# Patient Record
Sex: MALE | Race: OTHER | HISPANIC OR LATINO | URBAN - METROPOLITAN AREA
[De-identification: names, ages, dates, MRNs, and addresses within clinical notes are randomized per-mention and may not be internally consistent; named-entity substitution may affect disease eponyms.]

---

## 2023-07-31 ENCOUNTER — INPATIENT (INPATIENT)
Facility: HOSPITAL | Age: 63
LOS: 15 days | Discharge: HOME | DRG: 54 | End: 2023-08-16
Attending: HOSPITALIST | Admitting: HOSPITALIST
Payer: MEDICAID

## 2023-07-31 VITALS
SYSTOLIC BLOOD PRESSURE: 179 MMHG | OXYGEN SATURATION: 99 % | WEIGHT: 122.14 LBS | HEIGHT: 64.96 IN | RESPIRATION RATE: 18 BRPM | DIASTOLIC BLOOD PRESSURE: 90 MMHG | HEART RATE: 72 BPM | TEMPERATURE: 98 F

## 2023-07-31 DIAGNOSIS — I63.9 CEREBRAL INFARCTION, UNSPECIFIED: ICD-10-CM

## 2023-07-31 LAB
ALBUMIN SERPL ELPH-MCNC: 4 G/DL — SIGNIFICANT CHANGE UP (ref 3.3–5.2)
ALP SERPL-CCNC: 69 U/L — SIGNIFICANT CHANGE UP (ref 40–120)
ALT FLD-CCNC: 1179 U/L — HIGH
AMPHET UR-MCNC: NEGATIVE — SIGNIFICANT CHANGE UP
ANION GAP SERPL CALC-SCNC: 13 MMOL/L — SIGNIFICANT CHANGE UP (ref 5–17)
APTT BLD: 27.9 SEC — SIGNIFICANT CHANGE UP (ref 24.5–35.6)
AST SERPL-CCNC: 856 U/L — HIGH
BARBITURATES UR SCN-MCNC: NEGATIVE — SIGNIFICANT CHANGE UP
BASOPHILS # BLD AUTO: 0.01 K/UL — SIGNIFICANT CHANGE UP (ref 0–0.2)
BASOPHILS NFR BLD AUTO: 0.1 % — SIGNIFICANT CHANGE UP (ref 0–2)
BENZODIAZ UR-MCNC: NEGATIVE — SIGNIFICANT CHANGE UP
BILIRUB SERPL-MCNC: 0.4 MG/DL — SIGNIFICANT CHANGE UP (ref 0.4–2)
BUN SERPL-MCNC: 23.3 MG/DL — HIGH (ref 8–20)
CALCIUM SERPL-MCNC: 8.9 MG/DL — SIGNIFICANT CHANGE UP (ref 8.4–10.5)
CHLORIDE SERPL-SCNC: 98 MMOL/L — SIGNIFICANT CHANGE UP (ref 96–108)
CO2 SERPL-SCNC: 26 MMOL/L — SIGNIFICANT CHANGE UP (ref 22–29)
COCAINE METAB.OTHER UR-MCNC: POSITIVE
CREAT SERPL-MCNC: 1.34 MG/DL — HIGH (ref 0.5–1.3)
EGFR: 60 ML/MIN/1.73M2 — SIGNIFICANT CHANGE UP
EOSINOPHIL # BLD AUTO: 0 K/UL — SIGNIFICANT CHANGE UP (ref 0–0.5)
EOSINOPHIL NFR BLD AUTO: 0 % — SIGNIFICANT CHANGE UP (ref 0–6)
GLUCOSE BLDC GLUCOMTR-MCNC: 103 MG/DL — HIGH (ref 70–99)
GLUCOSE SERPL-MCNC: 110 MG/DL — HIGH (ref 70–99)
HCT VFR BLD CALC: 40.3 % — SIGNIFICANT CHANGE UP (ref 39–50)
HGB BLD-MCNC: 14 G/DL — SIGNIFICANT CHANGE UP (ref 13–17)
IMM GRANULOCYTES NFR BLD AUTO: 0.4 % — SIGNIFICANT CHANGE UP (ref 0–0.9)
INR BLD: 1.02 RATIO — SIGNIFICANT CHANGE UP (ref 0.85–1.18)
LYMPHOCYTES # BLD AUTO: 0.94 K/UL — LOW (ref 1–3.3)
LYMPHOCYTES # BLD AUTO: 7.6 % — LOW (ref 13–44)
MCHC RBC-ENTMCNC: 29.7 PG — SIGNIFICANT CHANGE UP (ref 27–34)
MCHC RBC-ENTMCNC: 34.7 GM/DL — SIGNIFICANT CHANGE UP (ref 32–36)
MCV RBC AUTO: 85.4 FL — SIGNIFICANT CHANGE UP (ref 80–100)
METHADONE UR-MCNC: NEGATIVE — SIGNIFICANT CHANGE UP
MONOCYTES # BLD AUTO: 0.99 K/UL — HIGH (ref 0–0.9)
MONOCYTES NFR BLD AUTO: 8 % — SIGNIFICANT CHANGE UP (ref 2–14)
NEUTROPHILS # BLD AUTO: 10.44 K/UL — HIGH (ref 1.8–7.4)
NEUTROPHILS NFR BLD AUTO: 83.9 % — HIGH (ref 43–77)
OPIATES UR-MCNC: NEGATIVE — SIGNIFICANT CHANGE UP
PCP SPEC-MCNC: SIGNIFICANT CHANGE UP
PCP UR-MCNC: NEGATIVE — SIGNIFICANT CHANGE UP
PLATELET # BLD AUTO: 162 K/UL — SIGNIFICANT CHANGE UP (ref 150–400)
POTASSIUM SERPL-MCNC: 3.8 MMOL/L — SIGNIFICANT CHANGE UP (ref 3.5–5.3)
POTASSIUM SERPL-SCNC: 3.8 MMOL/L — SIGNIFICANT CHANGE UP (ref 3.5–5.3)
PROT SERPL-MCNC: 6.6 G/DL — SIGNIFICANT CHANGE UP (ref 6.6–8.7)
PROTHROM AB SERPL-ACNC: 11.3 SEC — SIGNIFICANT CHANGE UP (ref 9.5–13)
RBC # BLD: 4.72 M/UL — SIGNIFICANT CHANGE UP (ref 4.2–5.8)
RBC # FLD: 12.9 % — SIGNIFICANT CHANGE UP (ref 10.3–14.5)
SODIUM SERPL-SCNC: 137 MMOL/L — SIGNIFICANT CHANGE UP (ref 135–145)
THC UR QL: POSITIVE
TROPONIN T SERPL-MCNC: 0.03 NG/ML — SIGNIFICANT CHANGE UP (ref 0–0.06)
WBC # BLD: 12.43 K/UL — HIGH (ref 3.8–10.5)
WBC # FLD AUTO: 12.43 K/UL — HIGH (ref 3.8–10.5)

## 2023-07-31 PROCEDURE — 99223 1ST HOSP IP/OBS HIGH 75: CPT

## 2023-07-31 PROCEDURE — 93010 ELECTROCARDIOGRAM REPORT: CPT

## 2023-07-31 PROCEDURE — 99285 EMERGENCY DEPT VISIT HI MDM: CPT

## 2023-07-31 PROCEDURE — 70450 CT HEAD/BRAIN W/O DYE: CPT | Mod: 26,MA

## 2023-07-31 RX ORDER — PANTOPRAZOLE SODIUM 20 MG/1
40 TABLET, DELAYED RELEASE ORAL
Refills: 0 | Status: DISCONTINUED | OUTPATIENT
Start: 2023-07-31 | End: 2023-08-16

## 2023-07-31 RX ORDER — LANOLIN ALCOHOL/MO/W.PET/CERES
3 CREAM (GRAM) TOPICAL AT BEDTIME
Refills: 0 | Status: DISCONTINUED | OUTPATIENT
Start: 2023-07-31 | End: 2023-08-04

## 2023-07-31 RX ORDER — ONDANSETRON 8 MG/1
4 TABLET, FILM COATED ORAL EVERY 8 HOURS
Refills: 0 | Status: DISCONTINUED | OUTPATIENT
Start: 2023-07-31 | End: 2023-08-16

## 2023-07-31 RX ORDER — THIAMINE MONONITRATE (VIT B1) 100 MG
100 TABLET ORAL DAILY
Refills: 0 | Status: COMPLETED | OUTPATIENT
Start: 2023-07-31 | End: 2023-08-03

## 2023-07-31 RX ORDER — SENNA PLUS 8.6 MG/1
2 TABLET ORAL AT BEDTIME
Refills: 0 | Status: DISCONTINUED | OUTPATIENT
Start: 2023-07-31 | End: 2023-08-10

## 2023-07-31 RX ORDER — HYDRALAZINE HCL 50 MG
5 TABLET ORAL EVERY 4 HOURS
Refills: 0 | Status: DISCONTINUED | OUTPATIENT
Start: 2023-07-31 | End: 2023-08-16

## 2023-07-31 RX ORDER — ATORVASTATIN CALCIUM 80 MG/1
80 TABLET, FILM COATED ORAL AT BEDTIME
Refills: 0 | Status: DISCONTINUED | OUTPATIENT
Start: 2023-07-31 | End: 2023-08-16

## 2023-07-31 RX ORDER — ASPIRIN/CALCIUM CARB/MAGNESIUM 324 MG
300 TABLET ORAL DAILY
Refills: 0 | Status: DISCONTINUED | OUTPATIENT
Start: 2023-07-31 | End: 2023-07-31

## 2023-07-31 RX ORDER — ASPIRIN/CALCIUM CARB/MAGNESIUM 324 MG
300 TABLET ORAL DAILY
Refills: 0 | Status: DISCONTINUED | OUTPATIENT
Start: 2023-07-31 | End: 2023-08-01

## 2023-07-31 RX ORDER — FOLIC ACID 0.8 MG
1 TABLET ORAL DAILY
Refills: 0 | Status: DISCONTINUED | OUTPATIENT
Start: 2023-07-31 | End: 2023-08-16

## 2023-07-31 RX ORDER — LABETALOL HCL 100 MG
10 TABLET ORAL
Refills: 0 | Status: DISCONTINUED | OUTPATIENT
Start: 2023-07-31 | End: 2023-08-16

## 2023-07-31 RX ORDER — ACETAMINOPHEN 500 MG
650 TABLET ORAL EVERY 6 HOURS
Refills: 0 | Status: DISCONTINUED | OUTPATIENT
Start: 2023-07-31 | End: 2023-08-16

## 2023-07-31 RX ORDER — HYDRALAZINE HCL 50 MG
10 TABLET ORAL EVERY 4 HOURS
Refills: 0 | Status: DISCONTINUED | OUTPATIENT
Start: 2023-07-31 | End: 2023-08-16

## 2023-07-31 RX ADMIN — Medication 300 MILLIGRAM(S): at 23:55

## 2023-07-31 RX ADMIN — Medication 2 MILLIGRAM(S): at 23:59

## 2023-07-31 RX ADMIN — ATORVASTATIN CALCIUM 80 MILLIGRAM(S): 80 TABLET, FILM COATED ORAL at 23:55

## 2023-07-31 NOTE — ED ADULT TRIAGE NOTE - CHIEF COMPLAINT QUOTE
pt c/o right sided weakness, right arm & right leg, started 8am yesterday, he was in bed all day, just staring  unable to walk, had weakness, couldn't move arms or legs  awake alert resp wnl

## 2023-07-31 NOTE — H&P ADULT - CLICK TO LAUNCH ORM
Signed and placed in TC/MA file.  485, please fax and send for abstracting.  Electronically signed by Yumiko Ralph M.D.    .

## 2023-07-31 NOTE — ED ADULT TRIAGE NOTE - TEMPERATURE IN FAHRENHEIT (DEGREES F)
- Chest pain free on admission.  EKG unrevealing.  Troponin negative x 1.  - Trend serial cardiac enzymes.  - Daily ASA 81 mg.  - Check FLP and add statin if warranted.  - Further recs pending clinical course.  
-Chest pain in patient reproducible to palpitation of chest wall   -Patient believes chest pain also related to anxiety attack. Recalls having a bid disagreement with her daughter prior to episode   -Patient had a fall last week and landed on chest and left arm  -Troponin negative x2  -EKG unrevealing  -Echo in Sept 2019-normal   -Recommend OP Cardiology follow up.   -Will be available PRN     
97.6

## 2023-07-31 NOTE — ED PROVIDER NOTE - OBJECTIVE STATEMENT
ED  Brian 62 y/o male hx cocaine abuse present with weakness to RUE/RLE with poor gait since 8am day prior. Per wife pt did some cocaine on saturday midday, went to check on him 8am yesterday and was making less sense, not moving right arm. has been off balance, given pedialyte and started making more sense when speaking. pt reports mild headache. no hx of prior. no cp/sob. no other complaints.

## 2023-07-31 NOTE — ED ADULT NURSE NOTE - OBJECTIVE STATEMENT
Pt present with weakness to RUE/RLE with poor gait since 8am day prior. Per wife pt did some cocaine on saturday midday, went to check on him 8am yesterday and was making less sense, not moving right arm. has been off balance, given pedialyte and started making more sense when speaking. pt reports mild headache. no hx of prior. no cp/sob. no other complaints.

## 2023-07-31 NOTE — ED ADULT NURSE REASSESSMENT NOTE - NS ED NURSE REASSESS COMMENT FT1
MD Bla[place]ein at bedside with  explaining plan of care and results to patient and family
Patient resting comfortably, rr even and unlabored, reports having right sided weakness, unable to lift right arm, weakness noted to right arm, able to feel equally on both sides. Normal sinus rhythm on cardiac monitor.
Patient resting, rr even and unlabored, vital signs stable, denies pain, aware of plan of care

## 2023-07-31 NOTE — CONSULT NOTE ADULT - SUBJECTIVE AND OBJECTIVE BOX
Patient is a 63 year old Male who presents with a chief complaint of R sided weakness     HISTORY OF PRESENT ILLNESS:   63M w/ no PMHX (has not seen PCP ever)       PAST MEDICAL & SURGICAL HISTORY:  No significant hx    FAMILY HISTORY:  Maternal uterine ca    SOCIAL HISTORY:  Tobacco Use: 1 cigarette a day per 40 years  EtOH use: 3 beers a day  Substance: Cocaine     Allergies  No Known Allergies    REVIEW OF SYSTEMS  Negative except as noted in HPI    HOME MEDICATIONS:    MEDICATIONS:  Antibiotics:  Neuro:  acetaminophen     Tablet .. 650 milliGRAM(s) Oral every 6 hours PRN  aspirin Suppository 300 milliGRAM(s) Rectal daily  melatonin 3 milliGRAM(s) Oral at bedtime PRN  ondansetron Injectable 4 milliGRAM(s) IV Push every 8 hours PRN  Anticoagulation:  OTHER:  aluminum hydroxide/magnesium hydroxide/simethicone Suspension 30 milliLiter(s) Oral every 4 hours PRN  atorvastatin 80 milliGRAM(s) Oral at bedtime  IVF:    Vital Signs Last 24 Hrs  T(C): 37 (31 Jul 2023 19:44), Max: 37.2 (31 Jul 2023 15:24)  T(F): 98.6 (31 Jul 2023 19:44), Max: 98.9 (31 Jul 2023 15:24)  HR: 69 (31 Jul 2023 19:44) (69 - 72)  BP: 167/95 (31 Jul 2023 19:44) (163/95 - 179/90)  BP(mean): --  RR: 18 (31 Jul 2023 19:44) (18 - 18)  SpO2: 97% (31 Jul 2023 19:44) (97% - 99%)  Parameters below as of 31 Jul 2023 19:44  Patient On (Oxygen Delivery Method): room air    PHYSICAL EXAM:  GENERAL: NAD, well-groomed, well-developed  HEAD: Atraumatic, normocephalic  ARIANE COMA SCORE: E-4 V-4 M-6 = 14  MENTAL STATUS: AAO x2 (summer but not year); Awake; Opens eyes spontaneously; conversant without aphasia; following simple commands  CRANIAL NERVES: Visual acuity normal for age, visual fields attempted but unable to follow. PERRL. EOMI without nystagmus. Facial sensation intact V1-3 distribution b/l. Face symmetric w/ normal eye closure and smile, tongue midline. Hearing grossly intact. Speech clear.   MOTOR: strength 5/5 LUE/LLE. RUE 0/5, no tone. RLE 4/5  SENSATION: dec on R side RUE>>RLE  SKIN: Warm, dry    LABS:             14.0   12.43 )-----------( 162      ( 31 Jul 2023 16:30 )             40.3     07-31    137  |  98  |  23.3<H>  ----------------------------<  110<H>  3.8   |  26.0  |  1.34<H>    Ca    8.9      31 Jul 2023 16:30    TPro  6.6  /  Alb  4.0  /  TBili  0.4  /  DBili  x   /  AST  856<H>  /  ALT  1179<H>  /  AlkPhos  69  07-31    PT/INR - ( 31 Jul 2023 16:30 )   PT: 11.3 sec;   INR: 1.02 ratio       PTT - ( 31 Jul 2023 16:30 )  PTT:27.9 sec  Urinalysis Basic - ( 31 Jul 2023 16:30 )    Color: x / Appearance: x / SG: x / pH: x  Gluc: 110 mg/dL / Ketone: x  / Bili: x / Urobili: x   Blood: x / Protein: x / Nitrite: x   Leuk Esterase: x / RBC: x / WBC x   Sq Epi: x / Non Sq Epi: x / Bacteria: x      CULTURES:          RADIOLOGY & ADDITIONAL STUDIES:  CT Head No Cont (07.31.23 @ 19:13)  IMPRESSION:  1. Findings suggestive of a age indeterminate left MCA territory and   right occipital (PCA territory) infarcts..  2. Left posterior parietal intraparenchymal hemorrhage measuring   approximately 2.1 cm in greatest diameter, with surrounding edema.   Smaller eft inferior frontal hemorrhagic lesion with surrounding edema,   small left posterior temporal hemorrhagic lesion with surrounding edema   and small right temporal hemorrhagic lesion with surrounding edema. Small   foci of abnormal signal are also appreciated bilaterally within the   cerebellar regions.Findings are concerning for presence of underlying   hemorrhagic mass lesions, possibly metastatic in etiology. Possibility of   mycotic aneurysm should also be considered in the differential diagnosis.   Consider follow-up pre and postcontrast MR imaging of the brain, to   include DWI imaging and ADC mapping techniques. MRA of the [place] of   [place] may also prove useful.  3. Punctate focus of calcification in a left occipital location without   surrounding edema, may represent prior infectious/inflammatory process   versus nonacute hemorrhagic event.     Patient is a 63 year old Male who presents with a chief complaint of R sided weakness     HISTORY OF PRESENT ILLNESS:   63M w/ no PMHX (has not seen PCP ever) daily ETOH~3 beers, current smoker 1 cigarette >40 years, +cocaine, BIB sister after noticed R sided weakness since sunday, unable to ambulate on his own w/o leaning. Patient used cocaine saturday and woke up sunday w/ the R sided weakness. Came to Ed after was unable to get up. C/o of blurry vision, weakness and dec sensation on RUE/RLE.     PAST MEDICAL & SURGICAL HISTORY:  No significant hx    FAMILY HISTORY:  Maternal uterine ca    SOCIAL HISTORY:  Tobacco Use: 1 cigarette a day per 40 years  EtOH use: 3 beers a day  Substance: Cocaine     Allergies  No Known Allergies    REVIEW OF SYSTEMS  Negative except as noted in HPI    HOME MEDICATIONS:    MEDICATIONS:  Antibiotics:  Neuro:  acetaminophen     Tablet .. 650 milliGRAM(s) Oral every 6 hours PRN  aspirin Suppository 300 milliGRAM(s) Rectal daily  melatonin 3 milliGRAM(s) Oral at bedtime PRN  ondansetron Injectable 4 milliGRAM(s) IV Push every 8 hours PRN  Anticoagulation:  OTHER:  aluminum hydroxide/magnesium hydroxide/simethicone Suspension 30 milliLiter(s) Oral every 4 hours PRN  atorvastatin 80 milliGRAM(s) Oral at bedtime  IVF:    Vital Signs Last 24 Hrs  T(C): 37 (31 Jul 2023 19:44), Max: 37.2 (31 Jul 2023 15:24)  T(F): 98.6 (31 Jul 2023 19:44), Max: 98.9 (31 Jul 2023 15:24)  HR: 69 (31 Jul 2023 19:44) (69 - 72)  BP: 167/95 (31 Jul 2023 19:44) (163/95 - 179/90)  BP(mean): --  RR: 18 (31 Jul 2023 19:44) (18 - 18)  SpO2: 97% (31 Jul 2023 19:44) (97% - 99%)  Parameters below as of 31 Jul 2023 19:44  Patient On (Oxygen Delivery Method): room air    PHYSICAL EXAM:  GENERAL: NAD, well-groomed, well-developed  HEAD: Atraumatic, normocephalic  ARIANE COMA SCORE: E-4 V-4 M-6 = 14  MENTAL STATUS: AAO x2 (summer but not year); Awake; Opens eyes spontaneously; conversant without aphasia; following simple commands  CRANIAL NERVES: Visual acuity normal for age, visual fields attempted but unable to follow. PERRL. EOMI without nystagmus. Facial sensation intact V1-3 distribution b/l. Face symmetric w/ normal eye closure and smile, tongue midline. Hearing grossly intact. Speech clear.   MOTOR: strength 5/5 LUE/LLE. RUE 0/5, no tone. RLE 4/5  SENSATION: dec on R side RUE>>RLE  SKIN: Warm, dry    LABS:             14.0   12.43 )-----------( 162      ( 31 Jul 2023 16:30 )             40.3     07-31    137  |  98  |  23.3<H>  ----------------------------<  110<H>  3.8   |  26.0  |  1.34<H>    Ca    8.9      31 Jul 2023 16:30    TPro  6.6  /  Alb  4.0  /  TBili  0.4  /  DBili  x   /  AST  856<H>  /  ALT  1179<H>  /  AlkPhos  69  07-31    PT/INR - ( 31 Jul 2023 16:30 )   PT: 11.3 sec;   INR: 1.02 ratio       PTT - ( 31 Jul 2023 16:30 )  PTT:27.9 sec  Urinalysis Basic - ( 31 Jul 2023 16:30 )    Color: x / Appearance: x / SG: x / pH: x  Gluc: 110 mg/dL / Ketone: x  / Bili: x / Urobili: x   Blood: x / Protein: x / Nitrite: x   Leuk Esterase: x / RBC: x / WBC x   Sq Epi: x / Non Sq Epi: x / Bacteria: x      CULTURES:          RADIOLOGY & ADDITIONAL STUDIES:  CT Head No Cont (07.31.23 @ 19:13)  IMPRESSION:  1. Findings suggestive of a age indeterminate left MCA territory and   right occipital (PCA territory) infarcts..  2. Left posterior parietal intraparenchymal hemorrhage measuring   approximately 2.1 cm in greatest diameter, with surrounding edema.   Smaller eft inferior frontal hemorrhagic lesion with surrounding edema,   small left posterior temporal hemorrhagic lesion with surrounding edema   and small right temporal hemorrhagic lesion with surrounding edema. Small   foci of abnormal signal are also appreciated bilaterally within the   cerebellar regions.Findings are concerning for presence of underlying   hemorrhagic mass lesions, possibly metastatic in etiology. Possibility of   mycotic aneurysm should also be considered in the differential diagnosis.   Consider follow-up pre and postcontrast MR imaging of the brain, to   include DWI imaging and ADC mapping techniques. MRA of the [place] of   [place] may also prove useful.  3. Punctate focus of calcification in a left occipital location without   surrounding edema, may represent prior infectious/inflammatory process   versus nonacute hemorrhagic event.

## 2023-07-31 NOTE — H&P ADULT - HISTORY OF PRESENT ILLNESS
63 year old  male presents to [facility] ER for weakness of Right arm and leg which began >2 days ago.  Pt also noted speech slurring.  Further questioning of pt. positive for visual disturbances for the past 2 months.  Pt unable to describe visual field defects/loss but noted positive for photophobia.  Pt. also c/o pain, hot in quality" in both right leg/arm, with need for assistance for ambulation by sister.  No h/o falls.  Pt note for h/o ?HTN in past, does not see a PMD on any regular basis.  + cocaine and THC use, as well as Alcohol abuse (daily) stopped yesterday  CT of head done by ER shows age indeterminate L MCA territory, Right occipital PCA territory infarct as well as scattered hemorrhagic lesions with surrounding edema in frontal/posterior/temporal areas.   Concern for underlying hemorrhagic mass lesions probably metastatic in origin cannot be excluded.  Pt admitted for further workup    Hospital  [facility] utilized for history and physical examination

## 2023-07-31 NOTE — ED PROVIDER NOTE - PHYSICAL EXAMINATION
Gen: No acute distress, non toxic  HEENT: Mucous membranes moist, pink conjunctivae, EOMI  CV: RRR, nl s1/s2.  Resp: CTAB, normal rate and effort  GI: Abdomen soft, NT, ND. No rebound, no guarding  : No CVAT  Neuro: A&O x 3, right arm without movement. right leg able to lift off bed but weakner. decreasd sensation right side. cn 2-12 wnl. nl speech.   MSK: No spine or joint tenderness to palpation. equal pulses b/l extremities.   Skin: No rashes. intact and perfused.

## 2023-07-31 NOTE — ED ADULT NURSE NOTE - NSFALLHARMRISKINTERV_ED_ALL_ED

## 2023-07-31 NOTE — H&P ADULT - TIME BILLING
ED course, reviewing labs/imaging studies, discussing case with ED attending, examining patient, furnishing H&P, orders, doing medication reconciliation, discussing plan of care with Patient/PA and answering all their questions.

## 2023-07-31 NOTE — ED PROVIDER NOTE - CLINICAL SUMMARY MEDICAL DECISION MAKING FREE TEXT BOX
64 y/o male hx drug abuse present with >30 hours of RUE/RLE weakness with poor gait and decreased sensation in setting of recent cocaine. no cp/sob. no a/c. nsr. labs, ct, suspect likely completed stroke, ct, neuro consult and admit.

## 2023-07-31 NOTE — ED PROVIDER NOTE - PROGRESS NOTE DETAILS
Called by Radiology regarding CT results.  Neurosurgery consult called and case d/w Hospitalist and will admit to Step down for q2h neuro check and of brain MR brain with/out contrast for metastatic ds work-up.  Pt and his sister informed of results and need for admission for further w/u

## 2023-07-31 NOTE — CONSULT NOTE ADULT - ASSESSMENT
63M daily ETOH, +cocaine, not seen PCP presents w/ multiple brain lesions, CVA      Plan  - Imaging reviewed  - Medicine admission for mets workup  - MRI Brain w/w/o contrast stereotactic brain lab protocol  - Repeat CTH in 6 hours to assess stability of hemorrhagic lesion  - Normotension  - Keppra  - CIWA  - b/l SCDs; recommend holding blood thinners however in setting of acute CVA, risk/benefit ratio to be determined by primary team given risk of possible worsening hemorrhage.   - Medical management/supportive care per primary team  - D/w Dr. Valdivia

## 2023-07-31 NOTE — H&P ADULT - NSHPPHYSICALEXAM_GEN_ALL_CORE
Pt is alert, oriented to person, place, time and situation in NAD  Pt supine on stretcher, monitor in place speaking in full sentences but some dysarthria noted  Head- NCAT  Eyes- PERRLA, EOMI anicteric;   Nares- clear bilaterally without discharge   Pharynx- clear without exudate, lesion or erythema; Uvula midline and symmetrical elevation  Neck- nontender, without lymphadenopathy   Thyroid not appreciated  Thorax nontender without bony deformity  Lungs- clear bilaterally to auscultation  Heart- s1s2 heard, RRR no murmur appreciated  Abdomen- soft, nontender +bowel sounds all quadrants; no appreciated masses or organomegaly  Genitalia- pt declined  Extremities- Loss of active motion RUE, 0/5; RLE 4/5; left side 5/5 upper and lower extremity                          peripheral pulses 2+bilaterally without cyanosis, clubbing or edema; capillary refill <2 seconds all limbs  Neuro- CN II-XII grossly intact except for dysarthria; unable to assess gait                NIH score 6  Psychiatric A+O with appropriate thought processes  Vital Signs Last 24 Hrs  T(C): 37 (31 Jul 2023 19:44), Max: 37.2 (31 Jul 2023 15:24)  T(F): 98.6 (31 Jul 2023 19:44), Max: 98.9 (31 Jul 2023 15:24)  HR: 69 (31 Jul 2023 19:44) (69 - 72)  BP: 167/95 (31 Jul 2023 19:44) (163/95 - 179/90)  BP(mean): --  RR: 18 (31 Jul 2023 19:44) (18 - 18)  SpO2: 97% (31 Jul 2023 19:44) (97% - 99%)    Parameters below as of 31 Jul 2023 19:44  Patient On (Oxygen Delivery Method): room air

## 2023-07-31 NOTE — H&P ADULT - ASSESSMENT
# CVA/ Brain masses  -head CT done as noted above  -MRI brain w/wo contrast pending  -admit to step down unit  -neuro-checks Q2h  -SBP control- hydralazine 5mg i.v. push Q6h for SBP >140; hydralazine 10mg i.v. push Q6H for SBP >160 for desired SBP goal 140mmHg or less  a.m. labs- coags/cbc/bmp/lipid profile and HgA1c  -neurology consultation   -Telemetry   -OOB w/assistance  - dysphagia screen completed and passed- S&S evaluation   -PT/OT evaluation  -PM&R evaluation     #ETOH abuse/substance abuse  -Utox positive for cocaine/THC  -CIWA protocol  -SW consult    DVT prophylaxis   -SCD- hold off on chemical agents due to presence of hemorrhagic components seen on head CT 64 y/o male with hx of Cocaine/THC use, now with confusion and CTH showing possible hemorrhagic metastatic lesions vs CVA with hemorrhagic conversion, elevated transaminases    # CVA w/ hemorrhagic conversion vs Mets with hemorrhage vs primary CNS neoplasm  -Head CT done as noted above  -Repeat CT in 6 hours to assess for interval change   -MRI brain w/wo contrast pending  -admit to step down unit  -neuro-checks Q2h Vitals Q 2  -SBP control- hydralazine 5mg i.v. push Q6h for SBP >140; hydralazine 10mg i.v. push Q6H for SBP >160 for desired SBP goal 140mmHg or less  a.m. labs- coags/cbc/bmp/lipid profile and HgA1c  -neurology consultation   -ICH order set  -Hold aspirin with ICH on CT  -If MRI/MRA showing concern for malignancy over CVA will order CT chest /abd/pelvis to assess for occult malignancy   -Keppra per neuro Sx.  -Telemetry   -OOB w/assistance  -Fall/seizure precautions   -passed dysphagia screen  -PT/OT evaluation  -PM&R evaluation   -VC boots no AC with ICH    #ETOH abuse/substance abuse  -Utox positive for cocaine/THC  -CIWA protocol with Ativan taper and PRN for CIWA >8  -FA/MVI/Thiamine for possible thiamine deficiency  -Seizure/fall risk protocol   -SW consult for eventual SATP referral   -Utox as above     #Elevated transaminases:  -Etoh ?  -Cocaine induced from vasospasm?  -Check viral hepatitis panel  -RUQ sono  -Repeat CMP in AM    DVT prophylaxis   -SCD- hold off on chemical agents due to presence of hemorrhagic components seen on head CT    Discussed with ED staff, Patient

## 2023-08-01 ENCOUNTER — TRANSCRIPTION ENCOUNTER (OUTPATIENT)
Age: 63
End: 2023-08-01

## 2023-08-01 LAB
A1C WITH ESTIMATED AVERAGE GLUCOSE RESULT: 5.2 % — SIGNIFICANT CHANGE UP (ref 4–5.6)
ALBUMIN SERPL ELPH-MCNC: 4.3 G/DL — SIGNIFICANT CHANGE UP (ref 3.3–5.2)
ALP SERPL-CCNC: 72 U/L — SIGNIFICANT CHANGE UP (ref 40–120)
ALT FLD-CCNC: 934 U/L — HIGH
ANION GAP SERPL CALC-SCNC: 14 MMOL/L — SIGNIFICANT CHANGE UP (ref 5–17)
APTT BLD: 26.4 SEC — SIGNIFICANT CHANGE UP (ref 24.5–35.6)
AST SERPL-CCNC: 504 U/L — HIGH
BASOPHILS # BLD AUTO: 0.01 K/UL — SIGNIFICANT CHANGE UP (ref 0–0.2)
BASOPHILS NFR BLD AUTO: 0.1 % — SIGNIFICANT CHANGE UP (ref 0–2)
BILIRUB DIRECT SERPL-MCNC: 0.2 MG/DL — SIGNIFICANT CHANGE UP (ref 0–0.3)
BILIRUB INDIRECT FLD-MCNC: 0.5 MG/DL — SIGNIFICANT CHANGE UP (ref 0.2–1)
BILIRUB SERPL-MCNC: 0.7 MG/DL — SIGNIFICANT CHANGE UP (ref 0.4–2)
BUN SERPL-MCNC: 18.2 MG/DL — SIGNIFICANT CHANGE UP (ref 8–20)
CALCIUM SERPL-MCNC: 9.2 MG/DL — SIGNIFICANT CHANGE UP (ref 8.4–10.5)
CHLORIDE SERPL-SCNC: 100 MMOL/L — SIGNIFICANT CHANGE UP (ref 96–108)
CHOLEST SERPL-MCNC: 156 MG/DL — SIGNIFICANT CHANGE UP
CO2 SERPL-SCNC: 26 MMOL/L — SIGNIFICANT CHANGE UP (ref 22–29)
CREAT SERPL-MCNC: 1.05 MG/DL — SIGNIFICANT CHANGE UP (ref 0.5–1.3)
EGFR: 80 ML/MIN/1.73M2 — SIGNIFICANT CHANGE UP
EOSINOPHIL # BLD AUTO: 0.01 K/UL — SIGNIFICANT CHANGE UP (ref 0–0.5)
EOSINOPHIL NFR BLD AUTO: 0.1 % — SIGNIFICANT CHANGE UP (ref 0–6)
ESTIMATED AVERAGE GLUCOSE: 103 MG/DL — SIGNIFICANT CHANGE UP (ref 68–114)
GLUCOSE SERPL-MCNC: 95 MG/DL — SIGNIFICANT CHANGE UP (ref 70–99)
HBV CORE IGM SER-ACNC: SIGNIFICANT CHANGE UP
HBV SURFACE AG SER-ACNC: SIGNIFICANT CHANGE UP
HCT VFR BLD CALC: 41.5 % — SIGNIFICANT CHANGE UP (ref 39–50)
HCV AB S/CO SERPL IA: 0.3 S/CO — SIGNIFICANT CHANGE UP (ref 0–0.99)
HCV AB S/CO SERPL IA: 0.3 S/CO — SIGNIFICANT CHANGE UP (ref 0–0.99)
HCV AB SERPL-IMP: SIGNIFICANT CHANGE UP
HCV AB SERPL-IMP: SIGNIFICANT CHANGE UP
HDLC SERPL-MCNC: 53 MG/DL — SIGNIFICANT CHANGE UP
HGB BLD-MCNC: 14.5 G/DL — SIGNIFICANT CHANGE UP (ref 13–17)
IMM GRANULOCYTES NFR BLD AUTO: 0.4 % — SIGNIFICANT CHANGE UP (ref 0–0.9)
INR BLD: 1.02 RATIO — SIGNIFICANT CHANGE UP (ref 0.85–1.18)
LIPID PNL WITH DIRECT LDL SERPL: 67 MG/DL — SIGNIFICANT CHANGE UP
LYMPHOCYTES # BLD AUTO: 1.18 K/UL — SIGNIFICANT CHANGE UP (ref 1–3.3)
LYMPHOCYTES # BLD AUTO: 10.5 % — LOW (ref 13–44)
MAGNESIUM SERPL-MCNC: 2.4 MG/DL — SIGNIFICANT CHANGE UP (ref 1.6–2.6)
MCHC RBC-ENTMCNC: 29.7 PG — SIGNIFICANT CHANGE UP (ref 27–34)
MCHC RBC-ENTMCNC: 34.9 GM/DL — SIGNIFICANT CHANGE UP (ref 32–36)
MCV RBC AUTO: 84.9 FL — SIGNIFICANT CHANGE UP (ref 80–100)
MONOCYTES # BLD AUTO: 0.82 K/UL — SIGNIFICANT CHANGE UP (ref 0–0.9)
MONOCYTES NFR BLD AUTO: 7.3 % — SIGNIFICANT CHANGE UP (ref 2–14)
NEUTROPHILS # BLD AUTO: 9.21 K/UL — HIGH (ref 1.8–7.4)
NEUTROPHILS NFR BLD AUTO: 81.6 % — HIGH (ref 43–77)
NON HDL CHOLESTEROL: 103 MG/DL — SIGNIFICANT CHANGE UP
PHOSPHATE SERPL-MCNC: 2.1 MG/DL — LOW (ref 2.4–4.7)
PLATELET # BLD AUTO: 180 K/UL — SIGNIFICANT CHANGE UP (ref 150–400)
POTASSIUM SERPL-MCNC: 3.6 MMOL/L — SIGNIFICANT CHANGE UP (ref 3.5–5.3)
POTASSIUM SERPL-SCNC: 3.6 MMOL/L — SIGNIFICANT CHANGE UP (ref 3.5–5.3)
PROT SERPL-MCNC: 6.5 G/DL — LOW (ref 6.6–8.7)
PROTHROM AB SERPL-ACNC: 11.3 SEC — SIGNIFICANT CHANGE UP (ref 9.5–13)
RBC # BLD: 4.89 M/UL — SIGNIFICANT CHANGE UP (ref 4.2–5.8)
RBC # FLD: 13.1 % — SIGNIFICANT CHANGE UP (ref 10.3–14.5)
SODIUM SERPL-SCNC: 139 MMOL/L — SIGNIFICANT CHANGE UP (ref 135–145)
TRIGL SERPL-MCNC: 180 MG/DL — HIGH
WBC # BLD: 11.27 K/UL — HIGH (ref 3.8–10.5)
WBC # FLD AUTO: 11.27 K/UL — HIGH (ref 3.8–10.5)

## 2023-08-01 PROCEDURE — 70496 CT ANGIOGRAPHY HEAD: CPT | Mod: 26

## 2023-08-01 PROCEDURE — 99254 IP/OBS CNSLTJ NEW/EST MOD 60: CPT

## 2023-08-01 PROCEDURE — 99233 SBSQ HOSP IP/OBS HIGH 50: CPT

## 2023-08-01 PROCEDURE — 99223 1ST HOSP IP/OBS HIGH 75: CPT

## 2023-08-01 PROCEDURE — 71260 CT THORAX DX C+: CPT | Mod: 26

## 2023-08-01 PROCEDURE — 74177 CT ABD & PELVIS W/CONTRAST: CPT | Mod: 26

## 2023-08-01 PROCEDURE — 76705 ECHO EXAM OF ABDOMEN: CPT | Mod: 26

## 2023-08-01 PROCEDURE — 70498 CT ANGIOGRAPHY NECK: CPT | Mod: 26

## 2023-08-01 PROCEDURE — 70450 CT HEAD/BRAIN W/O DYE: CPT | Mod: 26,59

## 2023-08-01 RX ORDER — LEVETIRACETAM 250 MG/1
500 TABLET, FILM COATED ORAL EVERY 12 HOURS
Refills: 0 | Status: COMPLETED | OUTPATIENT
Start: 2023-08-01 | End: 2023-08-07

## 2023-08-01 RX ADMIN — ATORVASTATIN CALCIUM 80 MILLIGRAM(S): 80 TABLET, FILM COATED ORAL at 21:26

## 2023-08-01 RX ADMIN — Medication 1 MILLIGRAM(S): at 17:12

## 2023-08-01 RX ADMIN — PANTOPRAZOLE SODIUM 40 MILLIGRAM(S): 20 TABLET, DELAYED RELEASE ORAL at 07:02

## 2023-08-01 RX ADMIN — LEVETIRACETAM 400 MILLIGRAM(S): 250 TABLET, FILM COATED ORAL at 17:13

## 2023-08-01 RX ADMIN — Medication 100 MILLIGRAM(S): at 17:12

## 2023-08-01 RX ADMIN — LEVETIRACETAM 400 MILLIGRAM(S): 250 TABLET, FILM COATED ORAL at 05:08

## 2023-08-01 RX ADMIN — Medication 1 TABLET(S): at 17:11

## 2023-08-01 RX ADMIN — Medication 10 MILLIGRAM(S): at 10:53

## 2023-08-01 RX ADMIN — Medication 650 MILLIGRAM(S): at 22:39

## 2023-08-01 RX ADMIN — Medication 650 MILLIGRAM(S): at 21:26

## 2023-08-01 NOTE — PROGRESS NOTE ADULT - SUBJECTIVE AND OBJECTIVE BOX
Hospitalist Daily Progress Note    Chief Complaint:  Patient is a 63y old  Male who presents with a chief complaint of CVA with R sided weakness (01 Aug 2023 12:10)      SUBJECTIVE / OVERNIGHT EVENTS:  Patient was seen and examined at bedside. German translated by Niecy at bedside. Sister at bedside. States that he does not drink alcohol daily.   Patient denies chest pain, SOB, abd pain, N/V, fever, chills, dysuria or any other complaints. All remainder ROS negative.     MEDICATIONS  (STANDING):  atorvastatin 80 milliGRAM(s) Oral at bedtime  folic acid 1 milliGRAM(s) Oral daily  levETIRAcetam  IVPB 500 milliGRAM(s) IV Intermittent every 12 hours  multivitamin 1 Tablet(s) Oral daily  pantoprazole   Suspension 40 milliGRAM(s) Oral before breakfast  thiamine 100 milliGRAM(s) Oral daily    MEDICATIONS  (PRN):  acetaminophen     Tablet .. 650 milliGRAM(s) Oral every 6 hours PRN Temp greater or equal to 38C (100.4F), Mild Pain (1 - 3)  aluminum hydroxide/magnesium hydroxide/simethicone Suspension 30 milliLiter(s) Oral every 4 hours PRN Dyspepsia  hydrALAZINE Injectable 10 milliGRAM(s) IV Push every 4 hours PRN SBP >160  hydrALAZINE Injectable 5 milliGRAM(s) IV Push every 4 hours PRN SBP >140  labetalol Injectable 10 milliGRAM(s) IV Push every 10 minutes PRN SBP> 160 and DBP> 90  LORazepam   Injectable 1 milliGRAM(s) IV Push every 1 hour PRN CIWA-Ar score 8 or greater  melatonin 3 milliGRAM(s) Oral at bedtime PRN Insomnia  ondansetron Injectable 4 milliGRAM(s) IV Push every 8 hours PRN Nausea and/or Vomiting  senna 2 Tablet(s) Oral at bedtime PRN Constipation        I&O's Summary    01 Aug 2023 07:01  -  01 Aug 2023 12:51  --------------------------------------------------------  IN: 0 mL / OUT: 350 mL / NET: -350 mL        PHYSICAL EXAM:  Vital Signs Last 24 Hrs  T(C): 36.5 (01 Aug 2023 09:30), Max: 37.2 (31 Jul 2023 15:24)  T(F): 97.7 (01 Aug 2023 09:30), Max: 98.9 (31 Jul 2023 15:24)  HR: 66 (01 Aug 2023 11:24) (62 - 74)  BP: 156/92 (01 Aug 2023 11:24) (124/82 - 179/90)  BP(mean): --  RR: 18 (01 Aug 2023 05:30) (18 - 20)  SpO2: 98% (01 Aug 2023 09:30) (96% - 99%)    Parameters below as of 01 Aug 2023 09:30  Patient On (Oxygen Delivery Method): room air          Constitutional: NAD, Resting  ENT: Supple, No JVD  Lungs: CTA B/L, Non-labored breathing  Cardio: RRR, S1/S2, No murmur  Abdomen: Soft, Nontender, Nondistended; Bowel sounds present  Extremities: No calf tenderness, No pitting edema  Musculoskeletal:   No joint swelling  Psych: Calm, cooperative affect appropriate  Neuro: Awake and alert, oriented, right ue is 05/ RLE is 3/5 LLE/LUE is 5/5   Skin: No rashes; no palpable lesions    LABS:                        14.5   11.27 )-----------( 180      ( 01 Aug 2023 03:20 )             41.5     08-01    139  |  100  |  18.2  ----------------------------<  95  3.6   |  26.0  |  1.05    Ca    9.2      01 Aug 2023 03:20  Phos  2.1     08-01  Mg     2.4     08-01    TPro  6.5<L>  /  Alb  4.3  /  TBili  0.7  /  DBili  0.2  /  AST  504<H>  /  ALT  934<H>  /  AlkPhos  72  08-01    PT/INR - ( 01 Aug 2023 03:20 )   PT: 11.3 sec;   INR: 1.02 ratio         PTT - ( 01 Aug 2023 03:20 )  PTT:26.4 sec  CARDIAC MARKERS ( 31 Jul 2023 16:30 )  x     / 0.03 ng/mL / x     / x     / x          Urinalysis Basic - ( 01 Aug 2023 03:20 )    Color: x / Appearance: x / SG: x / pH: x  Gluc: 95 mg/dL / Ketone: x  / Bili: x / Urobili: x   Blood: x / Protein: x / Nitrite: x   Leuk Esterase: x / RBC: x / WBC x   Sq Epi: x / Non Sq Epi: x / Bacteria: x        CAPILLARY BLOOD GLUCOSE      POCT Blood Glucose.: 103 mg/dL (31 Jul 2023 21:22)        RADIOLOGY REVIEWED

## 2023-08-01 NOTE — CONSULT NOTE ADULT - NSCONSULTADDITIONALINFOA_GEN_ALL_CORE
History obtained from sister. Both patient and sister speak Malay. Joby, the in person ED  translated.

## 2023-08-01 NOTE — PROGRESS NOTE ADULT - ASSESSMENT
62 y/o male with hx of Cocaine/THC use, now with confusion and CTH showing possible hemorrhagic metastatic lesions vs CVA with hemorrhagic conversion, elevated transaminases    #CVA w/ hemorrhagic conversion vs Mets with hemorrhage vs primary CNS neoplasm  -Head CT  noted  -Repeat CT in 6 hours to assess for interval change   -MRI brain w/wo contrast pending  -CTA head and neck ordered  -SDU  -neuro-checks Q2h Vitals Q 2  -SBP control  -Hydralazine PRN  -neurology consultation   -Hold aspirin with ICH on CT  -Keppra per neuro Sx.  -Telemetry   -OOB w/assistance  -Fall/seizure precautions   -passed dysphagia screen  -PT/OT evaluation  -PM&R evaluation   -VC boots no AC with ICH  -Will obtain CT C/A/P with contrast     #ETOH abuse/substance abuse  -Utox positive for cocaine/THC  -CIWA protocol with Ativan PRN for CIWA >8  -FA/MVI/Thiamine for possible thiamine deficiency  -Seizure/fall risk protocol   - consult for eventual SATP referral   -Utox as above     #Elevated transaminases:  -Possibly related to ETOH   -Cocaine induced from vasospasm?  -Check viral hepatitis panel  -JOSHUA herrera      DVT prophylaxis   -SCD     Discussed with patients sister at bedside

## 2023-08-01 NOTE — CHART NOTE - NSCHARTNOTEFT_GEN_A_CORE
Nurse aid interpreters for Latvian speaking pt.  Pt states he was going to the store, and fell, landing on his right side.  Fall witnessed by fellow pt, stated he landed on his right side.  Pt admitted for CVA/brain lesions, [facility] protocol for etoh withdrawl    On examination:  Vital Signs Last 24 Hrs  T(C): 36.7 (01 Aug 2023 02:00), Max: 37.2 (31 Jul 2023 15:24)  T(F): 98.1 (01 Aug 2023 02:00), Max: 98.9 (31 Jul 2023 15:24)  HR: 68 (01 Aug 2023 02:00) (68 - 74)  BP: 124/82 (01 Aug 2023 02:00) (124/82 - 179/90)  BP(mean): --  RR: 18 (01 Aug 2023 02:00) (18 - 20)  SpO2: 98% (01 Aug 2023 02:00) (97% - 99%)    Parameters below as of 01 Aug 2023 02:00  Patient On (Oxygen Delivery Method): room air    Pt is lethargic but answers questions put to him, speech still dysarthric  Head- NCAT. no abrasions/hematomas  Eyes- PERRLA  Neck- nontender to palpation  Thorax/Back without tenderness/abrasions  Extremities- full passive ROM without tenderness; leg length equal bilaterally without internal rotation  Neuro- CN grossly intact (unchanged from admission examination)    Impression: 1- fall, atraumatic  Plan: 1- continue to monitor, re-evaluate prn

## 2023-08-01 NOTE — CONSULT NOTE ADULT - ASSESSMENT
INPROGRESS 63 year old  male with Cocaine, thc, daily alcohol use, admitted with slurring, Rt arm weakness,   CT Head with age indeterminate L MCA infarct, Right occipital PCA territory infarct as well as scattered hemorrhagic lesions with surrounding edema in frontal/posterior/temporal areas.       CT Chest/ Abd w/ IV Cont (08.01.23 @ 13:12) >  - Spiculated 2.3 x 2.2 cm lesion in the suprahilar left upper lobe with retraction of the adjacent major fissure.  - bilobed 4 mm nodule in the left upper lobe at the apex anteriorly  - opacities in the bilateral lungs measuring up to 11 x 10 mm in the right upper lobe Mild patchy groundglass opacity in the   superior segments of the lower lobes.  - shotty mediastinal and left hilar lymph nodes measuring up to 13 x 10 mm at the precarinal station. Small  calcified subcarinal lymph node    CT HEAD: Rt Occcipital area, and scattered hemorrhagic lesions with areas of edema in frontal posterior Temporal area    # Left Upper lobe spiculated lesionm and Mediastinal and hilar ln   # Brain leisons with hemorrhagic component    INPROGRESS 63 year old  male with Cocaine, thc, daily alcohol use, admitted with slurring, Rt arm weakness,   CT Head with age indeterminate L MCA infarct, Right occipital PCA territory infarct as well as scattered hemorrhagic lesions with surrounding edema in frontal/posterior/temporal areas.       CT Chest/ Abd w/ IV Cont (08.01.23 @ 13:12) >  - Spiculated 2.3 x 2.2 cm lesion in the suprahilar left upper lobe with retraction of the adjacent major fissure.  - bilobed 4 mm nodule in the left upper lobe at the apex anteriorly  - opacities in the bilateral lungs measuring up to 11 x 10 mm in the right upper lobe Mild patchy groundglass opacity in the   superior segments of the lower lobes.  - shotty mediastinal and left hilar lymph nodes measuring up to 13 x 10 mm at the precarinal station. Small  calcified subcarinal lymph node    CT HEAD: Rt Occcipital area, and scattered hemorrhagic lesions with areas of edema in frontal posterior Temporal area    # Left Upper lobe spiculated lesionm and Mediastinal and hilar ln   # Brain lesons with hemorrhagic component    - MRI Brain to assess brain lesion   - Will require biopsy of lung ( ir vs Thoracic) to establish diagnosis and primary   - send CEA/ LDH   - will follow with results of biopsy

## 2023-08-01 NOTE — SPEECH LANGUAGE PATHOLOGY EVALUATION - SLP GENERAL OBSERVATIONS
Pt received & seen seated upright in bed, 1:1 present, fair alertness, language line ID: [id number] used for Grenadian, 0/10 pain pre/post

## 2023-08-01 NOTE — OCCUPATIONAL THERAPY INITIAL EVALUATION ADULT - RANGE OF MOTION EXAMINATION, UPPER EXTREMITY
Right UE shoulder shrug, no other active ROM noted/Left UE Active ROM was WNL (within normal limits)/Right UE Passive ROM was WFL  (within functional limits)

## 2023-08-01 NOTE — PROGRESS NOTE ADULT - ASSESSMENT
Assessment:  63M with PMH drug use, etoh use who presented with acute onset R sided weakness Sunday morning after using cocaine night prior. CTH showed multiple areas of hemorrhagic infarct vs hemorrhagic lesions vs mycotic aneurysms.   - Repeat CTH stable  - Exam stable / improving       Plan:  - Discussed with Dr. Valdivia  - Ok for stepdown unit  - MRI brain w/wo IV contrast with brain lab protocol to further characterize lesions   - CTA H/N to rule out mycotic aneurysms  - CT CAP for mets w/u  - TTE pending to r/o endocarditis   - Start keppra 500 BID for seizure prophylaxis   - DVT prophylaxis: SCDs, no chemoprophylaxis given intracranial hemorrhages  - Stat CTH if change in mental status / neuro exam  - Etoh withdrawal per primary team  - PT/OT  - Further plan pending imaging results  - Further care per primary team

## 2023-08-01 NOTE — CONSULT NOTE ADULT - ATTENDING COMMENTS
Seen and examined with resident  Assessment and Plan discussed with Resident and summarized below    Likely brain metastases.  Will follow up MRI brain to help confirm  If no stroke, will ask general neurology to follow    will follow with you    Kostas Carrasco MD PhD   [id number]

## 2023-08-01 NOTE — CONSULT NOTE ADULT - SUBJECTIVE AND OBJECTIVE BOX
63yM was admitted on 07-31 with right sided weakness for 2 days. Also has history of cocaine, THC and EtOH use.     Imaging Reviewed Today:  HEAD CT 7/31 - 1. Findings suggestive of a age indeterminate left MCA territory and right occipital (PCA territory) infarcts..  2. Left posterior parietal intraparenchymal hemorrhage measuring approximately 2.1 cm in greatest diameter, with surrounding edema. Smaller eft inferior frontal hemorrhagic lesion with surrounding edema, small left posterior temporal hemorrhagic lesion with surrounding edema and small right temporal hemorrhagic lesion with surrounding edema. Small foci of abnormal signal are also appreciated bilaterally within the cerebellar regions.Findings are concerning for presence of underlying hemorrhagic mass lesions, possibly metastatic in etiology. Possibility of mycotic aneurysm should also be considered in the differential diagnosis. Consider follow-up pre and postcontrast MR imaging of the brain, to include DWI imaging and ADC mapping techniques. MRA of the [place] may also prove useful. 3. Punctate focus of calcification in a left occipital location without surrounding edema, may represent prior infectious/inflammatory process versus nonacute hemorrhagic event.    HEAD CT 8/1 - Stable peripheral areas of decreased attenuation some of which have associated increased cortical density. Favor subacute infarcts some of which with hemorrhagic conversion. Correlate clinically in regards to embolic disease..    ----------------------------  Patient is fatigued.  Limited comprehension of what he is verbalizing.  Limited memory on the occurrences of his admission.     VITALS  T(C): 36.3 (08-01-23 @ 07:30), Max: 37.2 (07-31-23 @ 15:24)  HR: 69 (08-01-23 @ 07:30) (62 - 74)  BP: 140/96 (08-01-23 @ 07:30) (124/82 - 179/90)  RR: 18 (08-01-23 @ 05:30) (18 - 20)  SpO2: 98% (08-01-23 @ 07:30) (96% - 99%)  Wt(kg): --    PAST MEDICAL & SURGICAL HISTORY  Hypertension    No significant past surgical history         RECENT LABS/IMAGING  REVIEWED    CBC Full  -  ( 01 Aug 2023 03:20 )  WBC Count : 11.27 K/uL  RBC Count : 4.89 M/uL  Hemoglobin : 14.5 g/dL  Hematocrit : 41.5 %  Platelet Count - Automated : 180 K/uL  Mean Cell Volume : 84.9 fl  Mean Cell Hemoglobin : 29.7 pg  Mean Cell Hemoglobin Concentration : 34.9 gm/dL  Auto Neutrophil # : 9.21 K/uL  Auto Lymphocyte # : 1.18 K/uL  Auto Monocyte # : 0.82 K/uL  Auto Eosinophil # : 0.01 K/uL  Auto Basophil # : 0.01 K/uL  Auto Neutrophil % : 81.6 %  Auto Lymphocyte % : 10.5 %  Auto Monocyte % : 7.3 %  Auto Eosinophil % : 0.1 %  Auto Basophil % : 0.1 %    08-01    139  |  100  |  18.2  ----------------------------<  95  3.6   |  26.0  |  1.05    Ca    9.2      01 Aug 2023 03:20  Phos  2.1     08-01  Mg     2.4     08-01    TPro  6.5<L>  /  Alb  4.3  /  TBili  0.7  /  DBili  0.2  /  AST  504<H>  /  ALT  934<H>  /  AlkPhos  72  08-01    Urinalysis Basic - ( 01 Aug 2023 03:20 )    Color: x / Appearance: x / SG: x / pH: x  Gluc: 95 mg/dL / Ketone: x  / Bili: x / Urobili: x   Blood: x / Protein: x / Nitrite: x   Leuk Esterase: x / RBC: x / WBC x   Sq Epi: x / Non Sq Epi: x / Bacteria: x        ALLERGIES  No Known Allergies      MEDICATIONS   acetaminophen     Tablet .. 650 milliGRAM(s) Oral every 6 hours PRN  aluminum hydroxide/magnesium hydroxide/simethicone Suspension 30 milliLiter(s) Oral every 4 hours PRN  atorvastatin 80 milliGRAM(s) Oral at bedtime  folic acid 1 milliGRAM(s) Oral daily  hydrALAZINE Injectable 5 milliGRAM(s) IV Push every 4 hours PRN  hydrALAZINE Injectable 10 milliGRAM(s) IV Push every 4 hours PRN  labetalol Injectable 10 milliGRAM(s) IV Push every 10 minutes PRN  levETIRAcetam  IVPB 500 milliGRAM(s) IV Intermittent every 12 hours  LORazepam     Tablet 1.5 milliGRAM(s) Oral every 4 hours  LORazepam     Tablet 2 milliGRAM(s) Oral every 4 hours  LORazepam     Tablet   Oral   LORazepam   Injectable 1 milliGRAM(s) IV Push every 1 hour PRN  melatonin 3 milliGRAM(s) Oral at bedtime PRN  multivitamin 1 Tablet(s) Oral daily  ondansetron Injectable 4 milliGRAM(s) IV Push every 8 hours PRN  pantoprazole   Suspension 40 milliGRAM(s) Oral before breakfast  senna 2 Tablet(s) Oral at bedtime PRN  thiamine 100 milliGRAM(s) Oral daily      ----------------------------------------------------------------------------------------  FUNCTIONAL HISTORY - as per patient   Lives with mother  Not working    CURRENT FUNCTIONAL STATUS  AS per 1:1, bed mobility is independent, stands with stand by assist    ----------------------------------------------------------------------------------------  PHYSICAL EXAM  Constitutional - NAD, Appears Comfortable  HEENT - NCAT, EOMI  Neck - Supple, No limited ROM  Chest - Breathing comfortably, No wheezing  Cardiovascular - S1S2   Abdomen - Soft   Extremities - No C/C/E, No calf tenderness   Neurologic Exam -                    Cognitive - AAO to self, NOT place, situation     Communication - Delayed processing, Dysarthric      FUNCTIONAL MOTOR EXAM - Unable to assess due to limited participation  Psychiatric - Fatigued, Calm/Intermittently restless  ----------------------------------------------------------------------------------------  ASSESSMENT/PLAN  63yMale with functional deficits after developing acute CVAs/brain lesions  CVAs/Brain Lesions - Keppra  EtOH - Ativan, Thiamine, Folate  CAD - Lipitor  HTN - Labetalol, Hydralazine   Pain - Tylenol  DVT PPX - SCDs  Rehab/Impaired mobility and function - Patient continues to require hospitalization for the above diagnoses and ongoing active management of comorbid complications (on 1:1, pending workup of brain mass - MRI/MRA, CT CAP) that are substantially impairing functional ability and impairing quality of life.       Will continue to follow. Rehab recommendations are dependent on how functional progress changes as well as how patient continues to participate and tolerate therapeutic interventions, which may change. Recommend ongoing mobilization by staff to maintain cardiopulmonary function and prevention of secondary complications related to debility. Discussed the specific management and recommendations above with rehab clinical care team/rehab liaison.      Total Time Spent on Encounter (reviewing clinical notes, labs, radiology, medications, patient history/exam, assessment and plan) - 75 minutes

## 2023-08-01 NOTE — PROGRESS NOTE ADULT - SUBJECTIVE AND OBJECTIVE BOX
Patient is a 63y old  Male who presents with a chief complaint of CVA with R sided weakness (01 Aug 2023 10:22)    HPI:  63 year old  male presents to [facility] ER for weakness of Right arm and leg which began >2 days ago.  Pt also noted speech slurring.  Further questioning of pt. positive for visual disturbances for the past 2 months.  Pt unable to describe visual field defects/loss but noted positive for photophobia.  Pt. also c/o pain, hot in quality" in both right leg/arm, with need for assistance for ambulation by sister.  No h/o falls.  Pt note for h/o ?HTN in past, does not see a PMD on any regular basis.  + cocaine and THC use, as well as Alcohol abuse (daily) stopped yesterday  CT of head done by ER shows age indeterminate L MCA territory, Right occipital PCA territory infarct as well as scattered hemorrhagic lesions with surrounding edema in frontal/posterior/temporal areas.   Concern for underlying hemorrhagic mass lesions probably metastatic in origin cannot be excluded.  Pt admitted for further workup    Hospital  [facility] utilized for history and physical examination  (31 Jul 2023 23:00)      Interval history:  Patient seen and examined by neurosurgery team. Interviewed with ED . Discussed CTH findings with patient need for further workup including MRI, CTA H/N, and CT CAP. Patient's sister reported that patient was complaining about tooth pain starting a few weeks ago. Patient denied cancer history, patient's sister reported their mother had ovarian and liver cancers. No acute complaints at this time.       Vital Signs Last 24 Hrs  T(C): 36.5 (01 Aug 2023 09:30), Max: 37.2 (31 Jul 2023 15:24)  T(F): 97.7 (01 Aug 2023 09:30), Max: 98.9 (31 Jul 2023 15:24)  HR: 66 (01 Aug 2023 11:24) (62 - 74)  BP: 156/92 (01 Aug 2023 11:24) (124/82 - 179/90)  RR: 18 (01 Aug 2023 05:30) (18 - 20)  SpO2: 98% (01 Aug 2023 09:30) (96% - 99%)  Parameters below as of 01 Aug 2023 09:30  Patient On (Oxygen Delivery Method): room air      Physical Exam:  Constitutional: NAD, lying in bed  Neuro  * Mental Status:  GCS 15: Awake, alert, oriented to conversation. No aphasia or difficulty speaking. No dysarthria. Hypophonic.   * Cranial Nerves: Cnii-Cnxii grossly intact. PERRL, EOMI, tongue midline, no gaze deviation  * Motor: RUE 0/5, LUE 5/5, RLE 4/5, LLE 5/5, no drift   * Sensory: Sensation grossly intact to noxious stimuli   * Reflexes: not assessed       LABS:                        14.5   11.27 )-----------( 180      ( 01 Aug 2023 03:20 )             41.5     08-01    139  |  100  |  18.2  ----------------------------<  95  3.6   |  26.0  |  1.05    Ca    9.2      01 Aug 2023 03:20  Phos  2.1     08-01  Mg     2.4     08-01    TPro  6.5<L>  /  Alb  4.3  /  TBili  0.7  /  DBili  0.2  /  AST  504<H>  /  ALT  934<H>  /  AlkPhos  72  08-01    PT/INR - ( 01 Aug 2023 03:20 )   PT: 11.3 sec;   INR: 1.02 ratio    PTT - ( 01 Aug 2023 03:20 )  PTT:26.4 sec    Urinalysis Basic - ( 01 Aug 2023 03:20 )  Color: x / Appearance: x / SG: x / pH: x  Gluc: 95 mg/dL / Ketone: x  / Bili: x / Urobili: x   Blood: x / Protein: x / Nitrite: x   Leuk Esterase: x / RBC: x / WBC x   Sq Epi: x / Non Sq Epi: x / Bacteria: x      Medications:  MEDICATIONS  (STANDING):  atorvastatin 80 milliGRAM(s) Oral at bedtime  folic acid 1 milliGRAM(s) Oral daily  levETIRAcetam  IVPB 500 milliGRAM(s) IV Intermittent every 12 hours  LORazepam     Tablet 2 milliGRAM(s) Oral every 4 hours  LORazepam     Tablet 1.5 milliGRAM(s) Oral every 4 hours  LORazepam     Tablet   Oral   multivitamin 1 Tablet(s) Oral daily  pantoprazole   Suspension 40 milliGRAM(s) Oral before breakfast  thiamine 100 milliGRAM(s) Oral daily    MEDICATIONS  (PRN):  acetaminophen     Tablet .. 650 milliGRAM(s) Oral every 6 hours PRN Temp greater or equal to 38C (100.4F), Mild Pain (1 - 3)  aluminum hydroxide/magnesium hydroxide/simethicone Suspension 30 milliLiter(s) Oral every 4 hours PRN Dyspepsia  hydrALAZINE Injectable 10 milliGRAM(s) IV Push every 4 hours PRN SBP >160  hydrALAZINE Injectable 5 milliGRAM(s) IV Push every 4 hours PRN SBP >140  labetalol Injectable 10 milliGRAM(s) IV Push every 10 minutes PRN SBP> 160 and DBP> 90  LORazepam   Injectable 1 milliGRAM(s) IV Push every 1 hour PRN CIWA-Ar score 8 or greater  melatonin 3 milliGRAM(s) Oral at bedtime PRN Insomnia  ondansetron Injectable 4 milliGRAM(s) IV Push every 8 hours PRN Nausea and/or Vomiting  senna 2 Tablet(s) Oral at bedtime PRN Constipation      RADIOLOGY & ADDITIONAL STUDIES:  < from: CT Head No Cont (08.01.23 @ 02:32) >  IMPRESSION:  Stable peripheral areas of decreased attenuation some of which have   associated increased cortical density. Favor subacute infarcts some of   which with hemorrhagic conversion. Correlate clinically in regards to   embolic disease..    --- End of Report ---    ELIGIO SHRESTHA MD; Attending Radiologist  This document has been electronically signed. Aug  1 2023  8:25AM    < end of copied text >

## 2023-08-01 NOTE — CONSULT NOTE ADULT - SUBJECTIVE AND OBJECTIVE BOX
REASON FOR CONSULTATION:     HPI:  63 year old  male presents to [facility] ER for weakness of Right arm and leg which began >2 days ago.  Pt also noted speech slurring.  Further questioning of pt. positive for visual disturbances for the past 2 months.  Pt unable to describe visual field defects/loss but noted positive for photophobia.  Pt. also c/o pain, hot in quality" in both right leg/arm, with need for assistance for ambulation by sister.  No h/o falls.  Pt note for h/o ?HTN in past, does not see a PMD on any regular basis.  + cocaine and THC use, as well as Alcohol abuse (daily) stopped yesterday  CT of head done by ER shows age indeterminate L MCA territory, Right occipital PCA territory infarct as well as scattered hemorrhagic lesions with surrounding edema in frontal/posterior/temporal areas.   Concern for underlying hemorrhagic mass lesions probably metastatic in origin cannot be excluded.  Pt admitted for further workup     CT Chest/ Abd w/ IV Cont (08.01.23 @ 13:12) >  - Spiculated 2.3 x 2.2 cm lesion in the suprahilar left upper lobe with retraction of the adjacent major fissure.  - bilobed 4 mm nodule in the left upper lobe at the apex anteriorly  - opacities in the bilateral lungs measuring up to 11 x 10 mm in the right upper lobe Mild patchy groundglass opacity in the   superior segments of the lower lobes.  - shotty mediastinal and left hilar lymph nodes measuring up to 13 x 10 mm at the precarinal station. Small  calcified subcarinal lymph node        REVIEW OF SYSTEMS:  Constitutional, Eyes, ENT, Cardiovascular, Respiratory, Gastrointestinal, Genitourinary, Musculoskeletal, Integumentary, Neurological, Psychiatric, Endocrine, Heme/Lymph, and Allergic/Immunologic review of systems are otherwise negative except as noted in the HPI.    PAST MEDICAL & SURGICAL HISTORY:  Hypertension      No significant past surgical history          FAMILY HISTORY:  No pertinent family history in first degree relatives        SOCIAL HISTORY:    Allergies    No Known Allergies    Intolerances        MEDICATIONS  (STANDING):  atorvastatin 80 milliGRAM(s) Oral at bedtime  folic acid 1 milliGRAM(s) Oral daily  levETIRAcetam  IVPB 500 milliGRAM(s) IV Intermittent every 12 hours  multivitamin 1 Tablet(s) Oral daily  pantoprazole   Suspension 40 milliGRAM(s) Oral before breakfast  thiamine 100 milliGRAM(s) Oral daily    MEDICATIONS  (PRN):  acetaminophen     Tablet .. 650 milliGRAM(s) Oral every 6 hours PRN Temp greater or equal to 38C (100.4F), Mild Pain (1 - 3)  aluminum hydroxide/magnesium hydroxide/simethicone Suspension 30 milliLiter(s) Oral every 4 hours PRN Dyspepsia  hydrALAZINE Injectable 5 milliGRAM(s) IV Push every 4 hours PRN SBP >140  hydrALAZINE Injectable 10 milliGRAM(s) IV Push every 4 hours PRN SBP >160  labetalol Injectable 10 milliGRAM(s) IV Push every 10 minutes PRN SBP> 160 and DBP> 90  LORazepam   Injectable 1 milliGRAM(s) IV Push every 1 hour PRN CIWA-Ar score 8 or greater  melatonin 3 milliGRAM(s) Oral at bedtime PRN Insomnia  ondansetron Injectable 4 milliGRAM(s) IV Push every 8 hours PRN Nausea and/or Vomiting  senna 2 Tablet(s) Oral at bedtime PRN Constipation      Vital Signs Last 24 Hrs  T(C): 36.8 (01 Aug 2023 23:01), Max: 36.8 (01 Aug 2023 17:30)  T(F): 98.3 (01 Aug 2023 23:01), Max: 98.3 (01 Aug 2023 23:01)  HR: 64 (01 Aug 2023 23:01) (62 - 85)  BP: 115/71 (01 Aug 2023 23:01) (115/71 - 171/103)  BP(mean): 87 (01 Aug 2023 20:40) (87 - 87)  RR: 16 (01 Aug 2023 23:01) (16 - 20)  SpO2: 97% (01 Aug 2023 23:01) (93% - 98%)    Parameters below as of 01 Aug 2023 23:01  Patient On (Oxygen Delivery Method): room air        PHYSICAL EXAM:    GENERAL: NAD, well-groomed, well-developed  HEAD:  Atraumatic, Normocephalic  EYES: EOMI, PERRLA, conjunctiva and sclera clear  ENMT: No tonsillar erythema, exudates, or enlargement; Moist mucous membranes, Good dentition, No lesions  NECK: Supple, No JVD, Normal thyroid  NERVOUS SYSTEM:  Alert & Oriented X3, Good concentration; Motor Strength 5/5 B/L upper and lower extremities; DTRs 2+ intact and symmetric  CHEST/LUNG: Clear to auscultation bilaterally; No rales, rhonchi, wheezing, or rubs  HEART: Regular rate and rhythm; No murmurs, rubs, or gallops  ABDOMEN: Soft, Nontender, Nondistended; Bowel sounds present  EXTREMITIES:  2+ Peripheral Pulses, No clubbing, cyanosis, or edema  LYMPH: No lymphadenopathy noted  SKIN: No rashes or lesions      LABS:                        14.5   11.27 )-----------( 180      ( 01 Aug 2023 03:20 )             41.5     08-01    139  |  100  |  18.2  ----------------------------<  95  3.6   |  26.0  |  1.05    Ca    9.2      01 Aug 2023 03:20  Phos  2.1     08-01  Mg     2.4     08-01    TPro  6.5<L>  /  Alb  4.3  /  TBili  0.7  /  DBili  0.2  /  AST  504<H>  /  ALT  934<H>  /  AlkPhos  72  08-01    PT/INR - ( 01 Aug 2023 03:20 )   PT: 11.3 sec;   INR: 1.02 ratio         PTT - ( 01 Aug 2023 03:20 )  PTT:26.4 sec  Urinalysis Basic - ( 01 Aug 2023 03:20 )    Color: x / Appearance: x / SG: x / pH: x  Gluc: 95 mg/dL / Ketone: x  / Bili: x / Urobili: x   Blood: x / Protein: x / Nitrite: x   Leuk Esterase: x / RBC: x / WBC x   Sq Epi: x / Non Sq Epi: x / Bacteria: x          RADIOLOGY & ADDITIONAL STUDIES:    PATHOLOGY:     REASON FOR CONSULTATION:     HPI:  63 year old  male presents to [facility] ER for weakness of Right arm and leg which began >2 days ago.  Pt also noted speech slurring.  Further questioning of pt. positive for visual disturbances for the past 2 months.  Pt unable to describe visual field defects/loss but noted positive for photophobia.  Pt. also c/o pain, hot in quality" in both right leg/arm, with need for assistance for ambulation by sister.  No h/o falls.  Pt note for h/o ?HTN in past, does not see a PMD on any regular basis.  + cocaine and THC use, as well as Alcohol abuse (daily) stopped yesterday  CT of head done by ER shows age indeterminate L MCA territory, Right occipital PCA territory infarct as well as scattered hemorrhagic lesions with surrounding edema in frontal/posterior/temporal areas.   Concern for underlying hemorrhagic mass lesions probably metastatic in origin cannot be excluded.  Pt admitted for further workup     CT Chest/ Abd w/ IV Cont (08.01.23 @ 13:12) >  - Spiculated 2.3 x 2.2 cm lesion in the suprahilar left upper lobe with retraction of the adjacent major fissure.  - bilobed 4 mm nodule in the left upper lobe at the apex anteriorly  - opacities in the bilateral lungs measuring up to 11 x 10 mm in the right upper lobe Mild patchy groundglass opacity in the   superior segments of the lower lobes.  - shotty mediastinal and left hilar lymph nodes measuring up to 13 x 10 mm at the precarinal station. Small  calcified subcarinal lymph node        REVIEW OF SYSTEMS:  Constitutional, Eyes, ENT, Cardiovascular, Respiratory, Gastrointestinal, Genitourinary, Musculoskeletal, Integumentary, Neurological, Psychiatric, Endocrine, Heme/Lymph, and Allergic/Immunologic review of systems are otherwise negative except as noted in the HPI.    PAST MEDICAL & SURGICAL HISTORY:  Hypertension      No significant past surgical history          FAMILY HISTORY:  No pertinent family history in first degree relatives        SOCIAL HISTORY:    Allergies    No Known Allergies    Intolerances        MEDICATIONS  (STANDING):  atorvastatin 80 milliGRAM(s) Oral at bedtime  folic acid 1 milliGRAM(s) Oral daily  levETIRAcetam  IVPB 500 milliGRAM(s) IV Intermittent every 12 hours  multivitamin 1 Tablet(s) Oral daily  pantoprazole   Suspension 40 milliGRAM(s) Oral before breakfast  thiamine 100 milliGRAM(s) Oral daily    MEDICATIONS  (PRN):  acetaminophen     Tablet .. 650 milliGRAM(s) Oral every 6 hours PRN Temp greater or equal to 38C (100.4F), Mild Pain (1 - 3)  aluminum hydroxide/magnesium hydroxide/simethicone Suspension 30 milliLiter(s) Oral every 4 hours PRN Dyspepsia  hydrALAZINE Injectable 5 milliGRAM(s) IV Push every 4 hours PRN SBP >140  hydrALAZINE Injectable 10 milliGRAM(s) IV Push every 4 hours PRN SBP >160  labetalol Injectable 10 milliGRAM(s) IV Push every 10 minutes PRN SBP> 160 and DBP> 90  LORazepam   Injectable 1 milliGRAM(s) IV Push every 1 hour PRN CIWA-Ar score 8 or greater  melatonin 3 milliGRAM(s) Oral at bedtime PRN Insomnia  ondansetron Injectable 4 milliGRAM(s) IV Push every 8 hours PRN Nausea and/or Vomiting  senna 2 Tablet(s) Oral at bedtime PRN Constipation      Vital Signs Last 24 Hrs  T(C): 36.8 (01 Aug 2023 23:01), Max: 36.8 (01 Aug 2023 17:30)  T(F): 98.3 (01 Aug 2023 23:01), Max: 98.3 (01 Aug 2023 23:01)  HR: 64 (01 Aug 2023 23:01) (62 - 85)  BP: 115/71 (01 Aug 2023 23:01) (115/71 - 171/103)  BP(mean): 87 (01 Aug 2023 20:40) (87 - 87)  RR: 16 (01 Aug 2023 23:01) (16 - 20)  SpO2: 97% (01 Aug 2023 23:01) (93% - 98%)    Parameters below as of 01 Aug 2023 23:01  Patient On (Oxygen Delivery Method): room air        PHYSICAL EXAM:    GENERAL: NAD, well-groomed, well-developed  HEAD:  Atraumatic, Normocephalic  EYES: EOMI, PERRLA,  CVS: S1S2   RS BS B/l   Abd Soft non tender   Ext : no pedal edema      LABS:                        14.5   11.27 )-----------( 180      ( 01 Aug 2023 03:20 )             41.5     08-01    139  |  100  |  18.2  ----------------------------<  95  3.6   |  26.0  |  1.05    Ca    9.2      01 Aug 2023 03:20  Phos  2.1     08-01  Mg     2.4     08-01    TPro  6.5<L>  /  Alb  4.3  /  TBili  0.7  /  DBili  0.2  /  AST  504<H>  /  ALT  934<H>  /  AlkPhos  72  08-01    PT/INR - ( 01 Aug 2023 03:20 )   PT: 11.3 sec;   INR: 1.02 ratio         PTT - ( 01 Aug 2023 03:20 )  PTT:26.4 sec  Urinalysis Basic - ( 01 Aug 2023 03:20 )    Color: x / Appearance: x / SG: x / pH: x  Gluc: 95 mg/dL / Ketone: x  / Bili: x / Urobili: x   Blood: x / Protein: x / Nitrite: x   Leuk Esterase: x / RBC: x / WBC x   Sq Epi: x / Non Sq Epi: x / Bacteria: x          RADIOLOGY & ADDITIONAL STUDIES:    PATHOLOGY:

## 2023-08-01 NOTE — CONSULT NOTE ADULT - SUBJECTIVE AND OBJECTIVE BOX
Preliminary note, official note pending attending review/signature.    PENDING                             [facility] Stroke Team  CC:  HPI:  63 year old male with pmh htn presents to [facility] ER for new onset weakness of Right arm and leg.  Symptoms began >2 days ago.  Pt also noted speech slurring.  Further questioning of pt. positive for visual disturbances for the past 2 months.  Pt was unable to describe visual field defects/loss but noted positive for photophobia.  Pt. also complained pain, hot in quality" in both right leg/arm, with need for assistance for ambulation by sister.  No h/o falls.  Pt note for h/o ?HTN in past, does not see a PMD on any regular basis.  + cocaine and THC use, as well as Alcohol abuse (daily) stopped yesterday    CT of head showed age indeterminate L MCA territory, Right occipital PCA territory infarct as well as scattered hemorrhagic lesions with surrounding edema in frontal/posterior/temporal areas.   Concern for underlying hemorrhagic mass lesions probably metastatic in origin cannot be excluded.       Stroke risk factors:    PAST MEDICAL & SURGICAL HISTORY:  Hypertension      No significant past surgical history          MEDICATIONS  (STANDING):  atorvastatin 80 milliGRAM(s) Oral at bedtime  folic acid 1 milliGRAM(s) Oral daily  levETIRAcetam  IVPB 500 milliGRAM(s) IV Intermittent every 12 hours  LORazepam     Tablet 2 milliGRAM(s) Oral every 4 hours  LORazepam     Tablet 1.5 milliGRAM(s) Oral every 4 hours  LORazepam     Tablet   Oral   multivitamin 1 Tablet(s) Oral daily  pantoprazole   Suspension 40 milliGRAM(s) Oral before breakfast  thiamine 100 milliGRAM(s) Oral daily    MEDICATIONS  (PRN):  acetaminophen     Tablet .. 650 milliGRAM(s) Oral every 6 hours PRN Temp greater or equal to 38C (100.4F), Mild Pain (1 - 3)  aluminum hydroxide/magnesium hydroxide/simethicone Suspension 30 milliLiter(s) Oral every 4 hours PRN Dyspepsia  hydrALAZINE Injectable 5 milliGRAM(s) IV Push every 4 hours PRN SBP >140  hydrALAZINE Injectable 10 milliGRAM(s) IV Push every 4 hours PRN SBP >160  labetalol Injectable 10 milliGRAM(s) IV Push every 10 minutes PRN SBP> 160 and DBP> 90  LORazepam   Injectable 1 milliGRAM(s) IV Push every 1 hour PRN CIWA-Ar score 8 or greater  melatonin 3 milliGRAM(s) Oral at bedtime PRN Insomnia  ondansetron Injectable 4 milliGRAM(s) IV Push every 8 hours PRN Nausea and/or Vomiting  senna 2 Tablet(s) Oral at bedtime PRN Constipation      Allergies    No Known Allergies    Intolerances        SOCIAL HISTORY:  + cocaine and THC use,   Alcohol use(daily),   no tobacco use    FAMILY HISTORY:  No pertinent family history in first degree relatives          ROS: 14 point ROS negative other than what is present in HPI or below    Vital Signs Last 24 Hrs  T(C): 36.5 (01 Aug 2023 09:30), Max: 37.2 (31 Jul 2023 15:24)  T(F): 97.7 (01 Aug 2023 09:30), Max: 98.9 (31 Jul 2023 15:24)  HR: 67 (01 Aug 2023 09:30) (62 - 74)  BP: 171/103 (01 Aug 2023 09:30) (124/82 - 179/90)  BP(mean): --  RR: 18 (01 Aug 2023 05:30) (18 - 20)  SpO2: 98% (01 Aug 2023 09:30) (96% - 99%)    Parameters below as of 01 Aug 2023 09:30  Patient On (Oxygen Delivery Method): room air        PENDING  General: NAD    Detailed Neurologic Exam:    Mental status: The patient is awake and alert and has normal attention span.  The patient is fully oriented in 3 spheres. The patient is oriented to current events. The patient is able to name objects, follow commands, repeat sentences.    Cranial nerves: Pupils equal and react symmetrically to light. There is no visual field deficit to confrontation. Extraocular motion is full with no nystagmus. There is no ptosis. Facial sensation is intact. Facial musculature is symmetric. Palate elevates symmetrically. Tongue is midline.    Motor: There is normal bulk and tone.  There is no tremor.  Strength is 5/5 in the right arm and leg.   Strength is 5/5 in the left arm and leg.    Sensation: Intact to light touch and pin in 4 extremities    Reflexes: 1-2+ throughout and plantar responses are flexor.    Cerebellar: There is no dysmetria on finger to nose testing.    Gait : deferred    NIH SS:  DATE:  TIME:  1A: Level of consciousness (0-3):   1B: Questions (0-2):   1C: Commands (0-2):   2: Gaze (0-2):   3: Visual fields (0-3):   4: Facial palsy (0-3):   MOTOR:  5A: Left arm motor drift (0-4):   5B: Right arm motor drift (0-4):   6A: Left leg motor drift (0-4):   6B: Right leg motor drift (0-4):   7: Limb ataxia (0-2):   SENSORY:  8: Sensation (0-2):   SPEECH:  9: Language (0-3):   10: Dysarthria (0-2):   EXTINCTION:  11: Extinction/inattention (0-2):     TOTAL SCORE:     prehospital mRS=      LABS:                         14.5   11.27 )-----------( 180      ( 01 Aug 2023 03:20 )             41.5       08-01    139  |  100  |  18.2  ----------------------------<  95  3.6   |  26.0  |  1.05    Ca    9.2      01 Aug 2023 03:20  Phos  2.1     08-01  Mg     2.4     08-01    TPro  6.5<L>  /  Alb  4.3  /  TBili  0.7  /  DBili  0.2  /  AST  504<H>  /  ALT  934<H>  /  AlkPhos  72  08-01      PT/INR - ( 01 Aug 2023 03:20 )   PT: 11.3 sec;   INR: 1.02 ratio         PTT - ( 01 Aug 2023 03:20 )  PTT:26.4 sec    Lipid panel:    HgbA1c:      stroke Neuro REVIEW PENDING    RADIOLOGY & ADDITIONAL STUDIES (independently reviewed unless otherwise noted):  CT head:  CTA head: no aneurysm, AVM, LVO or sig stenosis in COW  CTA neck: no sig carotid or vertebral stenosis  CT Perfusion head - CBF<30% volume 0ml, Tmax>6s volume =0ml   Preliminary note, official note pending attending review/signature.                               [facility] Stroke Team  CC: right arm and leg weakness  HPI:  63 year old male with non-contributory medical history and poor medical follow up presents to [facility] ER for new onset weakness of Right arm and leg.  Symptoms began Sunday morning, when his sister noted the patient could not get out of bed due to new weakness of Right arm and leg and severe fatigue and sleepiness (his sister said he was difficult to rouse).  The patient also had new onset memory loss, slurred speech (he was difficult to understand, per his sister) and severe headache.   No h/o falls.  Pt does not have a pcp and has not seen a doctor in a long time.          PAST MEDICAL & SURGICAL HISTORY:  Hypertension      No significant past surgical history          MEDICATIONS  (STANDING):  atorvastatin 80 milliGRAM(s) Oral at bedtime  folic acid 1 milliGRAM(s) Oral daily  levETIRAcetam  IVPB 500 milliGRAM(s) IV Intermittent every 12 hours  LORazepam     Tablet 2 milliGRAM(s) Oral every 4 hours  LORazepam     Tablet 1.5 milliGRAM(s) Oral every 4 hours  LORazepam     Tablet   Oral   multivitamin 1 Tablet(s) Oral daily  pantoprazole   Suspension 40 milliGRAM(s) Oral before breakfast  thiamine 100 milliGRAM(s) Oral daily    MEDICATIONS  (PRN):  acetaminophen     Tablet .. 650 milliGRAM(s) Oral every 6 hours PRN Temp greater or equal to 38C (100.4F), Mild Pain (1 - 3)  aluminum hydroxide/magnesium hydroxide/simethicone Suspension 30 milliLiter(s) Oral every 4 hours PRN Dyspepsia  hydrALAZINE Injectable 5 milliGRAM(s) IV Push every 4 hours PRN SBP >140  hydrALAZINE Injectable 10 milliGRAM(s) IV Push every 4 hours PRN SBP >160  labetalol Injectable 10 milliGRAM(s) IV Push every 10 minutes PRN SBP> 160 and DBP> 90  LORazepam   Injectable 1 milliGRAM(s) IV Push every 1 hour PRN CIWA-Ar score 8 or greater  melatonin 3 milliGRAM(s) Oral at bedtime PRN Insomnia  ondansetron Injectable 4 milliGRAM(s) IV Push every 8 hours PRN Nausea and/or Vomiting  senna 2 Tablet(s) Oral at bedtime PRN Constipation      Allergies    No Known Allergies    Intolerances        SOCIAL HISTORY:  + cocaine and THC use,   Alcohol use(daily),   no tobacco use    FAMILY HISTORY:  No pertinent family history in first degree relatives          ROS: 14 point ROS negative other than what is present in HPI or below    Vital Signs Last 24 Hrs  T(C): 36.5 (01 Aug 2023 09:30), Max: 37.2 (31 Jul 2023 15:24)  T(F): 97.7 (01 Aug 2023 09:30), Max: 98.9 (31 Jul 2023 15:24)  HR: 67 (01 Aug 2023 09:30) (62 - 74)  BP: 171/103 (01 Aug 2023 09:30) (124/82 - 179/90)  BP(mean): --  RR: 18 (01 Aug 2023 05:30) (18 - 20)  SpO2: 98% (01 Aug 2023 09:30) (96% - 99%)    Parameters below as of 01 Aug 2023 09:30  Patient On (Oxygen Delivery Method): room air        General: patient appear fatigue and somnolent- falls asleep after a few questions, but is arousable    Detailed Neurologic Exam:    Mental status: Aox2; unable to name date. The patient is able to name objects, follow commands, repeat sentences in [place].    Cranial nerves: Pupils equal and react symmetrically to light. There is no visual field deficit to confrontation. Extraocular motion is full with no nystagmus. There is no ptosis. Facial sensation is intact. Mild right side facial droop. Palate elevates symmetrically. Tongue is midline.    Motor:   There is no tremor. Right arm with decreased tone  Strength is 2/5 in the right arm and 4+/5 in right leg.   Strength is 5/5 in the left arm and leg.    Sensation: Intact to light touch in 4 extremities    Cerebellar: There is no dysmetria on finger to nose testing on left, cannot complete exam on right due to weakness    Gait : deferred    [facility] SS:  DATE: 8/1/23  TIME: 3:10 pm   1A: Level of consciousness (0-3): 0  1B: Questions (0-2): 1  1C: Commands (0-2): 0  2: Gaze (0-2): 0  3: Visual fields (0-3): 0  4: Facial palsy (0-3): 1  MOTOR:  5A: Left arm motor drift (0-4): 0  5B: Right arm motor drift (0-4): 3  6A: Left leg motor drift (0-4): 0  6B: Right leg motor drift (0-4): 1  7: Limb ataxia (0-2): 0  SENSORY:  8: Sensation (0-2): 0  SPEECH:  9: Language (0-3): 0  10: Dysarthria (0-2): 1  EXTINCTION:  11: Extinction/inattention (0-2): 0    TOTAL SCORE: 7    prehospital mRS=      LABS:                         14.5   11.27 )-----------( 180      ( 01 Aug 2023 03:20 )             41.5       08-01    139  |  100  |  18.2  ----------------------------<  95  3.6   |  26.0  |  1.05    Ca    9.2      01 Aug 2023 03:20  Phos  2.1     08-01  Mg     2.4     08-01    TPro  6.5<L>  /  Alb  4.3  /  TBili  0.7  /  DBili  0.2  /  AST  504<H>  /  ALT  934<H>  /  AlkPhos  72  08-01      PT/INR - ( 01 Aug 2023 03:20 )   PT: 11.3 sec;   INR: 1.02 ratio         PTT - ( 01 Aug 2023 03:20 )  PTT:26.4 sec    Lipid panel: (08.01.23 @ 03:20)    LDL Cholesterol Calculated: 67 mg/dL Cholesterol: 156 mg/dL  Triglycerides, Serum: 180 mg/dL  HDL Cholesterol: 53 mg/dL  Non HDL Cholesterol: 103    HgbA1c:  A1C with Estimated Average Glucose (08.01.23 @ 03:20)   A1C with Estimated Average Glucose Result: 5.2 %  Estimated Average Glucose: 103 mg/dL    Neuro REVIEW PENDING    RADIOLOGY & ADDITIONAL STUDIES (independently reviewed unless otherwise noted):  < from: CT Angio Neck w/ IV Cont (08.01.23 @ 13:12) >  IMPRESSION:    1. Bilateral vertebral arteries patent, with left-sided dominance.  2. No evidence of stenosis, occlusion or dissection bilateral   extracranial carotid arteries.  3. No evidence of large vessel occlusion, aneurysm or vascular   malformation intracranial circulation.  4. Additional findings described in detail above.    --- End of Report ---            LEENA YIN M.D., ATTENDING RADIOLOGIST    < end of copied text >  < from: CT Chest w/ IV Cont (08.01.23 @ 13:12) >  MPRESSION: Slightly spiculated left upper lobe lesion, suspicious for   primary lung carcinoma, including small cell carcinoma. Scattered   semisolid lung lesions, which may represent sites of semiinvasive   adenocarcinoma of the lung.    --- End of Report ---            LIANA SANTANA MD; Attending Radiologist  This document has been electronically signed. Aug  1 2023  3:04PM    < end of copied text >  < from: CT Angio Head w/ IV Cont (08.01.23 @ 13:11) >  IMPRESSION:    1. Bilateral vertebral arteries patent, with left-sided dominance.  2. No evidence of stenosis, occlusion or dissection bilateral   extracranial carotid arteries.  3. No evidence of large vessel occlusion, aneurysm or vascular   malformation intracranial circulation.  4. Additional findings described in detail above.    --- End of Report ---            LEENA YIN M.D., ATTENDING RADIOLOGIST    < end of copied text >  < from: CT Head No Cont (07.31.23 @ 19:13) >  IMPRESSION:  1. Findings suggestive of a age indeterminate left MCA territory and   right occipital (PCA territory) infarcts..  2. Left posterior parietal intraparenchymal hemorrhage measuring   approximately 2.1 cm in greatest diameter, with surrounding edema.   Smaller eft inferior frontal hemorrhagic lesion with surrounding edema,   small left posterior temporal hemorrhagic lesion with surrounding edema   and small right temporal hemorrhagic lesion with surrounding edema. Small   foci of abnormal signal are also appreciated bilaterally within the   cerebellar regions.Findings are concerning for presence of underlying   hemorrhagic mass lesions, possibly metastatic in etiology. Possibility of   mycotic aneurysm should also be considered in the differential diagnosis.   Consider follow-up pre and postcontrast MR imaging of the brain, to   include DWI imaging and ADC mapping techniques. MRA of the [place] may also prove useful.  3. Punctate focus of calcification in a left occipital location without   surrounding edema, may represent prior infectious/inflammatory process   versus nonacute hemorrhagic event.    Findings were communicated by Dr. Behr Ventura to Dr. Conrado Dorantes in   the emergency department at approximately 7:59 PM 7/31/2023.    --- End of Report ---            DAWN M BEHR-VENTURA MD; Attending Radiologist  This document has been electronically signed. Jul 31 2023  8:11PM    < end of copied text >   [facility] Stroke Team  Consult Note    CC: right arm and leg weakness  HPI:  63 year old male with non-contributory medical history and poor medical follow up presents to [facility] ER for new onset weakness of Right arm and leg.  Symptoms began Sunday morning, when his sister noted the patient could not get out of bed due to new weakness of Right arm and leg and severe fatigue and sleepiness (his sister said he was difficult to rouse).  The patient also had new onset memory loss, slurred speech (he was difficult to understand, per his sister) and severe headache.   No h/o falls.  Pt does not have a pcp and has not seen a doctor in a long time.        PAST MEDICAL & SURGICAL HISTORY:  Hypertension      No significant past surgical history    MEDICATIONS  (STANDING):  atorvastatin 80 milliGRAM(s) Oral at bedtime  folic acid 1 milliGRAM(s) Oral daily  levETIRAcetam  IVPB 500 milliGRAM(s) IV Intermittent every 12 hours  LORazepam     Tablet 2 milliGRAM(s) Oral every 4 hours  LORazepam     Tablet 1.5 milliGRAM(s) Oral every 4 hours  LORazepam     Tablet   Oral   multivitamin 1 Tablet(s) Oral daily  pantoprazole   Suspension 40 milliGRAM(s) Oral before breakfast  thiamine 100 milliGRAM(s) Oral daily    MEDICATIONS  (PRN):  acetaminophen     Tablet .. 650 milliGRAM(s) Oral every 6 hours PRN Temp greater or equal to 38C (100.4F), Mild Pain (1 - 3)  aluminum hydroxide/magnesium hydroxide/simethicone Suspension 30 milliLiter(s) Oral every 4 hours PRN Dyspepsia  hydrALAZINE Injectable 5 milliGRAM(s) IV Push every 4 hours PRN SBP >140  hydrALAZINE Injectable 10 milliGRAM(s) IV Push every 4 hours PRN SBP >160  labetalol Injectable 10 milliGRAM(s) IV Push every 10 minutes PRN SBP> 160 and DBP> 90  LORazepam   Injectable 1 milliGRAM(s) IV Push every 1 hour PRN CIWA-Ar score 8 or greater  melatonin 3 milliGRAM(s) Oral at bedtime PRN Insomnia  ondansetron Injectable 4 milliGRAM(s) IV Push every 8 hours PRN Nausea and/or Vomiting  senna 2 Tablet(s) Oral at bedtime PRN Constipation      Allergies    No Known Allergies    Intolerances        SOCIAL HISTORY:  + cocaine and THC use,   Alcohol use(daily),   no tobacco use    FAMILY HISTORY:  No pertinent family history in first degree relatives      ROS: 14 point ROS negative other than what is present in HPI or below    Vital Signs Last 24 Hrs  T(C): 36.5 (01 Aug 2023 09:30), Max: 37.2 (31 Jul 2023 15:24)  T(F): 97.7 (01 Aug 2023 09:30), Max: 98.9 (31 Jul 2023 15:24)  HR: 67 (01 Aug 2023 09:30) (62 - 74)  BP: 171/103 (01 Aug 2023 09:30) (124/82 - 179/90)  BP(mean): --  RR: 18 (01 Aug 2023 05:30) (18 - 20)  SpO2: 98% (01 Aug 2023 09:30) (96% - 99%)    Parameters below as of 01 Aug 2023 09:30  Patient On (Oxygen Delivery Method): room air      General: patient appear fatigue and somnolent- falls asleep after a few questions, but is arousable    Detailed Neurologic Exam:    Mental status: Aox2; unable to name date. The patient is able to name objects, follow commands, repeat sentences in [place]iqu[place].    Cranial nerves: Pupils equal and react symmetrically to light. There is no visual field deficit to confrontation. Extraocular motion is full with no nystagmus. There is no ptosis. Facial sensation is intact. Mild right side facial droop. Palate elevates symmetrically. Tongue is midline.    Motor:   There is no tremor. Right arm with decreased tone  Strength is 2/5 in the right arm and 4+/5 in right leg.   Strength is 5/5 in the left arm and leg.    Sensation: Intact to light touch in 4 extremities    Cerebellar: There is no dysmetria on finger to nose testing on left, cannot complete exam on right due to weakness    Gait : deferred    [facility] SS:  DATE: 8/1/23  TIME: 3:10 pm   1A: Level of consciousness (0-3): 0  1B: Questions (0-2): 1  1C: Commands (0-2): 0  2: Gaze (0-2): 0  3: Visual fields (0-3): 0  4: Facial palsy (0-3): 1  MOTOR:  5A: Left arm motor drift (0-4): 0  5B: Right arm motor drift (0-4): 3  6A: Left leg motor drift (0-4): 0  6B: Right leg motor drift (0-4): 1  7: Limb ataxia (0-2): 0  SENSORY:  8: Sensation (0-2): 0  SPEECH:  9: Language (0-3): 0  10: Dysarthria (0-2): 1  EXTINCTION:  11: Extinction/inattention (0-2): 0    TOTAL SCORE: 7      LABS:                         14.5   11.27 )-----------( 180      ( 01 Aug 2023 03:20 )             41.5       08-01    139  |  100  |  18.2  ----------------------------<  95  3.6   |  26.0  |  1.05    Ca    9.2      01 Aug 2023 03:20  Phos  2.1     08-01  Mg     2.4     08-01    TPro  6.5<L>  /  Alb  4.3  /  TBili  0.7  /  DBili  0.2  /  AST  504<H>  /  ALT  934<H>  /  AlkPhos  72  08-01      PT/INR - ( 01 Aug 2023 03:20 )   PT: 11.3 sec;   INR: 1.02 ratio         PTT - ( 01 Aug 2023 03:20 )  PTT:26.4 sec    Lipid panel: (08.01.23 @ 03:20)    LDL Cholesterol Calculated: 67 mg/dL Cholesterol: 156 mg/dL  Triglycerides, Serum: 180 mg/dL  HDL Cholesterol: 53 mg/dL  Non HDL Cholesterol: 103    HgbA1c:  A1C with Estimated Average Glucose (08.01.23 @ 03:20)   A1C with Estimated Average Glucose Result: 5.2 %  Estimated Average Glucose: 103 mg/dL    RADIOLOGY & ADDITIONAL STUDIES (independently reviewed unless otherwise noted):  CT Angio Neck w/ IV Cont (08.01.23 @ 13:12)   IMPRESSION:    1. Bilateral vertebral arteries patent, with left-sided dominance.  2. No evidence of stenosis, occlusion or dissection bilateral   extracranial carotid arteries.  3. No evidence of large vessel occlusion, aneurysm or vascular   malformation intracranial circulation.  4. Additional findings described in detail above.    CT Chest w/ IV Cont (08.01.23 @ 13:12)   MPRESSION: Slightly spiculated left upper lobe lesion, suspicious for   primary lung carcinoma, including small cell carcinoma. Scattered   semisolid lung lesions, which may represent sites of semiinvasive   adenocarcinoma of the lung.    CT Angio Head w/ IV Cont (08.01.23 @ 13:11)   IMPRESSION:  1. Bilateral vertebral arteries patent, with left-sided dominance.  2. No evidence of stenosis, occlusion or dissection bilateral   extracranial carotid arteries.  3. No evidence of large vessel occlusion, aneurysm or vascular   malformation intracranial circulation.  4. Additional findings described in detail above.      CT Head No Cont (07.31.23 @ 19:13)   IMPRESSION:  1. Findings suggestive of a age indeterminate left MCA territory and   right occipital (PCA territory) infarcts..  2. Left posterior parietal intraparenchymal hemorrhage measuring   approximately 2.1 cm in greatest diameter, with surrounding edema.   Smaller eft inferior frontal hemorrhagic lesion with surrounding edema,   small left posterior temporal hemorrhagic lesion with surrounding edema   and small right temporal hemorrhagic lesion with surrounding edema. Small   foci of abnormal signal are also appreciated bilaterally within the   cerebellar regions.Findings are concerning for presence of underlying   hemorrhagic mass lesions, possibly metastatic in etiology. Possibility of   mycotic aneurysm should also be considered in the differential diagnosis.   Consider follow-up pre and postcontrast MR imaging of the brain, to   include DWI imaging and ADC mapping techniques. MRA of the [place]   [place] may also prove useful.  3. Punctate focus of calcification in a left occipital location without   surrounding edema, may represent prior infectious/inflammatory process   versus nonacute hemorrhagic event.

## 2023-08-01 NOTE — SPEECH LANGUAGE PATHOLOGY EVALUATION - COMMENTS
At least moderate deficits are informally noted: formal assessment to follow as schedule permits Mild to moderate dysarthria is observed, impacting speech intelligibility at sentence & conversational levels Deficits in cognition impacting overall auditory comprehension skills As per MD note: "62 y/o male with hx of Cocaine/THC use, now with confusion and CTH showing possible hemorrhagic metastatic lesions vs CVA with hemorrhagic conversion, elevated transaminases" Pt unable to visually track (? impacted by cognition/lethargy as eval progressed)

## 2023-08-01 NOTE — DISCHARGE NOTE NURSING/CASE MANAGEMENT/SOCIAL WORK - NSDCPEFALRISK_GEN_ALL_CORE
For information on Fall & Injury Prevention, visit: [email or web address] OR  [email or web address] OR  https://www.cdc.gov/steadi/patient.html

## 2023-08-01 NOTE — CONSULT NOTE ADULT - ASSESSMENT
ASSESSMENT:   68 year old male with pmh htn and poor medical follow up presents with new right sided hemiparesis and encephalopathy, Workup notable for multiple lesions on CT head (largest is 2.1 cm diameter left posterior parietal intraparenchymal lesion), left upper lobe lung mass, and CT head/neck angiogram showing No evidence of large vessel occlusion, aneurysm or vascular malformation intracranial circulation.  Neuro exam and NIHSS score stable since admission.    Patient pending MRI head with and without contrast to better characterize intracranial lesions.    NEURO:   - defer antiplatelet therapy pending MRI head result and concern hemorrhagic intracranial lesions  - Stroke neuro checks q 2h, stat CT head without contrast if changes  -  SBP goal: 120-160 given possible hemorrhagic intracranial lesions  - neurosurgery following  -Dysphagia screen: pass  - continue aspiration precautions  -Physical therapy/OT/Speech eval/treatment.    - agree with CIWA protocol and keppra 500mg bid for seizure prophylaxis  - continue atorvastatin 80mg daily     -CARDIOVASCULAR: follow up results TTE with doppler, cardiac monitoring w/ telemetry for now, further evaluation pending findings of noted workup                              -HEMATOLOGY: hemoglobin stable      No chemical DVT ppx- concern ICH pending MRI head, agree with use scds    PULMONARY: protecting airway, saturating well       - workup of pulmonary mass per primary team    RENAL: CULLEN resolved, monitor urine output and creatinine, maintain adequate hydration       Na Goal:  135-145    ID:  leukocytosis, management per primary team of reactive vs infection as a cause  continue to monitor for sign infection    OTHER:  condition and plan of care d/w patient and sister, questions and concerns addressed.     DISPOSITION: Rehab or home depending on PT eval once stable and workup is complete      CORE MEASURES:        Admission NIHSS: 6     Tenecteplase : [] YES [x] NO      LDL/HDL/A1C:      Depression Screen- if depression hx and/or present      Statin Therapy: continue     Dysphagia Screen: [x] PASS [] FAIL     Smoking [x] YES [] NO      Afib [] YES [x] NO - on cardiac monitor     Stroke Education [x] YES [] NO    Obtain screening lower extremity venous ultrasound in patients who meet 1 or more of the following criteria as patient is high risk for DVT/PE on admission:   [] History of DVT/PE  []Hypercoagulable states (Factor V Leiden, Cancer, OCP, etc. )  []Prolonged immobility (hemiplegia/hemiparesis/post operative or any other extended immobilization)  [] Transferred from outside facility (Rehab or Long term care)  [] Age </= to 50 ASSESSMENT:   68 year old male with pmh htn and poor medical follow up presents with new right sided hemiparesis and encephalopathy, Workup notable for multiple lesions on CT head (largest is 2.1 cm diameter left posterior parietal intraparenchymal lesion), left upper lobe lung mass, and CT head/neck angiogram showing No evidence of large vessel occlusion, aneurysm or vascular malformation intracranial circulation.  Neuro exam and NIHSS score stable since admission.    Patient pending MRI head with and without contrast to better characterize intracranial lesions. However, given likely lung neoplasm on Chest CT this represents metastatic lesions.    NEURO:   - defer antiplatelet therapy pending MRI head result and concern hemorrhagic intracranial lesions  - Stroke neuro checks q 2h, stat CT head without contrast if changes  -  SBP goal: 120-160 given possible hemorrhagic intracranial lesions  - neurosurgery following  -Dysphagia screen: pass  - continue aspiration precautions  -Physical therapy/OT/Speech eval/treatment.    - agree with CIWA protocol and keppra 500mg bid for seizure prophylaxis  - continue atorvastatin 80mg daily     -CARDIOVASCULAR: follow up results TTE with doppler, cardiac monitoring w/ telemetry for now, further evaluation pending findings of noted workup                              -HEMATOLOGY: hemoglobin stable      No chemical DVT ppx- concern ICH pending MRI head, agree with use scds    PULMONARY: protecting airway, saturating well       - workup of pulmonary mass per primary team    RENAL: CULLEN resolved, monitor urine output and creatinine, maintain adequate hydration       Na Goal:  135-145    ID:  leukocytosis, management per primary team of reactive vs infection as a cause  continue to monitor for sign infection    OTHER:  condition and plan of care d/w patient and sister, questions and concerns addressed.     DISPOSITION: Rehab or home depending on PT eval once stable and workup is complete      CORE MEASURES:        Admission NIHSS: 6     Tenecteplase : [] YES [x] NO      LDL/HDL/A1C:      Depression Screen- if depression hx and/or present      Statin Therapy: continue     Dysphagia Screen: [x] PASS [] FAIL     Smoking [x] YES [] NO      Afib [] YES [x] NO - on cardiac monitor     Stroke Education [x] YES [] NO    Obtain screening lower extremity venous ultrasound in patients who meet 1 or more of the following criteria as patient is high risk for DVT/PE on admission:   [] History of DVT/PE  []Hypercoagulable states (Factor V Leiden, Cancer, OCP, etc. )  []Prolonged immobility (hemiplegia/hemiparesis/post operative or any other extended immobilization)  [] Transferred from outside facility (Rehab or Long term care)  [] Age </= to 50

## 2023-08-01 NOTE — DISCHARGE NOTE NURSING/CASE MANAGEMENT/SOCIAL WORK - PATIENT PORTAL LINK FT
You can access the FollowMyHealth Patient Portal offered by [facility] by registering at the following website: [email or web address]. By joining [facility]’s FollowMyHealth portal, you will also be able to view your health information using other applications (apps) compatible with our system.

## 2023-08-02 DIAGNOSIS — R91.8 OTHER NONSPECIFIC ABNORMAL FINDING OF LUNG FIELD: ICD-10-CM

## 2023-08-02 LAB
ANION GAP SERPL CALC-SCNC: 14 MMOL/L — SIGNIFICANT CHANGE UP (ref 5–17)
BASOPHILS # BLD AUTO: 0.02 K/UL — SIGNIFICANT CHANGE UP (ref 0–0.2)
BASOPHILS NFR BLD AUTO: 0.2 % — SIGNIFICANT CHANGE UP (ref 0–2)
BUN SERPL-MCNC: 12.3 MG/DL — SIGNIFICANT CHANGE UP (ref 8–20)
CALCIUM SERPL-MCNC: 9.1 MG/DL — SIGNIFICANT CHANGE UP (ref 8.4–10.5)
CHLORIDE SERPL-SCNC: 103 MMOL/L — SIGNIFICANT CHANGE UP (ref 96–108)
CO2 SERPL-SCNC: 25 MMOL/L — SIGNIFICANT CHANGE UP (ref 22–29)
CREAT SERPL-MCNC: 0.85 MG/DL — SIGNIFICANT CHANGE UP (ref 0.5–1.3)
EGFR: 98 ML/MIN/1.73M2 — SIGNIFICANT CHANGE UP
EOSINOPHIL # BLD AUTO: 0.03 K/UL — SIGNIFICANT CHANGE UP (ref 0–0.5)
EOSINOPHIL NFR BLD AUTO: 0.3 % — SIGNIFICANT CHANGE UP (ref 0–6)
GLUCOSE SERPL-MCNC: 103 MG/DL — HIGH (ref 70–99)
HAV IGM SER-ACNC: SIGNIFICANT CHANGE UP
HCT VFR BLD CALC: 43.8 % — SIGNIFICANT CHANGE UP (ref 39–50)
HGB BLD-MCNC: 15.1 G/DL — SIGNIFICANT CHANGE UP (ref 13–17)
IMM GRANULOCYTES NFR BLD AUTO: 0.3 % — SIGNIFICANT CHANGE UP (ref 0–0.9)
LYMPHOCYTES # BLD AUTO: 1.17 K/UL — SIGNIFICANT CHANGE UP (ref 1–3.3)
LYMPHOCYTES # BLD AUTO: 11.7 % — LOW (ref 13–44)
MCHC RBC-ENTMCNC: 29.5 PG — SIGNIFICANT CHANGE UP (ref 27–34)
MCHC RBC-ENTMCNC: 34.5 GM/DL — SIGNIFICANT CHANGE UP (ref 32–36)
MCV RBC AUTO: 85.5 FL — SIGNIFICANT CHANGE UP (ref 80–100)
MONOCYTES # BLD AUTO: 1.21 K/UL — HIGH (ref 0–0.9)
MONOCYTES NFR BLD AUTO: 12.1 % — SIGNIFICANT CHANGE UP (ref 2–14)
NEUTROPHILS # BLD AUTO: 7.58 K/UL — HIGH (ref 1.8–7.4)
NEUTROPHILS NFR BLD AUTO: 75.4 % — SIGNIFICANT CHANGE UP (ref 43–77)
PLATELET # BLD AUTO: 199 K/UL — SIGNIFICANT CHANGE UP (ref 150–400)
POTASSIUM SERPL-MCNC: 3.8 MMOL/L — SIGNIFICANT CHANGE UP (ref 3.5–5.3)
POTASSIUM SERPL-SCNC: 3.8 MMOL/L — SIGNIFICANT CHANGE UP (ref 3.5–5.3)
RBC # BLD: 5.12 M/UL — SIGNIFICANT CHANGE UP (ref 4.2–5.8)
RBC # FLD: 13.2 % — SIGNIFICANT CHANGE UP (ref 10.3–14.5)
SODIUM SERPL-SCNC: 141 MMOL/L — SIGNIFICANT CHANGE UP (ref 135–145)
WBC # BLD: 10.04 K/UL — SIGNIFICANT CHANGE UP (ref 3.8–10.5)
WBC # FLD AUTO: 10.04 K/UL — SIGNIFICANT CHANGE UP (ref 3.8–10.5)

## 2023-08-02 PROCEDURE — 99233 SBSQ HOSP IP/OBS HIGH 50: CPT

## 2023-08-02 PROCEDURE — 99252 IP/OBS CONSLTJ NEW/EST SF 35: CPT

## 2023-08-02 RX ADMIN — ATORVASTATIN CALCIUM 80 MILLIGRAM(S): 80 TABLET, FILM COATED ORAL at 21:29

## 2023-08-02 RX ADMIN — Medication 650 MILLIGRAM(S): at 05:40

## 2023-08-02 RX ADMIN — PANTOPRAZOLE SODIUM 40 MILLIGRAM(S): 20 TABLET, DELAYED RELEASE ORAL at 05:43

## 2023-08-02 RX ADMIN — Medication 1 MILLIGRAM(S): at 08:26

## 2023-08-02 RX ADMIN — Medication 1 TABLET(S): at 08:26

## 2023-08-02 RX ADMIN — LEVETIRACETAM 400 MILLIGRAM(S): 250 TABLET, FILM COATED ORAL at 05:39

## 2023-08-02 RX ADMIN — Medication 650 MILLIGRAM(S): at 16:16

## 2023-08-02 RX ADMIN — Medication 100 MILLIGRAM(S): at 08:26

## 2023-08-02 RX ADMIN — Medication 5 MILLIGRAM(S): at 08:26

## 2023-08-02 RX ADMIN — LEVETIRACETAM 400 MILLIGRAM(S): 250 TABLET, FILM COATED ORAL at 17:05

## 2023-08-02 NOTE — PROGRESS NOTE ADULT - SUBJECTIVE AND OBJECTIVE BOX
INTERVAL HPI/OVERNIGHT EVENTS:  63y Male presented to [facility] with right hemiparesis and dysarthria, 'visual disturbances' for 2 months, found with multiple areas concerning for infarcts with hemorrhagic conversion versus hemorrhagic lesions. Patient seen earlier today with sister at bedside. Continues to be concerned with RHP but no other complaints    Vital Signs Last 24 Hrs  T(C): 36.8 (02 Aug 2023 09:50), Max: 36.8 (01 Aug 2023 17:30)  T(F): 98.3 (02 Aug 2023 09:50), Max: 98.3 (01 Aug 2023 23:01)  HR: 64 (02 Aug 2023 09:50) (57 - 85)  BP: 126/87 (02 Aug 2023 09:50) (115/71 - 147/94)  BP(mean): 103 (02 Aug 2023 05:15) (87 - 103)  RR: 16 (02 Aug 2023 09:50) (16 - 18)  SpO2: 98% (02 Aug 2023 09:50) (93% - 98%)    Parameters below as of 02 Aug 2023 09:50  Patient On (Oxygen Delivery Method): room air    PHYSICAL EXAM:  GENERAL: NAD, well-groomed  HEAD: Atraumatic, normocephalic  ARIANE COMA SCORE: E- 4 V- 5 M- 6=15  MENTAL STATUS: AAO x3; Appropriately conversant without aphasia; following commands  CRANIAL NERVES: Visual fields grossly intact. PERRL. EOMI without nystagmus. Facial sensation intact V1-3 distribution b/l. Mild flattening of the nasolabial fold on right. Hearing grossly intact. Speech clear  MOTOR: RUE 0/5. RLE 4+/5. LUE/LLE 5/5  SENSATION: grossly intact to light touch all extremities. Extinction intact    LABS:                        15.1   10.04 )-----------( 199      ( 02 Aug 2023 07:25 )             43.8     08-02    141  |  103  |  12.3  ----------------------------<  103<H>  3.8   |  25.0  |  0.85    Ca    9.1      02 Aug 2023 07:25  Phos  2.1     08-01  Mg     2.4     08-01    TPro  6.5<L>  /  Alb  4.3  /  TBili  0.7  /  DBili  0.2  /  AST  504<H>  /  ALT  934<H>  /  AlkPhos  72  08-01    PT/INR - ( 01 Aug 2023 03:20 )   PT: 11.3 sec;   INR: 1.02 ratio       PTT - ( 01 Aug 2023 03:20 )  PTT:26.4 sec  Urinalysis Basic - ( 02 Aug 2023 07:25 )    Color: x / Appearance: x / SG: x / pH: x  Gluc: 103 mg/dL / Ketone: x  / Bili: x / Urobili: x   Blood: x / Protein: x / Nitrite: x   Leuk Esterase: x / RBC: x / WBC x   Sq Epi: x / Non Sq Epi: x / Bacteria: x    08-01 @ 07:01  -  08-02 @ 07:00  --------------------------------------------------------  IN: 0 mL / OUT: 350 mL / NET: -350 mL    RADIOLOGY & ADDITIONAL TESTS:  CT Abdomen and Pelvis w/ IV Cont (08.01.23 @ 13:13)  IMPRESSION: Slightly spiculated left upper lobe lesion, suspicious for   primary lung carcinoma, including small cell carcinoma. Scattered   semisolid lung lesions, which may represent sites of semiinvasive   adenocarcinoma of the lung.    CT Angio Head/Neck w/ IV Cont (08.01.23 @ 13:12)  IMPRESSION:  1. Bilateral vertebral arteries patent, with left-sided dominance.  2. No evidence of stenosis, occlusion or dissection bilateral   extracranial carotid arteries.  3. No evidence of large vessel occlusion, aneurysm or vascular   malformation intracranial circulation.  4. Additional findings described in detail above.    CT Head No Cont (0.01.23 @ 02:32)  IMPRESSION:  Stable peripheral areas of decreased attenuation some of which have   associated increased cortical density. Favor subacute infarcts some of   which with hemorrhagic conversion. Correlate clinically in regards to   embolic disease..    CT Head No Cont (07.31.23 @ 19:13)  IMPRESSION:  1. Findings suggestive of a age indeterminate left MCA territory and   right occipital (PCA territory) infarcts..  2. Left posterior parietal intraparenchymal hemorrhage measuring   approximately 2.1 cm in greatest diameter, with surrounding edema.   Smaller eft inferior frontal hemorrhagic lesion with surrounding edema,   small left posterior temporal hemorrhagic lesion with surrounding edema   and small right temporal hemorrhagic lesion with surrounding edema. Small   foci of abnormal signal are also appreciated bilaterally within the   cerebellar regions.Findings are concerning for presence of underlying   hemorrhagic mass lesions, possibly metastatic in etiology. Possibility of   mycotic aneurysm should also be considered in the differential diagnosis.   Consider follow-up pre and postcontrast MR imaging of the brain, to   include DWI imaging and ADC mapping techniques. MRA of the [place] may also prove useful.  3. Punctate focus of calcification in a left occipital location without   surrounding edema, may represent prior infectious/inflammatory process   versus nonacute hemorrhagic event.

## 2023-08-02 NOTE — CONSULT NOTE ADULT - ASSESSMENT
63 year old  male presents to [facility] ER for weakness of Right arm and leg which began >2 days ago.  Pt also noted speech slurring.  Further questioning of pt. positive for visual disturbances for the past 2 months.  + cocaine and THC use, as well as Alcohol abuse (daily) stopped yesterday. CT of head done by ER shows age indeterminate L MCA territory, Right occipital PCA territory infarct as well as scattered hemorrhagic lesions with surrounding edema in frontal/posterior/temporal areas.   CT chest demonstrated MIGEL spiculated lung lesion suspicious for primary lung CA. Thoracic surgery consulted for possible lung biopsy.

## 2023-08-02 NOTE — PATIENT PROFILE ADULT - FALL HARM RISK - HARM RISK INTERVENTIONS
Assistance with ambulation/Assistance OOB with selected safe patient handling equipment/Communicate Risk of Fall with Harm to all staff/Discuss with provider need for PT consult/Monitor gait and stability/Provide patient with walking aids - walker, cane, crutches/Reinforce activity limits and safety measures with patient and family/Tailored Fall Risk Interventions/Use of alarms - bed, chair and/or voice tab/Visual Cue: Yellow wristband and red socks/Bed in lowest position, wheels locked, appropriate side rails in place/Call bell, personal items and telephone in reach/Instruct patient to call for assistance before getting out of bed or chair/Non-slip footwear when patient is out of bed/[place] to call system/Physically safe environment - no spills, clutter or unnecessary equipment/Purposeful Proactive Rounding/Room/bathroom lighting operational, light cord in reach

## 2023-08-02 NOTE — PROGRESS NOTE ADULT - SUBJECTIVE AND OBJECTIVE BOX
Patient is more alert and interactive.  Reports has improvement in the right sided weakness.   Memory continues to be impaired.     FUNCTIONAL PROGRESS  8/1 OT  Bathing Training:   · Level of [place]	moderate assist (50% patients effort); maximum assist (25% patients effort)  · Physical Assist/Nonphysical Assist	1 person assist    Upper Body Dressing Training:   · Level of [place]	maximum assist (25% patients effort); to don gown  · Physical Assist/Nonphysical Assist	1 person assist    Lower Body Dressing Training:   · Level of [place]	maximum assist (25% patients effort); to don socks  · Physical Assist/Nonphysical Assist	1 person assist    Toilet Hygiene Training:   · Level of [place]	maximum assist (25% patients effort)    Grooming Training:   · Level of [place]	maximum assist (25% patients effort)    Eating/Self-Feeding Training:   · Level of [place]	not observed      VITALS  T(C): 36.4 (08-02-23 @ 07:55), Max: 36.8 (08-01-23 @ 17:30)  HR: 62 (08-02-23 @ 08:55) (57 - 85)  BP: 122/77 (08-02-23 @ 08:55) (115/71 - 171/103)  RR: 16 (08-02-23 @ 08:55) (16 - 18)  SpO2: 98% (08-02-23 @ 08:55) (93% - 98%)  Wt(kg): --    MEDICATIONS   acetaminophen     Tablet .. 650 milliGRAM(s) every 6 hours PRN  aluminum hydroxide/magnesium hydroxide/simethicone Suspension 30 milliLiter(s) every 4 hours PRN  atorvastatin 80 milliGRAM(s) at bedtime  folic acid 1 milliGRAM(s) daily  hydrALAZINE Injectable 5 milliGRAM(s) every 4 hours PRN  hydrALAZINE Injectable 10 milliGRAM(s) every 4 hours PRN  labetalol Injectable 10 milliGRAM(s) every 10 minutes PRN  levETIRAcetam  IVPB 500 milliGRAM(s) every 12 hours  LORazepam   Injectable 1 milliGRAM(s) every 1 hour PRN  melatonin 3 milliGRAM(s) at bedtime PRN  multivitamin 1 Tablet(s) daily  ondansetron Injectable 4 milliGRAM(s) every 8 hours PRN  pantoprazole   Suspension 40 milliGRAM(s) before breakfast  senna 2 Tablet(s) at bedtime PRN  thiamine 100 milliGRAM(s) daily      RECENT LABS/IMAGING  - Reviewed Today                        15.1   10.04 )-----------( 199      ( 02 Aug 2023 07:25 )             43.8     08-02    141  |  103  |  12.3  ----------------------------<  103<H>  3.8   |  25.0  |  0.85    Ca    9.1      02 Aug 2023 07:25  Phos  2.1     08-01  Mg     2.4     08-01    TPro  6.5<L>  /  Alb  4.3  /  TBili  0.7  /  DBili  0.2  /  AST  504<H>  /  ALT  934<H>  /  AlkPhos  72  08-01    PT/INR - ( 01 Aug 2023 03:20 )   PT: 11.3 sec;   INR: 1.02 ratio         PTT - ( 01 Aug 2023 03:20 )  PTT:26.4 sec  Urinalysis Basic - ( 02 Aug 2023 07:25 )    Color: x / Appearance: x / SG: x / pH: x  Gluc: 103 mg/dL / Ketone: x  / Bili: x / Urobili: x   Blood: x / Protein: x / Nitrite: x   Leuk Esterase: x / RBC: x / WBC x   Sq Epi: x / Non Sq Epi: x / Bacteria: x            HEAD CT 7/31 - 1. Findings suggestive of a age indeterminate left MCA territory and right occipital (PCA territory) infarcts..  2. Left posterior parietal intraparenchymal hemorrhage measuring approximately 2.1 cm in greatest diameter, with surrounding edema. Smaller eft inferior frontal hemorrhagic lesion with surrounding edema, small left posterior temporal hemorrhagic lesion with surrounding edema and small right temporal hemorrhagic lesion with surrounding edema. Small foci of abnormal signal are also appreciated bilaterally within the cerebellar regions.Findings are concerning for presence of underlying hemorrhagic mass lesions, possibly metastatic in etiology. Possibility of mycotic aneurysm should also be considered in the differential diagnosis. Consider follow-up pre and postcontrast MR imaging of the brain, to include DWI imaging and ADC mapping techniques. MRA of the [place] may also prove useful. 3. Punctate focus of calcification in a left occipital location without surrounding edema, may represent prior infectious/inflammatory process versus nonacute hemorrhagic event.    HEAD CT 8/1 - Stable peripheral areas of decreased attenuation some of which have associated increased cortical density. Favor subacute infarcts some of which with hemorrhagic conversion. Correlate clinically in regards to embolic disease..    CT CAP 8/1 - Slightly spiculated left upper lobe lesion, suspicious for primary lung carcinoma, including small cell carcinoma. Scattered semisolid lung lesions, which may represent sites of semiinvasive adenocarcinoma of the lung.    CTA NECK/HEAD 8/1 - 1. Bilateral vertebral arteries patent, with left-sided dominance. 2. No evidence of stenosis, occlusion or dissection bilateral extracranial carotid arteries.3. No evidence of large vessel occlusion, aneurysm or vascular malformation intracranial circulation. 4. Additional findings described in detail above.  ----------------------------------------------------------------------------------------  PHYSICAL EXAM  Constitutional - NAD, Comfortable  Extremities - No C/C/E, No calf tenderness   Neurologic Exam -                    Cognitive - AAO to self, NOT place, situation     Communication - Delayed processing, Dysarthric      FUNCTIONAL MOTOR EXAM -                     LEFT    UE - ShAB 5/5, EF 5/5, EE 5/5, WE 5/5,  5/5                    RIGHT UE - ShAB 1/5, EF 0/5, EE 0/5, WE 0/5,  0/5                    LEFT    LE - HF 5/5, KE 5/5, DF 5/5, PF 5/5                    RIGHT LE - HF 3/5, KE 3/5, DF 5/5, PF 5/5      Psychiatric - Fatigued, Calm   ----------------------------------------------------------------------------------------  ASSESSMENT/PLAN  63yMale with functional deficits after developing acute CVAs/brain lesions  CVAs/Brain Lesions - Keppra  EtOH - Ativan, Thiamine, Folate  CAD - Lipitor  HTN - Labetalol, Hydralazine   Pain - Tylenol  DVT PPX - SCDs  Rehab/Impaired mobility and function - Patient continues to require hospitalization for the above diagnoses and ongoing active management of comorbid complications (on 1:1, pending workup of brain mass - MRI) that are substantially impairing functional ability and impairing quality of life.     However, patient with complex psychosocial factors and limited resources for rehab. Therefore, CM and therapy services will need to work on support in community for disposition of HOME.     Will continue to follow. Rehab recommendations are dependent on how functional progress changes as well as how patient continues to participate and tolerate therapeutic interventions, which may change. Recommend ongoing mobilization by staff to maintain cardiopulmonary function and prevention of secondary complications related to debility. Discussed the specific management and recommendations above with rehab clinical care team/rehab liaison.

## 2023-08-02 NOTE — CONSULT NOTE ADULT - ASSESSMENT
Patient is a 63 year-old male admitted for CVA. Patient found to have a lung nodule on imaging and IR consulted for Lung biopsy. Due to scheduling restraints, unable to perform biopsy this week. If patient still requires inpatient care, can schedule for early next week otherwise biopsy can be performed as an outpatient. please call [phone] to set up outpatient biopsy. Will follow up with Primary team on Monday to reassess for biopsy.     Please call extension [phone] with any questions, concerns or issues regarding above.

## 2023-08-02 NOTE — CONSULT NOTE ADULT - SUBJECTIVE AND OBJECTIVE BOX
63 year old  male presents to [facility] ER for weakness of Right arm and leg which began >2 days ago.  Pt also noted speech slurring.  Further questioning of pt. positive for visual disturbances for the past 2 months.  + cocaine and THC use, as well as Alcohol abuse (daily) stopped yesterday. CT of head done by ER shows age indeterminate L MCA territory, Right occipital PCA territory infarct as well as scattered hemorrhagic lesions with surrounding edema in frontal/posterior/temporal areas.   CT chest demonstrated MIGEL spiculated lung lesion suspicious for primary lung CA. Thoracic surgery consulted for possible lung biopsy.     Subjective - patient seen and evaluated bedside. Sitting comfortably in bed. Complains of right sided weakness.  Denies CP, SOB, HA, dizziness, n/v/d    Review of Systems: negative x 10 systems except as noted above        PAST MEDICAL & SURGICAL HISTORY:  Hypertension      No significant past surgical history      No significant past surgical history            acetaminophen     Tablet .. 650 milliGRAM(s) Oral every 6 hours PRN  aluminum hydroxide/magnesium hydroxide/simethicone Suspension 30 milliLiter(s) Oral every 4 hours PRN  atorvastatin 80 milliGRAM(s) Oral at bedtime  folic acid 1 milliGRAM(s) Oral daily  hydrALAZINE Injectable 5 milliGRAM(s) IV Push every 4 hours PRN  hydrALAZINE Injectable 10 milliGRAM(s) IV Push every 4 hours PRN  labetalol Injectable 10 milliGRAM(s) IV Push every 10 minutes PRN  levETIRAcetam  IVPB 500 milliGRAM(s) IV Intermittent every 12 hours  LORazepam   Injectable 1 milliGRAM(s) IV Push every 1 hour PRN  melatonin 3 milliGRAM(s) Oral at bedtime PRN  multivitamin 1 Tablet(s) Oral daily  ondansetron Injectable 4 milliGRAM(s) IV Push every 8 hours PRN  pantoprazole   Suspension 40 milliGRAM(s) Oral before breakfast  senna 2 Tablet(s) Oral at bedtime PRN  thiamine 100 milliGRAM(s) Oral daily  MEDICATIONS  (PRN):  acetaminophen     Tablet .. 650 milliGRAM(s) Oral every 6 hours PRN Temp greater or equal to 38C (100.4F), Mild Pain (1 - 3)  aluminum hydroxide/magnesium hydroxide/simethicone Suspension 30 milliLiter(s) Oral every 4 hours PRN Dyspepsia  hydrALAZINE Injectable 5 milliGRAM(s) IV Push every 4 hours PRN SBP >140  hydrALAZINE Injectable 10 milliGRAM(s) IV Push every 4 hours PRN SBP >160  labetalol Injectable 10 milliGRAM(s) IV Push every 10 minutes PRN SBP> 160 and DBP> 90  LORazepam   Injectable 1 milliGRAM(s) IV Push every 1 hour PRN CIWA-Ar score 8 or greater  melatonin 3 milliGRAM(s) Oral at bedtime PRN Insomnia  ondansetron Injectable 4 milliGRAM(s) IV Push every 8 hours PRN Nausea and/or Vomiting  senna 2 Tablet(s) Oral at bedtime PRN Constipation      Daily     Daily                               15.1   10.04 )-----------( 199      ( 02 Aug 2023 07:25 )             43.8   08-02    141  |  103  |  12.3  ----------------------------<  103<H>  3.8   |  25.0  |  0.85    Ca    9.1      02 Aug 2023 07:25  Phos  2.1     08-01  Mg     2.4     08-01    TPro  6.5<L>  /  Alb  4.3  /  TBili  0.7  /  DBili  0.2  /  AST  504<H>  /  ALT  934<H>  /  AlkPhos  72  08-01    CARDIAC MARKERS ( 31 Jul 2023 16:30 )  x     / 0.03 ng/mL / x     / x     / x        PT/INR - ( 01 Aug 2023 03:20 )   PT: 11.3 sec;   INR: 1.02 ratio         PTT - ( 01 Aug 2023 03:20 )  PTT:26.4 sec      Objective:  T(C): 36.8 (08-02-23 @ 09:50), Max: 36.8 (08-01-23 @ 17:30)  HR: 64 (08-02-23 @ 09:50) (57 - 85)  BP: 126/87 (08-02-23 @ 09:50) (115/71 - 147/94)  RR: 16 (08-02-23 @ 09:50) (16 - 18)  SpO2: 98% (08-02-23 @ 09:50) (93% - 98%)  Wt(kg): --CAPILLARY BLOOD GLUCOSE      I&O's Summary    01 Aug 2023 07:01  -  02 Aug 2023 07:00  --------------------------------------------------------  IN: 0 mL / OUT: 350 mL / NET: -350 mL        Physical Exam  General: NAD  Neuro: A+O x 3,speech clear and intact. RUE 0/5 LUE  5/5, RLE 4/5, LLE 5/5  Psych: Appropriate affect  Pulm: CTA, equal bilaterally  CV: RRR, +S1S2  Abd: soft, NT, ND, +BS  Ext: +DP Pulses b/l, no edema  Skin: Warm, dry, intact          Imaging:    < from: CT Chest w/ IV Cont (08.01.23 @ 13:12) >  IMPRESSION: Slightly spiculated left upper lobe lesion, suspicious for   primary lung carcinoma, including small cell carcinoma. Scattered   semisolid lung lesions, which may represent sites of semiinvasive   adenocarcinoma of the lung.    < end of copied text >  < from: CT Angio Neck w/ IV Cont (08.01.23 @ 13:12) >  IMPRESSION:    1. Bilateral vertebral arteries patent, with left-sided dominance.  2. No evidence of stenosis, occlusion or dissection bilateral   extracranial carotid arteries.  3. No evidence of large vessel occlusion, aneurysm or vascular   malformation intracranial circulation.  4. Additional findings described in detail above.    < end of copied text >  < from: CT Head No Cont (08.01.23 @ 02:32) >  IMPRESSION:  Stable peripheral areas of decreased attenuation some of which have   associated increased cortical density. Favor subacute infarcts some of   which with hemorrhagic conversion. Correlate clinically in regards to   embolic disease..    < end of copied text >

## 2023-08-02 NOTE — PROGRESS NOTE ADULT - SUBJECTIVE AND OBJECTIVE BOX
Hospitalist Daily Progress Note    Chief Complaint:  Patient is a 63y old  Male who presents with a chief complaint of CVA with R sided weakness (02 Aug 2023 08:06)      SUBJECTIVE / OVERNIGHT EVENTS:  Patient was seen and examined at bedside. Kyrgyz translated by Bedside  Honey.   Patient denies chest pain, SOB, abd pain, N/V, fever, chills, dysuria or any other complaints. All remainder ROS negative.     MEDICATIONS  (STANDING):  atorvastatin 80 milliGRAM(s) Oral at bedtime  folic acid 1 milliGRAM(s) Oral daily  levETIRAcetam  IVPB 500 milliGRAM(s) IV Intermittent every 12 hours  multivitamin 1 Tablet(s) Oral daily  pantoprazole   Suspension 40 milliGRAM(s) Oral before breakfast  thiamine 100 milliGRAM(s) Oral daily    MEDICATIONS  (PRN):  acetaminophen     Tablet .. 650 milliGRAM(s) Oral every 6 hours PRN Temp greater or equal to 38C (100.4F), Mild Pain (1 - 3)  aluminum hydroxide/magnesium hydroxide/simethicone Suspension 30 milliLiter(s) Oral every 4 hours PRN Dyspepsia  hydrALAZINE Injectable 10 milliGRAM(s) IV Push every 4 hours PRN SBP >160  hydrALAZINE Injectable 5 milliGRAM(s) IV Push every 4 hours PRN SBP >140  labetalol Injectable 10 milliGRAM(s) IV Push every 10 minutes PRN SBP> 160 and DBP> 90  LORazepam   Injectable 1 milliGRAM(s) IV Push every 1 hour PRN CIWA-Ar score 8 or greater  melatonin 3 milliGRAM(s) Oral at bedtime PRN Insomnia  ondansetron Injectable 4 milliGRAM(s) IV Push every 8 hours PRN Nausea and/or Vomiting  senna 2 Tablet(s) Oral at bedtime PRN Constipation        I&O's Summary    01 Aug 2023 07:01  -  02 Aug 2023 07:00  --------------------------------------------------------  IN: 0 mL / OUT: 350 mL / NET: -350 mL        PHYSICAL EXAM:  Vital Signs Last 24 Hrs  T(C): 36.8 (02 Aug 2023 09:50), Max: 36.8 (01 Aug 2023 17:30)  T(F): 98.3 (02 Aug 2023 09:50), Max: 98.3 (01 Aug 2023 23:01)  HR: 64 (02 Aug 2023 09:50) (57 - 85)  BP: 126/87 (02 Aug 2023 09:50) (115/71 - 147/94)  BP(mean): 103 (02 Aug 2023 05:15) (87 - 103)  RR: 16 (02 Aug 2023 09:50) (16 - 18)  SpO2: 98% (02 Aug 2023 09:50) (93% - 98%)    Parameters below as of 02 Aug 2023 09:50  Patient On (Oxygen Delivery Method): room air          Constitutional: NAD, Resting  ENT: Supple, No JVD  Lungs: CTA B/L, Non-labored breathing  Cardio: RRR, S1/S2, No murmur  Abdomen: Soft, Nontender, Nondistended; Bowel sounds present  Extremities: No calf tenderness, No pitting edema  Musculoskeletal:   No joint swelling  Psych: Calm, cooperative affect appropriate  Neuro: Awake and alert, oriented to name, location, moves 5/5 in LUE/LLE 4/5 in RLE and 0/5 in RUE  Skin: No rashes; no palpable lesions    LABS:                        15.1   10.04 )-----------( 199      ( 02 Aug 2023 07:25 )             43.8     08-02    141  |  103  |  12.3  ----------------------------<  103<H>  3.8   |  25.0  |  0.85    Ca    9.1      02 Aug 2023 07:25  Phos  2.1     08-01  Mg     2.4     08-01    TPro  6.5<L>  /  Alb  4.3  /  TBili  0.7  /  DBili  0.2  /  AST  504<H>  /  ALT  934<H>  /  AlkPhos  72  08-01    PT/INR - ( 01 Aug 2023 03:20 )   PT: 11.3 sec;   INR: 1.02 ratio         PTT - ( 01 Aug 2023 03:20 )  PTT:26.4 sec  CARDIAC MARKERS ( 31 Jul 2023 16:30 )  x     / 0.03 ng/mL / x     / x     / x          Urinalysis Basic - ( 02 Aug 2023 07:25 )    Color: x / Appearance: x / SG: x / pH: x  Gluc: 103 mg/dL / Ketone: x  / Bili: x / Urobili: x   Blood: x / Protein: x / Nitrite: x   Leuk Esterase: x / RBC: x / WBC x   Sq Epi: x / Non Sq Epi: x / Bacteria: x        CAPILLARY BLOOD GLUCOSE            RADIOLOGY REVIEWED

## 2023-08-02 NOTE — CONSULT NOTE ADULT - SUBJECTIVE AND OBJECTIVE BOX
HPI:  63 year old  male presents to [facility] ER for weakness of Right arm and leg which began >2 days ago.  Pt also noted speech slurring.  Further questioning of pt. positive for visual disturbances for the past 2 months.  Pt unable to describe visual field defects/loss but noted positive for photophobia.  Pt. also c/o pain, hot in quality" in both right leg/arm, with need for assistance for ambulation by sister.  No h/o falls.  Pt note for h/o ?HTN in past, does not see a PMD on any regular basis.  + cocaine and THC use, as well as Alcohol abuse (daily) stopped yesterday  CT of head done by ER shows age indeterminate L MCA territory, Right occipital PCA territory infarct as well as scattered hemorrhagic lesions with surrounding edema in frontal/posterior/temporal areas.   Concern for underlying hemorrhagic mass lesions probably metastatic in origin cannot be excluded.  Pt admitted for further workup    Interval history: CT chest noted for L lung nodule. IR consulted for lung biopsy    ============================================================================  MEDICATIONS  (STANDING):  atorvastatin 80 milliGRAM(s) Oral at bedtime  folic acid 1 milliGRAM(s) Oral daily  levETIRAcetam  IVPB 500 milliGRAM(s) IV Intermittent every 12 hours  multivitamin 1 Tablet(s) Oral daily  pantoprazole   Suspension 40 milliGRAM(s) Oral before breakfast  thiamine 100 milliGRAM(s) Oral daily    MEDICATIONS  (PRN):  acetaminophen     Tablet .. 650 milliGRAM(s) Oral every 6 hours PRN Temp greater or equal to 38C (100.4F), Mild Pain (1 - 3)  aluminum hydroxide/magnesium hydroxide/simethicone Suspension 30 milliLiter(s) Oral every 4 hours PRN Dyspepsia  hydrALAZINE Injectable 10 milliGRAM(s) IV Push every 4 hours PRN SBP >160  hydrALAZINE Injectable 5 milliGRAM(s) IV Push every 4 hours PRN SBP >140  labetalol Injectable 10 milliGRAM(s) IV Push every 10 minutes PRN SBP> 160 and DBP> 90  LORazepam   Injectable 1 milliGRAM(s) IV Push every 1 hour PRN CIWA-Ar score 8 or greater  melatonin 3 milliGRAM(s) Oral at bedtime PRN Insomnia  ondansetron Injectable 4 milliGRAM(s) IV Push every 8 hours PRN Nausea and/or Vomiting  senna 2 Tablet(s) Oral at bedtime PRN Constipation      Allergies:   No Known Allergies    ============================================================================  PAST MEDICAL & SURGICAL HISTORY:  Hypertension      No significant past surgical history          FAMILY HISTORY:  No pertinent family history in first degree relatives        Social History:   unemployed  (31 Jul 2023 23:00)      ============================================================================  Vitals:  Vital Signs Last 24 Hrs  T(C): 36.8 (02 Aug 2023 09:50), Max: 36.8 (01 Aug 2023 17:30)  T(F): 98.3 (02 Aug 2023 09:50), Max: 98.3 (01 Aug 2023 23:01)  HR: 64 (02 Aug 2023 09:50) (57 - 85)  BP: 126/87 (02 Aug 2023 09:50) (115/71 - 147/94)  BP(mean): 103 (02 Aug 2023 05:15) (87 - 103)  RR: 16 (02 Aug 2023 09:50) (16 - 18)  SpO2: 98% (02 Aug 2023 09:50) (93% - 98%)    Parameters below as of 02 Aug 2023 09:50  Patient On (Oxygen Delivery Method): room air    Labs:                        15.1   10.04 )-----------( 199      ( 02 Aug 2023 07:25 )             43.8     08-02    141  |  103  |  12.3  ----------------------------<  103<H>  3.8   |  25.0  |  0.85    Ca    9.1      02 Aug 2023 07:25  Phos  2.1     08-01  Mg     2.4     08-01    TPro  6.5<L>  /  Alb  4.3  /  TBili  0.7  /  DBili  0.2  /  AST  504<H>  /  ALT  934<H>  /  AlkPhos  72  08-01    PT/INR - ( 01 Aug 2023 03:20 )   PT: 11.3 sec;   INR: 1.02 ratio         PTT - ( 01 Aug 2023 03:20 )  PTT:26.4 sec    Imaging:   Pertinent Imaging Reviewed.    ============================================================================

## 2023-08-02 NOTE — PROGRESS NOTE ADULT - ASSESSMENT
63y Male presented to [facility] with right hemiparesis and dysarthria, 'visual disturbances' for 2 months, found with multiple areas concerning for infarcts with hemorrhagic conversion versus hemorrhagic lesions.  - CT CAP with lung lesions    PLAN:  - Will d/w attending  - Continue Q2 neuro checks   - MR brain w/wo contrast pending  - On Keppra 500 Q12  - SBP goal 100-160  - Thoracic surgery consulted for possible biopsy  - Hold ACT/APT for now  - Supportive care/further medical management per primary team   - Further recs if any pending MRI results

## 2023-08-02 NOTE — PROGRESS NOTE ADULT - ASSESSMENT
62 y/o male with hx of Cocaine/THC use, now with confusion and CTH showing possible hemorrhagic metastatic lesions vs CVA with hemorrhagic conversion, elevated transaminases    #Mets with hemorrhage   -Unlikely CVA or primary CNS neoplasm  -Head CT noted  -Repeat CT in 6 hours to assess for interval change   -MRI brain w/wo contrast pending  -CTA head and neck without occlusion noted  -SDU  -neuro-checks Q2h Vitals Q 2  -SBP control  -Hydralazine PRN  -neurology recs appreciated  -Hold aspirin with ICH on CT  -Keppra per neuro Sx.  -Telemetry   -OOB w/assistance  -Fall/seizure precautions   -passed dysphagia screen  -PT/OT recs AR  -PM&R recs home  -VC boots no AC with ICH  -Will obtain CT C/A/P with contrast  shows left lung spiculated mass    #Left spiculated lung mass  -Will obtain CT C/A/P with contrast  shows left lung spiculated mass  -IR consulted for biopsy - Reports can not be done until next week  -CT surgery consulted for biopsy  -Heme onc recs apprecaited    #ETOH abuse/substance abuse  -Utox positive for cocaine/THC  -CIWA protocol with Ativan PRN for CIWA >8  -FA/MVI/Thiamine for possible thiamine deficiency  -Seizure/fall risk protocol   -SW consult for eventual SATP referral   -Utox as above     #Elevated transaminases:  -Possibly related to ETOH   -Cocaine induced from vasospasm?  -Viral hepatitis panel  -JOSHUA herrera wnl      DVT prophylaxis   -SCD     Discussed with patients sister at bedside  Dispo: Remains acute, pending course

## 2023-08-03 LAB
ANION GAP SERPL CALC-SCNC: 12 MMOL/L — SIGNIFICANT CHANGE UP (ref 5–17)
BASOPHILS # BLD AUTO: 0.02 K/UL — SIGNIFICANT CHANGE UP (ref 0–0.2)
BASOPHILS NFR BLD AUTO: 0.2 % — SIGNIFICANT CHANGE UP (ref 0–2)
BUN SERPL-MCNC: 12.9 MG/DL — SIGNIFICANT CHANGE UP (ref 8–20)
CALCIUM SERPL-MCNC: 9.2 MG/DL — SIGNIFICANT CHANGE UP (ref 8.4–10.5)
CHLORIDE SERPL-SCNC: 103 MMOL/L — SIGNIFICANT CHANGE UP (ref 96–108)
CO2 SERPL-SCNC: 25 MMOL/L — SIGNIFICANT CHANGE UP (ref 22–29)
CREAT SERPL-MCNC: 0.98 MG/DL — SIGNIFICANT CHANGE UP (ref 0.5–1.3)
EGFR: 87 ML/MIN/1.73M2 — SIGNIFICANT CHANGE UP
EOSINOPHIL # BLD AUTO: 0.08 K/UL — SIGNIFICANT CHANGE UP (ref 0–0.5)
EOSINOPHIL NFR BLD AUTO: 0.8 % — SIGNIFICANT CHANGE UP (ref 0–6)
GLUCOSE SERPL-MCNC: 92 MG/DL — SIGNIFICANT CHANGE UP (ref 70–99)
HCT VFR BLD CALC: 44.8 % — SIGNIFICANT CHANGE UP (ref 39–50)
HGB BLD-MCNC: 15.4 G/DL — SIGNIFICANT CHANGE UP (ref 13–17)
IMM GRANULOCYTES NFR BLD AUTO: 0.3 % — SIGNIFICANT CHANGE UP (ref 0–0.9)
LYMPHOCYTES # BLD AUTO: 1.7 K/UL — SIGNIFICANT CHANGE UP (ref 1–3.3)
LYMPHOCYTES # BLD AUTO: 17.5 % — SIGNIFICANT CHANGE UP (ref 13–44)
MCHC RBC-ENTMCNC: 29.4 PG — SIGNIFICANT CHANGE UP (ref 27–34)
MCHC RBC-ENTMCNC: 34.4 GM/DL — SIGNIFICANT CHANGE UP (ref 32–36)
MCV RBC AUTO: 85.7 FL — SIGNIFICANT CHANGE UP (ref 80–100)
MONOCYTES # BLD AUTO: 1.31 K/UL — HIGH (ref 0–0.9)
MONOCYTES NFR BLD AUTO: 13.5 % — SIGNIFICANT CHANGE UP (ref 2–14)
NEUTROPHILS # BLD AUTO: 6.58 K/UL — SIGNIFICANT CHANGE UP (ref 1.8–7.4)
NEUTROPHILS NFR BLD AUTO: 67.7 % — SIGNIFICANT CHANGE UP (ref 43–77)
PLATELET # BLD AUTO: 232 K/UL — SIGNIFICANT CHANGE UP (ref 150–400)
POTASSIUM SERPL-MCNC: 3.6 MMOL/L — SIGNIFICANT CHANGE UP (ref 3.5–5.3)
POTASSIUM SERPL-SCNC: 3.6 MMOL/L — SIGNIFICANT CHANGE UP (ref 3.5–5.3)
RBC # BLD: 5.23 M/UL — SIGNIFICANT CHANGE UP (ref 4.2–5.8)
RBC # FLD: 13.1 % — SIGNIFICANT CHANGE UP (ref 10.3–14.5)
SODIUM SERPL-SCNC: 140 MMOL/L — SIGNIFICANT CHANGE UP (ref 135–145)
WBC # BLD: 9.72 K/UL — SIGNIFICANT CHANGE UP (ref 3.8–10.5)
WBC # FLD AUTO: 9.72 K/UL — SIGNIFICANT CHANGE UP (ref 3.8–10.5)

## 2023-08-03 PROCEDURE — 99232 SBSQ HOSP IP/OBS MODERATE 35: CPT

## 2023-08-03 PROCEDURE — 99233 SBSQ HOSP IP/OBS HIGH 50: CPT | Mod: GC

## 2023-08-03 PROCEDURE — 99233 SBSQ HOSP IP/OBS HIGH 50: CPT

## 2023-08-03 RX ADMIN — PANTOPRAZOLE SODIUM 40 MILLIGRAM(S): 20 TABLET, DELAYED RELEASE ORAL at 05:07

## 2023-08-03 RX ADMIN — Medication 1 MILLIGRAM(S): at 11:04

## 2023-08-03 RX ADMIN — Medication 650 MILLIGRAM(S): at 11:04

## 2023-08-03 RX ADMIN — Medication 5 MILLIGRAM(S): at 06:05

## 2023-08-03 RX ADMIN — LEVETIRACETAM 400 MILLIGRAM(S): 250 TABLET, FILM COATED ORAL at 18:12

## 2023-08-03 RX ADMIN — Medication 650 MILLIGRAM(S): at 12:04

## 2023-08-03 RX ADMIN — LEVETIRACETAM 400 MILLIGRAM(S): 250 TABLET, FILM COATED ORAL at 05:06

## 2023-08-03 RX ADMIN — Medication 1 TABLET(S): at 11:04

## 2023-08-03 RX ADMIN — ATORVASTATIN CALCIUM 80 MILLIGRAM(S): 80 TABLET, FILM COATED ORAL at 21:02

## 2023-08-03 RX ADMIN — Medication 100 MILLIGRAM(S): at 11:04

## 2023-08-03 NOTE — PHYSICAL THERAPY INITIAL EVALUATION ADULT - WORK/LEISURE ACTIVITY, REHAB EVAL
"[facility]    CC: constipation    HPI 51 y.o. female with HTN, SC trait, and metastatic pancreatic cancer on chemotherapy who was seen in clinic yesterday, and is now in the ER with chronic, worsening, severe constipation associated with abdominal pain. She has significant cancer related pain and takes morphine and dilaudid for this. Over the past month or two, she has become constipated with frequent missed BMs and abdominal fullness/bloating, and loss of appetite. She denies diarrhea and hard stools, as well as blood in stool. She started Miralax daily 5 days ago which has helped some.  Her last BM and flatus were yesterday morning.    After clinic, she continued to worsen and presented to the ER with nausea and vomiting.  NG tube was placed and ~500cc of output was noted.  KUB completed and appeared consistent with constipation and possible small bowel obstruction. CT scan completed with read pending.    Her sister was at the bedside and provided additional history.  The NG tube has provided relief from N/V.  She has not moved her bowels or passed gas since yesterday.  Denies Fevers, chills.    Past Medical History    has a past medical history of Chemotherapy follow-up examination (12/28/2016); HTN (hypertension); Pancreatic mass; Paronychia (1/23/2017); Sickle cell trait; SOB (shortness of breath) (1/23/2017); and Swelling of lower extremity (1/23/2017).      Review of Systems  General ROS: negative for chills, fever or weight loss  Cardiovascular ROS: no chest pain or dyspnea on exertion  Gastrointestinal ROS: +abdominal pain/fullness, nausea, vomiting, and constipation, no GI bleeding    Physical Examination  Visit Vitals    BP (!) 140/86    Pulse (!) 118    Temp 98.5 °F (36.9 °C) (Oral)    Resp 18    Ht 5' 4" (1.626 m)    Wt 74.8 kg (165 lb)    SpO2 97%    BMI 28.32 kg/m2     General appearance: alert, cooperative, no distress  HENT: Normocephalic, atraumatic, neck symmetrical, no nasal " discharge   Lungs: clear to auscultation in all fields, symmetric chest wall expansion bilaterally, no wheeze, rale, or rhonchi  Heart: normal rate, regular rhythm without rub; palpable peripheral pulsesI   Abdomen: soft, full, mildly tender to palpation diffusely without guarding or rebound, decreased bowel sounds without high pitched tinkling  Extremities: extremities symmetric; no clubbing, cyanosis, or edema  Neurologic: Alert and oriented X 3, normal strength, normal coordination    Labs:    WBC 6  H/H 12/38  Platelets 458    CR 0.8  Bilirubin 1.2    Imaging:    KUB today:    Distended small bowel loops with air-fluid levels consistent with small bowel obstruction.  Findings represent detrimental change compared to recent CT from 1/17/17.    I have personally reviewed these images.    Assessment:   52 yo AAF with SC trait, HTN, and metastatic pancreatic cancer on chemotherapy requiring narcotic pain medication, with worsening abdominal pain with abdominal distension, constipation, and a large amount of stool in the colon seen on recent imaging. Now with ER presentation for obstipation with nausea and vomiting; differential includes severe opioid induced constipation, and bowel obstruction.    Plan:  - Follow up CT read  - Admission for NG tube decompression, IV fluid support, and lab monitoring  - Relistor 12mg Subcutaneous x one dose today (written by me)  - BID enemas, either tap water or soap suds  - Serial abdominal exams  - KUB every 1-3 days, depending on clinical course    Brenda Willis MD  [employer] [facility] PGY V  [phone]     independent

## 2023-08-03 NOTE — PROGRESS NOTE ADULT - SUBJECTIVE AND OBJECTIVE BOX
Hospitalist Daily Progress Note    Chief Complaint:  Patient is a 63y old  Male who presents with a chief complaint of CVA with R sided weakness    SUBJECTIVE / OVERNIGHT EVENTS:  Patient was seen and examined at bedside. Patient denies chest pain, SOB, abd pain, N/V, fever, chills, dysuria or any other complaints. All remainder ROS negative.       MEDICATIONS  (STANDING):  atorvastatin 80 milliGRAM(s) Oral at bedtime  folic acid 1 milliGRAM(s) Oral daily  levETIRAcetam  IVPB 500 milliGRAM(s) IV Intermittent every 12 hours  multivitamin 1 Tablet(s) Oral daily  pantoprazole   Suspension 40 milliGRAM(s) Oral before breakfast    MEDICATIONS  (PRN):  acetaminophen     Tablet .. 650 milliGRAM(s) Oral every 6 hours PRN Temp greater or equal to 38C (100.4F), Mild Pain (1 - 3)  aluminum hydroxide/magnesium hydroxide/simethicone Suspension 30 milliLiter(s) Oral every 4 hours PRN Dyspepsia  hydrALAZINE Injectable 5 milliGRAM(s) IV Push every 4 hours PRN SBP >140  hydrALAZINE Injectable 10 milliGRAM(s) IV Push every 4 hours PRN SBP >160  labetalol Injectable 10 milliGRAM(s) IV Push every 10 minutes PRN SBP> 160 and DBP> 90  LORazepam   Injectable 1 milliGRAM(s) IV Push every 1 hour PRN CIWA-Ar score 8 or greater  melatonin 3 milliGRAM(s) Oral at bedtime PRN Insomnia  ondansetron Injectable 4 milliGRAM(s) IV Push every 8 hours PRN Nausea and/or Vomiting  senna 2 Tablet(s) Oral at bedtime PRN Constipation        I&O's Summary    02 Aug 2023 07:01  -  03 Aug 2023 07:00  --------------------------------------------------------  IN: 280 mL / OUT: 775 mL / NET: -495 mL        PHYSICAL EXAM:  Vital Signs Last 24 Hrs  T(C): 36.4 (03 Aug 2023 08:07), Max: 37.1 (02 Aug 2023 20:00)  T(F): 97.5 (03 Aug 2023 08:07), Max: 98.7 (02 Aug 2023 20:00)  HR: 73 (03 Aug 2023 08:00) (57 - 73)  BP: 131/93 (03 Aug 2023 08:00) (124/91 - 166/88)  BP(mean): 102 (03 Aug 2023 08:00) (102 - 109)  RR: 24 (03 Aug 2023 08:00) (13 - 24)  SpO2: 99% (03 Aug 2023 08:00) (97% - 100%)    Parameters below as of 03 Aug 2023 08:00  Patient On (Oxygen Delivery Method): room air        Constitutional: NAD, Resting  ENT: Supple, No JVD  Lungs: CTA B/L, Non-labored breathing  Cardio: RRR, S1/S2, No murmur  Abdomen: Soft, Nontender, Nondistended; Bowel sounds present  Extremities: No calf tenderness, No pitting edema  Musculoskeletal:   No joint swelling  Psych: Calm, cooperative affect appropriate  Neuro: Awake and alert, oriented to name, location, moves 5/5 in LUE/LLE 4/5 in RLE and 0/5 in RUE  Skin: No rashes; no palpable lesions    LABS:                        15.4   9.72  )-----------( 232      ( 03 Aug 2023 04:49 )             44.8     08-03    140  |  103  |  12.9  ----------------------------<  92  3.6   |  25.0  |  0.98    Ca    9.2      03 Aug 2023 04:49            Urinalysis Basic - ( 03 Aug 2023 04:49 )    Color: x / Appearance: x / SG: x / pH: x  Gluc: 92 mg/dL / Ketone: x  / Bili: x / Urobili: x   Blood: x / Protein: x / Nitrite: x   Leuk Esterase: x / RBC: x / WBC x   Sq Epi: x / Non Sq Epi: x / Bacteria: x        CAPILLARY BLOOD GLUCOSE            RADIOLOGY & ADDITIONAL TESTS:  Results Reviewed: Y  Imaging Personally Reviewed: N  Electrocardiogram Personally Reviewed: N

## 2023-08-03 NOTE — CHART NOTE - NSCHARTNOTEFT_GEN_A_CORE
63 year old  male presents to [facility] ER for weakness of Right arm and leg which began >2 days ago.  Pt also noted speech slurring.  Further questioning of pt. positive for visual disturbances for the past 2 months.  + cocaine and THC use, as well as Alcohol abuse (daily) stopped yesterday. CT of head done by ER shows age indeterminate L MCA territory, Right occipital PCA territory infarct as well as scattered hemorrhagic lesions with surrounding edema in frontal/posterior/temporal areas.   CT chest demonstrated MIGEL spiculated lung lesion suspicious for primary lung CA. Thoracic surgery consulted for possible lung biopsy.     Plan:   MIGEL lung lesion suspicious for primary lung CA.  CT scan as above  P/w neuro symptoms head CT suspicious for brain mets  Thoracic surgery consulted for possible lung biopsy.  Will defer to IR at this time.   Will sign off.  Please reconsult as necessary.  D/w Dr. Yoon.

## 2023-08-03 NOTE — SBIRT NOTE ADULT - NSSBIRTBRIEFINTDET_GEN_A_CORE
Social work able to communicate with patient in preferred language, [place]. Social work educated patient on healthy drinking limits and offered resources - patient receptive and agreeable. Resources provided.

## 2023-08-03 NOTE — PHYSICAL THERAPY INITIAL EVALUATION ADULT - RANGE OF MOTION EXAMINATION, REHAB EVAL
no AROM right elbow and wrist, right shoulder lacks 80% AROM throughout/no ROM deficits were identified

## 2023-08-03 NOTE — PHYSICAL THERAPY INITIAL EVALUATION ADULT - PERTINENT HX OF CURRENT PROBLEM, REHAB EVAL
62 y/o male with hx of Cocaine/THC use, now with confusion and CTH showing possible hemorrhagic metastatic lesions vs CVA with hemorrhagic conversion, elevated transaminases

## 2023-08-03 NOTE — PROGRESS NOTE ADULT - SUBJECTIVE AND OBJECTIVE BOX
Patient is doing well today   Complains of right arm weakness   He has a slight HA, rates 3/10   He didn't sleep well because it was cold   VSS afebrile     FUNCTIONAL PROGRESS  8/1 OT  Bathing Training:   · Level of [place]	moderate assist (50% patients effort); maximum assist (25% patients effort)  · Physical Assist/Nonphysical Assist	1 person assist    Upper Body Dressing Training:   · Level of [place]	maximum assist (25% patients effort); to don gown  · Physical Assist/Nonphysical Assist	1 person assist    Lower Body Dressing Training:   · Level of [place]	maximum assist (25% patients effort); to don socks  · Physical Assist/Nonphysical Assist	1 person assist    Toilet Hygiene Training:   · Level of [place]	maximum assist (25% patients effort)    Grooming Training:   · Level of [place]	maximum assist (25% patients effort)    Eating/Self-Feeding Training:   · Level of [place]	not observed      VITALS  T(C): 36.4 (08-03-23 @ 08:07), Max: 37.1 (08-02-23 @ 20:00)  HR: 73 (08-03-23 @ 08:00) (57 - 73)  BP: 131/93 (08-03-23 @ 08:00) (124/91 - 166/88)  RR: 24 (08-03-23 @ 08:00) (13 - 24)  SpO2: 99% (08-03-23 @ 08:00) (97% - 100%)  Wt(kg): --    MEDICATIONS   acetaminophen     Tablet .. 650 milliGRAM(s) every 6 hours PRN  aluminum hydroxide/magnesium hydroxide/simethicone Suspension 30 milliLiter(s) every 4 hours PRN  atorvastatin 80 milliGRAM(s) at bedtime  folic acid 1 milliGRAM(s) daily  hydrALAZINE Injectable 5 milliGRAM(s) every 4 hours PRN  hydrALAZINE Injectable 10 milliGRAM(s) every 4 hours PRN  labetalol Injectable 10 milliGRAM(s) every 10 minutes PRN  levETIRAcetam  IVPB 500 milliGRAM(s) every 12 hours  LORazepam   Injectable 1 milliGRAM(s) every 1 hour PRN  melatonin 3 milliGRAM(s) at bedtime PRN  multivitamin 1 Tablet(s) daily  ondansetron Injectable 4 milliGRAM(s) every 8 hours PRN  pantoprazole   Suspension 40 milliGRAM(s) before breakfast  senna 2 Tablet(s) at bedtime PRN  thiamine 100 milliGRAM(s) daily      RECENT LABS/IMAGING  - Reviewed Today                        15.4   9.72  )-----------( 232      ( 03 Aug 2023 04:49 )             44.8     08-03    140  |  103  |  12.9  ----------------------------<  92  3.6   |  25.0  |  0.98    Ca    9.2      03 Aug 2023 04:49        Urinalysis Basic - ( 03 Aug 2023 04:49 )    Color: x / Appearance: x / SG: x / pH: x  Gluc: 92 mg/dL / Ketone: x  / Bili: x / Urobili: x   Blood: x / Protein: x / Nitrite: x   Leuk Esterase: x / RBC: x / WBC x   Sq Epi: x / Non Sq Epi: x / Bacteria: x      HEAD CT 7/31 - 1. Findings suggestive of a age indeterminate left MCA territory and right occipital (PCA territory) infarcts..  2. Left posterior parietal intraparenchymal hemorrhage measuring approximately 2.1 cm in greatest diameter, with surrounding edema. Smaller eft inferior frontal hemorrhagic lesion with surrounding edema, small left posterior temporal hemorrhagic lesion with surrounding edema and small right temporal hemorrhagic lesion with surrounding edema. Small foci of abnormal signal are also appreciated bilaterally within the cerebellar regions.Findings are concerning for presence of underlying hemorrhagic mass lesions, possibly metastatic in etiology. Possibility of mycotic aneurysm should also be considered in the differential diagnosis. Consider follow-up pre and postcontrast MR imaging of the brain, to include DWI imaging and ADC mapping techniques. MRA of the [place] may also prove useful. 3. Punctate focus of calcification in a left occipital location without surrounding edema, may represent prior infectious/inflammatory process versus nonacute hemorrhagic event.    HEAD CT 8/1 - Stable peripheral areas of decreased attenuation some of which have associated increased cortical density. Favor subacute infarcts some of which with hemorrhagic conversion. Correlate clinically in regards to embolic disease..    CT CAP 8/1 - Slightly spiculated left upper lobe lesion, suspicious for primary lung carcinoma, including small cell carcinoma. Scattered semisolid lung lesions, which may represent sites of semiinvasive adenocarcinoma of the lung.    CTA NECK/HEAD 8/1 - 1. Bilateral vertebral arteries patent, with left-sided dominance. 2. No evidence of stenosis, occlusion or dissection bilateral extracranial carotid arteries.3. No evidence of large vessel occlusion, aneurysm or vascular malformation intracranial circulation. 4. Additional findings described in detail above.  ----------------------------------------------------------------------------------------  PHYSICAL EXAM  Constitutional - NAD, Comfortable  Extremities - No C/C/E, No calf tenderness   Neurologic Exam -                    Cognitive - AAO to self, NOT place, situation     Communication - Delayed processing, Dysarthric      FUNCTIONAL MOTOR EXAM -                     LEFT    UE - ShAB 5/5, EF 5/5, EE 5/5, WE 5/5,  5/5                    RIGHT UE - ShAB 1/5, EF 0/5, EE 0/5, WE 0/5,  0/5                    LEFT    LE - HF 5/5, KE 5/5, DF 5/5, PF 5/5                    RIGHT LE - HF 3/5, KE 3/5, DF 5/5, PF 5/5      Psychiatric - Fatigued, Calm   ----------------------------------------------------------------------------------------  ASSESSMENT/PLAN  63yMale with functional deficits after developing acute CVAs/brain lesions  CVAs/Brain Lesions - Keppra  EtOH - Ativan, Thiamine, Folate  CAD - Lipitor  HTN - Labetalol, Hydralazine   Pain - Tylenol  DVT PPX - SCDs  Rehab/Impaired mobility and function - Patient continues to require hospitalization for the above diagnoses and ongoing active management of comorbid complications (on 1:1, pending workup of brain mass - MRI) that are substantially impairing functional ability and impairing quality of life.     However, patient with complex psychosocial factors and limited resources for rehab. Therefore, CM and therapy services will need to work on support in community for disposition of HOME.     Will continue to follow. Rehab recommendations are dependent on how functional progress changes as well as how patient continues to participate and tolerate therapeutic interventions, which may change. Recommend ongoing mobilization by staff to maintain cardiopulmonary function and prevention of secondary complications related to debility. Discussed the specific management and recommendations above with rehab clinical care team/rehab liaison.         Patient is doing well today   Complains of right arm weakness   He has a slight HA, rates 3/10   He didn't sleep well because it was cold   VSS afebrile     FUNCTIONAL PROGRESS  8/1 OT  Bathing Training:   · Level of [place]	moderate assist (50% patients effort); maximum assist (25% patients effort)  · Physical Assist/Nonphysical Assist	1 person assist    Upper Body Dressing Training:   · Level of [place]	maximum assist (25% patients effort); to don gown  · Physical Assist/Nonphysical Assist	1 person assist    Lower Body Dressing Training:   · Level of [place]	maximum assist (25% patients effort); to don socks  · Physical Assist/Nonphysical Assist	1 person assist    Toilet Hygiene Training:   · Level of [place]	maximum assist (25% patients effort)    Grooming Training:   · Level of [place]	maximum assist (25% patients effort)    Eating/Self-Feeding Training:   · Level of [place]	not observed    8/1 PT   Very lethargic, will reattempt     VITALS  T(C): 36.4 (08-03-23 @ 08:07), Max: 37.1 (08-02-23 @ 20:00)  HR: 73 (08-03-23 @ 08:00) (57 - 73)  BP: 131/93 (08-03-23 @ 08:00) (124/91 - 166/88)  RR: 24 (08-03-23 @ 08:00) (13 - 24)  SpO2: 99% (08-03-23 @ 08:00) (97% - 100%)  Wt(kg): --    MEDICATIONS   acetaminophen     Tablet .. 650 milliGRAM(s) every 6 hours PRN  aluminum hydroxide/magnesium hydroxide/simethicone Suspension 30 milliLiter(s) every 4 hours PRN  atorvastatin 80 milliGRAM(s) at bedtime  folic acid 1 milliGRAM(s) daily  hydrALAZINE Injectable 5 milliGRAM(s) every 4 hours PRN  hydrALAZINE Injectable 10 milliGRAM(s) every 4 hours PRN  labetalol Injectable 10 milliGRAM(s) every 10 minutes PRN  levETIRAcetam  IVPB 500 milliGRAM(s) every 12 hours  LORazepam   Injectable 1 milliGRAM(s) every 1 hour PRN  melatonin 3 milliGRAM(s) at bedtime PRN  multivitamin 1 Tablet(s) daily  ondansetron Injectable 4 milliGRAM(s) every 8 hours PRN  pantoprazole   Suspension 40 milliGRAM(s) before breakfast  senna 2 Tablet(s) at bedtime PRN  thiamine 100 milliGRAM(s) daily      RECENT LABS/IMAGING  -                         15.4   9.72  )-----------( 232      ( 03 Aug 2023 04:49 )             44.8     08-03    140  |  103  |  12.9  ----------------------------<  92  3.6   |  25.0  |  0.98    Ca    9.2      03 Aug 2023 04:49        Urinalysis Basic - ( 03 Aug 2023 04:49 )    Color: x / Appearance: x / SG: x / pH: x  Gluc: 92 mg/dL / Ketone: x  / Bili: x / Urobili: x   Blood: x / Protein: x / Nitrite: x   Leuk Esterase: x / RBC: x / WBC x   Sq Epi: x / Non Sq Epi: x / Bacteria: x      HEAD CT 7/31 - 1. Findings suggestive of a age indeterminate left MCA territory and right occipital (PCA territory) infarcts..  2. Left posterior parietal intraparenchymal hemorrhage measuring approximately 2.1 cm in greatest diameter, with surrounding edema. Smaller eft inferior frontal hemorrhagic lesion with surrounding edema, small left posterior temporal hemorrhagic lesion with surrounding edema and small right temporal hemorrhagic lesion with surrounding edema. Small foci of abnormal signal are also appreciated bilaterally within the cerebellar regions.Findings are concerning for presence of underlying hemorrhagic mass lesions, possibly metastatic in etiology. Possibility of mycotic aneurysm should also be considered in the differential diagnosis. Consider follow-up pre and postcontrast MR imaging of the brain, to include DWI imaging and ADC mapping techniques. MRA of the [place] may also prove useful. 3. Punctate focus of calcification in a left occipital location without surrounding edema, may represent prior infectious/inflammatory process versus nonacute hemorrhagic event.    HEAD CT 8/1 - Stable peripheral areas of decreased attenuation some of which have associated increased cortical density. Favor subacute infarcts some of which with hemorrhagic conversion. Correlate clinically in regards to embolic disease..    CT CAP 8/1 - Slightly spiculated left upper lobe lesion, suspicious for primary lung carcinoma, including small cell carcinoma. Scattered semisolid lung lesions, which may represent sites of semiinvasive adenocarcinoma of the lung.    CTA NECK/HEAD 8/1 - 1. Bilateral vertebral arteries patent, with left-sided dominance. 2. No evidence of stenosis, occlusion or dissection bilateral extracranial carotid arteries.3. No evidence of large vessel occlusion, aneurysm or vascular malformation intracranial circulation. 4. Additional findings described in detail above.  ----------------------------------------------------------------------------------------  PHYSICAL EXAM  Constitutional - NAD, Comfortable  Extremities - No C/C/E, No calf tenderness   Neurologic Exam -                    Cognitive - AAO to self, place, situation     Communication - Fluent      FUNCTIONAL MOTOR EXAM -                     LEFT    UE - ShAB 5/5, EF 5/5, EE 5/5, WE 5/5,  5/5                    RIGHT UE - ShAB 1/5, EF 0/5, EE 0/5, WE 0/5,  0/5                    LEFT    LE - HF 5/5, KE 5/5, DF 5/5, PF 5/5                    RIGHT LE - HF 3/5, KE 3/5, DF 5/5, PF 5/5      Psychiatric - Calm   ----------------------------------------------------------------------------------------  ASSESSMENT/PLAN  63yMale with functional deficits after developing acute CVAs/brain lesions  CVAs/Brain Lesions - Keppra  Right UE weakness - Recommend extension wrist brace   EtOH - Ativan, Thiamine, Folate  CAD - Lipitor  HTN - Labetalol, Hydralazine   Pain - Tylenol  DVT PPX - SCDs, would recommend starting pharmacological DVT ppx when medically cleared   Rehab/Impaired mobility and function - Continuous hospitalization is crucial for managing the patient's acute medical issues, which have significantly impacted their mobility, quality of life, and function. Rehabilitation recommendations will be based on the patient's functional progress and their ability to participate in and tolerate therapeutic interventions, which may change over time. Maintaining ongoing mobilization by the staff is imperative to prevent secondary medical complications and associated health issues related to debility.    Currently, the patient has complex psychosocial factors and limited resources for rehab. Therefore, CM and therapy will need to work on support in the community for disposition home. On interview, the patient has good family support and can get help from his sister and mother on discharge.     The patient will continue to actively engage in Physical Therapy (PT), Occupational Therapy (OT), and Speech Therapy (SLP) to optimize functional outcomes. The Physical Medicine and Rehabilitation (PM&R) team will closely monitor their progress, and discharge recommendations will depend on their functional improvement and overall medical stability.

## 2023-08-03 NOTE — SBIRT NOTE ADULT - NSSBIRTDRGBRIEFINTDET_GEN_A_CORE
Patient reports cocaine and THC use - patient reports he is motivated to stop use and receptive/agreement to outpatient resources. Resources provided.

## 2023-08-03 NOTE — PROGRESS NOTE ADULT - ASSESSMENT
62 y/o male with hx of Cocaine/THC use, now with confusion and CTH showing possible hemorrhagic metastatic lesions vs CVA with hemorrhagic conversion, elevated transaminases    #Mets with hemorrhage   CT Chest showing MIGEL spiculated mass  -Unlikely CVA or primary CNS neoplasm  -MRI brain w/wo contrast pending  -CTA head and neck without occlusion noted  -SDU  -neuro-checks Q2h Vitals Q 2  -SBP control  -Hydralazine PRN  -neurology recs appreciated  -Hold aspirin with ICH on CT  -Keppra per neuro Sx.  -Telemetry   -OOB w/assistance  -Fall/seizure precautions   -PT/OT recs AR  -PM&R recs home  -VC boots no AC with ICH    #Left spiculated lung mass  -Will obtain CT C/A/P with contrast  shows left lung spiculated mass  -IR consulted for biopsy - Reports can not be done until next week  -CT surgery consulted for biopsy  -Heme onc recs appreciated    #ETOH abuse/substance abuse  -Utox positive for cocaine/THC  -CIWA protocol with Ativan PRN for CIWA >8  -FA/MVI/Thiamine for possible thiamine deficiency  -Seizure/fall risk protocol   -SW consult for eventual [facility] referral     #Elevated transaminases:  -Possibly related to ETOH   -Cocaine induced from vasospasm?  -Viral hepatitis panel  -RUQ sono wnl      DVT prophylaxis   -SCD     Discussed with patient  Dispo - Awaiting Brain MR and biopsy of IR

## 2023-08-03 NOTE — PROGRESS NOTE ADULT - SUBJECTIVE AND OBJECTIVE BOX
Preliminary note, offical recommendations pending attending review/signature   [facility] Stroke Team  Progress Note     HPI:  63 year old  male presents to [facility] ER for weakness of Right arm and leg which began >2 days ago.  Pt also noted speech slurring.  Further questioning of pt. positive for visual disturbances for the past 2 months.  Pt unable to describe visual field defects/loss but noted positive for photophobia.  Pt. also c/o pain, hot in quality" in both right leg/arm, with need for assistance for ambulation by sister.  No h/o falls.  Pt note for h/o ?HTN in past, does not see a PMD on any regular basis.  + cocaine and THC use, as well as Alcohol abuse (daily) stopped yesterday  CT of head done by ER shows age indeterminate L MCA territory, Right occipital PCA territory infarct as well as scattered hemorrhagic lesions with surrounding edema in frontal/posterior/temporal areas.   Concern for underlying hemorrhagic mass lesions probably metastatic in origin cannot be excluded.  Pt admitted for further workup    Admission NIHSS  Pre-MRS  ICH Score (if applicable)    PENDING  SUBJECTIVE: No events overnight.  No new neurologic complaints.  ROS reported negative unless otherwise noted.    acetaminophen     Tablet .. 650 milliGRAM(s) Oral every 6 hours PRN  aluminum hydroxide/magnesium hydroxide/simethicone Suspension 30 milliLiter(s) Oral every 4 hours PRN  atorvastatin 80 milliGRAM(s) Oral at bedtime  folic acid 1 milliGRAM(s) Oral daily  hydrALAZINE Injectable 5 milliGRAM(s) IV Push every 4 hours PRN  hydrALAZINE Injectable 10 milliGRAM(s) IV Push every 4 hours PRN  labetalol Injectable 10 milliGRAM(s) IV Push every 10 minutes PRN  levETIRAcetam  IVPB 500 milliGRAM(s) IV Intermittent every 12 hours  LORazepam   Injectable 1 milliGRAM(s) IV Push every 1 hour PRN  melatonin 3 milliGRAM(s) Oral at bedtime PRN  multivitamin 1 Tablet(s) Oral daily  ondansetron Injectable 4 milliGRAM(s) IV Push every 8 hours PRN  pantoprazole   Suspension 40 milliGRAM(s) Oral before breakfast  senna 2 Tablet(s) Oral at bedtime PRN  thiamine 100 milliGRAM(s) Oral daily      PHYSICAL EXAM:   Vital Signs Last 24 Hrs  T(C): 36.4 (03 Aug 2023 08:07), Max: 37.1 (02 Aug 2023 20:00)  T(F): 97.5 (03 Aug 2023 08:07), Max: 98.7 (02 Aug 2023 20:00)  HR: 68 (03 Aug 2023 06:33) (57 - 72)  BP: 124/91 (03 Aug 2023 06:33) (122/77 - 166/88)  BP(mean): 109 (02 Aug 2023 18:00) (108 - 109)  RR: 19 (03 Aug 2023 06:00) (13 - 20)  SpO2: 98% (03 Aug 2023 06:00) (97% - 100%)    Parameters below as of 03 Aug 2023 06:00  Patient On (Oxygen Delivery Method): room air      NEURO EXAM PENDING  General: No acute distress    NEUROLOGICAL EXAM:  Mental status: Awake, alert, oriented x3, speech fluent, follows commands, no neglect, normal memory   Cranial Nerves: No facial asymmetry, no nystagmus, no dysarthria,  tongue midline  Motor exam: Normal tone, no drift, 5/5 RUE, 5/5 RLE, 5/5 LUE, 5/5 LLE, normal fine finger movements.  Sensation: Intact to light touch   Coordination/ Gait: No dysmetria, gait not tested    LABS:                        15.4   9.72  )-----------( 232      ( 03 Aug 2023 04:49 )             44.8    08-03    140  |  103  |  12.9  ----------------------------<  92  3.6   |  25.0  |  0.98    Ca    9.2      03 Aug 2023 04:49          IMAGING: Reviewed by me.      Preliminary note, offical recommendations pending attending review/signature   [facility] Stroke Team  Progress Note     HPI:  63 year old  male presents to [facility] ER for weakness of Right arm and leg which began >2 days ago.  Pt also noted speech slurring.  Further questioning of pt. positive for visual disturbances for the past 2 months.  Pt unable to describe visual field defects/loss but noted positive for photophobia.  Pt. also c/o pain, hot in quality" in both right leg/arm, with need for assistance for ambulation by sister.  No h/o falls.  Pt note for h/o ?HTN in past, does not see a PMD on any regular basis.  + cocaine and THC use, as well as Alcohol abuse (daily) stopped yesterday  CT of head done by ER shows age indeterminate L MCA territory, Right occipital PCA territory infarct as well as scattered hemorrhagic lesions with surrounding edema in frontal/posterior/temporal areas.   Concern for underlying hemorrhagic mass lesions probably metastatic in origin cannot be excluded.  Pt admitted for further workup    Admission NIHSS 6  Pre-MRS:1    PENDING  SUBJECTIVE: No events overnight.  No new neurologic complaints.  ROS reported negative unless otherwise noted.    acetaminophen     Tablet .. 650 milliGRAM(s) Oral every 6 hours PRN  aluminum hydroxide/magnesium hydroxide/simethicone Suspension 30 milliLiter(s) Oral every 4 hours PRN  atorvastatin 80 milliGRAM(s) Oral at bedtime  folic acid 1 milliGRAM(s) Oral daily  hydrALAZINE Injectable 5 milliGRAM(s) IV Push every 4 hours PRN  hydrALAZINE Injectable 10 milliGRAM(s) IV Push every 4 hours PRN  labetalol Injectable 10 milliGRAM(s) IV Push every 10 minutes PRN  levETIRAcetam  IVPB 500 milliGRAM(s) IV Intermittent every 12 hours  LORazepam   Injectable 1 milliGRAM(s) IV Push every 1 hour PRN  melatonin 3 milliGRAM(s) Oral at bedtime PRN  multivitamin 1 Tablet(s) Oral daily  ondansetron Injectable 4 milliGRAM(s) IV Push every 8 hours PRN  pantoprazole   Suspension 40 milliGRAM(s) Oral before breakfast  senna 2 Tablet(s) Oral at bedtime PRN  thiamine 100 milliGRAM(s) Oral daily      PHYSICAL EXAM:   Vital Signs Last 24 Hrs  T(C): 36.4 (03 Aug 2023 08:07), Max: 37.1 (02 Aug 2023 20:00)  T(F): 97.5 (03 Aug 2023 08:07), Max: 98.7 (02 Aug 2023 20:00)  HR: 68 (03 Aug 2023 06:33) (57 - 72)  BP: 124/91 (03 Aug 2023 06:33) (122/77 - 166/88)  BP(mean): 109 (02 Aug 2023 18:00) (108 - 109)  RR: 19 (03 Aug 2023 06:00) (13 - 20)  SpO2: 98% (03 Aug 2023 06:00) (97% - 100%)    Parameters below as of 03 Aug 2023 06:00  Patient On (Oxygen Delivery Method): room air      NEURO EXAM PENDING  General: No acute distress, lying in bed answering questions and appears more alert than previous exam    NEUROLOGICAL EXAM:  Mental status: Awake, alert, oriented x3, , follows commands, no neglect  Cranial Nerves: No facial asymmetry, no nystagmus, dysarthria present,  tongue midline  Motor exam: tone decreased on right side,  2/5 RUE, 4+/5 RLE, 5/5 LUE, 5/5 LLE  Sensation: Intact to light touch   Coordination/ Gait: No dysmetria on left and unable to assess on right, gait not tested    LABS:                        15.4   9.72  )-----------( 232      ( 03 Aug 2023 04:49 )             44.8    08-03    140  |  103  |  12.9  ----------------------------<  92  3.6   |  25.0  |  0.98    Ca    9.2      03 Aug 2023 04:49          IMAGING: Reviewed by me.        [facility] Stroke Team  Progress Note     HPI:  63 year old  male presents to [facility] ER for weakness of Right arm and leg which began >2 days ago.  Pt also noted speech slurring.  Further questioning of pt. positive for visual disturbances for the past 2 months.  Pt unable to describe visual field defects/loss but noted positive for photophobia.  Pt. also c/o pain, hot in quality" in both right leg/arm, with need for assistance for ambulation by sister.  No h/o falls.  Pt note for h/o ?HTN in past, does not see a PMD on any regular basis.  + cocaine and THC use, as well as Alcohol abuse (daily) stopped yesterday  CT of head done by ER shows age indeterminate L MCA territory, Right occipital PCA territory infarct as well as scattered hemorrhagic lesions with surrounding edema in frontal/posterior/temporal areas.   Concern for underlying hemorrhagic mass lesions probably metastatic in origin cannot be excluded.  Pt admitted for further workup    Admission NIHSS 6  Pre-MRS:1    PENDING  SUBJECTIVE: No events overnight.  No new neurologic complaints.  ROS reported negative unless otherwise noted.    acetaminophen     Tablet .. 650 milliGRAM(s) Oral every 6 hours PRN  aluminum hydroxide/magnesium hydroxide/simethicone Suspension 30 milliLiter(s) Oral every 4 hours PRN  atorvastatin 80 milliGRAM(s) Oral at bedtime  folic acid 1 milliGRAM(s) Oral daily  hydrALAZINE Injectable 5 milliGRAM(s) IV Push every 4 hours PRN  hydrALAZINE Injectable 10 milliGRAM(s) IV Push every 4 hours PRN  labetalol Injectable 10 milliGRAM(s) IV Push every 10 minutes PRN  levETIRAcetam  IVPB 500 milliGRAM(s) IV Intermittent every 12 hours  LORazepam   Injectable 1 milliGRAM(s) IV Push every 1 hour PRN  melatonin 3 milliGRAM(s) Oral at bedtime PRN  multivitamin 1 Tablet(s) Oral daily  ondansetron Injectable 4 milliGRAM(s) IV Push every 8 hours PRN  pantoprazole   Suspension 40 milliGRAM(s) Oral before breakfast  senna 2 Tablet(s) Oral at bedtime PRN  thiamine 100 milliGRAM(s) Oral daily      PHYSICAL EXAM:   Vital Signs Last 24 Hrs  T(C): 36.4 (03 Aug 2023 08:07), Max: 37.1 (02 Aug 2023 20:00)  T(F): 97.5 (03 Aug 2023 08:07), Max: 98.7 (02 Aug 2023 20:00)  HR: 68 (03 Aug 2023 06:33) (57 - 72)  BP: 124/91 (03 Aug 2023 06:33) (122/77 - 166/88)  BP(mean): 109 (02 Aug 2023 18:00) (108 - 109)  RR: 19 (03 Aug 2023 06:00) (13 - 20)  SpO2: 98% (03 Aug 2023 06:00) (97% - 100%)    Parameters below as of 03 Aug 2023 06:00  Patient On (Oxygen Delivery Method): room air      NEURO EXAM PENDING  General: No acute distress, lying in bed answering questions and appears more alert than previous exam    NEUROLOGICAL EXAM:  Mental status: Awake, alert, oriented x3, , follows commands, no neglect  Cranial Nerves: No facial asymmetry, no nystagmus, dysarthria present,  tongue midline  Motor exam: tone decreased on right side,  2/5 RUE, 4+/5 RLE, 5/5 LUE, 5/5 LLE  Sensation: Intact to light touch   Coordination/ Gait: No dysmetria on left and unable to assess on right, gait not tested    LABS:                        15.4   9.72  )-----------( 232      ( 03 Aug 2023 04:49 )             44.8    08-03    140  |  103  |  12.9  ----------------------------<  92  3.6   |  25.0  |  0.98    Ca    9.2      03 Aug 2023 04:49          IMAGING: CT Angio Neck w/ IV Cont (08.01.23 @ 13:12)   IMPRESSION:    1. Bilateral vertebral arteries patent, with left-sided dominance.  2. No evidence of stenosis, occlusion or dissection bilateral   extracranial carotid arteries.  3. No evidence of large vessel occlusion, aneurysm or vascular   malformation intracranial circulation.  4. Additional findings described in detail above.    CT Chest w/ IV Cont (08.01.23 @ 13:12)   MPRESSION: Slightly spiculated left upper lobe lesion, suspicious for   primary lung carcinoma, including small cell carcinoma. Scattered   semisolid lung lesions, which may represent sites of semiinvasive   adenocarcinoma of the lung.    CT Angio Head w/ IV Cont (08.01.23 @ 13:11)   IMPRESSION:  1. Bilateral vertebral arteries patent, with left-sided dominance.  2. No evidence of stenosis, occlusion or dissection bilateral   extracranial carotid arteries.  3. No evidence of large vessel occlusion, aneurysm or vascular   malformation intracranial circulation.  4. Additional findings described in detail above.      CT Head No Cont (07.31.23 @ 19:13)   IMPRESSION:  1. Findings suggestive of a age indeterminate left MCA territory and   right occipital (PCA territory) infarcts..  2. Left posterior parietal intraparenchymal hemorrhage measuring   approximately 2.1 cm in greatest diameter, with surrounding edema.   Smaller eft inferior frontal hemorrhagic lesion with surrounding edema,   small left posterior temporal hemorrhagic lesion with surrounding edema   and small right temporal hemorrhagic lesion with surrounding edema. Small   foci of abnormal signal are also appreciated bilaterally within the   cerebellar regions.Findings are concerning for presence of underlying   hemorrhagic mass lesions, possibly metastatic in etiology. Possibility of   mycotic aneurysm should also be considered in the differential diagnosis.   Consider follow-up pre and postcontrast MR imaging of the brain, to   include DWI imaging and ADC mapping techniques. MRA of the [place] may also prove useful.  3. Punctate focus of calcification in a left occipital location without   surrounding edema, may represent prior infectious/inflammatory process   versus nonacute hemorrhagic event.    TTE Echo Complete w/ Contrast w/ Doppler (08.01.23 @ 11:32)    Summary:   1. Left ventricular ejection fraction, by visual estimation, is 50 to   55%.   2. Low normal global left ventricular systolic function.   3. Spectral Doppler shows impaired relaxation pattern of left   ventricular myocardial filling (Grade I diastolic dysfunction).   4. No shunt of the IAS seen by color doppler.   5. There is no evidence of pericardial effusion.   6. Mild mitral valve regurgitation.   7. Mild thickening of the anterior and posterior mitral valve leaflets.   8. Mild tricuspid regurgitation.   9. Endocardial visualization was enhanced with intravenous echo contrast.    Patricia, Electronically signed on 8/1/2023 at 5:03:24 PM             [facility] Stroke Team  Progress Note     HPI:  63 year old  male presents to [facility] ER for weakness of Right arm and leg which began >2 days ago.  Pt also noted speech slurring.  Further questioning of pt. positive for visual disturbances for the past 2 months.  Pt unable to describe visual field defects/loss but noted positive for photophobia.  Pt. also c/o pain, hot in quality" in both right leg/arm, with need for assistance for ambulation by sister.  No h/o falls.  Pt note for h/o ?HTN in past, does not see a PMD on any regular basis.  + cocaine and THC use, as well as Alcohol abuse (daily) stopped yesterday  CT of head done by ER shows age indeterminate L MCA territory, Right occipital PCA territory infarct as well as scattered hemorrhagic lesions with surrounding edema in frontal/posterior/temporal areas.   Concern for underlying hemorrhagic mass lesions probably metastatic in origin cannot be excluded.  Pt admitted for further workup    Admission NIHSS 6  Pre-MRS:1    PENDING  SUBJECTIVE: No events overnight.  No new neurologic complaints.  ROS reported negative unless otherwise noted.    acetaminophen     Tablet .. 650 milliGRAM(s) Oral every 6 hours PRN  aluminum hydroxide/magnesium hydroxide/simethicone Suspension 30 milliLiter(s) Oral every 4 hours PRN  atorvastatin 80 milliGRAM(s) Oral at bedtime  folic acid 1 milliGRAM(s) Oral daily  hydrALAZINE Injectable 5 milliGRAM(s) IV Push every 4 hours PRN  hydrALAZINE Injectable 10 milliGRAM(s) IV Push every 4 hours PRN  labetalol Injectable 10 milliGRAM(s) IV Push every 10 minutes PRN  levETIRAcetam  IVPB 500 milliGRAM(s) IV Intermittent every 12 hours  LORazepam   Injectable 1 milliGRAM(s) IV Push every 1 hour PRN  melatonin 3 milliGRAM(s) Oral at bedtime PRN  multivitamin 1 Tablet(s) Oral daily  ondansetron Injectable 4 milliGRAM(s) IV Push every 8 hours PRN  pantoprazole   Suspension 40 milliGRAM(s) Oral before breakfast  senna 2 Tablet(s) Oral at bedtime PRN  thiamine 100 milliGRAM(s) Oral daily      PHYSICAL EXAM:   Vital Signs Last 24 Hrs  T(C): 36.4 (03 Aug 2023 08:07), Max: 37.1 (02 Aug 2023 20:00)  T(F): 97.5 (03 Aug 2023 08:07), Max: 98.7 (02 Aug 2023 20:00)  HR: 68 (03 Aug 2023 06:33) (57 - 72)  BP: 124/91 (03 Aug 2023 06:33) (122/77 - 166/88)  BP(mean): 109 (02 Aug 2023 18:00) (108 - 109)  RR: 19 (03 Aug 2023 06:00) (13 - 20)  SpO2: 98% (03 Aug 2023 06:00) (97% - 100%)    Parameters below as of 03 Aug 2023 06:00  Patient On (Oxygen Delivery Method): room air      NEURO EXAM PENDING  General: No acute distress, lying in bed answering questions and appears more alert than previous exam    NEUROLOGICAL EXAM:  Mental status: Awake, alert, oriented x3, , follows commands, no neglect  Cranial Nerves: No facial asymmetry, no nystagmus, dysarthria present,  tongue midline  Motor exam: tone decreased on right side,  2/5 RUE, 4+/5 RLE, 5/5 LUE, 5/5 LLE  Sensation: Intact to light touch   Coordination/ Gait: No dysmetria on left and unable to assess on right, gait not tested    LABS:                        15.4   9.72  )-----------( 232      ( 03 Aug 2023 04:49 )             44.8    08-03    140  |  103  |  12.9  ----------------------------<  92  3.6   |  25.0  |  0.98    Ca    9.2      03 Aug 2023 04:49      IMAGING: CT Angio Neck w/ IV Cont (08.01.23 @ 13:12)   IMPRESSION:    1. Bilateral vertebral arteries patent, with left-sided dominance.  2. No evidence of stenosis, occlusion or dissection bilateral   extracranial carotid arteries.  3. No evidence of large vessel occlusion, aneurysm or vascular   malformation intracranial circulation.  4. Additional findings described in detail above.    CT Chest w/ IV Cont (08.01.23 @ 13:12)   MPRESSION: Slightly spiculated left upper lobe lesion, suspicious for   primary lung carcinoma, including small cell carcinoma. Scattered   semisolid lung lesions, which may represent sites of semiinvasive   adenocarcinoma of the lung.    CT Angio Head w/ IV Cont (08.01.23 @ 13:11)   IMPRESSION:  1. Bilateral vertebral arteries patent, with left-sided dominance.  2. No evidence of stenosis, occlusion or dissection bilateral   extracranial carotid arteries.  3. No evidence of large vessel occlusion, aneurysm or vascular   malformation intracranial circulation.  4. Additional findings described in detail above.      CT Head No Cont (07.31.23 @ 19:13)   IMPRESSION:  1. Findings suggestive of a age indeterminate left MCA territory and   right occipital (PCA territory) infarcts..  2. Left posterior parietal intraparenchymal hemorrhage measuring   approximately 2.1 cm in greatest diameter, with surrounding edema.   Smaller eft inferior frontal hemorrhagic lesion with surrounding edema,   small left posterior temporal hemorrhagic lesion with surrounding edema   and small right temporal hemorrhagic lesion with surrounding edema. Small   foci of abnormal signal are also appreciated bilaterally within the   cerebellar regions.Findings are concerning for presence of underlying   hemorrhagic mass lesions, possibly metastatic in etiology. Possibility of   mycotic aneurysm should also be considered in the differential diagnosis.   Consider follow-up pre and postcontrast MR imaging of the brain, to   include DWI imaging and ADC mapping techniques. MRA of the [place] may also prove useful.  3. Punctate focus of calcification in a left occipital location without   surrounding edema, may represent prior infectious/inflammatory process   versus nonacute hemorrhagic event.    TTE Echo Complete w/ Contrast w/ Doppler (08.01.23 @ 11:32)    Summary:   1. Left ventricular ejection fraction, by visual estimation, is 50 to   55%.   2. Low normal global left ventricular systolic function.   3. Spectral Doppler shows impaired relaxation pattern of left   ventricular myocardial filling (Grade I diastolic dysfunction).   4. No shunt of the IAS seen by color doppler.   5. There is no evidence of pericardial effusion.   6. Mild mitral valve regurgitation.   7. Mild thickening of the anterior and posterior mitral valve leaflets.   8. Mild tricuspid regurgitation.   9. Endocardial visualization was enhanced with intravenous echo contrast.    Patricia, Electronically signed on 8/1/2023 at 5:03:24 PM

## 2023-08-03 NOTE — PROGRESS NOTE ADULT - ASSESSMENT
Neuro recommendations pending  ASSESSMENT:   68 year old male with pmh htn, cocaine use and poor medical follow up presents with new right sided hemiparesis and encephalopathy.   Workup notable for multiple lesions on CT head (notably a 2.1 cm diameter left posterior parietal intraparenchymal lesion), left upper lobe lung lesion, and CT head/neck angiogram showing No evidence of large vessel occlusion, aneurysm or vascular malformation intracranial circulation. TTE with doppler not showing shunting, EF low-normal at 50-55%.  Neuro exam and NIHSS score stable since admission.    Patient pending MRI head with and without contrast to better characterize intracranial lesions. However, given likely lung neoplasm on Chest CT this represents metastatic lesions.    NEURO:   - defer antiplatelet therapy pending MRI head result and concern hemorrhagic intracranial lesions  - Stroke neuro checks q 2h, stat CT head without contrast if changes  -  SBP goal: 120-160 given possible hemorrhagic intracranial lesions  - neurosurgery following  - Dysphagia screen: pass  - continue aspiration precautions  - continue Physical therapy/OT/Speech eval/treatment.    - agree with [facility] protocol   - keppra 500mg bid for seizure prophylaxis per neurosurgery  - continue atorvastatin 80mg daily     -CARDIOVASCULAR: TTE with doppler, not showing shunt  cardiac monitoring w/ telemetry for now, further evaluation pending findings of noted workup                              -HEMATOLOGY: hemoglobin stable      No chemical DVT ppx- concern ICH pending MRI head, agree with use scds    PULMONARY: protecting airway, saturating well  CT chest with elena lesion and hilar node enlargement      - workup of pulmonary mass per primary team    RENAL: CULLEN resolved, monitor urine output and creatinine, maintain adequate hydration       Na Goal:  135-145    ID:  leukocytosis resolved, and likely reactive  continue to monitor for sign infection    OTHER:  condition and plan of care d/w patient and sister, questions and concerns addressed.     DISPOSITION: Rehab or home depending on PT eval once stable and workup is complete      CORE MEASURES:        Admission NIHSS: 6     Tenecteplase : [] YES [x] NO      LDL/HDL/A1C:      Depression Screen- if depression hx and/or present      Statin Therapy: continue     Dysphagia Screen: [x] PASS [] FAIL     Smoking [x] YES [] NO      Afib [] YES [x] NO - on cardiac monitor     Stroke Education [x] YES [] NO    Obtain screening lower extremity venous ultrasound in patients who meet 1 or more of the following criteria as patient is high risk for DVT/PE on admission:   [] History of DVT/PE  []Hypercoagulable states (Factor V Leiden, Cancer, OCP, etc. )  []Prolonged immobility (hemiplegia/hemiparesis/post operative or any other extended immobilization)  [] Transferred from outside facility (Rehab or Long term care)  [] Age </= to 50 Neuro recommendations pending  ASSESSMENT:   68 year old male with pmh htn, cocaine use and poor medical follow up presents with new right sided hemiparesis and encephalopathy.   Workup notable for multiple lesions on CT head (notably a 2.1 cm diameter left posterior parietal intraparenchymal lesion), left upper lobe lung lesion, and CT head/neck angiogram showing No evidence of large vessel occlusion, aneurysm or vascular malformation intracranial circulation. TTE with doppler not showing shunting, EF low-normal at 50-55%.  Neuro exam and NIHSS score stable since admission. Mentation improved but pt still, but dysarthria and strength of right side (strength UE<LE) remains     Patient pending MRI head with and without contrast to better characterize intracranial lesions. However, given likely lung neoplasm on Chest CT this represents metastatic lesions.    NEURO:   - defer antiplatelet therapy pending MRI head result and concern hemorrhagic intracranial lesions  - general neurology is aware of the patient and will follow if MRI head shows lesions are neoplastic   - Stroke neuro checks q 2h, stat CT head without contrast if changes  -  SBP goal: 120-160 given possible hemorrhagic intracranial lesions  - neurosurgery following  - Dysphagia screen: pass  - continue aspiration precautions  - continue Physical therapy/OT/Speech eval/treatment.    - agree with CIWA protocol   - keppra 500mg bid for seizure prophylaxis per neurosurgery  - continue atorvastatin 80mg daily     -CARDIOVASCULAR: TTE with doppler, not showing shunt and low-normal ef  cardiac monitoring w/ telemetry for now                             -HEMATOLOGY: hemoglobin stable      No chemical DVT ppx- concern ICH pending MRI head, agree with use scds  heme-onc consulted    PULMONARY: protecting airway, saturating well  CT chest with elena lesion and hilar node enlargement      - workup of pulmonary mass per primary team  thoracic surgery and ir consulted    RENAL: CULLEN resolved, monitor urine output and creatinine, maintain adequate hydration       Na Goal:  135-145    ID:  leukocytosis resolved, likely reactive  continue to monitor for sign infection    OTHER:  condition and plan of care d/w patient and sister, questions and concerns addressed.     DISPOSITION: Rehab or home depending on PT eval once stable and workup is complete      CORE MEASURES:        Admission NIHSS: 6     Tenecteplase : [] YES [x] NO      LDL/HDL/A1C: 67 mg/dL/   53 mg/dl   /    5.2 %     Depression Screen- if depression hx and/or present      Statin Therapy: continue     Dysphagia Screen: [x] PASS [] FAIL     Smoking [x] YES [] NO      Afib [] YES [x] NO - on cardiac monitor     Stroke Education [x] YES [] NO    Obtain screening lower extremity venous ultrasound in patients who meet 1 or more of the following criteria as patient is high risk for DVT/PE on admission:   [] History of DVT/PE  []Hypercoagulable states (Factor V Leiden, Cancer, OCP, etc. )  []Prolonged immobility (hemiplegia/hemiparesis/post operative or any other extended immobilization)  [] Transferred from outside facility (Rehab or Long term care)  [] Age </= to 50 Neuro recommendations pending  ASSESSMENT:   68 year old male with pmh htn, cocaine use and poor medical follow up presents with new right sided hemiparesis and encephalopathy.   Workup notable for multiple lesions on CT head (notably a 2.1 cm diameter left posterior parietal intraparenchymal lesion), left upper lobe lung lesion, and CT head/neck angiogram showing No evidence of large vessel occlusion, aneurysm or vascular malformation intracranial circulation. TTE with doppler not showing shunting, EF low-normal at 50-55%.  Neuro exam and NIHSS score stable since admission. Mentation improved but pt still, but dysarthria and strength of right side (strength UE<LE) remains     Patient pending MRI head with and without contrast to better characterize intracranial lesions. However, given likely lung neoplasm on Chest CT this represents metastatic lesions.    NEURO:   - defer antiplatelet therapy pending MRI head result and concern hemorrhagic intracranial lesions  - general neurology is aware of the patient and will follow if MRI head consistent with neoplastic lesions  - Stroke neuro checks q 2h, stat CT head without contrast if changes  -  SBP goal: 120-160 given possible hemorrhagic intracranial lesions  - neurosurgery following  - Dysphagia screen: pass  - continue aspiration precautions  - continue Physical therapy/OT/Speech eval/treatment.    - agree with CIWA protocol   - keppra 500mg bid for seizure prophylaxis per neurosurgery  - continue atorvastatin 80mg daily     -CARDIOVASCULAR: TTE with doppler, not showing shunt and low-normal ef  cardiac monitoring w/ telemetry for now                             -HEMATOLOGY: hemoglobin stable      No chemical DVT ppx- concern ICH pending MRI head, agree with use scds  heme-onc consulted    PULMONARY: protecting airway, saturating well  CT chest with elena lesion and hilar node enlargement      - workup of pulmonary mass per primary team  thoracic surgery and ir consulted    RENAL: CULLEN resolved, monitor urine output and creatinine, maintain adequate hydration       Na Goal:  135-145    ID:  leukocytosis resolved, likely reactive  continue to monitor for sign infection    OTHER:  condition and plan of care d/w patient and sister, questions and concerns addressed.     DISPOSITION: Rehab or home depending on PT eval once stable and workup is complete      CORE MEASURES:        Admission NIHSS: 6     Tenecteplase : [] YES [x] NO      LDL/HDL/A1C: 67 mg/dL/   53 mg/dl   /    5.2 %     Depression Screen- if depression hx and/or present      Statin Therapy: continue     Dysphagia Screen: [x] PASS [] FAIL     Smoking [x] YES [] NO      Afib [] YES [x] NO - on cardiac monitor     Stroke Education [x] YES [] NO    Obtain screening lower extremity venous ultrasound in patients who meet 1 or more of the following criteria as patient is high risk for DVT/PE on admission:   [] History of DVT/PE  []Hypercoagulable states (Factor V Leiden, Cancer, OCP, etc. )  []Prolonged immobility (hemiplegia/hemiparesis/post operative or any other extended immobilization)  [] Transferred from outside facility (Rehab or Long term care)  [] Age </= to 50

## 2023-08-04 LAB
ANION GAP SERPL CALC-SCNC: 15 MMOL/L — SIGNIFICANT CHANGE UP (ref 5–17)
BUN SERPL-MCNC: 14.4 MG/DL — SIGNIFICANT CHANGE UP (ref 8–20)
CALCIUM SERPL-MCNC: 9.4 MG/DL — SIGNIFICANT CHANGE UP (ref 8.4–10.5)
CHLORIDE SERPL-SCNC: 101 MMOL/L — SIGNIFICANT CHANGE UP (ref 96–108)
CO2 SERPL-SCNC: 22 MMOL/L — SIGNIFICANT CHANGE UP (ref 22–29)
CREAT SERPL-MCNC: 0.9 MG/DL — SIGNIFICANT CHANGE UP (ref 0.5–1.3)
EGFR: 97 ML/MIN/1.73M2 — SIGNIFICANT CHANGE UP
GLUCOSE SERPL-MCNC: 97 MG/DL — SIGNIFICANT CHANGE UP (ref 70–99)
HCT VFR BLD CALC: 47.7 % — SIGNIFICANT CHANGE UP (ref 39–50)
HGB BLD-MCNC: 16.2 G/DL — SIGNIFICANT CHANGE UP (ref 13–17)
MCHC RBC-ENTMCNC: 29.3 PG — SIGNIFICANT CHANGE UP (ref 27–34)
MCHC RBC-ENTMCNC: 34 GM/DL — SIGNIFICANT CHANGE UP (ref 32–36)
MCV RBC AUTO: 86.3 FL — SIGNIFICANT CHANGE UP (ref 80–100)
PLATELET # BLD AUTO: 247 K/UL — SIGNIFICANT CHANGE UP (ref 150–400)
POTASSIUM SERPL-MCNC: 3.5 MMOL/L — SIGNIFICANT CHANGE UP (ref 3.5–5.3)
POTASSIUM SERPL-SCNC: 3.5 MMOL/L — SIGNIFICANT CHANGE UP (ref 3.5–5.3)
RBC # BLD: 5.53 M/UL — SIGNIFICANT CHANGE UP (ref 4.2–5.8)
RBC # FLD: 13.2 % — SIGNIFICANT CHANGE UP (ref 10.3–14.5)
SODIUM SERPL-SCNC: 138 MMOL/L — SIGNIFICANT CHANGE UP (ref 135–145)
WBC # BLD: 9.73 K/UL — SIGNIFICANT CHANGE UP (ref 3.8–10.5)
WBC # FLD AUTO: 9.73 K/UL — SIGNIFICANT CHANGE UP (ref 3.8–10.5)

## 2023-08-04 PROCEDURE — 99233 SBSQ HOSP IP/OBS HIGH 50: CPT

## 2023-08-04 PROCEDURE — 70553 MRI BRAIN STEM W/O & W/DYE: CPT | Mod: 26

## 2023-08-04 PROCEDURE — 99233 SBSQ HOSP IP/OBS HIGH 50: CPT | Mod: GC

## 2023-08-04 RX ORDER — LANOLIN ALCOHOL/MO/W.PET/CERES
3 CREAM (GRAM) TOPICAL
Refills: 0 | Status: DISCONTINUED | OUTPATIENT
Start: 2023-08-04 | End: 2023-08-16

## 2023-08-04 RX ORDER — GABAPENTIN 400 MG/1
300 CAPSULE ORAL
Refills: 0 | Status: DISCONTINUED | OUTPATIENT
Start: 2023-08-04 | End: 2023-08-16

## 2023-08-04 RX ADMIN — LEVETIRACETAM 400 MILLIGRAM(S): 250 TABLET, FILM COATED ORAL at 17:00

## 2023-08-04 RX ADMIN — PANTOPRAZOLE SODIUM 40 MILLIGRAM(S): 20 TABLET, DELAYED RELEASE ORAL at 05:26

## 2023-08-04 RX ADMIN — Medication 650 MILLIGRAM(S): at 11:31

## 2023-08-04 RX ADMIN — Medication 3 MILLIGRAM(S): at 22:51

## 2023-08-04 RX ADMIN — Medication 650 MILLIGRAM(S): at 16:03

## 2023-08-04 RX ADMIN — Medication 1 TABLET(S): at 10:31

## 2023-08-04 RX ADMIN — GABAPENTIN 300 MILLIGRAM(S): 400 CAPSULE ORAL at 22:50

## 2023-08-04 RX ADMIN — LEVETIRACETAM 400 MILLIGRAM(S): 250 TABLET, FILM COATED ORAL at 05:26

## 2023-08-04 RX ADMIN — Medication 1 MILLIGRAM(S): at 10:31

## 2023-08-04 RX ADMIN — ATORVASTATIN CALCIUM 80 MILLIGRAM(S): 80 TABLET, FILM COATED ORAL at 22:50

## 2023-08-04 RX ADMIN — Medication 650 MILLIGRAM(S): at 10:31

## 2023-08-04 NOTE — PROGRESS NOTE ADULT - ASSESSMENT
63y Male presented to [facility] with right hemiparesis and dysarthria, 'visual disturbances' for 2 months, found with multiple areas concerning for infarcts with hemorrhagic conversion versus hemorrhagic lesions.    Recommendations   - q4 neuro checks   - Based on MRI looks like possible hemorrhagic mets, already plan for IR biopsy of lung mass for path, no neurosurgical intervention at this time, would recommend rad / onc   - AC/AP per primary team and oncology   - NSG signing off please reconsult as needed

## 2023-08-04 NOTE — PROGRESS NOTE ADULT - ASSESSMENT
64 y/o male with hx of Cocaine/THC use, now with confusion and CTH showing possible hemorrhagic metastatic lesions vs CVA with hemorrhagic conversion, elevated transaminases    #Mets with hemorrhage   CT Chest showing MIGEL spiculated mass  -Unlikely CVA or primary CNS neoplasm  -MRI brain w/wo contrast reviewed  -CTA head and neck without occlusion noted  -SDU  -neuro-checks Q2h Vitals Q 2, will discuss w/ neuro about downgrading neurochecks  -SBP control  -Hydralazine PRN  -neurology recs appreciated  -Hold aspirin with ICH on CT  -Keppra per neuro Sx.  -Telemetry   -OOB w/assistance  -Fall/seizure precautions   -PT/OT recs AR  -PM&R recs home  -VC boots no AC with ICH    #Left spiculated lung mass  -Will obtain CT C/A/P with contrast  shows left lung spiculated mass  -IR consulted for biopsy - Reports can not be done until next week  -CT surgery consulted for biopsy  -Heme onc recs appreciated    #ETOH abuse/substance abuse  -Utox positive for cocaine/THC  -CIWA protocol with Ativan PRN for CIWA >8  -FA/MVI/Thiamine for possible thiamine deficiency  -Seizure/fall risk protocol   -SW consult for eventual [facility] referral     #Elevated transaminases:  -Possibly related to ETOH   -Cocaine induced from vasospasm?  -Viral hepatitis panel  -JOSHUA herrera wnl      DVT prophylaxis   -SCD     Discussed with patient  Dispo - Awaiting IR Biopsy and recs from neuro and NSGY

## 2023-08-04 NOTE — PROGRESS NOTE ADULT - SUBJECTIVE AND OBJECTIVE BOX
Complains of right arm weakness, but has noticed some movement   Didn't sleep well and has a HA   MRI completed   VSS afebrile     FUNCTIONAL PROGRESS  8/1 OT  Bathing Training:   · Level of [place]	moderate assist (50% patients effort); maximum assist (25% patients effort)  · Physical Assist/Nonphysical Assist	1 person assist    Upper Body Dressing Training:   · Level of [place]	maximum assist (25% patients effort); to don gown  · Physical Assist/Nonphysical Assist	1 person assist    Lower Body Dressing Training:   · Level of [place]	maximum assist (25% patients effort); to don socks  · Physical Assist/Nonphysical Assist	1 person assist    Toilet Hygiene Training:   · Level of [place]	maximum assist (25% patients effort)    Grooming Training:   · Level of [place]	maximum assist (25% patients effort)    Eating/Self-Feeding Training:   · Level of [place]	not observed    VITALS  T(C): 36.9 (08-04-23 @ 07:40), Max: 37.1 (08-04-23 @ 00:27)  HR: 71 (08-04-23 @ 10:00) (62 - 74)  BP: 126/82 (08-04-23 @ 10:00) (122/85 - 150/86)  RR: 15 (08-04-23 @ 10:00) (12 - 21)  SpO2: 100% (08-04-23 @ 10:00) (95% - 100%)  Wt(kg): --    MEDICATIONS   acetaminophen     Tablet .. 650 milliGRAM(s) every 6 hours PRN  aluminum hydroxide/magnesium hydroxide/simethicone Suspension 30 milliLiter(s) every 4 hours PRN  atorvastatin 80 milliGRAM(s) at bedtime  folic acid 1 milliGRAM(s) daily  gabapentin 300 milliGRAM(s) <User Schedule>  hydrALAZINE Injectable 5 milliGRAM(s) every 4 hours PRN  hydrALAZINE Injectable 10 milliGRAM(s) every 4 hours PRN  labetalol Injectable 10 milliGRAM(s) every 10 minutes PRN  levETIRAcetam  IVPB 500 milliGRAM(s) every 12 hours  LORazepam   Injectable 1 milliGRAM(s) every 1 hour PRN  melatonin 3 milliGRAM(s) <User Schedule>  multivitamin 1 Tablet(s) daily  ondansetron Injectable 4 milliGRAM(s) every 8 hours PRN  pantoprazole   Suspension 40 milliGRAM(s) before breakfast  senna 2 Tablet(s) at bedtime PRN      RECENT LABS/IMAGING  - Reviewed Today    HEAD CT 7/31 - 1. Findings suggestive of a age indeterminate left MCA territory and right occipital (PCA territory) infarcts..  2. Left posterior parietal intraparenchymal hemorrhage measuring approximately 2.1 cm in greatest diameter, with surrounding edema. Smaller eft inferior frontal hemorrhagic lesion with surrounding edema, small left posterior temporal hemorrhagic lesion with surrounding edema and small right temporal hemorrhagic lesion with surrounding edema. Small foci of abnormal signal are also appreciated bilaterally within the cerebellar regions.Findings are concerning for presence of underlying hemorrhagic mass lesions, possibly metastatic in etiology. Possibility of mycotic aneurysm should also be considered in the differential diagnosis. Consider follow-up pre and postcontrast MR imaging of the brain, to include DWI imaging and ADC mapping techniques. MRA of the [place] may also prove useful. 3. Punctate focus of calcification in a left occipital location without surrounding edema, may represent prior infectious/inflammatory process versus nonacute hemorrhagic event.    HEAD CT 8/1 - Stable peripheral areas of decreased attenuation some of which have associated increased cortical density. Favor subacute infarcts some of which with hemorrhagic conversion. Correlate clinically in regards to embolic disease..    CT CAP 8/1 - Slightly spiculated left upper lobe lesion, suspicious for primary lung carcinoma, including small cell carcinoma. Scattered semisolid lung lesions, which may represent sites of semiinvasive adenocarcinoma of the lung.    CTA NECK/HEAD 8/1 - 1. Bilateral vertebral arteries patent, with left-sided dominance. 2. No evidence of stenosis, occlusion or dissection bilateral extracranial carotid arteries.3. No evidence of large vessel occlusion, aneurysm or vascular malformation intracranial circulation. 4. Additional findings described in detail above.    MRI Head 8/4:  IMPRESSION: Multiple acute supratentorial and infratentorial hemorrhagic   and nonhemorrhagic embolic infarctions.               16.2   9.73  )-----------( 247      ( 04 Aug 2023 04:39 )             47.7     08-04    138  |  101  |  14.4  ----------------------------<  97  3.5   |  22.0  |  0.90    Ca    9.4      04 Aug 2023 04:39        Urinalysis Basic - ( 04 Aug 2023 04:39 )    Color: x / Appearance: x / SG: x / pH: x  Gluc: 97 mg/dL / Ketone: x  / Bili: x / Urobili: x   Blood: x / Protein: x / Nitrite: x   Leuk Esterase: x / RBC: x / WBC x   Sq Epi: x / Non Sq Epi: x / Bacteria: x    ----------------------------------------------------------------------------------------  PHYSICAL EXAM  Constitutional - NAD, Comfortable  Extremities - No C/C/E, No calf tenderness   Neurologic Exam -                    Cognitive - AAO to self, place, situation     Communication - Fluent      FUNCTIONAL MOTOR EXAM -                     LEFT    UE - ShAB 5/5, EF 5/5, EE 5/5, WE 5/5,  5/5                    RIGHT UE - ShAB 1/5, EF 1/5, EE 0/5, WE 0/5,  0/5                    LEFT    LE - HF 5/5, KE 5/5, DF 5/5, PF 5/5                    RIGHT LE - HF 3/5, KE 3/5, DF 5/5, PF 5/5      Psychiatric - Calm   ----------------------------------------------------------------------------------------  ASSESSMENT/PLAN  63yMale with functional deficits after developing acute CVAs/brain lesions  CVAs/Brain Lesions - MRI completed 8/4, Keppra  Right UE weakness - Recommend Cockup Wrist Splint    EtOH - Ativan, Thiamine, Folate  CAD - Lipitor  HTN - Labetalol, Hydralazine   HA - Recommend gabapentin 300mg 2000  Pain - Tylenol  Sleep - Recommend melatonin 3mg 2000   DVT PPX - SCDs, would recommend starting pharmacological DVT ppx when medically cleared   Rehab/Impaired mobility and function - Continuous hospitalization is crucial for managing the patient's acute medical issues (ischemic infarct and lung mass), which have significantly impacted their mobility, quality of life, and function. Maintaining ongoing mobilization by the staff is imperative to prevent secondary medical complications and associated health issues related to debility.    Currently, the patient has complex psychosocial factors and limited resources for rehab. Therefore, CM and therapy will need to work on support in the community for disposition home. Patient will require a wrist cockup splint on discharge. Due to functional progress, PM&R will sign off at this time.

## 2023-08-04 NOTE — PROGRESS NOTE ADULT - SUBJECTIVE AND OBJECTIVE BOX
[facility] Stroke Team  Progress Note     HPI:  63 year old  male presents to [facility] ER for weakness of Right arm and leg which began >2 days ago.  Pt also noted speech slurring.  Further questioning of pt. positive for visual disturbances for the past 2 months.  Pt unable to describe visual field defects/loss but noted positive for photophobia.  Pt. also c/o pain, hot in quality" in both right leg/arm, with need for assistance for ambulation by sister.  No h/o falls.  Pt note for h/o ?HTN in past, does not see a PMD on any regular basis.  + cocaine and THC use, as well as Alcohol abuse (daily) stopped yesterday  CT of head done by ER shows age indeterminate L MCA territory, Right occipital PCA territory infarct as well as scattered hemorrhagic lesions with surrounding edema in frontal/posterior/temporal areas.   Concern for underlying hemorrhagic mass lesions probably metastatic in origin cannot be excluded.  Pt admitted for further workup    [facility] utilized for history and physical examination  (31 Jul 2023 23:00)      SUBJECTIVE: MRI obtained. Patient still has left sided weakness. No new neurologic complaints.  ROS reported negative unless otherwise noted.    acetaminophen     Tablet .. 650 milliGRAM(s) Oral every 6 hours PRN  aluminum hydroxide/magnesium hydroxide/simethicone Suspension 30 milliLiter(s) Oral every 4 hours PRN  atorvastatin 80 milliGRAM(s) Oral at bedtime  folic acid 1 milliGRAM(s) Oral daily  gabapentin 300 milliGRAM(s) Oral <User Schedule>  hydrALAZINE Injectable 5 milliGRAM(s) IV Push every 4 hours PRN  hydrALAZINE Injectable 10 milliGRAM(s) IV Push every 4 hours PRN  labetalol Injectable 10 milliGRAM(s) IV Push every 10 minutes PRN  levETIRAcetam  IVPB 500 milliGRAM(s) IV Intermittent every 12 hours  LORazepam   Injectable 1 milliGRAM(s) IV Push every 1 hour PRN  melatonin 3 milliGRAM(s) Oral <User Schedule>  multivitamin 1 Tablet(s) Oral daily  ondansetron Injectable 4 milliGRAM(s) IV Push every 8 hours PRN  pantoprazole   Suspension 40 milliGRAM(s) Oral before breakfast  senna 2 Tablet(s) Oral at bedtime PRN      PHYSICAL EXAM:   Vital Signs Last 24 Hrs  T(C): 36.9 (04 Aug 2023 07:40), Max: 37.1 (04 Aug 2023 00:27)  T(F): 98.5 (04 Aug 2023 07:40), Max: 98.8 (04 Aug 2023 00:27)  HR: 65 (04 Aug 2023 12:00) (62 - 74)  BP: 137/85 (04 Aug 2023 12:00) (122/85 - 145/91)  BP(mean): 96 (04 Aug 2023 12:00) (90 - 100)  RR: 16 (04 Aug 2023 12:00) (12 - 21)  SpO2: 100% (04 Aug 2023 12:00) (95% - 100%)    Parameters below as of 04 Aug 2023 12:00  Patient On (Oxygen Delivery Method): room air      Physical Exam:   General: No acute distress, lying in bed answering questions    NEUROLOGICAL EXAM:  Mental status: Awake, alert, oriented x3, , follows commands, no neglect  Cranial Nerves: No facial asymmetry, no nystagmus, dysarthria present,  tongue midline  Motor exam: changed from yesterday (right sided strength improved); tone decreased on right side,  2-3/5 RUE, 5/5 RLE, 5/5 LUE, 5/5 LLE  Sensation: Intact to light touch   Coordination/ Gait: No dysmetria on left and unable to assess on right, gait not tested      LABS:                        16.2   9.73  )-----------( 247      ( 04 Aug 2023 04:39 )             47.7    08-04    138  |  101  |  14.4  ----------------------------<  97  3.5   |  22.0  |  0.90    Ca    9.4      04 Aug 2023 04:39          IMAGING: .     MR Brain Stereotactic w/wo IV Cont (08.04.23 @ 08:43  FINDINGS:  No abnormal leptomeningeal or parenchymal enhancement.  There multiple areas of abnormal restricted diffusion in the bilateral   frontal lobes, bilateral occipital lobes, bilateral cerebral hemispheres   compatible with acute embolic infarctions. The left parietal lobe, left   occipital lobe, right temporal lobe lesions demonstrates hemosiderin   deposition. The hemorrhagic lesions demonstrates adjacent edema.    Scattered periventricular and subcortical white matter T2 /FLAIR   hyperintensities are seen without mass effect, nonspecific, likely   representing mild chronic microvascular changes. Normal T2 flow-voids are   seen within  the intracranial vasculature. The lateral ventricles and   cortical sulci are age-appropriate in size and configuration. There is no   mass, mass effect, or extra-axial fluid collection.There is no   susceptibility artifact to suggest hemorrhage. Midline structures are   normal.  The visualized paranasal sinuses, mastoid air cells and orbits   are unremarkable.      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      IMAGING: CT Angio Neck w/ IV Cont (08.01.23 @ 13:12)   IMPRESSION:    1. Bilateral vertebral arteries patent, with left-sided dominance.  2. No evidence of stenosis, occlusion or dissection bilateral   extracranial carotid arteries.  3. No evidence of large vessel occlusion, aneurysm or vascular   malformation intracranial circulation.  4. Additional findings described in detail above.    CT Chest w/ IV Cont (08.01.23 @ 13:12)   MPRESSION: Slightly spiculated left upper lobe lesion, suspicious for   primary lung carcinoma, including small cell carcinoma. Scattered   semisolid lung lesions, which may represent sites of semiinvasive   adenocarcinoma of the lung.    CT Angio Head w/ IV Cont (08.01.23 @ 13:11)   IMPRESSION:  1. Bilateral vertebral arteries patent, with left-sided dominance.  2. No evidence of stenosis, occlusion or dissection bilateral   extracranial carotid arteries.  3. No evidence of large vessel occlusion, aneurysm or vascular   malformation intracranial circulation.  4. Additional findings described in detail above.      CT Head No Cont (07.31.23 @ 19:13)   IMPRESSION:  1. Findings suggestive of a age indeterminate left MCA territory and   right occipital (PCA territory) infarcts..  2. Left posterior parietal intraparenchymal hemorrhage measuring   approximately 2.1 cm in greatest diameter, with surrounding edema.   Smaller eft inferior frontal hemorrhagic lesion with surrounding edema,   small left posterior temporal hemorrhagic lesion with surrounding edema   and small right temporal hemorrhagic lesion with surrounding edema. Small   foci of abnormal signal are also appreciated bilaterally within the   cerebellar regions.Findings are concerning for presence of underlying   hemorrhagic mass lesions, possibly metastatic in etiology. Possibility of   mycotic aneurysm should also be considered in the differential diagnosis.   Consider follow-up pre and postcontrast MR imaging of the brain, to   include DWI imaging and ADC mapping techniques. MRA of the [place]   [place] may also prove useful.  3. Punctate focus of calcification in a left occipital location without   surrounding edema, may represent prior infectious/inflammatory process   versus nonacute hemorrhagic event.    TTE Echo Complete w/ Contrast w/ Doppler (08.01.23 @ 11:32)    Summary:   1. Left ventricular ejection fraction, by visual estimation, is 50 to   55%.   2. Low normal global left ventricular systolic function.   3. Spectral Doppler shows impaired relaxation pattern of left   ventricular myocardial filling (Grade I diastolic dysfunction).   4. No shunt of the IAS seen by color doppler.   5. There is no evidence of pericardial effusion.   6. Mild mitral valve regurgitation.   7. Mild thickening of the anterior and posterior mitral valve leaflets.   8. Mild tricuspid regurgitation.   9. Endocardial visualization was enhanced with intravenous echo contrast.    Patricia, Electronically signed on 8/1/2023 at 5:03:24 PM [facility] Stroke Team  Progress Note     HPI:  63 year old  male presents to [facility] ER for weakness of Right arm and leg which began >2 days ago.  Pt also noted speech slurring.  Further questioning of pt. positive for visual disturbances for the past 2 months.  Pt unable to describe visual field defects/loss but noted positive for photophobia.  Pt. also c/o pain, hot in quality" in both right leg/arm, with need for assistance for ambulation by sister.  No h/o falls.  Pt note for h/o ?HTN in past, does not see a PMD on any regular basis.  + cocaine and THC use, as well as Alcohol abuse (daily) stopped yesterday  CT of head done by ER shows age indeterminate L MCA territory, Right occipital PCA territory infarct as well as scattered hemorrhagic lesions with surrounding edema in frontal/posterior/temporal areas.   Concern for underlying hemorrhagic mass lesions probably metastatic in origin cannot be excluded.  Pt admitted for further workup    [facility] utilized for history and physical examination  (31 Jul 2023 23:00)      SUBJECTIVE: MRI obtained. Patient still has left sided weakness. No new neurologic complaints.  ROS reported negative unless otherwise noted.    acetaminophen     Tablet .. 650 milliGRAM(s) Oral every 6 hours PRN  aluminum hydroxide/magnesium hydroxide/simethicone Suspension 30 milliLiter(s) Oral every 4 hours PRN  atorvastatin 80 milliGRAM(s) Oral at bedtime  folic acid 1 milliGRAM(s) Oral daily  gabapentin 300 milliGRAM(s) Oral <User Schedule>  hydrALAZINE Injectable 5 milliGRAM(s) IV Push every 4 hours PRN  hydrALAZINE Injectable 10 milliGRAM(s) IV Push every 4 hours PRN  labetalol Injectable 10 milliGRAM(s) IV Push every 10 minutes PRN  levETIRAcetam  IVPB 500 milliGRAM(s) IV Intermittent every 12 hours  LORazepam   Injectable 1 milliGRAM(s) IV Push every 1 hour PRN  melatonin 3 milliGRAM(s) Oral <User Schedule>  multivitamin 1 Tablet(s) Oral daily  ondansetron Injectable 4 milliGRAM(s) IV Push every 8 hours PRN  pantoprazole   Suspension 40 milliGRAM(s) Oral before breakfast  senna 2 Tablet(s) Oral at bedtime PRN      PHYSICAL EXAM:   Vital Signs Last 24 Hrs  T(C): 36.9 (04 Aug 2023 07:40), Max: 37.1 (04 Aug 2023 00:27)  T(F): 98.5 (04 Aug 2023 07:40), Max: 98.8 (04 Aug 2023 00:27)  HR: 65 (04 Aug 2023 12:00) (62 - 74)  BP: 137/85 (04 Aug 2023 12:00) (122/85 - 145/91)  BP(mean): 96 (04 Aug 2023 12:00) (90 - 100)  RR: 16 (04 Aug 2023 12:00) (12 - 21)  SpO2: 100% (04 Aug 2023 12:00) (95% - 100%)    Parameters below as of 04 Aug 2023 12:00  Patient On (Oxygen Delivery Method): room air      Physical Exam:   General: No acute distress, lying in bed answering questions    NEUROLOGICAL EXAM:  Mental status: Awake, alert, oriented x3, , follows commands, no neglect  Cranial Nerves: No facial asymmetry, no nystagmus, dysarthria present,  tongue midline  Motor exam: changed from yesterday (right sided strength improved); tone decreased on right side,  2-3/5 RUE, 5/5 RLE, 5/5 LUE, 5/5 LLE  Sensation: Intact to light touch   Coordination/ Gait: No dysmetria on left and unable to assess on right, gait not tested      LABS:                        16.2   9.73  )-----------( 247      ( 04 Aug 2023 04:39 )             47.7    08-04    138  |  101  |  14.4  ----------------------------<  97  3.5   |  22.0  |  0.90    Ca    9.4      04 Aug 2023 04:39          IMAGING: .     MR Brain Stereotactic w/wo IV Cont (08.04.23 @ 08:43  FINDINGS:  No abnormal leptomeningeal or parenchymal enhancement.  There multiple areas of abnormal restricted diffusion in the bilateral   frontal lobes, bilateral occipital lobes, bilateral cerebral hemispheres   compatible with acute embolic infarctions. The left parietal lobe, left   occipital lobe, right temporal lobe lesions demonstrates hemosiderin   deposition. The hemorrhagic lesions demonstrates adjacent edema.    Scattered periventricular and subcortical white matter T2 /FLAIR   hyperintensities are seen without mass effect, nonspecific, likely   representing mild chronic microvascular changes. Normal T2 flow-voids are   seen within  the intracranial vasculature. The lateral ventricles and   cortical sulci are age-appropriate in size and configuration. There is no   mass, mass effect, or extra-axial fluid collection.There is no   susceptibility artifact to suggest hemorrhage. Midline structures are   normal.  The visualized paranasal sinuses, mastoid air cells and orbits   are unremarkable.      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      IMAGING: CT Angio Neck w/ IV Cont (08.01.23 @ 13:12)   IMPRESSION:    1. Bilateral vertebral arteries patent, with left-sided dominance.  2. No evidence of stenosis, occlusion or dissection bilateral   extracranial carotid arteries.  3. No evidence of large vessel occlusion, aneurysm or vascular   malformation intracranial circulation.  4. Additional findings described in detail above.    CT Chest w/ IV Cont (08.01.23 @ 13:12)   MPRESSION: Slightly spiculated left upper lobe lesion, suspicious for   primary lung carcinoma, including small cell carcinoma. Scattered   semisolid lung lesions, which may represent sites of semiinvasive   adenocarcinoma of the lung.    CT Angio Head w/ IV Cont (08.01.23 @ 13:11)   IMPRESSION:  1. Bilateral vertebral arteries patent, with left-sided dominance.  2. No evidence of stenosis, occlusion or dissection bilateral   extracranial carotid arteries.  3. No evidence of large vessel occlusion, aneurysm or vascular   malformation intracranial circulation.  4. Additional findings described in detail above.      CT Head No Cont (07.31.23 @ 19:13)   IMPRESSION:  1. Findings suggestive of a age indeterminate left MCA territory and   right occipital (PCA territory) infarcts..  2. Left posterior parietal intraparenchymal hemorrhage measuring   approximately 2.1 cm in greatest diameter, with surrounding edema.   Smaller eft inferior frontal hemorrhagic lesion with surrounding edema,   small left posterior temporal hemorrhagic lesion with surrounding edema   and small right temporal hemorrhagic lesion with surrounding edema. Small   foci of abnormal signal are also appreciated bilaterally within the   cerebellar regions.Findings are concerning for presence of underlying   hemorrhagic mass lesions, possibly metastatic in etiology. Possibility of   mycotic aneurysm should also be considered in the differential diagnosis.   Consider follow-up pre and postcontrast MR imaging of the brain, to   include DWI imaging and ADC mapping techniques. MRA of the [place]   [place] may also prove useful.  3. Punctate focus of calcification in a left occipital location without   surrounding edema, may represent prior infectious/inflammatory process   versus nonacute hemorrhagic event.    TTE Echo Complete w/ Contrast w/ Doppler (08.01.23 @ 11:32)    Summary:   1. Left ventricular ejection fraction, by visual estimation, is 50 to   55%.   2. Low normal global left ventricular systolic function.   3. Spectral Doppler shows impaired relaxation pattern of left   ventricular myocardial filling (Grade I diastolic dysfunction).   4. No shunt of the IAS seen by color doppler.   5. There is no evidence of pericardial effusion.   6. Mild mitral valve regurgitation.   7. Mild thickening of the anterior and posterior mitral valve leaflets.   8. Mild tricuspid regurgitation.   9. Endocardial visualization was enhanced with intravenous echo contrast.    Patricia, Electronically signed on 8/1/2023 at 5:03:24 PM

## 2023-08-04 NOTE — PROGRESS NOTE ADULT - SUBJECTIVE AND OBJECTIVE BOX
Hospitalist Daily Progress Note    Chief Complaint:  Patient is a 63y old  Male who presents with a chief complaint of CVA with R sided weakness    SUBJECTIVE / OVERNIGHT EVENTS:  Patient was seen and examined at bedside. Patient denies chest pain, SOB, abd pain, N/V, fever, chills, dysuria or any other complaints. All remainder ROS negative.     MEDICATIONS  (STANDING):  atorvastatin 80 milliGRAM(s) Oral at bedtime  folic acid 1 milliGRAM(s) Oral daily  levETIRAcetam  IVPB 500 milliGRAM(s) IV Intermittent every 12 hours  multivitamin 1 Tablet(s) Oral daily  pantoprazole   Suspension 40 milliGRAM(s) Oral before breakfast    MEDICATIONS  (PRN):  acetaminophen     Tablet .. 650 milliGRAM(s) Oral every 6 hours PRN Temp greater or equal to 38C (100.4F), Mild Pain (1 - 3)  aluminum hydroxide/magnesium hydroxide/simethicone Suspension 30 milliLiter(s) Oral every 4 hours PRN Dyspepsia  hydrALAZINE Injectable 5 milliGRAM(s) IV Push every 4 hours PRN SBP >140  hydrALAZINE Injectable 10 milliGRAM(s) IV Push every 4 hours PRN SBP >160  labetalol Injectable 10 milliGRAM(s) IV Push every 10 minutes PRN SBP> 160 and DBP> 90  LORazepam   Injectable 1 milliGRAM(s) IV Push every 1 hour PRN CIWA-Ar score 8 or greater  melatonin 3 milliGRAM(s) Oral at bedtime PRN Insomnia  ondansetron Injectable 4 milliGRAM(s) IV Push every 8 hours PRN Nausea and/or Vomiting  senna 2 Tablet(s) Oral at bedtime PRN Constipation        I&O's Summary    03 Aug 2023 07:01  -  04 Aug 2023 07:00  --------------------------------------------------------  IN: 100 mL / OUT: 0 mL / NET: 100 mL        PHYSICAL EXAM:  Vital Signs Last 24 Hrs  T(C): 36.9 (04 Aug 2023 07:40), Max: 37.1 (04 Aug 2023 00:27)  T(F): 98.5 (04 Aug 2023 07:40), Max: 98.8 (04 Aug 2023 00:27)  HR: 62 (04 Aug 2023 08:00) (62 - 74)  BP: 135/90 (04 Aug 2023 07:45) (122/85 - 150/86)  BP(mean): 100 (04 Aug 2023 07:45) (93 - 103)  RR: 18 (04 Aug 2023 08:00) (12 - 21)  SpO2: 99% (04 Aug 2023 08:00) (95% - 100%)    Parameters below as of 04 Aug 2023 08:00  Patient On (Oxygen Delivery Method): room air        Constitutional: NAD, Resting  ENT: Supple, No JVD  Lungs: CTA B/L, Non-labored breathing  Cardio: RRR, S1/S2, No murmur  Abdomen: Soft, Nontender, Nondistended; Bowel sounds present  Extremities: No calf tenderness, No pitting edema  Musculoskeletal:   No joint swelling  Psych: Calm, cooperative affect appropriate  Neuro: Awake and alert, oriented to name, location, moves 5/5 in LUE/LLE 4/5 in RLE and 0/5 in RUE  Skin: No rashes; no palpable lesions    LABS:                        16.2   9.73  )-----------( 247      ( 04 Aug 2023 04:39 )             47.7     08-04    138  |  101  |  14.4  ----------------------------<  97  3.5   |  22.0  |  0.90    Ca    9.4      04 Aug 2023 04:39            Urinalysis Basic - ( 04 Aug 2023 04:39 )    Color: x / Appearance: x / SG: x / pH: x  Gluc: 97 mg/dL / Ketone: x  / Bili: x / Urobili: x   Blood: x / Protein: x / Nitrite: x   Leuk Esterase: x / RBC: x / WBC x   Sq Epi: x / Non Sq Epi: x / Bacteria: x        CAPILLARY BLOOD GLUCOSE            RADIOLOGY & ADDITIONAL TESTS:  Results Reviewed: Y  Imaging Personally Reviewed: N  Electrocardiogram Personally Reviewed: N

## 2023-08-04 NOTE — PROGRESS NOTE ADULT - ASSESSMENT
ASSESSMENT:   68 year old male with pmh htn, cocaine use and poor medical follow up admitted with new right sided hemiparesis and encephalopathy.   MRI head notably showed the intracranial lesions multiple areas of abnormal restricted diffusion, compatible with acute embolic infarction and some infarctions hemorrhagic in nature.   frontal lobes, bilateral occipital lobes, bilateral cerebral hemispheres   Workup notable for multiple lesions on CT head (notably a 2.1 cm diameter left posterior parietal intraparenchymal lesion), left upper lobe lung lesion, and CT head/neck angiogram showing No evidence of large vessel occlusion, aneurysm or vascular malformation intracranial circulation. TTE with doppler not showing shunting, EF low-normal at 50-55%.  Neuro exam and NIHSS score slightly improved since initial presentation; but pt still has dysarthria and 2-3/5 strength of right upper extremity     NEURO:   - hold antiplatelet therapy given hemorrhagic intracranial lesions  - patient to have cardioembolic workup; likely needs loop recorder on discharge  - 2 week follow up MRI head with and without contrast to reassess lesions  - stroke neuro will continue to follow  - Stroke neuro checks q 2h, stat CT head without contrast if neuro changes  -  SBP goal: 120-160 given hemorrhagic intracranial lesions  - Dysphagia screen: pass  - continue aspiration precautions  - continue Physical therapy/OT/Speech eval/treatment.    - agree with [facility] protocol   - keppra 500mg bid for seizure prophylaxis per neurosurgery  - continue atorvastatin 80mg daily     -CARDIOVASCULAR: TTE with doppler, not showing shunt and low-normal ef  cardiac monitoring w/ telemetry for now                             -HEMATOLOGY: hemoglobin stable      No chemical DVT ppx- hemorrhagic lesions intracranial, agree with use scds  heme-onc consulted    PULMONARY: protecting airway, saturating well  CT chest with elena lesion and hilar node enlargement      - workup of pulmonary mass per primary team  thoracic surgery and ir consulted    RENAL: CULLEN resolved, monitor urine output and creatinine, maintain adequate hydration       Na Goal:  135-145    ID:  leukocytosis resolved, likely reactive  continue to monitor for sign infection    OTHER:  condition and plan of care d/w patient and sister, questions and concerns addressed.     DISPOSITION: Rehab or home depending on PT eval once stable and workup is complete      CORE MEASURES:        Admission NIHSS: 6     Tenecteplase : [] YES [x] NO      LDL/HDL/A1C: 67 mg/dL/   53 mg/dl   /    5.2 %     Depression Screen- if depression hx and/or present      Statin Therapy: continue     Dysphagia Screen: [x] PASS [] FAIL     Smoking [x] YES [] NO      Afib [] YES [x] NO - on cardiac monitor     Stroke Education [x] YES [] NO    Obtain screening lower extremity venous ultrasound in patients who meet 1 or more of the following criteria as patient is high risk for DVT/PE on admission:   [] History of DVT/PE  []Hypercoagulable states (Factor V Leiden, Cancer, OCP, etc. )  []Prolonged immobility (hemiplegia/hemiparesis/post operative or any other extended immobilization)  [] Transferred from outside facility (Rehab or Long term care)  [] Age </= to 50   ASSESSMENT:   68 year old male with pmh htn, cocaine use and poor medical follow up admitted with new right sided hemiparesis and encephalopathy.   MRI head notably showed the intracranial lesions multiple areas of abnormal restricted diffusion, compatible with acute embolic infarction and some infarctions hemorrhagic in nature.   frontal lobes, bilateral occipital lobes, bilateral cerebral hemispheres   Workup notable for multiple lesions on CT head (notably a 2.1 cm diameter left posterior parietal intraparenchymal lesion), left upper lobe lung lesion, and CT head/neck angiogram showing No evidence of large vessel occlusion, aneurysm or vascular malformation intracranial circulation. TTE with doppler not showing shunting, EF low-normal at 50-55%.  Neuro exam and NIHSS score slightly improved since initial presentation; but pt still has dysarthria and 2-3/5 strength of right upper extremity     NEURO:   - hold antiplatelet therapy given hemorrhagic intracranial lesions, repeat CT head 7 days from stroke and reassess safety of ASA  - patient to have cardioembolic workup; will need KATHRINE, likely needs loop recorder on discharge  - 2 week follow up MRI head with and without contrast to reassess stroke vs lesion (ie if strokes do not show typical evolution this may represent non enhnacing neoplasms)  - stroke neuro will continue to follow  - Stroke neuro checks q 2h, stat CT head without contrast if neuro changes  -  SBP goal: 120-160 given hemorrhagic intracranial lesions  - Dysphagia screen: pass  - continue aspiration precautions  - continue Physical therapy/OT/Speech eval/treatment.    - agree with [facility] protocol   - keppra 500mg bid for seizure prophylaxis for 7 days  - continue atorvastatin 80mg daily     -CARDIOVASCULAR: TTE with doppler, not showing shunt and low-normal ef  cardiac monitoring w/ telemetry for now    Will need KATHRINE/ILR (or alternate long term cardiac monitor for occult A fib)                         -HEMATOLOGY: hemoglobin stable      No chemical DVT ppx- hemorrhagic lesions intracranial, agree with use scds  heme-onc consulted    PULMONARY: protecting airway, saturating well  CT chest with elena lesion and hilar node enlargement      - workup of pulmonary mass per primary team  thoracic surgery and ir consulted    RENAL: CULLEN resolved, monitor urine output and creatinine, maintain adequate hydration       Na Goal:  135-145    ID:  leukocytosis resolved, likely reactive  continue to monitor for sign infection    OTHER:  condition and plan of care d/w patient and sister, questions and concerns addressed.     DISPOSITION: Rehab or home depending on PT eval once stable and workup is complete      CORE MEASURES:        Admission NIHSS: 6     Tenecteplase : [] YES [x] NO      LDL/HDL/A1C: 67 mg/dL/   53 mg/dl   /    5.2 %     Depression Screen- if depression hx and/or present      Statin Therapy: continue     Dysphagia Screen: [x] PASS [] FAIL     Smoking [x] YES [] NO      Afib [] YES [x] NO - on cardiac monitor     Stroke Education [x] YES [] NO    Obtain screening lower extremity venous ultrasound in patients who meet 1 or more of the following criteria as patient is high risk for DVT/PE on admission:   [] History of DVT/PE  []Hypercoagulable states (Factor V Leiden, Cancer, OCP, etc. )  []Prolonged immobility (hemiplegia/hemiparesis/post operative or any other extended immobilization)  [] Transferred from outside facility (Rehab or Long term care)  [] Age </= to 50

## 2023-08-04 NOTE — PROGRESS NOTE ADULT - SUBJECTIVE AND OBJECTIVE BOX
HPI: 63y Male presented to [facility] with right hemiparesis and dysarthria, 'visual disturbances' for 2 months, found with multiple areas concerning for infarcts with hemorrhagic conversion versus hemorrhagic lesions.     Interval History: patient stable, continues to have RUE plegia. Otherwise no complaints. MRI reviewed with neurosurgeon + for strokes and hemorrhagic lesions. CT chest shows lung lesion likely primary     Vital Signs Last 24 Hrs  T(C): 36.9 (04 Aug 2023 07:40), Max: 37.1 (04 Aug 2023 00:27)  T(F): 98.5 (04 Aug 2023 07:40), Max: 98.8 (04 Aug 2023 00:27)  HR: 71 (04 Aug 2023 10:00) (62 - 74)  BP: 126/82 (04 Aug 2023 10:00) (122/85 - 150/86)  BP(mean): 90 (04 Aug 2023 10:00) (90 - 103)  RR: 15 (04 Aug 2023 10:00) (12 - 21)  SpO2: 100% (04 Aug 2023 10:00) (95% - 100%)    Parameters below as of 04 Aug 2023 10:00  Patient On (Oxygen Delivery Method): room air    PHYSICAL EXAM:  GENERAL: NAD  HEAD: Atraumatic, normocephalic  ARIANE COMA SCORE: E- 4 V- 5 M- 6=15  MENTAL STATUS: AAO x3; Appropriately conversant without aphasia; following commands  CRANIAL NERVES: Visual fields grossly intact. PERRL. EOMI without nystagmus. Facial sensation intact V1-3 distribution b/l. Mild flattening of the nasolabial fold on right. Hearing grossly intact. Speech clear  MOTOR: RUE 0/5. RLE 4+/5. LUE/LLE 5/5  SENSATION: grossly intact to light touch all extremities.    < from: MR Brain Stereotactic w/wo IV Cont (08.04.23 @ 08:43) >  Gadavist contrast. 2.5 cc of contrast was discarded    FINDINGS:  No abnormal leptomeningeal or parenchymal enhancement.  There multiple areas of abnormal restricted diffusion in the bilateral   frontal lobes, bilateral occipital lobes, bilateral cerebral hemispheres   compatible with acute embolic infarctions. The left parietal lobe, left   occipital lobe, right temporal lobe lesions demonstrates hemosiderin   deposition. The hemorrhagic lesions demonstrates adjacent edema.    Scattered periventricular and subcortical white matter T2 /FLAIR   hyperintensities are seen without mass effect, nonspecific, likely   representing mild chronic microvascular changes. Normal T2 flow-voids are   seen within  the intracranial vasculature. The lateral ventricles and   cortical sulci are age-appropriate in size and configuration. There is no   mass, mass effect, or extra-axial fluid collection.There is no   susceptibility artifact to suggest hemorrhage. Midline structures are   normal.  The visualized paranasal sinuses, mastoid air cells and orbits   are unremarkable.    < end of copied text >

## 2023-08-05 DIAGNOSIS — I63.9 CEREBRAL INFARCTION, UNSPECIFIED: ICD-10-CM

## 2023-08-05 DIAGNOSIS — I10 ESSENTIAL (PRIMARY) HYPERTENSION: ICD-10-CM

## 2023-08-05 PROCEDURE — 99222 1ST HOSP IP/OBS MODERATE 55: CPT | Mod: 25

## 2023-08-05 PROCEDURE — 99233 SBSQ HOSP IP/OBS HIGH 50: CPT

## 2023-08-05 RX ORDER — IBUPROFEN 200 MG
400 TABLET ORAL ONCE
Refills: 0 | Status: COMPLETED | OUTPATIENT
Start: 2023-08-05 | End: 2023-08-05

## 2023-08-05 RX ADMIN — ATORVASTATIN CALCIUM 80 MILLIGRAM(S): 80 TABLET, FILM COATED ORAL at 20:12

## 2023-08-05 RX ADMIN — Medication 3 MILLIGRAM(S): at 20:12

## 2023-08-05 RX ADMIN — Medication 400 MILLIGRAM(S): at 17:16

## 2023-08-05 RX ADMIN — PANTOPRAZOLE SODIUM 40 MILLIGRAM(S): 20 TABLET, DELAYED RELEASE ORAL at 05:39

## 2023-08-05 RX ADMIN — GABAPENTIN 300 MILLIGRAM(S): 400 CAPSULE ORAL at 20:12

## 2023-08-05 RX ADMIN — Medication 1 TABLET(S): at 08:53

## 2023-08-05 RX ADMIN — LEVETIRACETAM 400 MILLIGRAM(S): 250 TABLET, FILM COATED ORAL at 17:16

## 2023-08-05 RX ADMIN — Medication 1 MILLIGRAM(S): at 08:53

## 2023-08-05 RX ADMIN — LEVETIRACETAM 400 MILLIGRAM(S): 250 TABLET, FILM COATED ORAL at 05:39

## 2023-08-05 NOTE — PROGRESS NOTE ADULT - SUBJECTIVE AND OBJECTIVE BOX
Destiny Moser M.D.    Patient is a 63y old  Male who presents with a chief complaint of CVA with R sided weakness (05 Aug 2023 11:16)      SUBJECTIVE / OVERNIGHT EVENTS: ZAIDA carlos at bedside, Had mild headache, but much improved from yesterday.     Patient denies chest pain, SOB, abd pain, N/V, fever, chills, dysuria or any other complaints. All remainder ROS negative.     MEDICATIONS  (STANDING):  atorvastatin 80 milliGRAM(s) Oral at bedtime  folic acid 1 milliGRAM(s) Oral daily  gabapentin 300 milliGRAM(s) Oral <User Schedule>  levETIRAcetam  IVPB 500 milliGRAM(s) IV Intermittent every 12 hours  melatonin 3 milliGRAM(s) Oral <User Schedule>  multivitamin 1 Tablet(s) Oral daily  pantoprazole   Suspension 40 milliGRAM(s) Oral before breakfast    MEDICATIONS  (PRN):  acetaminophen     Tablet .. 650 milliGRAM(s) Oral every 6 hours PRN Temp greater or equal to 38C (100.4F), Mild Pain (1 - 3)  aluminum hydroxide/magnesium hydroxide/simethicone Suspension 30 milliLiter(s) Oral every 4 hours PRN Dyspepsia  hydrALAZINE Injectable 5 milliGRAM(s) IV Push every 4 hours PRN SBP >140  hydrALAZINE Injectable 10 milliGRAM(s) IV Push every 4 hours PRN SBP >160  labetalol Injectable 10 milliGRAM(s) IV Push every 10 minutes PRN SBP> 160 and DBP> 90  LORazepam   Injectable 1 milliGRAM(s) IV Push every 1 hour PRN CIWA-Ar score 8 or greater  ondansetron Injectable 4 milliGRAM(s) IV Push every 8 hours PRN Nausea and/or Vomiting  senna 2 Tablet(s) Oral at bedtime PRN Constipation      I&O's Summary      PHYSICAL EXAM:  Vital Signs Last 24 Hrs  T(C): 36.9 (05 Aug 2023 08:28), Max: 36.9 (05 Aug 2023 04:23)  T(F): 98.4 (05 Aug 2023 08:28), Max: 98.4 (05 Aug 2023 04:23)  HR: 65 (05 Aug 2023 12:23) (57 - 65)  BP: 134/88 (05 Aug 2023 12:23) (123/72 - 168/88)  BP(mean): 103 (05 Aug 2023 12:23) (103 - 107)  RR: 18 (05 Aug 2023 12:23) (18 - 19)  SpO2: 97% (05 Aug 2023 12:23) (95% - 100%)    Parameters below as of 05 Aug 2023 12:23  Patient On (Oxygen Delivery Method): room air      CONSTITUTIONAL: NAD, well-groomed  RESPIRATORY: Normal respiratory effort; lungs are clear to auscultation bilaterally  CARDIOVASCULAR: Regular rate and rhythm; No lower extremity edema  ABDOMEN: Nontender to palpation, normoactive bowel sounds  PSYCH: A+O x3; affect appropriate  NEUROLOGY: CN 2-12 are intact and symmetric; no gross sensory deficits;   SKIN: No rashes; no palpable lesions    LABS:                        16.2   9.73  )-----------( 247      ( 04 Aug 2023 04:39 )             47.7     08-04    138  |  101  |  14.4  ----------------------------<  97  3.5   |  22.0  |  0.90    Ca    9.4      04 Aug 2023 04:39            Urinalysis Basic - ( 04 Aug 2023 04:39 )    Color: x / Appearance: x / SG: x / pH: x  Gluc: 97 mg/dL / Ketone: x  / Bili: x / Urobili: x   Blood: x / Protein: x / Nitrite: x   Leuk Esterase: x / RBC: x / WBC x   Sq Epi: x / Non Sq Epi: x / Bacteria: x        CAPILLARY BLOOD GLUCOSE          RADIOLOGY & ADDITIONAL TESTS:  Results Reviewed:   Imaging Personally Reviewed:  Electrocardiogram Personally Reviewed:

## 2023-08-05 NOTE — PROGRESS NOTE ADULT - ASSESSMENT
62 y/o male with hx of Cocaine/THC use, now with confusion and CTH showing possible hemorrhagic metastatic lesions vs CVA with hemorrhagic conversion, elevated transaminases    #Mets with hemorrhagic CVA  -CT Chest showing MIGEL spiculated mass - will contact IR next week for biopsy   -Unlikely CVA or primary CNS neoplasm  -MRI brain w/wo contrast reviewed - concern for embolic CVA  -CTA head and neck without occlusion noted  -neuro-checks Q2h Vitals Q 2  -cards called for KATHRINE and ILR  -neurology recs appreciated  -Hold aspirin with ICH on CT. repeat CTH 8/9, 1 wk from CVA to eval safety for resume AC  -Keppra per neuro Sx x 7 days  -Telemetry   -OOB w/assistance  -Fall/seizure precautions   -PT/OT recs AR  -PM&R recs home  -VC boots no AC with ICH  -Neurosx signed off, outpt f/u    #Left spiculated lung mass  -Will obtain CT C/A/P with contrast  shows left lung spiculated mass  -CT surgery consulted - defer to IR  -IR consulted for biopsy - plan for biopsy sometimes next week  -Heme onc recs appreciated    #ETOH abuse/substance abuse  -Utox positive for cocaine/THC  -CIWA protocol with Ativan PRN for CIWA >8  -FA/MVI/Thiamine for possible thiamine deficiency  -Seizure/fall risk protocol   -SW consult for eventual SATP referral     #Elevated transaminases  -Possibly related to ETOH   -Cocaine induced from vasospasm?  -Viral hepatitis panel  -JOSHUA herrera wnl    DVT prophylaxis -SCD   Dispo - Awaiting KATHRINE with cards, IR Biopsy and recs from neuro

## 2023-08-05 NOTE — PROGRESS NOTE ADULT - SUBJECTIVE AND OBJECTIVE BOX
[facility] Stroke Team  Progress Note     HPI:  63 year old  male presents to [facility] ER for weakness of Right arm and leg which began >2 days ago.  Pt also noted speech slurring.  Further questioning of pt. positive for visual disturbances for the past 2 months.  Pt unable to describe visual field defects/loss but noted positive for photophobia.  Pt. also c/o pain, hot in quality" in both right leg/arm, with need for assistance for ambulation by sister.  No h/o falls.  Pt note for h/o ?HTN in past, does not see a PMD on any regular basis.  + cocaine and THC use, as well as Alcohol abuse (daily) stopped yesterday  CT of head done by ER shows age indeterminate L MCA territory, Right occipital PCA territory infarct as well as scattered hemorrhagic lesions with surrounding edema in frontal/posterior/temporal areas.   Concern for underlying hemorrhagic mass lesions probably metastatic in origin cannot be excluded.  Pt admitted for further workup    Pt's nurse was utilized to translate for history and physical examination      SUBJECTIVE: MRI obtained. Patient still has left sided weakness. No new neurologic complaints.  ROS reported negative unless otherwise noted.    acetaminophen     Tablet .. 650 milliGRAM(s) Oral every 6 hours PRN  aluminum hydroxide/magnesium hydroxide/simethicone Suspension 30 milliLiter(s) Oral every 4 hours PRN  atorvastatin 80 milliGRAM(s) Oral at bedtime  folic acid 1 milliGRAM(s) Oral daily  gabapentin 300 milliGRAM(s) Oral <User Schedule>  hydrALAZINE Injectable 5 milliGRAM(s) IV Push every 4 hours PRN  hydrALAZINE Injectable 10 milliGRAM(s) IV Push every 4 hours PRN  labetalol Injectable 10 milliGRAM(s) IV Push every 10 minutes PRN  levETIRAcetam  IVPB 500 milliGRAM(s) IV Intermittent every 12 hours  LORazepam   Injectable 1 milliGRAM(s) IV Push every 1 hour PRN  melatonin 3 milliGRAM(s) Oral <User Schedule>  multivitamin 1 Tablet(s) Oral daily  ondansetron Injectable 4 milliGRAM(s) IV Push every 8 hours PRN  pantoprazole   Suspension 40 milliGRAM(s) Oral before breakfast  senna 2 Tablet(s) Oral at bedtime PRN      PHYSICAL EXAM:   ICU Vital Signs Last 24 Hrs  T(C): 36.9 (05 Aug 2023 08:28), Max: 36.9 (05 Aug 2023 04:23)  T(F): 98.4 (05 Aug 2023 08:28), Max: 98.4 (05 Aug 2023 04:23)  HR: 63 (05 Aug 2023 08:28) (57 - 65)  BP: 123/72 (05 Aug 2023 08:28) (123/72 - 168/88)  BP(mean): 107 (04 Aug 2023 16:00) (96 - 107)  ABP: --  ABP(mean): --  RR: 19 (05 Aug 2023 08:28) (16 - 19)  SpO2: 98% (05 Aug 2023 08:28) (95% - 100%)    O2 Parameters below as of 05 Aug 2023 04:23  Patient On (Oxygen Delivery Method): room air            Physical Exam:   General: No acute distress, lying in bed answering questions    NEUROLOGICAL EXAM:  Mental status: Awake, alert, oriented x3, Belgian speaking , followed all commands, no neglect  Cranial Nerves: No facial asymmetry, no nystagmus, dysarthria present,  tongue midline  Motor exam: tone decreased on right side,  2+/5 RUE, 5/5 RLE, 5/5 LUE, 5/5 LLE  Sensation: Intact to light touch   Coordination/ Gait: No dysmetria on left and unable to assess on right, gait not tested      LABS:  CBC:            16.2   9.73  )-----------( 247      ( 08-04-23 @ 04:39 )             47.7         Chem:         ( 08-04-23 @ 04:39 )    138  |  101  |  14.4  ----------------------------<  97  3.5   |  22.0  |  0.90        Liver Functions:     Type & Screen:           IMAGING: .     MR Brain Stereotactic w/wo IV Cont (08.04.23 @ 08:43  FINDINGS:  No abnormal leptomeningeal or parenchymal enhancement.  There multiple areas of abnormal restricted diffusion in the bilateral   frontal lobes, bilateral occipital lobes, bilateral cerebral hemispheres   compatible with acute embolic infarctions. The left parietal lobe, left   occipital lobe, right temporal lobe lesions demonstrates hemosiderin   deposition. The hemorrhagic lesions demonstrates adjacent edema.    Scattered periventricular and subcortical white matter T2 /FLAIR   hyperintensities are seen without mass effect, nonspecific, likely   representing mild chronic microvascular changes. Normal T2 flow-voids are   seen within  the intracranial vasculature. The lateral ventricles and   cortical sulci are age-appropriate in size and configuration. There is no   mass, mass effect, or extra-axial fluid collection.There is no   susceptibility artifact to suggest hemorrhage. Midline structures are   normal.  The visualized paranasal sinuses, mastoid air cells and orbits   are unremarkable.      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      IMAGING: CT Angio Neck w/ IV Cont (08.01.23 @ 13:12)   IMPRESSION:    1. Bilateral vertebral arteries patent, with left-sided dominance.  2. No evidence of stenosis, occlusion or dissection bilateral   extracranial carotid arteries.  3. No evidence of large vessel occlusion, aneurysm or vascular   malformation intracranial circulation.  4. Additional findings described in detail above.    CT Chest w/ IV Cont (08.01.23 @ 13:12)   MPRESSION: Slightly spiculated left upper lobe lesion, suspicious for   primary lung carcinoma, including small cell carcinoma. Scattered   semisolid lung lesions, which may represent sites of semiinvasive   adenocarcinoma of the lung.    CT Angio Head w/ IV Cont (08.01.23 @ 13:11)   IMPRESSION:  1. Bilateral vertebral arteries patent, with left-sided dominance.  2. No evidence of stenosis, occlusion or dissection bilateral   extracranial carotid arteries.  3. No evidence of large vessel occlusion, aneurysm or vascular   malformation intracranial circulation.  4. Additional findings described in detail above.      CT Head No Cont (07.31.23 @ 19:13)   IMPRESSION:  1. Findings suggestive of a age indeterminate left MCA territory and   right occipital (PCA territory) infarcts..  2. Left posterior parietal intraparenchymal hemorrhage measuring   approximately 2.1 cm in greatest diameter, with surrounding edema.   Smaller eft inferior frontal hemorrhagic lesion with surrounding edema,   small left posterior temporal hemorrhagic lesion with surrounding edema   and small right temporal hemorrhagic lesion with surrounding edema. Small   foci of abnormal signal are also appreciated bilaterally within the   cerebellar regions.Findings are concerning for presence of underlying   hemorrhagic mass lesions, possibly metastatic in etiology. Possibility of   mycotic aneurysm should also be considered in the differential diagnosis.   Consider follow-up pre and postcontrast MR imaging of the brain, to   include DWI imaging and ADC mapping techniques. MRA of the [place] may also prove useful.  3. Punctate focus of calcification in a left occipital location without   surrounding edema, may represent prior infectious/inflammatory process   versus nonacute hemorrhagic event.    TTE Echo Complete w/ Contrast w/ Doppler (08.01.23 @ 11:32)    Summary:   1. Left ventricular ejection fraction, by visual estimation, is 50 to   55%.   2. Low normal global left ventricular systolic function.   3. Spectral Doppler shows impaired relaxation pattern of left   ventricular myocardial filling (Grade I diastolic dysfunction).   4. No shunt of the IAS seen by color doppler.   5. There is no evidence of pericardial effusion.   6. Mild mitral valve regurgitation.   7. Mild thickening of the anterior and posterior mitral valve leaflets.   8. Mild tricuspid regurgitation.   9. Endocardial visualization was enhanced with intravenous echo contrast.    Patricia, Electronically signed on 8/1/2023 at 5:03:24 PM

## 2023-08-05 NOTE — CONSULT NOTE ADULT - CONSULT REQUESTED DATE/TIME
05-Aug-2023 08:26
01-Aug-2023 10:23
01-Aug-2023 09:00
01-Aug-2023 23:57
31-Jul-2023 22:53
02-Aug-2023 14:12
02-Aug-2023 14:48

## 2023-08-05 NOTE — CONSULT NOTE ADULT - PROVIDER SPECIALTY LIST ADULT
Thoracic Surgery
Neurology
Neurosurgery
Heme/Onc
Brain Injury Medicine
Intervent Radiology
Cardiology

## 2023-08-05 NOTE — CONSULT NOTE ADULT - NS ATTEND AMEND GEN_ALL_CORE FT
NSGY Attg:    see above    patient seen and examined 8/1    agree with exam and plan as above
Patient seen and examined by me.    T(C): 36.9 (08-05-23 @ 15:16), Max: 36.9 (08-05-23 @ 04:23)  HR: 61 (08-05-23 @ 15:16) (59 - 65)  BP: 129/78 (08-05-23 @ 15:16) (123/72 - 168/88)  RR: 18 (08-05-23 @ 15:16) (18 - 19)  SpO2: 99% (08-05-23 @ 15:16) (95% - 100%)  Patient alert and awake.  Chest- Bilateral Clear BS  Cardiac- S1 and S2  Abdomen- Soft    Assessment/Plan:  1. CVA  2. HTN    Patient for KATHRINE on Monday.    Will sign off.    I have discussed my recommendation with the PA which are outlined above.

## 2023-08-05 NOTE — CONSULT NOTE ADULT - ASSESSMENT
A/P: Patient is a 64 y/o M active smoker/EtOH/cocaine use with a PMHx of HTN who presented to the ED with complaints of right sided weakness. Patient states that 2 days prior to his arrival he began having some slurred speech and right arm/leg weakness. Patient had also been experiencing photophobia for 2 months prior, but had never been evaluated. Patient states that he was diagnosed with HTN previously, but hadn't seen a PMD in years or taking medications. Patient was found to have findings concerning for L MCA infarct, right occipital PCA infarct, with scattered hemorrhagic lesions with surrounding edema. Workup initially concerning for metastatic lesions with hemorrhagic conversion, but MRI confirmed findings of acute embolic infarction and some infarctions hemorrhagic in nature. Patient also found to have a MIGEL lung lesion. Patient still with right sided weakness, but denies fevers, chills, CP, SOB, syncope, abdominal pain, N/V/D.   Troponin negative x 1

## 2023-08-05 NOTE — PROGRESS NOTE ADULT - ASSESSMENT
ASSESSMENT:   68 year old male with pmh htn, cocaine use and poor medical follow up admitted with new right sided hemiparesis and encephalopathy.   MRI head notably showed the intracranial lesions multiple areas of abnormal restricted diffusion, compatible with acute embolic infarction and some infarctions hemorrhagic in nature.   frontal lobes, bilateral occipital lobes, bilateral cerebral hemispheres   Workup notable for multiple lesions on CT head (notably a 2.1 cm diameter left posterior parietal intraparenchymal lesion), left upper lobe lung lesion, and CT head/neck angiogram showing No evidence of large vessel occlusion, aneurysm or vascular malformation intracranial circulation. TTE with doppler not showing shunting, EF low-normal at 50-55%.  Neuro exam and NIHSS score slightly improved since initial presentation; but pt still has dysarthria and 2+/5 strength of right upper extremity   Today pt reported regular Cocaine and Marijuana use prior to hospitalization.    NEURO:   - hold antiplatelet therapy given hemorrhagic intracranial lesions, repeat CT head 7 days from stroke and reassess safety of  - KATHRINE pending, followed by loop recorder implantation   - 2 week follow up MRI head with and without contrast to reassess stroke vs lesion (ie if strokes do not show typical evolution this may represent non enhnacing neoplasms)  - stroke neuro will continue to follow  - Stroke neuro checks q 2h, stat CT head without contrast if neuro changes  - SBP goal: 120-160 given hemorrhagic intracranial lesions  - Dysphagia screen: pass  - continue aspiration precautions  - continue Physical therapy/OT/Speech eval/treatment.    - agree with [facility] protocol   - keppra 500mg bid for seizure prophylaxis for 7 days  - continue atorvastatin 80mg daily   - discussed with pt the need to quit doing drugs and ETOH.     -CARDIOVASCULAR: TTE with doppler, not showing shunt and low-normal ef  cardiac monitoring w/ telemetry for now     - KATHRINE/ILR (or alternate long term cardiac monitor for occult A fib)                         -HEMATOLOGY: hemoglobin stable      No chemical DVT ppx- hemorrhagic lesions intracranial, agree with use scds  heme-onc consulted    PULMONARY: protecting airway, saturating well  CT chest with elena lesion and hilar node enlargement      - workup of pulmonary mass per primary team  thoracic surgery and ir consulted    RENAL: CULLEN resolved, monitor urine output and creatinine, maintain adequate hydration       Na Goal:  135-145    ID:  leukocytosis resolved, likely reactive  continue to monitor for sign infection    OTHER:  condition and plan of care d/w patient and sister, questions and concerns addressed.     DISPOSITION: Rehab or home depending on PT eval once stable and workup is complete      CORE MEASURES:        Admission NIHSS: 6     Tenecteplase : [] YES [x] NO      LDL/HDL/A1C: 67 mg/dL/   53 mg/dl   /    5.2 %     Depression Screen- if depression hx and/or present      Statin Therapy: continue     Dysphagia Screen: [x] PASS [] FAIL     Smoking [x] YES [] NO      Afib [] YES [x] NO - on cardiac monitor     Stroke Education [x] YES [] NO    Obtain screening lower extremity venous ultrasound in patients who meet 1 or more of the following criteria as patient is high risk for DVT/PE on admission:   [] History of DVT/PE  []Hypercoagulable states (Factor V Leiden, Cancer, OCP, etc. )  []Prolonged immobility (hemiplegia/hemiparesis/post operative or any other extended immobilization)  [] Transferred from outside facility (Rehab or Long term care)  [] Age </= to 50

## 2023-08-05 NOTE — CONSULT NOTE ADULT - PROBLEM SELECTOR RECOMMENDATION 9
- Multiple acute bilateral embolic infarcts on MRI.   - Patient with risk factors for Afib with cocaine and EtOH use.   - Left atrium normal size on TTE.   - Plan for KATHRINE to further evaluate.   - NPO after midnight tomorrow.   - KATHRINE on 08/07/23.   - If no significant findings on KATHRINE would recommend ILR.   - Restart aspirin when cleared from Neurology.   - Continue statin.
MIGEL lung lesion suspicious for primary lung CA.  CT scan as above  P/w neuro symptoms head CT suspicious for brain mets  Thoracic surgery consulted for possible lung biopsy.  Would not be able to perform until next week as well  Will defer to IR at this time.   Will follow along  D/w Dr. Yoon.

## 2023-08-05 NOTE — CONSULT NOTE ADULT - SUBJECTIVE AND OBJECTIVE BOX
[facility]                                              CARDIOLOGY AT [place] [address]                                             Telephone: [phone]. Fax:[phone]                                                       CARDIOLOGY CONSULTATION NOTE                                                                                             History obtained by: Patient and medical record  Community Cardiologist: None   obtained: Yes [ x ] No [  ] [employer] [id number]  Reason for Consultation: CVA  Available out pt records reviewed: Yes [  ] No [  ]    Chief complaint:    Patient is a 63y old  Male who presents with a chief complaint of CVA with R sided weakness (04 Aug 2023 15:04)      HPI: Patient is a 64 y/o M active smoker/EtOH/cocaine use with a PMHx of HTN who presented to the ED with complaints of right sided weakness. Patient states that 2 days prior to his arrival he began having some slurred speech and right arm/leg weakness. Patient had also been experiencing photophobia for 2 months prior, but had never been evaluated. Patient states that he was diagnosed with HTN previously, but hadn't seen a PMD in years or taking medications. Patient was found to have findings concerning for L MCA infarct, right occipital PCA infarct, with scattered hemorrhagic lesions with surrounding edema. Workup initially concerning for metastatic lesions with hemorrhagic conversion, but MRI confirmed findings of acute embolic infarction and some infarctions hemorrhagic in nature. Patient also found to have a MIGEL lung lesion. Patient still with right sided weakness, but denies fevers, chills, CP, SOB, syncope, abdominal pain, N/V/D.       CARDIAC TESTING   ECHO:  < from: TTE Echo Complete w/ Contrast w/ Doppler (08.01.23 @ 11:32) >  PHYSICIAN INTERPRETATION:  Left Ventricle: Endocardial visualization was enhanced with intravenous   echo contrast. The left ventricular internal cavity size is normal.  Global LV systolic function was low normal. Left ventricular ejection   fraction, by visual estimation, is 50 to 55%. Spectral Doppler shows   impaired relaxation pattern of left ventricular myocardial filling (Grade   I diastolic dysfunction).  Right Ventricle: The right ventricular size isnormal. RV systolic   function is low normal.  Left Atrium: The left atrium is normal in size. No shunt of the IAS seen   by color doppler.  Right Atrium: The right atrium is normal in size.  Pericardium: There is no evidence of pericardial effusion.  Mitral Valve: Mild thickening of the anterior and posterior mitral valve   leaflets. Mild mitral valve regurgitation is seen.  Tricuspid Valve: The tricuspid valve is normal in structure. Mild   tricuspid regurgitation is visualized.  Aortic Valve: The aortic valve is trileaflet. No evidence of aortic valve   regurgitation is seen.  Pulmonic Valve: Structurally normal pulmonic valve, with normal leaflet   excursion.  Aorta: The aortic root and ascending aorta are structurally normal, with   no evidence of dilitation.  Pulmonary Artery: The main pulmonary artery is normal in size.  Venous: The inferior vena cava was normal sized, with respiratory size   variation greater than 50%.      Summary:   1. Left ventricular ejection fraction, by visual estimation, is 50 to   55%.   2. Low normal global left ventricular systolic function.   3. Spectral Doppler shows impaired relaxation pattern of left   ventricular myocardial filling (Grade I diastolic dysfunction).   4. No shunt of the IAS seen by color doppler.   5. There is no evidence of pericardial effusion.   6. Mild mitral valve regurgitation.   7. Mild thickening of the anterior and posterior mitral valve leaflets.   8. Mild tricuspid regurgitation.   9. Endocardial visualization was enhanced with intravenous echo contrast.    Patricia, Electronically signed on 8/1/2023 at 5:03:24 PM    < end of copied text >      PAST MEDICAL HISTORY  Hypertension        PAST SURGICAL HISTORY  No significant past surgical history        SOCIAL HISTORY:    CIGARETTES:   Active smoker, a pack every 3 days.   ALCOHOL: 3-4 beers daily  DRUGS: +Cocaine and THC use    FAMILY HISTORY:  No pertinent family history in first degree relatives      Family History of Cardiovascular Disease:  Yes [  ] No [  ]  Coronary Artery Disease in first degree relative: Yes [  ] No [  ]  Sudden Cardiac Death in First degree relative: Yes [  ] No [  ]    HOME MEDICATIONS:      CURRENT CARDIAC MEDICATIONS:  hydrALAZINE Injectable 5 milliGRAM(s) IV Push every 4 hours PRN SBP >140  hydrALAZINE Injectable 10 milliGRAM(s) IV Push every 4 hours PRN SBP >160  labetalol Injectable 10 milliGRAM(s) IV Push every 10 minutes PRN SBP> 160 and DBP> 90      CURRENT OTHER MEDICATIONS:  acetaminophen     Tablet .. 650 milliGRAM(s) Oral every 6 hours PRN Temp greater or equal to 38C (100.4F), Mild Pain (1 - 3)  gabapentin 300 milliGRAM(s) Oral <User Schedule>  levETIRAcetam  IVPB 500 milliGRAM(s) IV Intermittent every 12 hours  LORazepam   Injectable 1 milliGRAM(s) IV Push every 1 hour PRN CIWA-Ar score 8 or greater  melatonin 3 milliGRAM(s) Oral <User Schedule>  ondansetron Injectable 4 milliGRAM(s) IV Push every 8 hours PRN Nausea and/or Vomiting  aluminum hydroxide/magnesium hydroxide/simethicone Suspension 30 milliLiter(s) Oral every 4 hours PRN Dyspepsia  pantoprazole   Suspension 40 milliGRAM(s) Oral before breakfast  senna 2 Tablet(s) Oral at bedtime PRN Constipation  atorvastatin 80 milliGRAM(s) Oral at bedtime  folic acid 1 milliGRAM(s) Oral daily  multivitamin 1 Tablet(s) Oral daily      ALLERGIES:   No Known Allergies      REVIEW OF SYMPTOMS:   CONSTITUTIONAL: No fever, no chills, no weight loss, no weight gain, no fatigue   ENMT:  No vertigo; No sinus or throat pain  NECK: No pain or stiffness  CARDIOVASCULAR: No chest pain, no dyspnea, no syncope/presyncope, no palpitations, no dizziness, no Orthopnea, no Paroxsymal nocturnal dyspnea  RESPIRATORY: no Shortness of breath, no cough, no wheezing  : No dysuria, no hematuria   GI: No dark color stool, no nausea, no diarrhea, no constipation, no abdominal pain   NEURO: AS PER HPI  MUSCULOSKELETAL: No joint pain or swelling; No muscle, back, or extremity pain  PSYCH: No agitation, no anxiety.    ALL OTHER REVIEW OF SYSTEMS ARE NEGATIVE.    VITAL SIGNS:  T(C): 36.9 (08-05-23 @ 04:23), Max: 36.9 (08-05-23 @ 04:23)  T(F): 98.4 (08-05-23 @ 04:23), Max: 98.4 (08-05-23 @ 04:23)  HR: 64 (08-05-23 @ 04:23) (57 - 71)  BP: 146/85 (08-05-23 @ 04:23) (126/82 - 168/88)  RR: 18 (08-05-23 @ 04:23) (15 - 18)  SpO2: 97% (08-05-23 @ 04:23) (95% - 100%)    INTAKE AND OUTPUT:       PHYSICAL EXAM:  Constitutional: Comfortable . No acute distress.   HEENT: Atraumatic and normocephalic , neck is supple . no JVD. No carotid bruit.  CNS: A&Ox3. Mild dysarthria, 2/3 RUE strength, otherwise 5/5   Respiratory: CTAB, unlabored   Cardiovascular: RRR normal s1 s2. No murmur. No rubs or gallop.  Gastrointestinal: Soft, non-tender. +Bowel sounds.   Extremities: 2+ Peripheral Pulses, No clubbing, cyanosis, or edema  Psychiatric: Calm . no agitation.   Skin: Warm and dry, no ulcers on extremities     LABS:  ( 31 Jul 2023 16:30 )  Troponin T  0.03 ,  CPK  X    , CKMB  X    , BNP X                                  16.2   9.73  )-----------( 247      ( 04 Aug 2023 04:39 )             47.7     08-04    138  |  101  |  14.4  ----------------------------<  97  3.5   |  22.0  |  0.90    Ca    9.4      04 Aug 2023 04:39        Urinalysis Basic - ( 04 Aug 2023 04:39 )    Color: x / Appearance: x / SG: x / pH: x  Gluc: 97 mg/dL / Ketone: x  / Bili: x / Urobili: x   Blood: x / Protein: x / Nitrite: x   Leuk Esterase: x / RBC: x / WBC x   Sq Epi: x / Non Sq Epi: x / Bacteria: x              INTERPRETATION OF TELEMETRY: SR    ECG: SR, early repolarization  Prior ECG: Yes [  ] No [  ]    RADIOLOGY & ADDITIONAL STUDIES:    X-ray:    CT scan:     < from: CT Angio Neck w/ IV Cont (08.01.23 @ 13:12) >  FINDINGS:    CTA NECK    Visualized aortic arch and origins of the great vessels unremarkable.    Bilateral vertebral arteries patent, with left-sided dominance. No   evidence of stenosis, occlusion or dissection bilateral extracranial   carotid arteries.    CT BRAIN    Fetal origin bilateral posterior cerebral arteries. Calcified plaque   cavernous segment left internal carotid artery. No evidence of large   vessel occlusion, aneurysm or vascular malformation intracranial   circulation.    OTHER    No evidence of dural venous thrombus. Left apical lung mass. Refer to   separate report of CT chest performed the same date. Degenerative changes   of the spine. No aggressive osseous lesion. Paranasal sinus mucosal   thickening with rightward deviation nasal septum.    IMPRESSION:    1. Bilateral vertebral arteries patent, with left-sided dominance.  2. No evidence of stenosis, occlusion or dissection bilateral   extracranial carotid arteries.  3. No evidence of large vessel occlusion, aneurysm or vascular   malformation intracranial circulation.  4. Additional findings described in detail above.    --- End of Report ---    < end of copied text >    MRI:   < from: MR Brain Stereotactic w/wo IV Cont (08.04.23 @ 08:43) >  FINDINGS:  No abnormal leptomeningeal or parenchymal enhancement.  There multiple areas of abnormal restricted diffusion in the bilateral   frontal lobes, bilateral occipital lobes, bilateral cerebral hemispheres   compatible with acute embolic infarctions. The left parietal lobe, left   occipital lobe, right temporal lobe lesions demonstrates hemosiderin   deposition. The hemorrhagic lesions demonstrates adjacent edema.    Scattered periventricular and subcortical white matter T2 /FLAIR   hyperintensities are seen without mass effect, nonspecific, likely   representing mild chronic microvascular changes. Normal T2 flow-voids are   seen within  the intracranial vasculature. The lateral ventricles and   cortical sulci are age-appropriate in size and configuration. There is no   mass, mass effect, or extra-axial fluid collection.There is no   susceptibility artifact to suggest hemorrhage. Midline structures are   normal.  The visualized paranasal sinuses, mastoid air cells and orbits   are unremarkable.      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    < end of copied text >    US:

## 2023-08-06 LAB
ANION GAP SERPL CALC-SCNC: 13 MMOL/L — SIGNIFICANT CHANGE UP (ref 5–17)
BUN SERPL-MCNC: 12.1 MG/DL — SIGNIFICANT CHANGE UP (ref 8–20)
CALCIUM SERPL-MCNC: 9 MG/DL — SIGNIFICANT CHANGE UP (ref 8.4–10.5)
CHLORIDE SERPL-SCNC: 102 MMOL/L — SIGNIFICANT CHANGE UP (ref 96–108)
CO2 SERPL-SCNC: 24 MMOL/L — SIGNIFICANT CHANGE UP (ref 22–29)
CREAT SERPL-MCNC: 0.78 MG/DL — SIGNIFICANT CHANGE UP (ref 0.5–1.3)
EGFR: 101 ML/MIN/1.73M2 — SIGNIFICANT CHANGE UP
GLUCOSE SERPL-MCNC: 87 MG/DL — SIGNIFICANT CHANGE UP (ref 70–99)
HCT VFR BLD CALC: 44.3 % — SIGNIFICANT CHANGE UP (ref 39–50)
HGB BLD-MCNC: 14.6 G/DL — SIGNIFICANT CHANGE UP (ref 13–17)
MCHC RBC-ENTMCNC: 28.9 PG — SIGNIFICANT CHANGE UP (ref 27–34)
MCHC RBC-ENTMCNC: 33 GM/DL — SIGNIFICANT CHANGE UP (ref 32–36)
MCV RBC AUTO: 87.7 FL — SIGNIFICANT CHANGE UP (ref 80–100)
PLATELET # BLD AUTO: 269 K/UL — SIGNIFICANT CHANGE UP (ref 150–400)
POTASSIUM SERPL-MCNC: 3.9 MMOL/L — SIGNIFICANT CHANGE UP (ref 3.5–5.3)
POTASSIUM SERPL-SCNC: 3.9 MMOL/L — SIGNIFICANT CHANGE UP (ref 3.5–5.3)
RBC # BLD: 5.05 M/UL — SIGNIFICANT CHANGE UP (ref 4.2–5.8)
RBC # FLD: 13.1 % — SIGNIFICANT CHANGE UP (ref 10.3–14.5)
SODIUM SERPL-SCNC: 139 MMOL/L — SIGNIFICANT CHANGE UP (ref 135–145)
WBC # BLD: 9.69 K/UL — SIGNIFICANT CHANGE UP (ref 3.8–10.5)
WBC # FLD AUTO: 9.69 K/UL — SIGNIFICANT CHANGE UP (ref 3.8–10.5)

## 2023-08-06 PROCEDURE — 99233 SBSQ HOSP IP/OBS HIGH 50: CPT

## 2023-08-06 RX ADMIN — ATORVASTATIN CALCIUM 80 MILLIGRAM(S): 80 TABLET, FILM COATED ORAL at 22:14

## 2023-08-06 RX ADMIN — LEVETIRACETAM 400 MILLIGRAM(S): 250 TABLET, FILM COATED ORAL at 05:01

## 2023-08-06 RX ADMIN — LEVETIRACETAM 400 MILLIGRAM(S): 250 TABLET, FILM COATED ORAL at 17:24

## 2023-08-06 RX ADMIN — Medication 1 MILLIGRAM(S): at 11:58

## 2023-08-06 RX ADMIN — Medication 1 TABLET(S): at 11:59

## 2023-08-06 RX ADMIN — GABAPENTIN 300 MILLIGRAM(S): 400 CAPSULE ORAL at 22:14

## 2023-08-06 RX ADMIN — PANTOPRAZOLE SODIUM 40 MILLIGRAM(S): 20 TABLET, DELAYED RELEASE ORAL at 05:01

## 2023-08-06 RX ADMIN — Medication 650 MILLIGRAM(S): at 14:00

## 2023-08-06 RX ADMIN — Medication 3 MILLIGRAM(S): at 22:14

## 2023-08-06 RX ADMIN — Medication 650 MILLIGRAM(S): at 11:58

## 2023-08-06 NOTE — PROGRESS NOTE ADULT - ASSESSMENT
64 y/o male with hx of Cocaine/THC use, now with confusion and CTH showing possible hemorrhagic metastatic lesions vs CVA with hemorrhagic conversion, elevated transaminases    #Mets with hemorrhagic CVA  -CT Chest showing MIGEL spiculated mass - will contact IR next week for biopsy   -Unlikely CVA or primary CNS neoplasm  -MRI brain w/wo contrast reviewed - concern for embolic CVA  -CTA head and neck without occlusion noted  -neuro-checks Q2h Vitals Q 2  -cards called for KATHRINE, planned for 8/7 and ILR  -neurology recs appreciated  -Hold aspirin with ICH on CT. repeat CTH 8/9, 1 wk from CVA to eval safety for resume AC  -Keppra per neuro Sx x 7 days  -Telemetry   -OOB w/assistance  -Fall/seizure precautions   -PT/OT recs AR  -PM&R recs home  -VC boots no AC with ICH  -Neurosx signed off, outpt f/u    #Left spiculated lung mass  -CT C/A/P with contrast  shows left lung spiculated mass  -CT surgery consulted - defer to IR  -IR consulted for biopsy - plan for biopsy sometimes next week, will need to call IR Monday  -Heme onc recs appreciated    #ETOH abuse/substance abuse  -Utox positive for cocaine/THC  -CIWA protocol with Ativan PRN for CIWA >8  -FA/MVI/Thiamine for possible thiamine deficiency  -Seizure/fall risk protocol   -SW consult for eventual S[facility] referral     #Elevated transaminases  -Possibly related to ETOH   -Cocaine induced from vasospasm?  -Viral hepatitis panel  -JOSHUA herrera wnl    DVT prophylaxis -SCD   Dispo - Awaiting KATHRINE with cards, IR Biopsy and recs from neuro

## 2023-08-06 NOTE — PROGRESS NOTE ADULT - SUBJECTIVE AND OBJECTIVE BOX
Destiny Moser M.D.    Patient is a 63y old  Male who presents with a chief complaint of CVA with R sided weakness (05 Aug 2023 13:05)      SUBJECTIVE / OVERNIGHT EVENTS: No concerns.     Patient denies chest pain, SOB, abd pain, N/V, fever, chills, dysuria or any other complaints. All remainder ROS negative.     MEDICATIONS  (STANDING):  atorvastatin 80 milliGRAM(s) Oral at bedtime  folic acid 1 milliGRAM(s) Oral daily  gabapentin 300 milliGRAM(s) Oral <User Schedule>  levETIRAcetam  IVPB 500 milliGRAM(s) IV Intermittent every 12 hours  melatonin 3 milliGRAM(s) Oral <User Schedule>  multivitamin 1 Tablet(s) Oral daily  pantoprazole   Suspension 40 milliGRAM(s) Oral before breakfast    MEDICATIONS  (PRN):  acetaminophen     Tablet .. 650 milliGRAM(s) Oral every 6 hours PRN Temp greater or equal to 38C (100.4F), Mild Pain (1 - 3)  aluminum hydroxide/magnesium hydroxide/simethicone Suspension 30 milliLiter(s) Oral every 4 hours PRN Dyspepsia  hydrALAZINE Injectable 5 milliGRAM(s) IV Push every 4 hours PRN SBP >140  hydrALAZINE Injectable 10 milliGRAM(s) IV Push every 4 hours PRN SBP >160  labetalol Injectable 10 milliGRAM(s) IV Push every 10 minutes PRN SBP> 160 and DBP> 90  LORazepam   Injectable 1 milliGRAM(s) IV Push every 1 hour PRN CIWA-Ar score 8 or greater  ondansetron Injectable 4 milliGRAM(s) IV Push every 8 hours PRN Nausea and/or Vomiting  senna 2 Tablet(s) Oral at bedtime PRN Constipation      I&O's Summary      PHYSICAL EXAM:  Vital Signs Last 24 Hrs  T(C): 36.4 (06 Aug 2023 07:58), Max: 36.9 (05 Aug 2023 15:16)  T(F): 97.6 (06 Aug 2023 07:58), Max: 98.5 (05 Aug 2023 15:16)  HR: 70 (06 Aug 2023 07:58) (58 - 70)  BP: 144/91 (06 Aug 2023 07:58) (110/68 - 144/91)  BP(mean): 103 (05 Aug 2023 12:23) (103 - 103)  RR: 18 (06 Aug 2023 07:58) (18 - 18)  SpO2: 100% (06 Aug 2023 07:58) (96% - 100%)    Parameters below as of 06 Aug 2023 07:58  Patient On (Oxygen Delivery Method): room air    CONSTITUTIONAL: NAD, well-groomed  RESPIRATORY: Normal respiratory effort; lungs are clear to auscultation bilaterally  CARDIOVASCULAR: Regular rate and rhythm; No lower extremity edema  ABDOMEN: Nontender to palpation, normoactive bowel sounds  PSYCH: A+O x3; affect appropriate  NEUROLOGY: CN 2-12 are intact and symmetric; no gross sensory deficits;   SKIN: No rashes; no palpable lesions    LABS:                        14.6   9.69  )-----------( 269      ( 06 Aug 2023 06:39 )             44.3     08-06    139  |  102  |  12.1  ----------------------------<  87  3.9   |  24.0  |  0.78    Ca    9.0      06 Aug 2023 06:39            Urinalysis Basic - ( 06 Aug 2023 06:39 )    Color: x / Appearance: x / SG: x / pH: x  Gluc: 87 mg/dL / Ketone: x  / Bili: x / Urobili: x   Blood: x / Protein: x / Nitrite: x   Leuk Esterase: x / RBC: x / WBC x   Sq Epi: x / Non Sq Epi: x / Bacteria: x        CAPILLARY BLOOD GLUCOSE          RADIOLOGY & ADDITIONAL TESTS:  Results Reviewed:   Imaging Personally Reviewed:  Electrocardiogram Personally Reviewed:

## 2023-08-07 PROCEDURE — 93325 DOPPLER ECHO COLOR FLOW MAPG: CPT | Mod: 26

## 2023-08-07 PROCEDURE — 93312 ECHO TRANSESOPHAGEAL: CPT | Mod: 26

## 2023-08-07 PROCEDURE — 99232 SBSQ HOSP IP/OBS MODERATE 35: CPT

## 2023-08-07 PROCEDURE — 93320 DOPPLER ECHO COMPLETE: CPT | Mod: 26

## 2023-08-07 PROCEDURE — 76376 3D RENDER W/INTRP POSTPROCES: CPT | Mod: 26

## 2023-08-07 PROCEDURE — 99233 SBSQ HOSP IP/OBS HIGH 50: CPT

## 2023-08-07 RX ADMIN — PANTOPRAZOLE SODIUM 40 MILLIGRAM(S): 20 TABLET, DELAYED RELEASE ORAL at 06:14

## 2023-08-07 RX ADMIN — LEVETIRACETAM 400 MILLIGRAM(S): 250 TABLET, FILM COATED ORAL at 06:15

## 2023-08-07 RX ADMIN — LEVETIRACETAM 400 MILLIGRAM(S): 250 TABLET, FILM COATED ORAL at 18:36

## 2023-08-07 RX ADMIN — Medication 3 MILLIGRAM(S): at 21:07

## 2023-08-07 RX ADMIN — Medication 1 MILLIGRAM(S): at 11:29

## 2023-08-07 RX ADMIN — Medication 1 TABLET(S): at 11:29

## 2023-08-07 RX ADMIN — ATORVASTATIN CALCIUM 80 MILLIGRAM(S): 80 TABLET, FILM COATED ORAL at 21:06

## 2023-08-07 RX ADMIN — GABAPENTIN 300 MILLIGRAM(S): 400 CAPSULE ORAL at 21:07

## 2023-08-07 NOTE — PROGRESS NOTE ADULT - SUBJECTIVE AND OBJECTIVE BOX
Destiny Moser M.D.    Patient is a 63y old  Male who presents with a chief complaint of CVA with R sided weakness (07 Aug 2023 08:19)      SUBJECTIVE / OVERNIGHT EVENTS: No concerns.     Patient denies chest pain, SOB, abd pain, N/V, fever, chills, dysuria or any other complaints. All remainder ROS negative.     MEDICATIONS  (STANDING):  atorvastatin 80 milliGRAM(s) Oral at bedtime  folic acid 1 milliGRAM(s) Oral daily  gabapentin 300 milliGRAM(s) Oral <User Schedule>  levETIRAcetam  IVPB 500 milliGRAM(s) IV Intermittent every 12 hours  melatonin 3 milliGRAM(s) Oral <User Schedule>  multivitamin 1 Tablet(s) Oral daily  pantoprazole   Suspension 40 milliGRAM(s) Oral before breakfast    MEDICATIONS  (PRN):  acetaminophen     Tablet .. 650 milliGRAM(s) Oral every 6 hours PRN Temp greater or equal to 38C (100.4F), Mild Pain (1 - 3)  aluminum hydroxide/magnesium hydroxide/simethicone Suspension 30 milliLiter(s) Oral every 4 hours PRN Dyspepsia  hydrALAZINE Injectable 10 milliGRAM(s) IV Push every 4 hours PRN SBP >160  hydrALAZINE Injectable 5 milliGRAM(s) IV Push every 4 hours PRN SBP >140  labetalol Injectable 10 milliGRAM(s) IV Push every 10 minutes PRN SBP> 160 and DBP> 90  ondansetron Injectable 4 milliGRAM(s) IV Push every 8 hours PRN Nausea and/or Vomiting  senna 2 Tablet(s) Oral at bedtime PRN Constipation      I&O's Summary      PHYSICAL EXAM:  Vital Signs Last 24 Hrs  T(C): 36.6 (07 Aug 2023 11:32), Max: 37 (06 Aug 2023 16:00)  T(F): 97.8 (07 Aug 2023 11:32), Max: 98.6 (06 Aug 2023 16:00)  HR: 64 (07 Aug 2023 11:32) (58 - 78)  BP: 149/86 (07 Aug 2023 11:32) (121/74 - 149/86)  BP(mean): --  RR: 16 (07 Aug 2023 11:32) (15 - 18)  SpO2: 98% (07 Aug 2023 11:32) (97% - 98%)    Parameters below as of 07 Aug 2023 11:32  Patient On (Oxygen Delivery Method): room air    CONSTITUTIONAL: NAD, well-groomed  RESPIRATORY: Normal respiratory effort; lungs are clear to auscultation bilaterally  CARDIOVASCULAR: Regular rate and rhythm; No lower extremity edema  ABDOMEN: Nontender to palpation, normoactive bowel sounds  PSYCH: A+O x3; affect appropriate  NEUROLOGY: CN 2-12 are intact and symmetric; no gross sensory deficits;   SKIN: No rashes; no palpable lesions    LABS:                        14.6   9.69  )-----------( 269      ( 06 Aug 2023 06:39 )             44.3     08-06    139  |  102  |  12.1  ----------------------------<  87  3.9   |  24.0  |  0.78    Ca    9.0      06 Aug 2023 06:39            Urinalysis Basic - ( 06 Aug 2023 06:39 )    Color: x / Appearance: x / SG: x / pH: x  Gluc: 87 mg/dL / Ketone: x  / Bili: x / Urobili: x   Blood: x / Protein: x / Nitrite: x   Leuk Esterase: x / RBC: x / WBC x   Sq Epi: x / Non Sq Epi: x / Bacteria: x        CAPILLARY BLOOD GLUCOSE          RADIOLOGY & ADDITIONAL TESTS:  Results Reviewed:   Imaging Personally Reviewed:  Electrocardiogram Personally Reviewed:

## 2023-08-07 NOTE — CHART NOTE - NSCHARTNOTEFT_GEN_A_CORE
CCL NP  Pre/Post KATHRINE note    63 year old male p/w CVA for KATHRINE to r/u cardioembolic source  VSS  Left sided weakness  lungs CTA  NSR      POST KATHRINE: CCL NP  Pre/Post KATHRINE note    63 year old male p/w CVA for KATHRINE to r/u cardioembolic source  VSS  Left sided weakness  lungs CTA  NSR      POST KATHRINE:  S/P KATHRINE which did not revealed any cardioembolic source.  No PFO, no clots  Full report to follow  VSS  -Transfer back to bed when criteria met

## 2023-08-07 NOTE — PROGRESS NOTE ADULT - ASSESSMENT
ASSESSMENT:   Patient is a 63 year old male who presented to [facility] ER on 7/31/23 for right sided hemiparesis for the past 2 days. Pt also noted speech slurring. Also noted to have visual disturbances and photophobia for the past 2 months. Patient noted for possible hx of HTN in the past, but does not see a primary care doctor. On admission patient was found to have + cocaine and THC use, as well as Alcohol abuse (daily) stopped day prior. CT of head showed age indeterminate left MCA territory and right occipital (PCA) infarcts. Also noted left posterior parietal IPH and hemorraghic lesions in the temporal, frontal,occipital and cerebellar regions. Findings were concerning for presence of underlying hemorrhagic mass lesions, possibly metastatic in etiology. Possibility of mycotic aneurysm should also be considered in the differential diagnosis. CT chest showed slightly spiculated left upper lobe lesion, suspicious for primary lung carcinoma. CT angio head/neck showed no significant stenopsis, occlusion, or aneurysm. 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   Etiology: Possible hemorraghic metastatic lesions vs. acute infarct with hemorraghic conversion Will need to obtain lung bx results for further evaluation and plan of management.     NEURO:   -Neurologically with improvement as patient is antigravity in right upper and lower extremity with improved strength  - Stroke neuro checks q 4h  -Suggest repeat head CT one week from infarction (8/9/23) to monitor for stability  -  SBP goal: 120-160 given hemorrhagic intracranial lesions. Avoid rapid fluctuations and hypotension  ANTITHROMBOTICS: Due to multiple hemorrhagic lesions of unclear etiology at this time will need to hold ASA 81mg due to risks>benefits. Further antithrombotics pending lung bx results.  - repeat MRI w/ and w/out contrast in 3 weeks to determine etiology of lesions. to determine if these are hemorrhagic mets due to possible lung carcinoma or true infarctions.   - Dysphagia screen: pass  - continue aspiration precautions  - continue Physical therapy/OT/Speech eval/treatment.    - agree with CIWA protocol   - keppra 500mg bid for seizure prophylaxis for 7 days as per neurosx    -CARDIOVASCULAR: TTE as noted. cardiac monitoring w/ telemetry for now. KATHRINE scheduled for 8/7/23. Still pending results.                 -HEMATOLOGY: H/H 14.6/44.3. Platelets 269. Heme/onc consult appreciated     No chemical DVT ppx- hemorrhagic lesions intracranial, agree with use scds    PULMONARY: protecting airway, saturating well. CT chest showed slightly spiculated left upper lobe lesion, suspicious for   primary lung carcinoma. thoracic surgery and IR consulted who will obtain biopsy for further management.     RENAL: CULLEN resolved,BUN/CR 12.1/0.78. monitor urine output and creatinine, maintain adequate hydration       Na Goal:  135-145    ID: afebrile, no leukocytosis continue to monitor for signs/symptoms of infection    OTHER:  Strongly advised to stop drug use and alcohol consumption Risks of drug use and alcohol were discussed with patient. condition and plan of care d/w patient, questions and concerns addressed.     DISPOSITION: Rehab or home depending on PT eval once stable and workup is complete      CORE MEASURES:        Admission NIHSS: 6     Tenecteplase : [] YES [x] NO      LDL/HDL/A1C: 67 mg/dL/   53 mg/dl   /    5.2 %     Depression Screen- if depression hx and/or present      Statin Therapy: continue     Dysphagia Screen: [x] PASS [] FAIL     Smoking [x] YES [] NO      Afib [] YES [x] NO - on cardiac monitor     Stroke Education [x] YES [] NO    Obtain screening lower extremity venous ultrasound in patients who meet 1 or more of the following criteria as patient is high risk for DVT/PE on admission:   [] History of DVT/PE  []Hypercoagulable states (Factor V Leiden, Cancer, OCP, etc. )  []Prolonged immobility (hemiplegia/hemiparesis/post operative or any other extended immobilization)  [] Transferred from outside facility (Rehab or Long term care)  [] Age </= to 50 ASSESSMENT:   Patient is a 63 year old male who presented to [facility] ER on 7/31/23 for right sided hemiparesis for the past 2 days. Pt also noted speech slurring. Also noted to have visual disturbances and photophobia for the past 2 months. Patient noted for possible hx of HTN in the past, but does not see a primary care doctor. On admission patient was found to have + cocaine and THC use, as well as Alcohol abuse (daily) stopped day prior. CT of head showed age indeterminate left MCA territory and right occipital (PCA) infarcts. Also noted left posterior parietal IPH and hemorraghic lesions in the temporal, frontal,occipital and cerebellar regions. Findings were concerning for presence of underlying hemorrhagic mass lesions, possibly metastatic in etiology. Possibility of mycotic aneurysm should also be considered in the differential diagnosis. CT chest showed slightly spiculated left upper lobe lesion, suspicious for primary lung carcinoma. CT angio head/neck showed no significant stenopsis, occlusion, or aneurysm. 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   Etiology: Possible hemorraghic metastatic lesions vs. acute infarct with hemorraghic conversion. Will need to obtain lung bx results for further evaluation and plan of management. Possible hypercoagulable state in setting of malignancy vs. hemorrhagic mets? Cannot rule out cardioembolic source as well.    NEURO:   -Neurologically with improvement as patient is antigravity in right upper and lower extremity with improved strength  - Stroke neuro checks q 4h  -Suggest repeat head CT one week from infarction (8/9/23) to monitor for stability  -  SBP goal: 120-160 given hemorrhagic intracranial lesions. Avoid rapid fluctuations and hypotension  ANTITHROMBOTICS: Due to multiple hemorrhagic lesions of unclear etiology at this time will need to hold ASA 81mg due to risks>benefits. Further antithrombotics pending lung bx results.  - repeat MRI w/ and w/out contrast in 3 weeks to determine etiology of lesions. to determine if these are hemorrhagic mets due to possible lung carcinoma or true infarctions.   - Dysphagia screen: pass  - continue aspiration precautions  - continue Physical therapy/OT/Speech eval/treatment.    - agree with CIWA protocol   - keppra 500mg bid for seizure prophylaxis for 7 days as per neurosx    -CARDIOVASCULAR: TTE as noted. cardiac monitoring w/ telemetry for now. KATHRINE scheduled for 8/7/23. Still pending results. ILR prior to discharge for long term monitoring for atrial fibrillation.                 -HEMATOLOGY: H/H 14.6/44.3. Platelets 269. Heme/onc consult appreciated. Patient should have all age and risk appropriate malignancy screenings with PCP or sooner if clinically suspected      No chemical DVT ppx- hemorrhagic lesions intracranial, agree with use scds    PULMONARY: protecting airway, saturating well. CT chest showed slightly spiculated left upper lobe lesion, suspicious for   primary lung carcinoma. thoracic surgery and IR consulted who will obtain biopsy for further management.     RENAL: CULLEN resolved,BUN/CR 12.1/0.78. monitor urine output and creatinine, maintain adequate hydration       Na Goal:  135-145    ID: afebrile, no leukocytosis continue to monitor for signs/symptoms of infection    OTHER:  Strongly advised to stop drug use and alcohol consumption Risks of drug use and alcohol were discussed with patient. condition and plan of care d/w patient, questions and concerns addressed.     DISPOSITION: Rehab or home depending on PT eval once stable and workup is complete      CORE MEASURES:        Admission NIHSS: 6     Tenecteplase : [] YES [x] NO      LDL/HDL/A1C: 67 mg/dL/   53 mg/dl   /    5.2 %     Depression Screen- if depression hx and/or present      Statin Therapy: continue     Dysphagia Screen: [x] PASS [] FAIL     Smoking [x] YES [] NO      Afib [] YES [x] NO - on cardiac monitor     Stroke Education [x] YES [] NO    Obtain screening lower extremity venous ultrasound in patients who meet 1 or more of the following criteria as patient is high risk for DVT/PE on admission:   [] History of DVT/PE  []Hypercoagulable states (Factor V Leiden, Cancer, OCP, etc. )  []Prolonged immobility (hemiplegia/hemiparesis/post operative or any other extended immobilization)  [] Transferred from outside facility (Rehab or Long term care)  [] Age </= to 50

## 2023-08-07 NOTE — CHART NOTE - NSCHARTNOTEFT_GEN_A_CORE
Language Line Romeo ID #[id number]     63 year old male, active smoker, with HTN, active ETOH and cocaine use, who presented to [facility] ED with complaints of right sided weakness. Found to have multiple acute supratentorial and infratentorial hemorrhagic and nonhemorrhagic embolic infarctions on brain MR. Telemetry shows sinus rhythm. Request for ILR implant for longitudinal arrhythmia surveillance.     Discussed with patient indications, risks and benefits of ILR implant. Pt is agreeable. All questions answered.   Will plan for ILR implant prior to discharge if pt shows no evidence of atrial arrhythmia while on telemetry.   Please call EP prior to d/c for implant. Language Line Romeo ID #[id number]     63 year old male, active smoker, with HTN, active ETOH and cocaine use, who presented to [facility] ED with complaints of right sided weakness. Found to have multiple acute supratentorial and infratentorial hemorrhagic and nonhemorrhagic embolic infarctions on brain MR. CT chest revealed spiculated lesion in the left upper lung lobe, awaiting biopsy with IR later this week.     Telemetry shows sinus rhythm. Request for ILR implant for longitudinal arrhythmia surveillance.   Discussed with patient indications, risks and benefits of ILR implant. Pt is agreeable. All questions answered.   Will plan for ILR implant prior to discharge if pt shows no evidence of atrial arrhythmia while on telemetry.   Please call EP prior to d/c for implant.

## 2023-08-07 NOTE — PROGRESS NOTE ADULT - ASSESSMENT
64 y/o male with hx of Cocaine/THC use, now with confusion and CTH showing possible hemorrhagic metastatic lesions vs CVA with hemorrhagic conversion, elevated transaminases    #Mets with hemorrhagic CVA  -CT Chest showing MIGEL spiculated mass - will contact IR next week for biopsy   -Unlikely CVA or primary CNS neoplasm  -MRI brain w/wo contrast reviewed - concern for embolic CVA  -CTA head and neck without occlusion noted  -neuro-checks Q2h Vitals Q 2  -cards called for KATHRINE, planned for 8/7 and ILR near [facility]  -neurology recs appreciated  -Hold aspirin with ICH on CT. repeat CTH 8/9, 1 wk from CVA to eval safety for resume AC  -Keppra per neuro Sx x 7 days  -Telemetry   -OOB w/assistance  -Fall/seizure precautions   -PT/OT recs AR  -PM&R recs home  -VC boots no AC with ICH  -Neurosx signed off, outpt f/u    #Left spiculated lung mass  -CT C/A/P with contrast  shows left lung spiculated mass  -CT surgery consulted - defer to IR  -IR consulted for biopsy - plan for biopsy Wed/Thurs after KATHRINE   -Heme onc recs appreciated    #ETOH abuse/substance abuse  -Utox positive for cocaine/THC  -s/p CIWA  -FA/MVI/Thiamine for possible thiamine deficiency  -Seizure/fall risk protocol   -SW consult for eventual [facility] referral     #Elevated transaminases  -Possibly related to ETOH   -Cocaine induced from vasospasm?  -Viral hepatitis panel  -RUQ unrulyo wnl    DVT prophylaxis -SCD   Dispo - Awaiting KATHRINE with cards, IR Biopsy and recs from neuro 62 y/o male with hx of Cocaine/THC use, now with confusion and CTH showing possible hemorrhagic metastatic lesions vs CVA with hemorrhagic conversion, elevated transaminases    #Mets with hemorrhagic CVA  -CT Chest showing MIGEL spiculated mass - will contact IR next week for biopsy   -Unlikely CVA or primary CNS neoplasm  -MRI brain w/wo contrast reviewed - concern for embolic CVA  -CTA head and neck without occlusion noted  -neuro-checks Q2h Vitals Q 2  -cards called for KATHRINE, planned for 8/7 and ILR near [facility]  -neurology recs appreciated  -Hold aspirin with ICH on CT. repeat CTH 8/9, 1 wk from CVA to eval safety for resume AC  -Keppra per neuro Sx x 7 days  -Telemetry   -OOB w/assistance  -Fall/seizure precautions   -PT/OT - home outpatient PT  -VC boots no AC with ICH  -Neurosx signed off, outpt f/u    #Left spiculated lung mass  -CT C/A/P with contrast  shows left lung spiculated mass  -CT surgery consulted - defer to IR  -IR consulted for biopsy - plan for biopsy Wed/Thurs after KATHRINE   -Heme onc recs appreciated    #ETOH abuse/substance abuse  -Utox positive for cocaine/THC  -s/p CIWA  -FA/MVI/Thiamine for possible thiamine deficiency  -Seizure/fall risk protocol   -SW consult for eventual [facility] referral     #Elevated transaminases  -Possibly related to ETOH   -Cocaine induced from vasospasm?  -Viral hepatitis panel  -RUPATEL herrera wnl    DVT prophylaxis -SCD   Dispo - Awaiting KATHRINE with cards, ILR and IR Biopsy     PT - home with outpatient PT

## 2023-08-07 NOTE — PROGRESS NOTE ADULT - SUBJECTIVE AND OBJECTIVE BOX
Preliminary note, offical recommendations pending attending review/signature   [facility] Stroke Team  Progress Note     HPI:  63 year old  male presents to [facility] ER for weakness of Right arm and leg which began >2 days prior to admission.  Pt also noted speech slurring.  Further questioning of pt. positive for visual disturbances for the past 2 months.  Pt unable to describe visual field defects/loss but noted positive for photophobia.  Pt. also c/o pain, hot in quality" in both right leg/arm, with need for assistance for ambulation by sister.  No h/o falls.  Pt note for h/o ?HTN in past, does not see a PMD on any regular basis.  + cocaine and THC use, as well as Alcohol abuse (daily) stopped day prior  CT of head done by ER shows age indeterminate L MCA territory, Right occipital PCA territory infarct as well as scattered hemorrhagic lesions with surrounding edema in frontal/posterior/temporal areas.     Pt admitted for further workup      SUBJECTIVE: No events overnight.  No new neurologic complaints.  ROS reported negative unless otherwise noted.    acetaminophen     Tablet .. 650 milliGRAM(s) Oral every 6 hours PRN  aluminum hydroxide/magnesium hydroxide/simethicone Suspension 30 milliLiter(s) Oral every 4 hours PRN  atorvastatin 80 milliGRAM(s) Oral at bedtime  folic acid 1 milliGRAM(s) Oral daily  gabapentin 300 milliGRAM(s) Oral <User Schedule>  hydrALAZINE Injectable 10 milliGRAM(s) IV Push every 4 hours PRN  hydrALAZINE Injectable 5 milliGRAM(s) IV Push every 4 hours PRN  labetalol Injectable 10 milliGRAM(s) IV Push every 10 minutes PRN  levETIRAcetam  IVPB 500 milliGRAM(s) IV Intermittent every 12 hours  LORazepam   Injectable 1 milliGRAM(s) IV Push every 1 hour PRN  melatonin 3 milliGRAM(s) Oral <User Schedule>  multivitamin 1 Tablet(s) Oral daily  ondansetron Injectable 4 milliGRAM(s) IV Push every 8 hours PRN  pantoprazole   Suspension 40 milliGRAM(s) Oral before breakfast  senna 2 Tablet(s) Oral at bedtime PRN      PHYSICAL EXAM:   Vital Signs Last 24 Hrs  T(C): 36.6 (07 Aug 2023 08:16), Max: 37 (06 Aug 2023 16:00)  T(F): 97.9 (07 Aug 2023 08:16), Max: 98.6 (06 Aug 2023 16:00)  HR: 58 (07 Aug 2023 08:16) (58 - 78)  BP: 127/74 (07 Aug 2023 08:16) (121/74 - 147/78)  BP(mean): --  RR: 18 (07 Aug 2023 08:16) (15 - 18)  SpO2: 98% (07 Aug 2023 08:16) (97% - 99%)    Parameters below as of 07 Aug 2023 08:16  Patient On (Oxygen Delivery Method): room air    EXAM PENDING     Physical Exam:   General: No acute distress, lying in bed answering questions    NEUROLOGICAL EXAM:  Mental status: Awake, alert, oriented x3, [place] speaking , followed all commands, no neglect  Cranial Nerves: No facial asymmetry, no nystagmus, dysarthria present,  tongue midline  Motor exam: tone decreased on right side,  2+/5 RUE, 5/5 RLE, 5/5 LUE, 5/5 LLE  Sensation: Intact to light touch   Coordination/ Gait: No dysmetria on left and unable to assess on right, gait not tested      LABS:                        14.6   9.69  )-----------( 269      ( 06 Aug 2023 06:39 )             44.3    08-06    139  |  102  |  12.1  ----------------------------<  87  3.9   |  24.0  |  0.78    Ca    9.0      06 Aug 2023 06:39          IMAGING: Reviewed by me.        MR Brain Stereotactic w/wo IV Cont (08.04.23 @ 08:43  FINDINGS:  No abnormal leptomeningeal or parenchymal enhancement.  There multiple areas of abnormal restricted diffusion in the bilateral   frontal lobes, bilateral occipital lobes, bilateral cerebral hemispheres   compatible with acute embolic infarctions. The left parietal lobe, left   occipital lobe, right temporal lobe lesions demonstrates hemosiderin   deposition. The hemorrhagic lesions demonstrates adjacent edema.    Scattered periventricular and subcortical white matter T2 /FLAIR   hyperintensities are seen without mass effect, nonspecific, likely   representing mild chronic microvascular changes. Normal T2 flow-voids are   seen within  the intracranial vasculature. The lateral ventricles and   cortical sulci are age-appropriate in size and configuration. There is no   mass, mass effect, or extra-axial fluid collection.There is no   susceptibility artifact to suggest hemorrhage. Midline structures are   normal.  The visualized paranasal sinuses, mastoid air cells and orbits   are unremarkable.      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      IMAGING: CT Angio Neck w/ IV Cont (08.01.23 @ 13:12)   IMPRESSION:    1. Bilateral vertebral arteries patent, with left-sided dominance.  2. No evidence of stenosis, occlusion or dissection bilateral   extracranial carotid arteries.  3. No evidence of large vessel occlusion, aneurysm or vascular   malformation intracranial circulation.  4. Additional findings described in detail above.    CT Chest w/ IV Cont (08.01.23 @ 13:12)   MPRESSION: Slightly spiculated left upper lobe lesion, suspicious for   primary lung carcinoma, including small cell carcinoma. Scattered   semisolid lung lesions, which may represent sites of semiinvasive   adenocarcinoma of the lung.    CT Angio Head w/ IV Cont (08.01.23 @ 13:11)   IMPRESSION:  1. Bilateral vertebral arteries patent, with left-sided dominance.  2. No evidence of stenosis, occlusion or dissection bilateral   extracranial carotid arteries.  3. No evidence of large vessel occlusion, aneurysm or vascular   malformation intracranial circulation.  4. Additional findings described in detail above.      CT Head No Cont (07.31.23 @ 19:13)   IMPRESSION:  1. Findings suggestive of a age indeterminate left MCA territory and   right occipital (PCA territory) infarcts..  2. Left posterior parietal intraparenchymal hemorrhage measuring   approximately 2.1 cm in greatest diameter, with surrounding edema.   Smaller eft inferior frontal hemorrhagic lesion with surrounding edema,   small left posterior temporal hemorrhagic lesion with surrounding edema   and small right temporal hemorrhagic lesion with surrounding edema. Small   foci of abnormal signal are also appreciated bilaterally within the   cerebellar regions.Findings are concerning for presence of underlying   hemorrhagic mass lesions, possibly metastatic in etiology. Possibility of   mycotic aneurysm should also be considered in the differential diagnosis.   Consider follow-up pre and postcontrast MR imaging of the brain, to   include DWI imaging and ADC mapping techniques. MRA of the [place] may also prove useful.  3. Punctate focus of calcification in a left occipital location without   surrounding edema, may represent prior infectious/inflammatory process   versus nonacute hemorrhagic event.    TTE Echo Complete w/ Contrast w/ Doppler (08.01.23 @ 11:32)    Summary:   1. Left ventricular ejection fraction, by visual estimation, is 50 to   55%.   2. Low normal global left ventricular systolic function.   3. Spectral Doppler shows impaired relaxation pattern of left   ventricular myocardial filling (Grade I diastolic dysfunction).   4. No shunt of the IAS seen by color doppler.   5. There is no evidence of pericardial effusion.   6. Mild mitral valve regurgitation.   7. Mild thickening of the anterior and posterior mitral valve leaflets.   8. Mild tricuspid regurgitation.   9. Endocardial visualization was enhanced with intravenous echo contrast.     Preliminary note, offical recommendations pending attending review/signature   [facility] Stroke Team  Progress Note   [id number]-  ID     HPI:  63 year old  male presents to [facility] ER for weakness of Right arm and leg which began >2 days prior to admission.  Pt also noted speech slurring.  Further questioning of pt. positive for visual disturbances for the past 2 months.  Pt unable to describe visual field defects/loss but noted positive for photophobia.  Pt. also c/o pain, hot in quality" in both right leg/arm, with need for assistance for ambulation by sister.  No h/o falls.  Pt note for h/o ?HTN in past, does not see a PMD on any regular basis.  + cocaine and THC use, as well as Alcohol abuse (daily) stopped day prior. CT of head done by ER shows age indeterminate L MCA territory, Right occipital PCA territory infarct as well as scattered hemorrhagic lesions with surrounding edema in frontal/posterior/temporal areas.     Pt admitted for further workup      SUBJECTIVE: No events overnight.  No new neurologic complaints.  Reports some back pain on right , notes could be from sleeping on telemetry monitor since waking up. RLE numbness intermittention- notes of course its better when asked how it is since coming into hospital. Notes some pain ROS reported negative unless otherwise noted.    acetaminophen     Tablet .. 650 milliGRAM(s) Oral every 6 hours PRN  aluminum hydroxide/magnesium hydroxide/simethicone Suspension 30 milliLiter(s) Oral every 4 hours PRN  atorvastatin 80 milliGRAM(s) Oral at bedtime  folic acid 1 milliGRAM(s) Oral daily  gabapentin 300 milliGRAM(s) Oral <User Schedule>  hydrALAZINE Injectable 10 milliGRAM(s) IV Push every 4 hours PRN  hydrALAZINE Injectable 5 milliGRAM(s) IV Push every 4 hours PRN  labetalol Injectable 10 milliGRAM(s) IV Push every 10 minutes PRN  levETIRAcetam  IVPB 500 milliGRAM(s) IV Intermittent every 12 hours  LORazepam   Injectable 1 milliGRAM(s) IV Push every 1 hour PRN  melatonin 3 milliGRAM(s) Oral <User Schedule>  multivitamin 1 Tablet(s) Oral daily  ondansetron Injectable 4 milliGRAM(s) IV Push every 8 hours PRN  pantoprazole   Suspension 40 milliGRAM(s) Oral before breakfast  senna 2 Tablet(s) Oral at bedtime PRN      PHYSICAL EXAM:   Vital Signs Last 24 Hrs  T(C): 36.6 (07 Aug 2023 08:16), Max: 37 (06 Aug 2023 16:00)  T(F): 97.9 (07 Aug 2023 08:16), Max: 98.6 (06 Aug 2023 16:00)  HR: 58 (07 Aug 2023 08:16) (58 - 78)  BP: 127/74 (07 Aug 2023 08:16) (121/74 - 147/78)  BP(mean): --  RR: 18 (07 Aug 2023 08:16) (15 - 18)  SpO2: 98% (07 Aug 2023 08:16) (97% - 99%)    Parameters below as of 07 Aug 2023 08:16  Patient On (Oxygen Delivery Method): room air       Physical Exam:   General: No acute distress, lying in bed   NEUROLOGICAL EXAM:  Mental status: Awake, alert, oriented to self, month, location, states year 93 then corrects to 2023, IDs objects, simple commands, repetition intact   Cranial Nerves: very subtle right facial, no nystagmus, subtle  dysarthria, PERRL b/l, EOMI, VF overall full   Motor exam: 3/5 RUE slight drift and wrist drop,  4/5- wrist and finger extension trace , 5/5 RLE, 5/5 LUE, 5/5 LLE  Sensation: Intact to light touch , no extinction   Coordination/ Gait: No dysmetria        LABS:                        14.6   9.69  )-----------( 269      ( 06 Aug 2023 06:39 )             44.3    08-06    139  |  102  |  12.1  ----------------------------<  87  3.9   |  24.0  |  0.78    Ca    9.0      06 Aug 2023 06:39          IMAGING: Reviewed by me.        MR Brain Stereotactic w/wo IV Cont (08.04.23 @ 08:43  FINDINGS:  No abnormal leptomeningeal or parenchymal enhancement.  There multiple areas of abnormal restricted diffusion in the bilateral   frontal lobes, bilateral occipital lobes, bilateral cerebral hemispheres   compatible with acute embolic infarctions. The left parietal lobe, left   occipital lobe, right temporal lobe lesions demonstrates hemosiderin   deposition. The hemorrhagic lesions demonstrates adjacent edema.    Scattered periventricular and subcortical white matter T2 /FLAIR   hyperintensities are seen without mass effect, nonspecific, likely   representing mild chronic microvascular changes. Normal T2 flow-voids are   seen within  the intracranial vasculature. The lateral ventricles and   cortical sulci are age-appropriate in size and configuration. There is no   mass, mass effect, or extra-axial fluid collection.There is no   susceptibility artifact to suggest hemorrhage. Midline structures are   normal.  The visualized paranasal sinuses, mastoid air cells and orbits   are unremarkable.      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      IMAGING: CT Angio Neck w/ IV Cont (08.01.23 @ 13:12)   IMPRESSION:    1. Bilateral vertebral arteries patent, with left-sided dominance.  2. No evidence of stenosis, occlusion or dissection bilateral   extracranial carotid arteries.  3. No evidence of large vessel occlusion, aneurysm or vascular   malformation intracranial circulation.  4. Additional findings described in detail above.    CT Chest w/ IV Cont (08.01.23 @ 13:12)   MPRESSION: Slightly spiculated left upper lobe lesion, suspicious for   primary lung carcinoma, including small cell carcinoma. Scattered   semisolid lung lesions, which may represent sites of semiinvasive   adenocarcinoma of the lung.    CT Angio Head w/ IV Cont (08.01.23 @ 13:11)   IMPRESSION:  1. Bilateral vertebral arteries patent, with left-sided dominance.  2. No evidence of stenosis, occlusion or dissection bilateral   extracranial carotid arteries.  3. No evidence of large vessel occlusion, aneurysm or vascular   malformation intracranial circulation.  4. Additional findings described in detail above.      CT Head No Cont (07.31.23 @ 19:13)   IMPRESSION:  1. Findings suggestive of a age indeterminate left MCA territory and   right occipital (PCA territory) infarcts..  2. Left posterior parietal intraparenchymal hemorrhage measuring   approximately 2.1 cm in greatest diameter, with surrounding edema.   Smaller eft inferior frontal hemorrhagic lesion with surrounding edema,   small left posterior temporal hemorrhagic lesion with surrounding edema   and small right temporal hemorrhagic lesion with surrounding edema. Small   foci of abnormal signal are also appreciated bilaterally within the   cerebellar regions.Findings are concerning for presence of underlying   hemorrhagic mass lesions, possibly metastatic in etiology. Possibility of   mycotic aneurysm should also be considered in the differential diagnosis.   Consider follow-up pre and postcontrast MR imaging of the brain, to   include DWI imaging and ADC mapping techniques. MRA of the [place] may also prove useful.  3. Punctate focus of calcification in a left occipital location without   surrounding edema, may represent prior infectious/inflammatory process   versus nonacute hemorrhagic event.    TTE Echo Complete w/ Contrast w/ Doppler (08.01.23 @ 11:32)    Summary:   1. Left ventricular ejection fraction, by visual estimation, is 50 to   55%.   2. Low normal global left ventricular systolic function.   3. Spectral Doppler shows impaired relaxation pattern of left   ventricular myocardial filling (Grade I diastolic dysfunction).   4. No shunt of the IAS seen by color doppler.   5. There is no evidence of pericardial effusion.   6. Mild mitral valve regurgitation.   7. Mild thickening of the anterior and posterior mitral valve leaflets.   8. Mild tricuspid regurgitation.   9. Endocardial visualization was enhanced with intravenous echo contrast.     Preliminary note, offical recommendations pending attending review/signature   [facility] Stroke Team  Progress Note   [id number]-  ID     HPI:  63 year old  male presents to [facility] ER for weakness of Right arm and leg which began >2 days prior to admission.  Pt also noted speech slurring.  Further questioning of pt. positive for visual disturbances for the past 2 months.  Pt unable to describe visual field defects/loss but noted positive for photophobia.  Pt. also c/o pain, hot in quality" in both right leg/arm, with need for assistance for ambulation by sister.  No h/o falls.  Pt note for h/o ?HTN in past, does not see a PMD on any regular basis.  + cocaine and THC use, as well as Alcohol abuse (daily) stopped day prior. CT of head done by ER shows age indeterminate L MCA territory, Right occipital PCA territory infarct as well as scattered hemorrhagic lesions with surrounding edema in frontal/posterior/temporal areas.     Pt admitted for further workup      SUBJECTIVE: No events overnight.  No new neurologic complaints.  Reports some back pain on right , notes could be from sleeping on telemetry monitor since waking up. RLE numbness intermittention- notes of course its better when asked how it is since coming into hospital. Notes some pain ROS reported negative unless otherwise noted.    acetaminophen     Tablet .. 650 milliGRAM(s) Oral every 6 hours PRN  aluminum hydroxide/magnesium hydroxide/simethicone Suspension 30 milliLiter(s) Oral every 4 hours PRN  atorvastatin 80 milliGRAM(s) Oral at bedtime  folic acid 1 milliGRAM(s) Oral daily  gabapentin 300 milliGRAM(s) Oral <User Schedule>  hydrALAZINE Injectable 10 milliGRAM(s) IV Push every 4 hours PRN  hydrALAZINE Injectable 5 milliGRAM(s) IV Push every 4 hours PRN  labetalol Injectable 10 milliGRAM(s) IV Push every 10 minutes PRN  levETIRAcetam  IVPB 500 milliGRAM(s) IV Intermittent every 12 hours  LORazepam   Injectable 1 milliGRAM(s) IV Push every 1 hour PRN  melatonin 3 milliGRAM(s) Oral <User Schedule>  multivitamin 1 Tablet(s) Oral daily  ondansetron Injectable 4 milliGRAM(s) IV Push every 8 hours PRN  pantoprazole   Suspension 40 milliGRAM(s) Oral before breakfast  senna 2 Tablet(s) Oral at bedtime PRN      PHYSICAL EXAM:   Vital Signs Last 24 Hrs  T(C): 36.6 (07 Aug 2023 08:16), Max: 37 (06 Aug 2023 16:00)  T(F): 97.9 (07 Aug 2023 08:16), Max: 98.6 (06 Aug 2023 16:00)  HR: 58 (07 Aug 2023 08:16) (58 - 78)  BP: 127/74 (07 Aug 2023 08:16) (121/74 - 147/78)  BP(mean): --  RR: 18 (07 Aug 2023 08:16) (15 - 18)  SpO2: 98% (07 Aug 2023 08:16) (97% - 99%)    Parameters below as of 07 Aug 2023 08:16  Patient On (Oxygen Delivery Method): room air       Physical Exam:   General: No acute distress, lying in bed   NEUROLOGICAL EXAM:  Mental status: Awake, alert, oriented to self, month, location, states year 93 then corrects to 2023, IDs objects, simple commands, repetition intact   Cranial Nerves: very subtle right facial, no nystagmus, subtle dysarthria, PERRL b/l, EOMI, VF overall full   Motor exam: 3/5 RUE slight drift and wrist drop,  4/5- wrist and finger extension trace , 5/5 RLE, 5/5 LUE, 5/5 LLE  Sensation: Intact to light touch , no extinction   Coordination/ Gait: No dysmetria        LABS:                        14.6   9.69  )-----------( 269      ( 06 Aug 2023 06:39 )             44.3    08-06    139  |  102  |  12.1  ----------------------------<  87  3.9   |  24.0  |  0.78    Ca    9.0      06 Aug 2023 06:39          IMAGING: Reviewed by me.       MR Brain Stereotactic w/wo IV Cont (08.04.23 @ 08:43  FINDINGS:  No abnormal leptomeningeal or parenchymal enhancement.  There multiple areas of abnormal restricted diffusion in the bilateral   frontal lobes, bilateral occipital lobes, bilateral cerebral hemispheres   compatible with acute embolic infarctions. The left parietal lobe, left   occipital lobe, right temporal lobe lesions demonstrates hemosiderin   deposition. The hemorrhagic lesions demonstrates adjacent edema.    Scattered periventricular and subcortical white matter T2 /FLAIR   hyperintensities are seen without mass effect, nonspecific, likely   representing mild chronic microvascular changes. Normal T2 flow-voids are   seen within  the intracranial vasculature. The lateral ventricles and   cortical sulci are age-appropriate in size and configuration. There is no   mass, mass effect, or extra-axial fluid collection.There is no   susceptibility artifact to suggest hemorrhage. Midline structures are   normal.  The visualized paranasal sinuses, mastoid air cells and orbits   are unremarkable.      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      IMAGING: CT Angio Neck w/ IV Cont (08.01.23 @ 13:12)   IMPRESSION:    1. Bilateral vertebral arteries patent, with left-sided dominance.  2. No evidence of stenosis, occlusion or dissection bilateral   extracranial carotid arteries.  3. No evidence of large vessel occlusion, aneurysm or vascular   malformation intracranial circulation.  4. Additional findings described in detail above.    CT Chest w/ IV Cont (08.01.23 @ 13:12)   IMPRESSION: Slightly spiculated left upper lobe lesion, suspicious for   primary lung carcinoma, including small cell carcinoma. Scattered   semisolid lung lesions, which may represent sites of semiinvasive   adenocarcinoma of the lung.    CT Angio Head w/ IV Cont (08.01.23 @ 13:11)   IMPRESSION:  1. Bilateral vertebral arteries patent, with left-sided dominance.  2. No evidence of stenosis, occlusion or dissection bilateral   extracranial carotid arteries.  3. No evidence of large vessel occlusion, aneurysm or vascular   malformation intracranial circulation.  4. Additional findings described in detail above.      CT Head No Cont (07.31.23 @ 19:13)   IMPRESSION:  1. Findings suggestive of a age indeterminate left MCA territory and   right occipital (PCA territory) infarcts..  2. Left posterior parietal intraparenchymal hemorrhage measuring   approximately 2.1 cm in greatest diameter, with surrounding edema.   Smaller eft inferior frontal hemorrhagic lesion with surrounding edema,   small left posterior temporal hemorrhagic lesion with surrounding edema   and small right temporal hemorrhagic lesion with surrounding edema. Small   foci of abnormal signal are also appreciated bilaterally within the   cerebellar regions. Findings are concerning for presence of underlying   hemorrhagic mass lesions, possibly metastatic in etiology. Possibility of   mycotic aneurysm should also be considered in the differential diagnosis.   Consider follow-up pre and postcontrast MR imaging of the brain, to   include DWI imaging and ADC mapping techniques. MRA of the [place]   [place] may also prove useful.  3. Punctate focus of calcification in a left occipital location without   surrounding edema, may represent prior infectious/inflammatory process   versus nonacute hemorrhagic event.    TTE Echo Complete w/ Contrast w/ Doppler (08.01.23 @ 11:32)    Summary:   1. Left ventricular ejection fraction, by visual estimation, is 50 to   55%.   2. Low normal global left ventricular systolic function.   3. Spectral Doppler shows impaired relaxation pattern of left   ventricular myocardial filling (Grade I diastolic dysfunction).   4. No shunt of the IAS seen by color doppler.   5. There is no evidence of pericardial effusion.   6. Mild mitral valve regurgitation.   7. Mild thickening of the anterior and posterior mitral valve leaflets.   8. Mild tricuspid regurgitation.   9. Endocardial visualization was enhanced with intravenous echo contrast.       [facility] Stroke Team  Progress Note   [id number]-  ID     HPI:  63 year old  male presents to [facility] ER for weakness of Right arm and leg which began >2 days prior to admission.  Pt also noted speech slurring.  Further questioning of pt. positive for visual disturbances for the past 2 months.  Pt unable to describe visual field defects/loss but noted positive for photophobia.  Pt. also c/o pain, hot in quality" in both right leg/arm, with need for assistance for ambulation by sister.  No h/o falls.  Pt note for h/o ?HTN in past, does not see a PMD on any regular basis.  + cocaine and THC use, as well as Alcohol abuse (daily) stopped day prior. CT of head done by ER shows age indeterminate L MCA territory, Right occipital PCA territory infarct as well as scattered hemorrhagic lesions with surrounding edema in frontal/posterior/temporal areas.     Pt admitted for further workup      SUBJECTIVE: No events overnight.  No new neurologic complaints.  Reports some back pain on right , notes could be from sleeping on telemetry monitor since waking up. RLE numbness intermittention- notes of course its better when asked how it is since coming into hospital. Notes some pain ROS reported negative unless otherwise noted.    acetaminophen     Tablet .. 650 milliGRAM(s) Oral every 6 hours PRN  aluminum hydroxide/magnesium hydroxide/simethicone Suspension 30 milliLiter(s) Oral every 4 hours PRN  atorvastatin 80 milliGRAM(s) Oral at bedtime  folic acid 1 milliGRAM(s) Oral daily  gabapentin 300 milliGRAM(s) Oral <User Schedule>  hydrALAZINE Injectable 10 milliGRAM(s) IV Push every 4 hours PRN  hydrALAZINE Injectable 5 milliGRAM(s) IV Push every 4 hours PRN  labetalol Injectable 10 milliGRAM(s) IV Push every 10 minutes PRN  levETIRAcetam  IVPB 500 milliGRAM(s) IV Intermittent every 12 hours  LORazepam   Injectable 1 milliGRAM(s) IV Push every 1 hour PRN  melatonin 3 milliGRAM(s) Oral <User Schedule>  multivitamin 1 Tablet(s) Oral daily  ondansetron Injectable 4 milliGRAM(s) IV Push every 8 hours PRN  pantoprazole   Suspension 40 milliGRAM(s) Oral before breakfast  senna 2 Tablet(s) Oral at bedtime PRN      PHYSICAL EXAM:   Vital Signs Last 24 Hrs  T(C): 36.6 (07 Aug 2023 08:16), Max: 37 (06 Aug 2023 16:00)  T(F): 97.9 (07 Aug 2023 08:16), Max: 98.6 (06 Aug 2023 16:00)  HR: 58 (07 Aug 2023 08:16) (58 - 78)  BP: 127/74 (07 Aug 2023 08:16) (121/74 - 147/78)  BP(mean): --  RR: 18 (07 Aug 2023 08:16) (15 - 18)  SpO2: 98% (07 Aug 2023 08:16) (97% - 99%)    Parameters below as of 07 Aug 2023 08:16  Patient On (Oxygen Delivery Method): room air       Physical Exam:   General: No acute distress, lying in bed   NEUROLOGICAL EXAM:  Mental status: Awake, alert, oriented to self, month, location, states year 93 then corrects to 2023, IDs objects, simple commands, repetition intact   Cranial Nerves: very subtle right facial, no nystagmus, subtle dysarthria, PERRL b/l, EOMI, VF overall full   Motor exam: 3/5 RUE slight drift and wrist drop,  4/5- wrist and finger extension trace , 5/5 RLE, 5/5 LUE, 5/5 LLE  Sensation: Intact to light touch , no extinction   Coordination/ Gait: No dysmetria        LABS:                        14.6   9.69  )-----------( 269      ( 06 Aug 2023 06:39 )             44.3    08-06    139  |  102  |  12.1  ----------------------------<  87  3.9   |  24.0  |  0.78    Ca    9.0      06 Aug 2023 06:39          IMAGING: Reviewed by me.       MR Brain Stereotactic w/wo IV Cont (08.04.23 @ 08:43  FINDINGS:  No abnormal leptomeningeal or parenchymal enhancement.  There multiple areas of abnormal restricted diffusion in the bilateral   frontal lobes, bilateral occipital lobes, bilateral cerebral hemispheres   compatible with acute embolic infarctions. The left parietal lobe, left   occipital lobe, right temporal lobe lesions demonstrates hemosiderin   deposition. The hemorrhagic lesions demonstrates adjacent edema.    Scattered periventricular and subcortical white matter T2 /FLAIR   hyperintensities are seen without mass effect, nonspecific, likely   representing mild chronic microvascular changes. Normal T2 flow-voids are   seen within  the intracranial vasculature. The lateral ventricles and   cortical sulci are age-appropriate in size and configuration. There is no   mass, mass effect, or extra-axial fluid collection.There is no   susceptibility artifact to suggest hemorrhage. Midline structures are   normal.  The visualized paranasal sinuses, mastoid air cells and orbits   are unremarkable.      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      IMAGING: CT Angio Neck w/ IV Cont (08.01.23 @ 13:12)   IMPRESSION:    1. Bilateral vertebral arteries patent, with left-sided dominance.  2. No evidence of stenosis, occlusion or dissection bilateral   extracranial carotid arteries.  3. No evidence of large vessel occlusion, aneurysm or vascular   malformation intracranial circulation.  4. Additional findings described in detail above.    CT Chest w/ IV Cont (08.01.23 @ 13:12)   IMPRESSION: Slightly spiculated left upper lobe lesion, suspicious for   primary lung carcinoma, including small cell carcinoma. Scattered   semisolid lung lesions, which may represent sites of semiinvasive   adenocarcinoma of the lung.    CT Angio Head w/ IV Cont (08.01.23 @ 13:11)   IMPRESSION:  1. Bilateral vertebral arteries patent, with left-sided dominance.  2. No evidence of stenosis, occlusion or dissection bilateral   extracranial carotid arteries.  3. No evidence of large vessel occlusion, aneurysm or vascular   malformation intracranial circulation.  4. Additional findings described in detail above.      CT Head No Cont (07.31.23 @ 19:13)   IMPRESSION:  1. Findings suggestive of a age indeterminate left MCA territory and   right occipital (PCA territory) infarcts..  2. Left posterior parietal intraparenchymal hemorrhage measuring   approximately 2.1 cm in greatest diameter, with surrounding edema.   Smaller eft inferior frontal hemorrhagic lesion with surrounding edema,   small left posterior temporal hemorrhagic lesion with surrounding edema   and small right temporal hemorrhagic lesion with surrounding edema. Small   foci of abnormal signal are also appreciated bilaterally within the   cerebellar regions. Findings are concerning for presence of underlying   hemorrhagic mass lesions, possibly metastatic in etiology. Possibility of   mycotic aneurysm should also be considered in the differential diagnosis.   Consider follow-up pre and postcontrast MR imaging of the brain, to   include DWI imaging and ADC mapping techniques. MRA of the [place]   [place] may also prove useful.  3. Punctate focus of calcification in a left occipital location without   surrounding edema, may represent prior infectious/inflammatory process   versus nonacute hemorrhagic event.    TTE Echo Complete w/ Contrast w/ Doppler (08.01.23 @ 11:32)    Summary:   1. Left ventricular ejection fraction, by visual estimation, is 50 to   55%.   2. Low normal global left ventricular systolic function.   3. Spectral Doppler shows impaired relaxation pattern of left   ventricular myocardial filling (Grade I diastolic dysfunction).   4. No shunt of the IAS seen by color doppler.   5. There is no evidence of pericardial effusion.   6. Mild mitral valve regurgitation.   7. Mild thickening of the anterior and posterior mitral valve leaflets.   8. Mild tricuspid regurgitation.   9. Endocardial visualization was enhanced with intravenous echo contrast.

## 2023-08-08 LAB
ANION GAP SERPL CALC-SCNC: 14 MMOL/L — SIGNIFICANT CHANGE UP (ref 5–17)
BASOPHILS # BLD AUTO: 0.02 K/UL — SIGNIFICANT CHANGE UP (ref 0–0.2)
BASOPHILS NFR BLD AUTO: 0.2 % — SIGNIFICANT CHANGE UP (ref 0–2)
BUN SERPL-MCNC: 10 MG/DL — SIGNIFICANT CHANGE UP (ref 8–20)
CALCIUM SERPL-MCNC: 8.9 MG/DL — SIGNIFICANT CHANGE UP (ref 8.4–10.5)
CHLORIDE SERPL-SCNC: 100 MMOL/L — SIGNIFICANT CHANGE UP (ref 96–108)
CO2 SERPL-SCNC: 23 MMOL/L — SIGNIFICANT CHANGE UP (ref 22–29)
CREAT SERPL-MCNC: 0.7 MG/DL — SIGNIFICANT CHANGE UP (ref 0.5–1.3)
EGFR: 104 ML/MIN/1.73M2 — SIGNIFICANT CHANGE UP
EOSINOPHIL # BLD AUTO: 0.05 K/UL — SIGNIFICANT CHANGE UP (ref 0–0.5)
EOSINOPHIL NFR BLD AUTO: 0.5 % — SIGNIFICANT CHANGE UP (ref 0–6)
GLUCOSE SERPL-MCNC: 91 MG/DL — SIGNIFICANT CHANGE UP (ref 70–99)
HCT VFR BLD CALC: 42.4 % — SIGNIFICANT CHANGE UP (ref 39–50)
HGB BLD-MCNC: 14.1 G/DL — SIGNIFICANT CHANGE UP (ref 13–17)
IMM GRANULOCYTES NFR BLD AUTO: 0.4 % — SIGNIFICANT CHANGE UP (ref 0–0.9)
LYMPHOCYTES # BLD AUTO: 1.54 K/UL — SIGNIFICANT CHANGE UP (ref 1–3.3)
LYMPHOCYTES # BLD AUTO: 15.2 % — SIGNIFICANT CHANGE UP (ref 13–44)
MCHC RBC-ENTMCNC: 29.1 PG — SIGNIFICANT CHANGE UP (ref 27–34)
MCHC RBC-ENTMCNC: 33.3 GM/DL — SIGNIFICANT CHANGE UP (ref 32–36)
MCV RBC AUTO: 87.6 FL — SIGNIFICANT CHANGE UP (ref 80–100)
MONOCYTES # BLD AUTO: 1.28 K/UL — HIGH (ref 0–0.9)
MONOCYTES NFR BLD AUTO: 12.6 % — SIGNIFICANT CHANGE UP (ref 2–14)
NEUTROPHILS # BLD AUTO: 7.21 K/UL — SIGNIFICANT CHANGE UP (ref 1.8–7.4)
NEUTROPHILS NFR BLD AUTO: 71.1 % — SIGNIFICANT CHANGE UP (ref 43–77)
PLATELET # BLD AUTO: 292 K/UL — SIGNIFICANT CHANGE UP (ref 150–400)
POTASSIUM SERPL-MCNC: 3.7 MMOL/L — SIGNIFICANT CHANGE UP (ref 3.5–5.3)
POTASSIUM SERPL-SCNC: 3.7 MMOL/L — SIGNIFICANT CHANGE UP (ref 3.5–5.3)
RBC # BLD: 4.84 M/UL — SIGNIFICANT CHANGE UP (ref 4.2–5.8)
RBC # FLD: 12.8 % — SIGNIFICANT CHANGE UP (ref 10.3–14.5)
SODIUM SERPL-SCNC: 137 MMOL/L — SIGNIFICANT CHANGE UP (ref 135–145)
WBC # BLD: 10.14 K/UL — SIGNIFICANT CHANGE UP (ref 3.8–10.5)
WBC # FLD AUTO: 10.14 K/UL — SIGNIFICANT CHANGE UP (ref 3.8–10.5)

## 2023-08-08 PROCEDURE — 99232 SBSQ HOSP IP/OBS MODERATE 35: CPT

## 2023-08-08 PROCEDURE — 99233 SBSQ HOSP IP/OBS HIGH 50: CPT

## 2023-08-08 PROCEDURE — 73600 X-RAY EXAM OF ANKLE: CPT | Mod: 26,RT

## 2023-08-08 PROCEDURE — 93970 EXTREMITY STUDY: CPT | Mod: 26

## 2023-08-08 RX ORDER — LIDOCAINE 4 G/100G
1 CREAM TOPICAL DAILY
Refills: 0 | Status: DISCONTINUED | OUTPATIENT
Start: 2023-08-08 | End: 2023-08-16

## 2023-08-08 RX ADMIN — PANTOPRAZOLE SODIUM 40 MILLIGRAM(S): 20 TABLET, DELAYED RELEASE ORAL at 06:32

## 2023-08-08 RX ADMIN — GABAPENTIN 300 MILLIGRAM(S): 400 CAPSULE ORAL at 20:54

## 2023-08-08 RX ADMIN — ATORVASTATIN CALCIUM 80 MILLIGRAM(S): 80 TABLET, FILM COATED ORAL at 20:55

## 2023-08-08 RX ADMIN — LIDOCAINE 1 PATCH: 4 CREAM TOPICAL at 11:26

## 2023-08-08 RX ADMIN — Medication 650 MILLIGRAM(S): at 14:40

## 2023-08-08 RX ADMIN — Medication 1 MILLIGRAM(S): at 11:26

## 2023-08-08 RX ADMIN — Medication 1 TABLET(S): at 11:27

## 2023-08-08 RX ADMIN — Medication 650 MILLIGRAM(S): at 20:20

## 2023-08-08 RX ADMIN — Medication 650 MILLIGRAM(S): at 13:57

## 2023-08-08 RX ADMIN — Medication 3 MILLIGRAM(S): at 20:54

## 2023-08-08 RX ADMIN — LIDOCAINE 1 PATCH: 4 CREAM TOPICAL at 20:54

## 2023-08-08 NOTE — PROGRESS NOTE ADULT - SUBJECTIVE AND OBJECTIVE BOX
Destiny Moser M.D.    Patient is a 63y old  Male who presents with a chief complaint of CVA with R sided weakness (08 Aug 2023 08:26)      SUBJECTIVE / OVERNIGHT EVENTS: No event overnight. Reports mild right rib pain, attributing to sleep position.     Patient denies SOB, abd pain, N/V, fever, chills, dysuria or any other complaints. All remainder ROS negative.     MEDICATIONS  (STANDING):  atorvastatin 80 milliGRAM(s) Oral at bedtime  folic acid 1 milliGRAM(s) Oral daily  gabapentin 300 milliGRAM(s) Oral <User Schedule>  lidocaine   4% Patch 1 Patch Transdermal daily  melatonin 3 milliGRAM(s) Oral <User Schedule>  multivitamin 1 Tablet(s) Oral daily  pantoprazole   Suspension 40 milliGRAM(s) Oral before breakfast    MEDICATIONS  (PRN):  acetaminophen     Tablet .. 650 milliGRAM(s) Oral every 6 hours PRN Temp greater or equal to 38C (100.4F), Mild Pain (1 - 3)  aluminum hydroxide/magnesium hydroxide/simethicone Suspension 30 milliLiter(s) Oral every 4 hours PRN Dyspepsia  hydrALAZINE Injectable 5 milliGRAM(s) IV Push every 4 hours PRN SBP >140  hydrALAZINE Injectable 10 milliGRAM(s) IV Push every 4 hours PRN SBP >160  labetalol Injectable 10 milliGRAM(s) IV Push every 10 minutes PRN SBP> 160 and DBP> 90  ondansetron Injectable 4 milliGRAM(s) IV Push every 8 hours PRN Nausea and/or Vomiting  senna 2 Tablet(s) Oral at bedtime PRN Constipation      I&O's Summary      PHYSICAL EXAM:  Vital Signs Last 24 Hrs  T(C): 36.9 (08 Aug 2023 07:45), Max: 37.1 (08 Aug 2023 04:54)  T(F): 98.5 (08 Aug 2023 07:45), Max: 98.8 (08 Aug 2023 04:54)  HR: 62 (08 Aug 2023 07:45) (54 - 66)  BP: 113/74 (08 Aug 2023 07:45) (113/73 - 162/95)  BP(mean): --  RR: 18 (08 Aug 2023 07:45) (16 - 20)  SpO2: 97% (08 Aug 2023 07:45) (96% - 100%)    Parameters below as of 08 Aug 2023 07:45  Patient On (Oxygen Delivery Method): room air      CONSTITUTIONAL: NAD, well-groomed  RESPIRATORY: Normal respiratory effort; lungs are clear to auscultation bilaterally  CARDIOVASCULAR: Regular rate and rhythm; No lower extremity edema  ABDOMEN: Nontender to palpation, normoactive bowel sounds  PSYCH: A+O x3; affect appropriate  NEUROLOGY: CN 2-12 are intact and symmetric; no gross sensory deficits;   SKIN: No rashes; no palpable lesions    LABS:                        14.1   10.14 )-----------( 292      ( 08 Aug 2023 05:12 )             42.4     08-08    137  |  100  |  10.0  ----------------------------<  91  3.7   |  23.0  |  0.70    Ca    8.9      08 Aug 2023 05:12            Urinalysis Basic - ( 08 Aug 2023 05:12 )    Color: x / Appearance: x / SG: x / pH: x  Gluc: 91 mg/dL / Ketone: x  / Bili: x / Urobili: x   Blood: x / Protein: x / Nitrite: x   Leuk Esterase: x / RBC: x / WBC x   Sq Epi: x / Non Sq Epi: x / Bacteria: x        CAPILLARY BLOOD GLUCOSE          RADIOLOGY & ADDITIONAL TESTS:  Results Reviewed:   Imaging Personally Reviewed:  Electrocardiogram Personally Reviewed:

## 2023-08-08 NOTE — PROGRESS NOTE ADULT - SUBJECTIVE AND OBJECTIVE BOX
Preliminary note, offical recommendations pending attending review/signature   [facility] Stroke Team  Progress Note      ID Number:   HPI:  63 year old  male presents to [facility] ER for weakness of Right arm and leg which began >2 days prior to admission.  Pt also noted speech slurring.  Further questioning of pt. positive for visual disturbances for the past 2 months.  Pt unable to describe visual field defects/loss but noted positive for photophobia.  Pt. also c/o pain, hot in quality" in both right leg/arm, with need for assistance for ambulation by sister.  No h/o falls.  Pt note for h/o ?HTN in past, does not see a PMD on any regular basis.  + cocaine and THC use, as well as Alcohol abuse (daily) stopped day prior. CT of head done by ER shows age indeterminate L MCA territory, Right occipital PCA territory infarct as well as scattered hemorrhagic lesions with surrounding edema in frontal/posterior/temporal areas. Patient admitted for further workup.    SUBJECTIVE: No events overnight.  No new neurologic complaints.  ROS reported negative unless otherwise noted.    acetaminophen   Tablet .. 650 milliGRAM(s) Oral every 6 hours PRN  aluminum hydroxide/magnesium hydroxide/simethicone Suspension 30 milliLiter(s) Oral every 4 hours PRN  atorvastatin 80 milliGRAM(s) Oral at bedtime  folic acid 1 milliGRAM(s) Oral daily  gabapentin 300 milliGRAM(s) Oral <User Schedule>  hydrALAZINE Injectable 10 milliGRAM(s) IV Push every 4 hours PRN  hydrALAZINE Injectable 5 milliGRAM(s) IV Push every 4 hours PRN  labetalol Injectable 10 milliGRAM(s) IV Push every 10 minutes PRN  melatonin 3 milliGRAM(s) Oral <User Schedule>  multivitamin 1 Tablet(s) Oral daily  ondansetron Injectable 4 milliGRAM(s) IV Push every 8 hours PRN  pantoprazole   Suspension 40 milliGRAM(s) Oral before breakfast  senna 2 Tablet(s) Oral at bedtime PRN      PHYSICAL EXAM:   Vital Signs Last 24 Hrs  T(C): 36.9 (08 Aug 2023 07:45), Max: 37.1 (08 Aug 2023 04:54)  T(F): 98.5 (08 Aug 2023 07:45), Max: 98.8 (08 Aug 2023 04:54)  HR: 62 (08 Aug 2023 07:45) (54 - 66)  BP: 113/74 (08 Aug 2023 07:45) (113/73 - 162/95)  BP(mean): --  RR: 18 (08 Aug 2023 07:45) (16 - 20)  SpO2: 97% (08 Aug 2023 07:45) (96% - 100%)    Parameters below as of 08 Aug 2023 07:45  Patient On (Oxygen Delivery Method): room air      PENDING  Physical Exam:   General: No acute distress, lying in bed   NEUROLOGICAL EXAM:  Mental status: Awake, alert, oriented to self, month, location, states year 93 then corrects to 2023, IDs objects, simple commands, repetition intact   Cranial Nerves: very subtle right facial, no nystagmus, subtle dysarthria, PERRL b/l, EOMI, VF overall full   Motor exam: 3/5 RUE slight drift and wrist drop,  4/5- wrist and finger extension trace , 5/5 RLE, 5/5 LUE, 5/5 LLE  Sensation: Intact to light touch , no extinction   Coordination/ Gait: No dysmetria      LABS:                        14.1   10.14 )-----------( 292      ( 08 Aug 2023 05:12 )             42.4    08-08    137  |  100  |  10.0  ----------------------------<  91  3.7   |  23.0  |  0.70    Ca    8.9      08 Aug 2023 05:12      IMAGING: Reviewed by me.   MR Brain Stereotactic w/wo IV Cont (08.04.23 @ 08:43  FINDINGS:  No abnormal leptomeningeal or parenchymal enhancement.  There multiple areas of abnormal restricted diffusion in the bilateral   frontal lobes, bilateral occipital lobes, bilateral cerebral hemispheres   compatible with acute embolic infarctions. The left parietal lobe, left   occipital lobe, right temporal lobe lesions demonstrates hemosiderin   deposition. The hemorrhagic lesions demonstrates adjacent edema.    Scattered periventricular and subcortical white matter T2 /FLAIR   hyperintensities are seen without mass effect, nonspecific, likely   representing mild chronic microvascular changes. Normal T2 flow-voids are   seen within  the intracranial vasculature. The lateral ventricles and   cortical sulci are age-appropriate in size and configuration. There is no   mass, mass effect, or extra-axial fluid collection.There is no   susceptibility artifact to suggest hemorrhage. Midline structures are   normal.  The visualized paranasal sinuses, mastoid air cells and orbits   are unremarkable.      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      IMAGING: CT Angio Neck w/ IV Cont (08.01.23 @ 13:12)   IMPRESSION:  1. Bilateral vertebral arteries patent, with left-sided dominance.  2. No evidence of stenosis, occlusion or dissection bilateral   extracranial carotid arteries.  3. No evidence of large vessel occlusion, aneurysm or vascular   malformation intracranial circulation.  4. Additional findings described in detail above.    CT Chest w/ IV Cont (08.01.23 @ 13:12)   IMPRESSION: Slightly spiculated left upper lobe lesion, suspicious for   primary lung carcinoma, including small cell carcinoma. Scattered   semisolid lung lesions, which may represent sites of semiinvasive   adenocarcinoma of the lung.    CT Angio Head w/ IV Cont (08.01.23 @ 13:11)   IMPRESSION:  1. Bilateral vertebral arteries patent, with left-sided dominance.  2. No evidence of stenosis, occlusion or dissection bilateral   extracranial carotid arteries.  3. No evidence of large vessel occlusion, aneurysm or vascular   malformation intracranial circulation.  4. Additional findings described in detail above.      CT Head No Cont (07.31.23 @ 19:13)   IMPRESSION:  1. Findings suggestive of a age indeterminate left MCA territory and   right occipital (PCA territory) infarcts..  2. Left posterior parietal intraparenchymal hemorrhage measuring   approximately 2.1 cm in greatest diameter, with surrounding edema.   Smaller eft inferior frontal hemorrhagic lesion with surrounding edema,   small left posterior temporal hemorrhagic lesion with surrounding edema   and small right temporal hemorrhagic lesion with surrounding edema. Small   foci of abnormal signal are also appreciated bilaterally within the   cerebellar regions. Findings are concerning for presence of underlying   hemorrhagic mass lesions, possibly metastatic in etiology. Possibility of   mycotic aneurysm should also be considered in the differential diagnosis.   Consider follow-up pre and postcontrast MR imaging of the brain, to   include DWI imaging and ADC mapping techniques. MRA of the [place]   [place] may also prove useful.  3. Punctate focus of calcification in a left occipital location without   surrounding edema, may represent prior infectious/inflammatory process   versus nonacute hemorrhagic event.    TTE Echo Complete w/ Contrast w/ Doppler (08.01.23 @ 11:32)  Summary:   1. Left ventricular ejection fraction, by visual estimation, is 50 to   55%.   2. Low normal global left ventricular systolic function.   3. Spectral Doppler shows impaired relaxation pattern of left   ventricular myocardial filling (Grade I diastolic dysfunction).   4. No shunt of the IAS seen by color doppler.   5. There is no evidence of pericardial effusion.   6. Mild mitral valve regurgitation.   7. Mild thickening of the anterior and posterior mitral valve leaflets.   8. Mild tricuspid regurgitation.   9. Endocardial visualization was enhanced with intravenous echo contrast.    KATHRINE Echo Doppler (08.07.23 @ 15:37)   Summary:   1. Left ventricular ejection fraction, by visual estimation, is 60 to   65%.   2. Normal global left ventricular systolic function.   3. Normal right ventricular size and function, inadequate estimation of   RVSP.   4. Normal left atrial size.   5. No left atrial appendage thrombus and normal left atrial appendage   velocities.   6. Trace mitral valve regurgitation.   7. Mobile intra-atrial septum.   8. Color flow doppler and intravenous injection of agitated saline   demonstrates the presence of an intact intra atrial septum.   9. Mild simple atheroma at the aortic arch.  10. There is no evidence of pericardial effusion.  11. No prior KATHRINE study available for comparison. No obvious cardiogenic   source of emboli.                 Preliminary note, offical recommendations pending attending review/signature   [facility] Stroke Team  Progress Note      ID Number: [id number]  HPI:  63 year old  male presents to [facility] ER for weakness of Right arm and leg which began >2 days prior to admission.  Pt also noted speech slurring.  Further questioning of pt. positive for visual disturbances for the past 2 months.  Pt unable to describe visual field defects/loss but noted positive for photophobia.  Pt. also c/o pain, hot in quality" in both right leg/arm, with need for assistance for ambulation by sister.  No h/o falls.  Pt note for h/o ?HTN in past, does not see a PMD on any regular basis.  + cocaine and THC use, as well as Alcohol abuse (daily) stopped day prior. CT of head done by ER shows age indeterminate L MCA territory, Right occipital PCA territory infarct as well as scattered hemorrhagic lesions with surrounding edema in frontal/posterior/temporal areas. Patient admitted for further workup.    SUBJECTIVE: No events overnight.  No new neurologic complaints. Remains with right back pain still- from time to time when it hurts noted he wants a patch. Notes today I do not feel anything (when compared today prior).  ROS reported negative unless otherwise noted.    acetaminophen   Tablet .. 650 milliGRAM(s) Oral every 6 hours PRN  aluminum hydroxide/magnesium hydroxide/simethicone Suspension 30 milliLiter(s) Oral every 4 hours PRN  atorvastatin 80 milliGRAM(s) Oral at bedtime  folic acid 1 milliGRAM(s) Oral daily  gabapentin 300 milliGRAM(s) Oral <User Schedule>  hydrALAZINE Injectable 10 milliGRAM(s) IV Push every 4 hours PRN  hydrALAZINE Injectable 5 milliGRAM(s) IV Push every 4 hours PRN  labetalol Injectable 10 milliGRAM(s) IV Push every 10 minutes PRN  melatonin 3 milliGRAM(s) Oral <User Schedule>  multivitamin 1 Tablet(s) Oral daily  ondansetron Injectable 4 milliGRAM(s) IV Push every 8 hours PRN  pantoprazole   Suspension 40 milliGRAM(s) Oral before breakfast  senna 2 Tablet(s) Oral at bedtime PRN      PHYSICAL EXAM:   Vital Signs Last 24 Hrs  T(C): 36.9 (08 Aug 2023 07:45), Max: 37.1 (08 Aug 2023 04:54)  T(F): 98.5 (08 Aug 2023 07:45), Max: 98.8 (08 Aug 2023 04:54)  HR: 62 (08 Aug 2023 07:45) (54 - 66)  BP: 113/74 (08 Aug 2023 07:45) (113/73 - 162/95)  BP(mean): --  RR: 18 (08 Aug 2023 07:45) (16 - 20)  SpO2: 97% (08 Aug 2023 07:45) (96% - 100%)    Parameters below as of 08 Aug 2023 07:45  Patient On (Oxygen Delivery Method): room air         Physical Exam:   General: No acute distress, lying in bed     NEUROLOGICAL EXAM:  Mental status: Awake, alert, oriented to self, month, location, time IDs objects, simple commands and  across midline, repetition intact   Cranial Nerves: very subtle right facial, no nystagmus, subtle dysarthria, PERRL b/l, EOMI, VF overall full (some hesitation at times patient notes wears glasses at baseline)  Motor exam: 3/5 RUE slight drift and wrist drop,  4/5- wrist and finger extension trace , 5/5 RLE, 5/5 LUE, 5/5 LLE  Sensation: Intact to light touch , no extinction   Coordination/ Gait: No dysmetria      LABS:                        14.1   10.14 )-----------( 292      ( 08 Aug 2023 05:12 )             42.4    08-08    137  |  100  |  10.0  ----------------------------<  91  3.7   |  23.0  |  0.70    Ca    8.9      08 Aug 2023 05:12      IMAGING: Reviewed by me.   MR Brain Stereotactic w/wo IV Cont (08.04.23 @ 08:43  FINDINGS:  No abnormal leptomeningeal or parenchymal enhancement.  There multiple areas of abnormal restricted diffusion in the bilateral   frontal lobes, bilateral occipital lobes, bilateral cerebral hemispheres   compatible with acute embolic infarctions. The left parietal lobe, left   occipital lobe, right temporal lobe lesions demonstrates hemosiderin   deposition. The hemorrhagic lesions demonstrates adjacent edema.    Scattered periventricular and subcortical white matter T2 /FLAIR   hyperintensities are seen without mass effect, nonspecific, likely   representing mild chronic microvascular changes. Normal T2 flow-voids are   seen within  the intracranial vasculature. The lateral ventricles and   cortical sulci are age-appropriate in size and configuration. There is no   mass, mass effect, or extra-axial fluid collection.There is no   susceptibility artifact to suggest hemorrhage. Midline structures are   normal.  The visualized paranasal sinuses, mastoid air cells and orbits   are unremarkable.      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      IMAGING: CT Angio Neck w/ IV Cont (08.01.23 @ 13:12)   IMPRESSION:  1. Bilateral vertebral arteries patent, with left-sided dominance.  2. No evidence of stenosis, occlusion or dissection bilateral   extracranial carotid arteries.  3. No evidence of large vessel occlusion, aneurysm or vascular   malformation intracranial circulation.  4. Additional findings described in detail above.    CT Chest w/ IV Cont (08.01.23 @ 13:12)   IMPRESSION: Slightly spiculated left upper lobe lesion, suspicious for   primary lung carcinoma, including small cell carcinoma. Scattered   semisolid lung lesions, which may represent sites of semiinvasive   adenocarcinoma of the lung.    CT Angio Head w/ IV Cont (08.01.23 @ 13:11)   IMPRESSION:  1. Bilateral vertebral arteries patent, with left-sided dominance.  2. No evidence of stenosis, occlusion or dissection bilateral   extracranial carotid arteries.  3. No evidence of large vessel occlusion, aneurysm or vascular   malformation intracranial circulation.  4. Additional findings described in detail above.      CT Head No Cont (07.31.23 @ 19:13)   IMPRESSION:  1. Findings suggestive of a age indeterminate left MCA territory and   right occipital (PCA territory) infarcts..  2. Left posterior parietal intraparenchymal hemorrhage measuring   approximately 2.1 cm in greatest diameter, with surrounding edema.   Smaller eft inferior frontal hemorrhagic lesion with surrounding edema,   small left posterior temporal hemorrhagic lesion with surrounding edema   and small right temporal hemorrhagic lesion with surrounding edema. Small   foci of abnormal signal are also appreciated bilaterally within the   cerebellar regions. Findings are concerning for presence of underlying   hemorrhagic mass lesions, possibly metastatic in etiology. Possibility of   mycotic aneurysm should also be considered in the differential diagnosis.   Consider follow-up pre and postcontrast MR imaging of the brain, to   include DWI imaging and ADC mapping techniques. MRA of the [place]   [place] may also prove useful.  3. Punctate focus of calcification in a left occipital location without   surrounding edema, may represent prior infectious/inflammatory process   versus nonacute hemorrhagic event.    TTE Echo Complete w/ Contrast w/ Doppler (08.01.23 @ 11:32)  Summary:   1. Left ventricular ejection fraction, by visual estimation, is 50 to   55%.   2. Low normal global left ventricular systolic function.   3. Spectral Doppler shows impaired relaxation pattern of left   ventricular myocardial filling (Grade I diastolic dysfunction).   4. No shunt of the IAS seen by color doppler.   5. There is no evidence of pericardial effusion.   6. Mild mitral valve regurgitation.   7. Mild thickening of the anterior and posterior mitral valve leaflets.   8. Mild tricuspid regurgitation.   9. Endocardial visualization was enhanced with intravenous echo contrast.    KATHRINE Echo Doppler (08.07.23 @ 15:37)   Summary:   1. Left ventricular ejection fraction, by visual estimation, is 60 to   65%.   2. Normal global left ventricular systolic function.   3. Normal right ventricular size and function, inadequate estimation of   RVSP.   4. Normal left atrial size.   5. No left atrial appendage thrombus and normal left atrial appendage   velocities.   6. Trace mitral valve regurgitation.   7. Mobile intra-atrial septum.   8. Color flow doppler and intravenous injection of agitated saline   demonstrates the presence of an intact intra atrial septum.   9. Mild simple atheroma at the aortic arch.  10. There is no evidence of pericardial effusion.  11. No prior KATHRINE study available for comparison. No obvious cardiogenic   source of emboli.                 Preliminary note, offical recommendations pending attending review/signature   [facility] Stroke Team  Progress Note      ID Number: [id number]  HPI:  63 year old  male presents to [facility] ER for weakness of Right arm and leg which began >2 days prior to admission.  Pt also noted speech slurring.  Further questioning of pt. positive for visual disturbances for the past 2 months.  Pt unable to describe visual field defects/loss but noted positive for photophobia.  Pt. also c/o pain, hot in quality" in both right leg/arm, with need for assistance for ambulation by sister.  No h/o falls.  Pt note for h/o ?HTN in past, does not see a PMD on any regular basis.  + cocaine and THC use, as well as Alcohol abuse (daily) stopped day prior. CT of head done by ER shows age indeterminate L MCA territory, Right occipital PCA territory infarct as well as scattered hemorrhagic lesions with surrounding edema in frontal/posterior/temporal areas. Patient admitted for further workup.    SUBJECTIVE: Reports sharp right ankle and lower extremity pain intermittently. No events overnight.  No new neurologic complaints. Remains with right back pain still- from time to time when it hurts noted he wants a patch. Notes today I do not feel anything (when compared today prior).  ROS reported negative unless otherwise noted.    acetaminophen   Tablet .. 650 milliGRAM(s) Oral every 6 hours PRN  aluminum hydroxide/magnesium hydroxide/simethicone Suspension 30 milliLiter(s) Oral every 4 hours PRN  atorvastatin 80 milliGRAM(s) Oral at bedtime  folic acid 1 milliGRAM(s) Oral daily  gabapentin 300 milliGRAM(s) Oral <User Schedule>  hydrALAZINE Injectable 10 milliGRAM(s) IV Push every 4 hours PRN  hydrALAZINE Injectable 5 milliGRAM(s) IV Push every 4 hours PRN  labetalol Injectable 10 milliGRAM(s) IV Push every 10 minutes PRN  melatonin 3 milliGRAM(s) Oral <User Schedule>  multivitamin 1 Tablet(s) Oral daily  ondansetron Injectable 4 milliGRAM(s) IV Push every 8 hours PRN  pantoprazole   Suspension 40 milliGRAM(s) Oral before breakfast  senna 2 Tablet(s) Oral at bedtime PRN      PHYSICAL EXAM:   Vital Signs Last 24 Hrs  T(C): 36.9 (08 Aug 2023 07:45), Max: 37.1 (08 Aug 2023 04:54)  T(F): 98.5 (08 Aug 2023 07:45), Max: 98.8 (08 Aug 2023 04:54)  HR: 62 (08 Aug 2023 07:45) (54 - 66)  BP: 113/74 (08 Aug 2023 07:45) (113/73 - 162/95)  BP(mean): --  RR: 18 (08 Aug 2023 07:45) (16 - 20)  SpO2: 97% (08 Aug 2023 07:45) (96% - 100%)    Parameters below as of 08 Aug 2023 07:45  Patient On (Oxygen Delivery Method): room air         Physical Exam:   General: No acute distress, lying in bed     NEUROLOGICAL EXAM:  Mental status: Awake, alert, oriented to self, month, location, time IDs objects, simple commands and  across midline, repetition intact   Cranial Nerves: very subtle right facial, no nystagmus, subtle dysarthria, PERRL b/l, EOMI, VF overall full (some hesitation at times patient notes wears glasses at baseline)  Motor exam: 3/5 RUE slight drift and wrist drop,  4/5- wrist and finger extension trace , 5/5 RLE, 5/5 LUE, 5/5 LLE  Sensation: Intact to light touch , no extinction   Coordination/ Gait: No dysmetria      LABS:                        14.1   10.14 )-----------( 292      ( 08 Aug 2023 05:12 )             42.4    08-08    137  |  100  |  10.0  ----------------------------<  91  3.7   |  23.0  |  0.70    Ca    8.9      08 Aug 2023 05:12      IMAGING: Reviewed by me.   MR Brain Stereotactic w/wo IV Cont (08.04.23 @ 08:43  FINDINGS:  No abnormal leptomeningeal or parenchymal enhancement.  There multiple areas of abnormal restricted diffusion in the bilateral   frontal lobes, bilateral occipital lobes, bilateral cerebral hemispheres   compatible with acute embolic infarctions. The left parietal lobe, left   occipital lobe, right temporal lobe lesions demonstrates hemosiderin   deposition. The hemorrhagic lesions demonstrates adjacent edema.    Scattered periventricular and subcortical white matter T2 /FLAIR   hyperintensities are seen without mass effect, nonspecific, likely   representing mild chronic microvascular changes. Normal T2 flow-voids are   seen within  the intracranial vasculature. The lateral ventricles and   cortical sulci are age-appropriate in size and configuration. There is no   mass, mass effect, or extra-axial fluid collection.There is no   susceptibility artifact to suggest hemorrhage. Midline structures are   normal.  The visualized paranasal sinuses, mastoid air cells and orbits   are unremarkable.      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      IMAGING: CT Angio Neck w/ IV Cont (08.01.23 @ 13:12)   IMPRESSION:  1. Bilateral vertebral arteries patent, with left-sided dominance.  2. No evidence of stenosis, occlusion or dissection bilateral   extracranial carotid arteries.  3. No evidence of large vessel occlusion, aneurysm or vascular   malformation intracranial circulation.  4. Additional findings described in detail above.    CT Chest w/ IV Cont (08.01.23 @ 13:12)   IMPRESSION: Slightly spiculated left upper lobe lesion, suspicious for   primary lung carcinoma, including small cell carcinoma. Scattered   semisolid lung lesions, which may represent sites of semiinvasive   adenocarcinoma of the lung.    CT Angio Head w/ IV Cont (08.01.23 @ 13:11)   IMPRESSION:  1. Bilateral vertebral arteries patent, with left-sided dominance.  2. No evidence of stenosis, occlusion or dissection bilateral   extracranial carotid arteries.  3. No evidence of large vessel occlusion, aneurysm or vascular   malformation intracranial circulation.  4. Additional findings described in detail above.      CT Head No Cont (07.31.23 @ 19:13)   IMPRESSION:  1. Findings suggestive of a age indeterminate left MCA territory and   right occipital (PCA territory) infarcts..  2. Left posterior parietal intraparenchymal hemorrhage measuring   approximately 2.1 cm in greatest diameter, with surrounding edema.   Smaller eft inferior frontal hemorrhagic lesion with surrounding edema,   small left posterior temporal hemorrhagic lesion with surrounding edema   and small right temporal hemorrhagic lesion with surrounding edema. Small   foci of abnormal signal are also appreciated bilaterally within the   cerebellar regions. Findings are concerning for presence of underlying   hemorrhagic mass lesions, possibly metastatic in etiology. Possibility of   mycotic aneurysm should also be considered in the differential diagnosis.   Consider follow-up pre and postcontrast MR imaging of the brain, to   include DWI imaging and ADC mapping techniques. MRA of the [place]   [place] may also prove useful.  3. Punctate focus of calcification in a left occipital location without   surrounding edema, may represent prior infectious/inflammatory process   versus nonacute hemorrhagic event.    TTE Echo Complete w/ Contrast w/ Doppler (08.01.23 @ 11:32)  Summary:   1. Left ventricular ejection fraction, by visual estimation, is 50 to   55%.   2. Low normal global left ventricular systolic function.   3. Spectral Doppler shows impaired relaxation pattern of left   ventricular myocardial filling (Grade I diastolic dysfunction).   4. No shunt of the IAS seen by color doppler.   5. There is no evidence of pericardial effusion.   6. Mild mitral valve regurgitation.   7. Mild thickening of the anterior and posterior mitral valve leaflets.   8. Mild tricuspid regurgitation.   9. Endocardial visualization was enhanced with intravenous echo contrast.    KATHRINE Echo Doppler (08.07.23 @ 15:37)   Summary:   1. Left ventricular ejection fraction, by visual estimation, is 60 to   65%.   2. Normal global left ventricular systolic function.   3. Normal right ventricular size and function, inadequate estimation of   RVSP.   4. Normal left atrial size.   5. No left atrial appendage thrombus and normal left atrial appendage   velocities.   6. Trace mitral valve regurgitation.   7. Mobile intra-atrial septum.   8. Color flow doppler and intravenous injection of agitated saline   demonstrates the presence of an intact intra atrial septum.   9. Mild simple atheroma at the aortic arch.  10. There is no evidence of pericardial effusion.  11. No prior KATHRINE study available for comparison. No obvious cardiogenic   source of emboli.                 [facility] Stroke Team  Progress Note      ID Number: [id number]  HPI:  63 year old  male presents to [facility] ER for weakness of Right arm and leg which began >2 days prior to admission.  Pt also noted speech slurring.  Further questioning of pt. positive for visual disturbances for the past 2 months.  Pt unable to describe visual field defects/loss but noted positive for photophobia.  Pt. also c/o pain, hot in quality" in both right leg/arm, with need for assistance for ambulation by sister.  No h/o falls.  Pt note for h/o ?HTN in past, does not see a PMD on any regular basis.  + cocaine and THC use, as well as Alcohol abuse (daily) stopped day prior. CT of head done by ER shows age indeterminate L MCA territory, Right occipital PCA territory infarct as well as scattered hemorrhagic lesions with surrounding edema in frontal/posterior/temporal areas. Patient admitted for further workup.    SUBJECTIVE: Reports sharp right ankle and lower extremity pain intermittently. No events overnight.  No new neurologic complaints. Remains with right back pain still- from time to time when it hurts noted he wants a patch. Notes today I do not feel anything (when compared today prior).  ROS reported negative unless otherwise noted.    acetaminophen   Tablet .. 650 milliGRAM(s) Oral every 6 hours PRN  aluminum hydroxide/magnesium hydroxide/simethicone Suspension 30 milliLiter(s) Oral every 4 hours PRN  atorvastatin 80 milliGRAM(s) Oral at bedtime  folic acid 1 milliGRAM(s) Oral daily  gabapentin 300 milliGRAM(s) Oral <User Schedule>  hydrALAZINE Injectable 10 milliGRAM(s) IV Push every 4 hours PRN  hydrALAZINE Injectable 5 milliGRAM(s) IV Push every 4 hours PRN  labetalol Injectable 10 milliGRAM(s) IV Push every 10 minutes PRN  melatonin 3 milliGRAM(s) Oral <User Schedule>  multivitamin 1 Tablet(s) Oral daily  ondansetron Injectable 4 milliGRAM(s) IV Push every 8 hours PRN  pantoprazole   Suspension 40 milliGRAM(s) Oral before breakfast  senna 2 Tablet(s) Oral at bedtime PRN      PHYSICAL EXAM:   Vital Signs Last 24 Hrs  T(C): 36.9 (08 Aug 2023 07:45), Max: 37.1 (08 Aug 2023 04:54)  T(F): 98.5 (08 Aug 2023 07:45), Max: 98.8 (08 Aug 2023 04:54)  HR: 62 (08 Aug 2023 07:45) (54 - 66)  BP: 113/74 (08 Aug 2023 07:45) (113/73 - 162/95)  BP(mean): --  RR: 18 (08 Aug 2023 07:45) (16 - 20)  SpO2: 97% (08 Aug 2023 07:45) (96% - 100%)    Parameters below as of 08 Aug 2023 07:45  Patient On (Oxygen Delivery Method): room air         Physical Exam:   General: No acute distress, lying in bed     NEUROLOGICAL EXAM:  Mental status: Awake, alert, oriented to self, month, location, time IDs objects, simple commands and  across midline, repetition intact   Cranial Nerves: very subtle right facial, no nystagmus, subtle dysarthria, PERRL b/l, EOMI, VF overall full (some hesitation at times patient notes wears glasses at baseline)  Motor exam: 3/5 RUE slight drift and wrist drop,  4/5- wrist and finger extension trace , 5/5 RLE, 5/5 LUE, 5/5 LLE  Sensation: Intact to light touch , no extinction   Coordination/ Gait: No dysmetria      LABS:                        14.1   10.14 )-----------( 292      ( 08 Aug 2023 05:12 )             42.4    08-08    137  |  100  |  10.0  ----------------------------<  91  3.7   |  23.0  |  0.70    Ca    8.9      08 Aug 2023 05:12      IMAGING: Reviewed by me.   MR Brain Stereotactic w/wo IV Cont (08.04.23 @ 08:43  FINDINGS:  No abnormal leptomeningeal or parenchymal enhancement.  There multiple areas of abnormal restricted diffusion in the bilateral   frontal lobes, bilateral occipital lobes, bilateral cerebral hemispheres   compatible with acute embolic infarctions. The left parietal lobe, left   occipital lobe, right temporal lobe lesions demonstrates hemosiderin   deposition. The hemorrhagic lesions demonstrates adjacent edema.    Scattered periventricular and subcortical white matter T2 /FLAIR   hyperintensities are seen without mass effect, nonspecific, likely   representing mild chronic microvascular changes. Normal T2 flow-voids are   seen within  the intracranial vasculature. The lateral ventricles and   cortical sulci are age-appropriate in size and configuration. There is no   mass, mass effect, or extra-axial fluid collection.There is no   susceptibility artifact to suggest hemorrhage. Midline structures are   normal.  The visualized paranasal sinuses, mastoid air cells and orbits   are unremarkable.      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      IMAGING: CT Angio Neck w/ IV Cont (08.01.23 @ 13:12)   IMPRESSION:  1. Bilateral vertebral arteries patent, with left-sided dominance.  2. No evidence of stenosis, occlusion or dissection bilateral   extracranial carotid arteries.  3. No evidence of large vessel occlusion, aneurysm or vascular   malformation intracranial circulation.  4. Additional findings described in detail above.    CT Chest w/ IV Cont (08.01.23 @ 13:12)   IMPRESSION: Slightly spiculated left upper lobe lesion, suspicious for   primary lung carcinoma, including small cell carcinoma. Scattered   semisolid lung lesions, which may represent sites of semiinvasive   adenocarcinoma of the lung.    CT Angio Head w/ IV Cont (08.01.23 @ 13:11)   IMPRESSION:  1. Bilateral vertebral arteries patent, with left-sided dominance.  2. No evidence of stenosis, occlusion or dissection bilateral   extracranial carotid arteries.  3. No evidence of large vessel occlusion, aneurysm or vascular   malformation intracranial circulation.  4. Additional findings described in detail above.      CT Head No Cont (07.31.23 @ 19:13)   IMPRESSION:  1. Findings suggestive of a age indeterminate left MCA territory and   right occipital (PCA territory) infarcts..  2. Left posterior parietal intraparenchymal hemorrhage measuring   approximately 2.1 cm in greatest diameter, with surrounding edema.   Smaller eft inferior frontal hemorrhagic lesion with surrounding edema,   small left posterior temporal hemorrhagic lesion with surrounding edema   and small right temporal hemorrhagic lesion with surrounding edema. Small   foci of abnormal signal are also appreciated bilaterally within the   cerebellar regions. Findings are concerning for presence of underlying   hemorrhagic mass lesions, possibly metastatic in etiology. Possibility of   mycotic aneurysm should also be considered in the differential diagnosis.   Consider follow-up pre and postcontrast MR imaging of the brain, to   include DWI imaging and ADC mapping techniques. MRA of the [place]   [place] may also prove useful.  3. Punctate focus of calcification in a left occipital location without   surrounding edema, may represent prior infectious/inflammatory process   versus nonacute hemorrhagic event.    TTE Echo Complete w/ Contrast w/ Doppler (08.01.23 @ 11:32)  Summary:   1. Left ventricular ejection fraction, by visual estimation, is 50 to   55%.   2. Low normal global left ventricular systolic function.   3. Spectral Doppler shows impaired relaxation pattern of left   ventricular myocardial filling (Grade I diastolic dysfunction).   4. No shunt of the IAS seen by color doppler.   5. There is no evidence of pericardial effusion.   6. Mild mitral valve regurgitation.   7. Mild thickening of the anterior and posterior mitral valve leaflets.   8. Mild tricuspid regurgitation.   9. Endocardial visualization was enhanced with intravenous echo contrast.    KATHRINE Echo Doppler (08.07.23 @ 15:37)   Summary:   1. Left ventricular ejection fraction, by visual estimation, is 60 to   65%.   2. Normal global left ventricular systolic function.   3. Normal right ventricular size and function, inadequate estimation of   RVSP.   4. Normal left atrial size.   5. No left atrial appendage thrombus and normal left atrial appendage   velocities.   6. Trace mitral valve regurgitation.   7. Mobile intra-atrial septum.   8. Color flow doppler and intravenous injection of agitated saline   demonstrates the presence of an intact intra atrial septum.   9. Mild simple atheroma at the aortic arch.  10. There is no evidence of pericardial effusion.  11. No prior KATHRINE study available for comparison. No obvious cardiogenic   source of emboli.

## 2023-08-08 NOTE — PROGRESS NOTE ADULT - ASSESSMENT
INCOMPLETE  ASSESSMENT:   Patient is a 63 year old male who presented to [facility] ER on 7/31/23 for right sided hemiparesis for the past 2 days. Pt also noted speech slurring. Also noted to have visual disturbances and photophobia for the past 2 months. Patient noted for possible hx of HTN in the past, but does not see a primary care doctor. On admission patient was found to have + cocaine and THC use, as well as Alcohol abuse (daily) stopped day prior. CT of head showed age indeterminate left MCA territory and right occipital (PCA) infarcts. Also noted left posterior parietal IPH and hemorraghic lesions in the temporal, frontal,occipital and cerebellar regions. Findings were concerning for presence of underlying hemorrhagic mass lesions, possibly metastatic in etiology. Possibility of mycotic aneurysm should also be considered in the differential diagnosis. CT chest showed slightly spiculated left upper lobe lesion, suspicious for primary lung carcinoma. CT angio head/neck showed no significant stenopsis, occlusion, or aneurysm. 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   Etiology: Possible hemorraghic metastatic lesions vs. acute infarct with hemorraghic conversion. Will need to obtain lung bx results for further evaluation and plan of management. Possible hypercoagulable state in setting of malignancy vs. hemorrhagic mets? Cannot rule out cardioembolic source as well.    NEURO:   -Neurologically with improvement as patient is antigravity in right upper and lower extremity with improved strength  - Stroke neuro checks q 4h  -Suggest repeat head CT one week from infarction (8/9/23) to monitor for stability  -  SBP goal: 120-160 given hemorrhagic intracranial lesions. Avoid rapid fluctuations and hypotension  ANTITHROMBOTICS: Due to multiple hemorrhagic lesions of unclear etiology at this time will need to hold ASA 81mg due to risks>benefits. Further antithrombotics pending lung bx results.  - repeat MRI w/ and w/out contrast in 3 weeks to determine etiology of lesions. to determine if these are hemorrhagic mets due to possible lung carcinoma or true infarctions.   - Dysphagia screen: pass  - continue aspiration precautions  - continue Physical therapy/OT/Speech eval/treatment.    - agree with CIWA protocol   - keppra 500mg bid for seizure prophylaxis for 7 days as per neurosx    -CARDIOVASCULAR: TTE and KATHRINE as noted with no significant findings of shunting or thrombus. Cardiac monitoring w/ telemetry for now.  ILR prior to discharge for long term monitoring for atrial fibrillation.                 -HEMATOLOGY: H/H 14.1/42.4. Platelets 292.. Heme/onc consult appreciated. Patient should have all age and risk appropriate malignancy screenings with PCP or sooner if clinically suspected      No chemical DVT ppx- hemorrhagic lesions intracranial, agree with use scds    PULMONARY: protecting airway, saturating well. CT chest showed slightly spiculated left upper lobe lesion, suspicious for   primary lung carcinoma. thoracic surgery and IR consulted who will obtain biopsy for further management.     RENAL: CULLEN resolved. BUN/CR 10.0/0.70.  monitor urine output,maintain adequate hydration       Na Goal:  135-145    ID: afebrile, no leukocytosis continue to monitor for signs/symptoms of infection    OTHER:  Strongly advised to stop drug use and alcohol consumption Risks of drug use and alcohol were discussed with patient. condition and plan of care d/w patient, questions and concerns addressed.     DISPOSITION: Rehab or home depending on PT eval once stable and workup is complete      CORE MEASURES:        Admission NIHSS: 6     Tenecteplase : [] YES [x] NO      LDL/HDL/A1C: 67 mg/dL/   53 mg/dl   /    5.2 %     Depression Screen- if depression hx and/or present      Statin Therapy: continue     Dysphagia Screen: [x] PASS [] FAIL     Smoking [x] YES [] NO      Afib [] YES [x] NO - on cardiac monitor     Stroke Education [x] YES [] NO    Obtain screening lower extremity venous ultrasound in patients who meet 1 or more of the following criteria as patient is high risk for DVT/PE on admission:   [] History of DVT/PE  []Hypercoagulable states (Factor V Leiden, Cancer, OCP, etc. )  []Prolonged immobility (hemiplegia/hemiparesis/post operative or any other extended immobilization)  [] Transferred from outside facility (Rehab or Long term care)  [] Age </= to 50 ASSESSMENT:   Patient is a 63 year old male who presented to [facility] ER on 7/31/23 for right sided hemiparesis for the past 2 days. Pt also noted speech slurring. Also noted to have visual disturbances and photophobia for the past 2 months. Patient noted for possible hx of HTN in the past, but does not see a primary care doctor. On admission patient was found to have + cocaine and THC use, as well as Alcohol abuse (daily) stopped day prior. CT of head showed age indeterminate left MCA territory and right occipital (PCA) infarcts. Also noted left posterior parietal IPH and hemorraghic lesions in the temporal, frontal,occipital and cerebellar regions. Findings were concerning for presence of underlying hemorrhagic mass lesions, possibly metastatic in etiology. Possibility of mycotic aneurysm should also be considered in the differential diagnosis. CT chest showed slightly spiculated left upper lobe lesion, suspicious for primary lung carcinoma. CT angio head/neck showed no significant stenopsis, occlusion, or aneurysm. 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   Etiology: Possible hemorraghic metastatic lesions vs. acute infarct with hemorraghic conversion. Will need to obtain lung bx results and MRI in 3 weeks for further evaluation and plan of management. Possible hypercoagulable state in setting of malignancy vs. hemorrhagic mets? Cannot rule out cardioembolic source as well.    NEURO:   -Neurologically unchanged but with improvement in right upper and lower extremity from day of presentation.   - Stroke neuro checks q 4h  -Suggest repeat head CT one week from infarction (8/9/23) to monitor for stability  -  SBP goal: 120-160 given hemorrhagic intracranial lesions. Avoid rapid fluctuations and hypotension  -Obtain right LE doppler and right ankle X-ray due to complaint of intermittent sharp pain in right ankle and right lower extremity  ANTITHROMBOTICS: Due to multiple hemorrhagic lesions of unclear etiology at this time will need to hold ASA 81mg due to risks>benefits. Further antithrombotics pending lung bx results and follow up MRI in 3 weeks.  - repeat MRI w/ and w/out contrast in 3 weeks to determine etiology of lesions to determine if these are hemorrhagic mets due to possible lung carcinoma or infarctions with hemmorrgaic conversion.   - Dysphagia screen: pass  - continue aspiration precautions  - continue Physical therapy/OT/Speech eval/treatment.    - agree with CIWA protocol   - keppra 500mg bid for seizure prophylaxis for 7 days as per neurosx    -CARDIOVASCULAR: TTE and KATHRINE as noted with no significant findings of shunting or thrombus. Cardiac monitoring w/ telemetry for now. Suggest 30 day Holter Monitor with +/- ILR depending on results for long term monitoring for atrial fibrillation                 -HEMATOLOGY: H/H 14.1/42.4. Platelets 292.. Heme/onc consult appreciated. Patient should have all age and risk appropriate malignancy screenings with PCP or sooner if clinically suspected      No chemical DVT ppx due hemorrhagic lesions intracranial. SCD as DVT ppx for now.    PULMONARY: protecting airway, saturating well. CT chest showed slightly spiculated left upper lobe lesion, suspicious for   primary lung carcinoma. thoracic surgery and IR consulted who will obtain biopsy for further management.     RENAL: CULLEN resolved. BUN/CR 10.0/0.70.  monitor urine output. maintain adequate hydration       Na Goal:  135-145    ID: afebrile, no leukocytosis continue to monitor for signs/symptoms of infection    OTHER:  Strongly advised to stop drug use and alcohol consumption Risks of drug use and alcohol were discussed with patient. condition and plan of care d/w patient, questions and concerns addressed.     DISPOSITION: Rehab or home depending on PT eval once stable and workup is complete      CORE MEASURES:        Admission NIHSS: 6     Tenecteplase : [] YES [x] NO      LDL/HDL/A1C: 67 mg/dL/   53 mg/dl   /    5.2 %     Depression Screen- if depression hx and/or present      Statin Therapy: continue     Dysphagia Screen: [x] PASS [] FAIL     Smoking [x] YES [] NO      Afib [] YES [x] NO - on cardiac monitor     Stroke Education [x] YES [] NO    Obtain screening lower extremity venous ultrasound in patients who meet 1 or more of the following criteria as patient is high risk for DVT/PE on admission:   [] History of DVT/PE  []Hypercoagulable states (Factor V Leiden, Cancer, OCP, etc. )  []Prolonged immobility (hemiplegia/hemiparesis/post operative or any other extended immobilization)  [] Transferred from outside facility (Rehab or Long term care)  [] Age </= to 50 ASSESSMENT:   Patient is a 63 year old male who presented to [facility] ER on 7/31/23 for right sided hemiparesis for the past 2 days. Pt also noted speech slurring. Also noted to have visual disturbances and photophobia for the past 2 months. Patient noted for possible hx of HTN in the past, but does not see a primary care doctor. On admission patient was found to have + cocaine and THC use, as well as Alcohol abuse (daily) stopped day prior. CT of head showed age indeterminate left MCA territory and right occipital (PCA) infarcts. Also noted left posterior parietal IPH and hemorraghic lesions in the temporal, frontal,occipital and cerebellar regions. Findings were concerning for presence of underlying hemorrhagic mass lesions, possibly metastatic in etiology. Possibility of mycotic aneurysm should also be considered in the differential diagnosis. CT chest showed slightly spiculated left upper lobe lesion, suspicious for primary lung carcinoma. CT angio head/neck showed no significant stenopsis, occlusion, or aneurysm. 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   Etiology: Possible hemorraghic metastatic lesions vs. acute infarct with hemorraghic conversion. Will need to obtain lung bx results and MRI in 3 weeks for further evaluation and plan of management. Possible hypercoagulable state in setting of malignancy vs. hemorrhagic mets? Cannot rule out cardioembolic source as well.    NEURO:   -Neurologically unchanged but with improvement in right upper and lower extremity from day of presentation.   - Stroke neuro checks q 4h  -Suggest repeat head CT one week from infarction (8/9/23) to monitor for stability  -  SBP goal: 120-160 given hemorrhagic intracranial lesions. Avoid rapid fluctuations and hypotension  -Obtain right LE doppler and right ankle X-ray due to complaint of intermittent sharp pain in right ankle and right lower extremity  ANTITHROMBOTICS: Due to multiple hemorrhagic lesions of unclear etiology at this time will need to hold ASA 81mg due to risks>benefits. Further antithrombotics pending lung bx results and follow up MRI in 3 weeks.  - repeat MRI w/ and w/out contrast in 3 weeks to determine etiology of lesions to determine if these are hemorrhagic mets due to possible lung carcinoma or infarctions with hemmorrgaic conversion.   - Dysphagia screen: pass  - continue aspiration precautions  - continue Physical therapy/OT/Speech eval/treatment.    - agree with CIWA protocol   - keppra 500mg bid for seizure prophylaxis for 7 days as per neurosx    -CARDIOVASCULAR: TTE and KATHRINE as noted with no significant findings of shunting or thrombus. Cardiac monitoring w/ telemetry for now. Suggest 30 day Holter Monitor with +/- ILR depending on results for long term monitoring for atrial fibrillation                 -HEMATOLOGY: H/H 14.1/42.4. Platelets 292.. Heme/onc consult appreciated. Patient should have all age and risk appropriate malignancy screenings with PCP or sooner if clinically suspected      No chemical DVT ppx due hemorrhagic lesions intracranial. SCD as DVT ppx for now.    PULMONARY: protecting airway, saturating well. CT chest showed slightly spiculated left upper lobe lesion, suspicious for   primary lung carcinoma. thoracic surgery and IR consulted who will obtain biopsy for further management.     RENAL: CULLEN resolved. BUN/CR 10.0/0.70.  monitor urine output. maintain adequate hydration       Na Goal:  135-145    ID: afebrile, no leukocytosis continue to monitor for signs/symptoms of infection    OTHER:  Strongly advised to stop drug use and alcohol consumption Risks of drug use and alcohol were discussed with patient. condition and plan of care d/w patient, questions and concerns addressed.     DISPOSITION: Rehab or home depending on PT eval once stable and workup is complete      CORE MEASURES:        Admission NIHSS: 6     Tenecteplase : [] YES [x] NO      LDL/HDL/A1C: 67 mg/dL/   53 mg/dl   /    5.2 %     Depression Screen- if depression hx and/or present      Statin Therapy: Atorvastatin 80mg if no medical contraindications     Dysphagia Screen: [x] PASS [] FAIL     Smoking [x] YES [] NO      Afib [] YES [x] NO - on cardiac monitor     Stroke Education [x] YES [] NO    Obtain screening lower extremity venous ultrasound in patients who meet 1 or more of the following criteria as patient is high risk for DVT/PE on admission:   [] History of DVT/PE  []Hypercoagulable states (Factor V Leiden, Cancer, OCP, etc. )  []Prolonged immobility (hemiplegia/hemiparesis/post operative or any other extended immobilization)  [] Transferred from outside facility (Rehab or Long term care)  [] Age </= to 50

## 2023-08-08 NOTE — PROGRESS NOTE ADULT - ASSESSMENT
62 y/o male with hx of Cocaine/THC use, now with confusion and CTH showing possible hemorrhagic metastatic lesions vs CVA with hemorrhagic conversion, elevated transaminases    #Mets with hemorrhagic CVA  -CT Chest showing MIGEL spiculated mass - will contact IR next week for biopsy   -Unlikely CVA or primary CNS neoplasm  -MRI brain w/wo contrast reviewed - concern for embolic CVA  -CTA head and neck without occlusion noted  -neuro-checks Q4hr  -s/p KATHRINE without acute findings  -ILR on discharge  -neurology recs appreciated  -Hold aspirin with ICH on CT. repeat CTH 8/9, 1 wk from CVA to eval safety for resume AC  -s/p Keppra x 7 days  -Telemetry   -OOB w/assistance  -Fall/seizure precautions   -PT/OT - home outpatient PT  -VC boots no AC with ICH  -Neurosx signed off, outpt f/u    #Left spiculated lung mass  -CT C/A/P with contrast  shows left lung spiculated mass  -CT surgery consulted - defer to IR  -IR consulted for biopsy - plan for biopsy Wed, NPO mn  -Heme onc recs appreciated    #ETOH abuse/substance abuse  -Utox positive for cocaine/THC  -s/p CIWA  -FA/MVI/Thiamine for possible thiamine deficiency  -Seizure/fall risk protocol   -SW consult for eventual [facility] referral     #Elevated transaminases  -Possibly related to ETOH   -Cocaine induced from vasospasm?  -Viral hepatitis panel  -JOSHUA herrera wnl    DVT prophylaxis -SCD   Dispo - Awaiting IR biopsy and ILR, likely 48 hours    PT - home with outpatient PT

## 2023-08-09 LAB
ANION GAP SERPL CALC-SCNC: 15 MMOL/L — SIGNIFICANT CHANGE UP (ref 5–17)
BUN SERPL-MCNC: 8.1 MG/DL — SIGNIFICANT CHANGE UP (ref 8–20)
CALCIUM SERPL-MCNC: 9 MG/DL — SIGNIFICANT CHANGE UP (ref 8.4–10.5)
CHLORIDE SERPL-SCNC: 101 MMOL/L — SIGNIFICANT CHANGE UP (ref 96–108)
CO2 SERPL-SCNC: 23 MMOL/L — SIGNIFICANT CHANGE UP (ref 22–29)
CREAT SERPL-MCNC: 0.62 MG/DL — SIGNIFICANT CHANGE UP (ref 0.5–1.3)
EGFR: 108 ML/MIN/1.73M2 — SIGNIFICANT CHANGE UP
GLUCOSE SERPL-MCNC: 99 MG/DL — SIGNIFICANT CHANGE UP (ref 70–99)
HCT VFR BLD CALC: 41.1 % — SIGNIFICANT CHANGE UP (ref 39–50)
HGB BLD-MCNC: 14.4 G/DL — SIGNIFICANT CHANGE UP (ref 13–17)
MCHC RBC-ENTMCNC: 29.8 PG — SIGNIFICANT CHANGE UP (ref 27–34)
MCHC RBC-ENTMCNC: 35 GM/DL — SIGNIFICANT CHANGE UP (ref 32–36)
MCV RBC AUTO: 85.1 FL — SIGNIFICANT CHANGE UP (ref 80–100)
PLATELET # BLD AUTO: 291 K/UL — SIGNIFICANT CHANGE UP (ref 150–400)
POTASSIUM SERPL-MCNC: 3.4 MMOL/L — LOW (ref 3.5–5.3)
POTASSIUM SERPL-SCNC: 3.4 MMOL/L — LOW (ref 3.5–5.3)
RBC # BLD: 4.83 M/UL — SIGNIFICANT CHANGE UP (ref 4.2–5.8)
RBC # FLD: 12.9 % — SIGNIFICANT CHANGE UP (ref 10.3–14.5)
SODIUM SERPL-SCNC: 139 MMOL/L — SIGNIFICANT CHANGE UP (ref 135–145)
WBC # BLD: 12.99 K/UL — HIGH (ref 3.8–10.5)
WBC # FLD AUTO: 12.99 K/UL — HIGH (ref 3.8–10.5)

## 2023-08-09 PROCEDURE — 70450 CT HEAD/BRAIN W/O DYE: CPT | Mod: 26

## 2023-08-09 PROCEDURE — 99232 SBSQ HOSP IP/OBS MODERATE 35: CPT

## 2023-08-09 RX ORDER — TRAMADOL HYDROCHLORIDE 50 MG/1
25 TABLET ORAL ONCE
Refills: 0 | Status: DISCONTINUED | OUTPATIENT
Start: 2023-08-09 | End: 2023-08-09

## 2023-08-09 RX ORDER — METHOCARBAMOL 500 MG/1
750 TABLET, FILM COATED ORAL ONCE
Refills: 0 | Status: COMPLETED | OUTPATIENT
Start: 2023-08-09 | End: 2023-08-09

## 2023-08-09 RX ORDER — OXYCODONE HYDROCHLORIDE 5 MG/1
5 TABLET ORAL
Refills: 0 | Status: DISCONTINUED | OUTPATIENT
Start: 2023-08-09 | End: 2023-08-16

## 2023-08-09 RX ORDER — POTASSIUM CHLORIDE 20 MEQ
10 PACKET (EA) ORAL
Refills: 0 | Status: COMPLETED | OUTPATIENT
Start: 2023-08-09 | End: 2023-08-09

## 2023-08-09 RX ORDER — METHOCARBAMOL 500 MG/1
750 TABLET, FILM COATED ORAL THREE TIMES A DAY
Refills: 0 | Status: DISCONTINUED | OUTPATIENT
Start: 2023-08-09 | End: 2023-08-16

## 2023-08-09 RX ADMIN — OXYCODONE HYDROCHLORIDE 5 MILLIGRAM(S): 5 TABLET ORAL at 14:00

## 2023-08-09 RX ADMIN — Medication 100 MILLIEQUIVALENT(S): at 12:00

## 2023-08-09 RX ADMIN — LIDOCAINE 1 PATCH: 4 CREAM TOPICAL at 00:00

## 2023-08-09 RX ADMIN — METHOCARBAMOL 750 MILLIGRAM(S): 500 TABLET, FILM COATED ORAL at 13:58

## 2023-08-09 RX ADMIN — PANTOPRAZOLE SODIUM 40 MILLIGRAM(S): 20 TABLET, DELAYED RELEASE ORAL at 08:06

## 2023-08-09 RX ADMIN — OXYCODONE HYDROCHLORIDE 5 MILLIGRAM(S): 5 TABLET ORAL at 13:10

## 2023-08-09 RX ADMIN — OXYCODONE HYDROCHLORIDE 5 MILLIGRAM(S): 5 TABLET ORAL at 23:33

## 2023-08-09 RX ADMIN — GABAPENTIN 300 MILLIGRAM(S): 400 CAPSULE ORAL at 22:33

## 2023-08-09 RX ADMIN — Medication 100 MILLIEQUIVALENT(S): at 09:55

## 2023-08-09 RX ADMIN — LIDOCAINE 1 PATCH: 4 CREAM TOPICAL at 12:02

## 2023-08-09 RX ADMIN — Medication 100 MILLIEQUIVALENT(S): at 08:44

## 2023-08-09 RX ADMIN — OXYCODONE HYDROCHLORIDE 5 MILLIGRAM(S): 5 TABLET ORAL at 10:00

## 2023-08-09 RX ADMIN — OXYCODONE HYDROCHLORIDE 5 MILLIGRAM(S): 5 TABLET ORAL at 08:52

## 2023-08-09 RX ADMIN — Medication 1 TABLET(S): at 12:01

## 2023-08-09 RX ADMIN — ATORVASTATIN CALCIUM 80 MILLIGRAM(S): 80 TABLET, FILM COATED ORAL at 22:34

## 2023-08-09 RX ADMIN — OXYCODONE HYDROCHLORIDE 5 MILLIGRAM(S): 5 TABLET ORAL at 22:33

## 2023-08-09 RX ADMIN — Medication 1 MILLIGRAM(S): at 12:01

## 2023-08-09 RX ADMIN — Medication 650 MILLIGRAM(S): at 08:06

## 2023-08-09 RX ADMIN — TRAMADOL HYDROCHLORIDE 25 MILLIGRAM(S): 50 TABLET ORAL at 02:24

## 2023-08-09 RX ADMIN — Medication 650 MILLIGRAM(S): at 09:00

## 2023-08-09 RX ADMIN — Medication 3 MILLIGRAM(S): at 22:33

## 2023-08-09 NOTE — PROGRESS NOTE ADULT - ASSESSMENT
62 y/o male with hx of Cocaine/THC use, now with confusion and CTH showing possible hemorrhagic metastatic lesions vs CVA with hemorrhagic conversion, elevated transaminases    #Mets with hemorrhagic CVA  -CT Chest showing MIGEL spiculated mass - will contact IR next week for biopsy   -Unlikely CVA or primary CNS neoplasm  -MRI brain w/wo contrast reviewed - concern for embolic CVA  -CTA head and neck without occlusion noted  -neuro-checks Q4hr  -s/p KATHRINE without acute findings  -ILR on discharge  -neurology recs appreciated  -Hold aspirin with ICH on CT  -F/u repeat CTH 8/9  -s/p Keppra x 7 days  -Telemetry   -OOB w/assistance  -Fall/seizure precautions   -PT/OT - home outpatient PT  -VC boots no AC with ICH  -Neurosx signed off, outpt f/u    #Left spiculated lung mass  -CT C/A/P with contrast  shows left lung spiculated mass  -CT surgery consulted - defer to IR  -IR consulted for biopsy - plan for biopsy 8/10, NPO mn  -Heme onc recs appreciated    #ETOH abuse/substance abuse  -Utox positive for cocaine/THC  -s/p CIWA  -FA/MVI/Thiamine for possible thiamine deficiency  -Seizure/fall risk protocol     #Elevated transaminases  -Possibly related to ETOH   -Cocaine induced from vasospasm?  -Viral hepatitis panel  -JOSHUA herrera wnl    DVT prophylaxis -SCD   Dispo - Awaiting IR biopsy and ILR, likely 48 hours    PT - home with outpatient PT

## 2023-08-10 LAB
ANION GAP SERPL CALC-SCNC: 13 MMOL/L — SIGNIFICANT CHANGE UP (ref 5–17)
BUN SERPL-MCNC: 5.1 MG/DL — LOW (ref 8–20)
CALCIUM SERPL-MCNC: 8.9 MG/DL — SIGNIFICANT CHANGE UP (ref 8.4–10.5)
CHLORIDE SERPL-SCNC: 97 MMOL/L — SIGNIFICANT CHANGE UP (ref 96–108)
CO2 SERPL-SCNC: 24 MMOL/L — SIGNIFICANT CHANGE UP (ref 22–29)
CREAT SERPL-MCNC: 0.55 MG/DL — SIGNIFICANT CHANGE UP (ref 0.5–1.3)
EGFR: 112 ML/MIN/1.73M2 — SIGNIFICANT CHANGE UP
GLUCOSE SERPL-MCNC: 107 MG/DL — HIGH (ref 70–99)
HCT VFR BLD CALC: 41.8 % — SIGNIFICANT CHANGE UP (ref 39–50)
HGB BLD-MCNC: 14 G/DL — SIGNIFICANT CHANGE UP (ref 13–17)
MCHC RBC-ENTMCNC: 29 PG — SIGNIFICANT CHANGE UP (ref 27–34)
MCHC RBC-ENTMCNC: 33.5 GM/DL — SIGNIFICANT CHANGE UP (ref 32–36)
MCV RBC AUTO: 86.7 FL — SIGNIFICANT CHANGE UP (ref 80–100)
PLATELET # BLD AUTO: 324 K/UL — SIGNIFICANT CHANGE UP (ref 150–400)
POTASSIUM SERPL-MCNC: 3.4 MMOL/L — LOW (ref 3.5–5.3)
POTASSIUM SERPL-SCNC: 3.4 MMOL/L — LOW (ref 3.5–5.3)
RBC # BLD: 4.82 M/UL — SIGNIFICANT CHANGE UP (ref 4.2–5.8)
RBC # FLD: 12.6 % — SIGNIFICANT CHANGE UP (ref 10.3–14.5)
SODIUM SERPL-SCNC: 134 MMOL/L — LOW (ref 135–145)
WBC # BLD: 15.09 K/UL — HIGH (ref 3.8–10.5)
WBC # FLD AUTO: 15.09 K/UL — HIGH (ref 3.8–10.5)

## 2023-08-10 PROCEDURE — 99233 SBSQ HOSP IP/OBS HIGH 50: CPT

## 2023-08-10 PROCEDURE — 99232 SBSQ HOSP IP/OBS MODERATE 35: CPT

## 2023-08-10 PROCEDURE — 71250 CT THORAX DX C-: CPT | Mod: 26

## 2023-08-10 RX ORDER — SENNA PLUS 8.6 MG/1
2 TABLET ORAL AT BEDTIME
Refills: 0 | Status: DISCONTINUED | OUTPATIENT
Start: 2023-08-10 | End: 2023-08-16

## 2023-08-10 RX ORDER — POTASSIUM CHLORIDE 20 MEQ
40 PACKET (EA) ORAL ONCE
Refills: 0 | Status: COMPLETED | OUTPATIENT
Start: 2023-08-10 | End: 2023-08-10

## 2023-08-10 RX ADMIN — GABAPENTIN 300 MILLIGRAM(S): 400 CAPSULE ORAL at 22:09

## 2023-08-10 RX ADMIN — SENNA PLUS 2 TABLET(S): 8.6 TABLET ORAL at 22:09

## 2023-08-10 RX ADMIN — Medication 650 MILLIGRAM(S): at 05:42

## 2023-08-10 RX ADMIN — Medication 650 MILLIGRAM(S): at 12:58

## 2023-08-10 RX ADMIN — Medication 650 MILLIGRAM(S): at 06:31

## 2023-08-10 RX ADMIN — Medication 650 MILLIGRAM(S): at 13:55

## 2023-08-10 RX ADMIN — Medication 1 MILLIGRAM(S): at 11:35

## 2023-08-10 RX ADMIN — PANTOPRAZOLE SODIUM 40 MILLIGRAM(S): 20 TABLET, DELAYED RELEASE ORAL at 05:43

## 2023-08-10 RX ADMIN — ATORVASTATIN CALCIUM 80 MILLIGRAM(S): 80 TABLET, FILM COATED ORAL at 22:09

## 2023-08-10 RX ADMIN — Medication 650 MILLIGRAM(S): at 19:02

## 2023-08-10 RX ADMIN — LIDOCAINE 1 PATCH: 4 CREAM TOPICAL at 23:00

## 2023-08-10 RX ADMIN — Medication 3 MILLIGRAM(S): at 22:09

## 2023-08-10 RX ADMIN — Medication 1 TABLET(S): at 11:29

## 2023-08-10 RX ADMIN — Medication 40 MILLIEQUIVALENT(S): at 08:37

## 2023-08-10 RX ADMIN — LIDOCAINE 1 PATCH: 4 CREAM TOPICAL at 11:28

## 2023-08-10 RX ADMIN — LIDOCAINE 1 PATCH: 4 CREAM TOPICAL at 19:00

## 2023-08-10 NOTE — PROGRESS NOTE ADULT - SUBJECTIVE AND OBJECTIVE BOX
Preliminary note, offical recommendations pending attending review/signature   [facility] Stroke Team  Progress Note     HPI:  63 year old  male presents to [facility] ER for weakness of Right arm and leg which began >2 days ago.  Pt also noted speech slurring.  Further questioning of pt. positive for visual disturbances for the past 2 months.  Pt unable to describe visual field defects/loss but noted positive for photophobia.  Pt. also c/o pain, hot in quality" in both right leg/arm, with need for assistance for ambulation by sister.  No h/o falls.  Pt note for h/o ?HTN in past, does not see a PMD on any regular basis.  + cocaine and THC use, as well as Alcohol abuse (daily) stopped yesterday  CT of head done by ER shows age indeterminate L MCA territory, Right occipital PCA territory infarct as well as scattered hemorrhagic lesions with surrounding edema in frontal/posterior/temporal areas.   Concern for underlying hemorrhagic mass lesions probably metastatic in origin cannot be excluded.  Pt admitted for further workup    [facility] utilized for history and physical examination  (31 Jul 2023 23:00)          SUBJECTIVE: IR biopsy of left lung mass unable to be completed. right sided back pain improved but still present.  ROS reported negative unless otherwise noted.    acetaminophen     Tablet .. 650 milliGRAM(s) Oral every 6 hours PRN  aluminum hydroxide/magnesium hydroxide/simethicone Suspension 30 milliLiter(s) Oral every 4 hours PRN  atorvastatin 80 milliGRAM(s) Oral at bedtime  folic acid 1 milliGRAM(s) Oral daily  gabapentin 300 milliGRAM(s) Oral <User Schedule>  hydrALAZINE Injectable 5 milliGRAM(s) IV Push every 4 hours PRN  hydrALAZINE Injectable 10 milliGRAM(s) IV Push every 4 hours PRN  labetalol Injectable 10 milliGRAM(s) IV Push every 10 minutes PRN  lidocaine   4% Patch 1 Patch Transdermal daily  melatonin 3 milliGRAM(s) Oral <User Schedule>  methocarbamol 750 milliGRAM(s) Oral three times a day PRN  multivitamin 1 Tablet(s) Oral daily  ondansetron Injectable 4 milliGRAM(s) IV Push every 8 hours PRN  oxyCODONE    IR 5 milliGRAM(s) Oral four times a day PRN  pantoprazole   Suspension 40 milliGRAM(s) Oral before breakfast  senna 2 Tablet(s) Oral at bedtime      PHYSICAL EXAM:   Vital Signs Last 24 Hrs  T(C): 36.6 (10 Aug 2023 12:31), Max: 37.5 (09 Aug 2023 20:00)  T(F): 97.8 (10 Aug 2023 12:31), Max: 99.5 (09 Aug 2023 20:00)  HR: 80 (10 Aug 2023 12:31) (68 - 86)  BP: 123/76 (10 Aug 2023 12:31) (99/62 - 169/93)  BP(mean): --  RR: 16 (10 Aug 2023 12:31) (16 - 18)  SpO2: 98% (10 Aug 2023 12:31) (93% - 98%)    Parameters below as of 10 Aug 2023 12:31  Patient On (Oxygen Delivery Method): room air      General: No acute distress  Back: no paraspinal tenderness      NEUROLOGICAL EXAM:    Mental status: Awake, alert, oriented to self, month, location, time (aox3) IDs objects, simple commands and  across midline, repetition intact   Cranial Nerves: very subtle right facial, no nystagmus, subtle dysarthria, PERRL b/l, EOMI, VF overall full (some hesitation at times patient notes wears glasses at baseline)  Motor exam: 3/5 RUE slight drift and wrist drop,  4/5- wrist and finger extension trace , 5/5 RLE, 5/5 LUE, 5/5 LLE  Sensation: Intact to light touch , no extinction   Coordination/ Gait: No dysmetria, ambulates unassisted around room        LABS:                        14.0   15.09 )-----------( 324      ( 10 Aug 2023 05:38 )             41.8    08-10    134<L>  |  97  |  5.1<L>  ----------------------------<  107<H>  3.4<L>   |  24.0  |  0.55    Ca    8.9      10 Aug 2023 05:38          IMAGING: Reviewed by me.     CT Head No Cont (08.09.23 @ 11:06)  IMPRESSION: Redemonstrated bilateral supratentorial hemorrhagic and   nonhemorrhagic infarctions. Slightly increased vasogenic edema in the   left parietal with a new 2.2 mm focus of acute hemorrhage. Stable edema   in the posterior right temporal lobe and left occipital lobe. Stable   edema in the high anterior left frontal lobe. Differential diagnosis can   include hemorrhagic metastatic lesions. Continued follow-up is   recommended to exclude underlying metastasis.          MR Brain Stereotactic w/wo IV Cont (08.04.23 @ 08:43  FINDINGS:  No abnormal leptomeningeal or parenchymal enhancement.  There multiple areas of abnormal restricted diffusion in the bilateral   frontal lobes, bilateral occipital lobes, bilateral cerebral hemispheres   compatible with acute embolic infarctions. The left parietal lobe, left   occipital lobe, right temporal lobe lesions demonstrates hemosiderin   deposition. The hemorrhagic lesions demonstrates adjacent edema.    Scattered periventricular and subcortical white matter T2 /FLAIR   hyperintensities are seen without mass effect, nonspecific, likely   representing mild chronic microvascular changes. Normal T2 flow-voids are   seen within  the intracranial vasculature. The lateral ventricles and   cortical sulci are age-appropriate in size and configuration. There is no   mass, mass effect, or extra-axial fluid collection.There is no   susceptibility artifact to suggest hemorrhage. Midline structures are   normal.  The visualized paranasal sinuses, mastoid air cells and orbits   are unremarkable.      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      IMAGING: CT Angio Neck w/ IV Cont (08.01.23 @ 13:12)   IMPRESSION:  1. Bilateral vertebral arteries patent, with left-sided dominance.  2. No evidence of stenosis, occlusion or dissection bilateral   extracranial carotid arteries.  3. No evidence of large vessel occlusion, aneurysm or vascular   malformation intracranial circulation.  4. Additional findings described in detail above.    CT Chest w/ IV Cont (08.01.23 @ 13:12)   IMPRESSION: Slightly spiculated left upper lobe lesion, suspicious for   primary lung carcinoma, including small cell carcinoma. Scattered   semisolid lung lesions, which may represent sites of semiinvasive   adenocarcinoma of the lung.    CT Angio Head w/ IV Cont (08.01.23 @ 13:11)   IMPRESSION:  1. Bilateral vertebral arteries patent, with left-sided dominance.  2. No evidence of stenosis, occlusion or dissection bilateral   extracranial carotid arteries.  3. No evidence of large vessel occlusion, aneurysm or vascular   malformation intracranial circulation.  4. Additional findings described in detail above.      CT Head No Cont (07.31.23 @ 19:13)   IMPRESSION:  1. Findings suggestive of a age indeterminate left MCA territory and   right occipital (PCA territory) infarcts..  2. Left posterior parietal intraparenchymal hemorrhage measuring   approximately 2.1 cm in greatest diameter, with surrounding edema.   Smaller eft inferior frontal hemorrhagic lesion with surrounding edema,   small left posterior temporal hemorrhagic lesion with surrounding edema   and small right temporal hemorrhagic lesion with surrounding edema. Small   foci of abnormal signal are also appreciated bilaterally within the   cerebellar regions. Findings are concerning for presence of underlying   hemorrhagic mass lesions, possibly metastatic in etiology. Possibility of   mycotic aneurysm should also be considered in the differential diagnosis.   Consider follow-up pre and postcontrast MR imaging of the brain, to   include DWI imaging and ADC mapping techniques. MRA of the [place] may also prove useful.  3. Punctate focus of calcification in a left occipital location without   surrounding edema, may represent prior infectious/inflammatory process   versus nonacute hemorrhagic event.    TTE Echo Complete w/ Contrast w/ Doppler (08.01.23 @ 11:32)  Summary:   1. Left ventricular ejection fraction, by visual estimation, is 50 to   55%.   2. Low normal global left ventricular systolic function.   3. Spectral Doppler shows impaired relaxation pattern of left   ventricular myocardial filling (Grade I diastolic dysfunction).   4. No shunt of the IAS seen by color doppler.   5. There is no evidence of pericardial effusion.   6. Mild mitral valve regurgitation.   7. Mild thickening of the anterior and posterior mitral valve leaflets.   8. Mild tricuspid regurgitation.   9. Endocardial visualization was enhanced with intravenous echo contrast.    KATHRINE Echo Doppler (08.07.23 @ 15:37)   Summary:   1. Left ventricular ejection fraction, by visual estimation, is 60 to   65%.   2. Normal global left ventricular systolic function.   3. Normal right ventricular size and function, inadequate estimation of   RVSP.   4. Normal left atrial size.   5. No left atrial appendage thrombus and normal left atrial appendage   velocities.   6. Trace mitral valve regurgitation.   7. Mobile intra-atrial septum.   8. Color flow doppler and intravenous injection of agitated saline   demonstrates the presence of an intact intra atrial septum.   9. Mild simple atheroma at the aortic arch.  10. There is no evidence of pericardial effusion.  11. No prior KATHRINE study available for comparison. No obvious cardiogenic   source of emboli.     [facility] Stroke Team  Progress Note     HPI:  63 year old  male presents to [facility] ER for weakness of Right arm and leg which began >2 days ago.  Pt also noted speech slurring.  Further questioning of pt. positive for visual disturbances for the past 2 months.  Pt unable to describe visual field defects/loss but noted positive for photophobia.  Pt. also c/o pain, hot in quality" in both right leg/arm, with need for assistance for ambulation by sister.  No h/o falls.  Pt note for h/o ?HTN in past, does not see a PMD on any regular basis.  + cocaine and THC use, as well as Alcohol abuse (daily) stopped yesterday  CT of head done by ER shows age indeterminate L MCA territory, Right occipital PCA territory infarct as well as scattered hemorrhagic lesions with surrounding edema in frontal/posterior/temporal areas.   Concern for underlying hemorrhagic mass lesions probably metastatic in origin cannot be excluded.  Pt admitted for further workup    [facility] for history and physical examination  (31 Jul 2023 23:00)          SUBJECTIVE: IR biopsy of left lung mass unable to be completed. right sided back pain improved but still present.  ROS reported negative unless otherwise noted.    acetaminophen     Tablet .. 650 milliGRAM(s) Oral every 6 hours PRN  aluminum hydroxide/magnesium hydroxide/simethicone Suspension 30 milliLiter(s) Oral every 4 hours PRN  atorvastatin 80 milliGRAM(s) Oral at bedtime  folic acid 1 milliGRAM(s) Oral daily  gabapentin 300 milliGRAM(s) Oral <User Schedule>  hydrALAZINE Injectable 5 milliGRAM(s) IV Push every 4 hours PRN  hydrALAZINE Injectable 10 milliGRAM(s) IV Push every 4 hours PRN  labetalol Injectable 10 milliGRAM(s) IV Push every 10 minutes PRN  lidocaine   4% Patch 1 Patch Transdermal daily  melatonin 3 milliGRAM(s) Oral <User Schedule>  methocarbamol 750 milliGRAM(s) Oral three times a day PRN  multivitamin 1 Tablet(s) Oral daily  ondansetron Injectable 4 milliGRAM(s) IV Push every 8 hours PRN  oxyCODONE    IR 5 milliGRAM(s) Oral four times a day PRN  pantoprazole   Suspension 40 milliGRAM(s) Oral before breakfast  senna 2 Tablet(s) Oral at bedtime      PHYSICAL EXAM:   Vital Signs Last 24 Hrs  T(C): 36.6 (10 Aug 2023 12:31), Max: 37.5 (09 Aug 2023 20:00)  T(F): 97.8 (10 Aug 2023 12:31), Max: 99.5 (09 Aug 2023 20:00)  HR: 80 (10 Aug 2023 12:31) (68 - 86)  BP: 123/76 (10 Aug 2023 12:31) (99/62 - 169/93)  BP(mean): --  RR: 16 (10 Aug 2023 12:31) (16 - 18)  SpO2: 98% (10 Aug 2023 12:31) (93% - 98%)    Parameters below as of 10 Aug 2023 12:31  Patient On (Oxygen Delivery Method): room air      General: No acute distress  Back: no paraspinal tenderness      NEUROLOGICAL EXAM:    Mental status: Awake, alert, oriented to self, month, location, time (aox3) IDs objects, simple commands and  across midline, repetition intact   Cranial Nerves: very subtle right facial, no nystagmus, subtle dysarthria, PERRL b/l, EOMI, VF overall full (some hesitation at times patient notes wears glasses at baseline)  Motor exam: 3/5 RUE slight drift and wrist drop,  4/5- wrist and finger extension trace , 5/5 RLE, 5/5 LUE, 5/5 LLE  Sensation: Intact to light touch , no extinction   Coordination/ Gait: No dysmetria, ambulates unassisted around room        LABS:                        14.0   15.09 )-----------( 324      ( 10 Aug 2023 05:38 )             41.8    08-10    134<L>  |  97  |  5.1<L>  ----------------------------<  107<H>  3.4<L>   |  24.0  |  0.55    Ca    8.9      10 Aug 2023 05:38          IMAGING: Reviewed by me.     CT Head No Cont (08.09.23 @ 11:06)  IMPRESSION: Redemonstrated bilateral supratentorial hemorrhagic and   nonhemorrhagic infarctions. Slightly increased vasogenic edema in the   left parietal with a new 2.2 mm focus of acute hemorrhage. Stable edema   in the posterior right temporal lobe and left occipital lobe. Stable   edema in the high anterior left frontal lobe. Differential diagnosis can   include hemorrhagic metastatic lesions. Continued follow-up is   recommended to exclude underlying metastasis.          MR Brain Stereotactic w/wo IV Cont (08.04.23 @ 08:43  FINDINGS:  No abnormal leptomeningeal or parenchymal enhancement.  There multiple areas of abnormal restricted diffusion in the bilateral   frontal lobes, bilateral occipital lobes, bilateral cerebral hemispheres   compatible with acute embolic infarctions. The left parietal lobe, left   occipital lobe, right temporal lobe lesions demonstrates hemosiderin   deposition. The hemorrhagic lesions demonstrates adjacent edema.    Scattered periventricular and subcortical white matter T2 /FLAIR   hyperintensities are seen without mass effect, nonspecific, likely   representing mild chronic microvascular changes. Normal T2 flow-voids are   seen within  the intracranial vasculature. The lateral ventricles and   cortical sulci are age-appropriate in size and configuration. There is no   mass, mass effect, or extra-axial fluid collection.There is no   susceptibility artifact to suggest hemorrhage. Midline structures are   normal.  The visualized paranasal sinuses, mastoid air cells and orbits   are unremarkable.      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      IMAGING: CT Angio Neck w/ IV Cont (08.01.23 @ 13:12)   IMPRESSION:  1. Bilateral vertebral arteries patent, with left-sided dominance.  2. No evidence of stenosis, occlusion or dissection bilateral   extracranial carotid arteries.  3. No evidence of large vessel occlusion, aneurysm or vascular   malformation intracranial circulation.  4. Additional findings described in detail above.    CT Chest w/ IV Cont (08.01.23 @ 13:12)   IMPRESSION: Slightly spiculated left upper lobe lesion, suspicious for   primary lung carcinoma, including small cell carcinoma. Scattered   semisolid lung lesions, which may represent sites of semiinvasive   adenocarcinoma of the lung.    CT Angio Head w/ IV Cont (08.01.23 @ 13:11)   IMPRESSION:  1. Bilateral vertebral arteries patent, with left-sided dominance.  2. No evidence of stenosis, occlusion or dissection bilateral   extracranial carotid arteries.  3. No evidence of large vessel occlusion, aneurysm or vascular   malformation intracranial circulation.  4. Additional findings described in detail above.      CT Head No Cont (07.31.23 @ 19:13)   IMPRESSION:  1. Findings suggestive of a age indeterminate left MCA territory and   right occipital (PCA territory) infarcts..  2. Left posterior parietal intraparenchymal hemorrhage measuring   approximately 2.1 cm in greatest diameter, with surrounding edema.   Smaller eft inferior frontal hemorrhagic lesion with surrounding edema,   small left posterior temporal hemorrhagic lesion with surrounding edema   and small right temporal hemorrhagic lesion with surrounding edema. Small   foci of abnormal signal are also appreciated bilaterally within the   cerebellar regions. Findings are concerning for presence of underlying   hemorrhagic mass lesions, possibly metastatic in etiology. Possibility of   mycotic aneurysm should also be considered in the differential diagnosis.   Consider follow-up pre and postcontrast MR imaging of the brain, to   include DWI imaging and ADC mapping techniques. MRA of the [place] may also prove useful.  3. Punctate focus of calcification in a left occipital location without   surrounding edema, may represent prior infectious/inflammatory process   versus nonacute hemorrhagic event.    TTE Echo Complete w/ Contrast w/ Doppler (08.01.23 @ 11:32)  Summary:   1. Left ventricular ejection fraction, by visual estimation, is 50 to   55%.   2. Low normal global left ventricular systolic function.   3. Spectral Doppler shows impaired relaxation pattern of left   ventricular myocardial filling (Grade I diastolic dysfunction).   4. No shunt of the IAS seen by color doppler.   5. There is no evidence of pericardial effusion.   6. Mild mitral valve regurgitation.   7. Mild thickening of the anterior and posterior mitral valve leaflets.   8. Mild tricuspid regurgitation.   9. Endocardial visualization was enhanced with intravenous echo contrast.    KATHRINE Echo Doppler (08.07.23 @ 15:37)   Summary:   1. Left ventricular ejection fraction, by visual estimation, is 60 to   65%.   2. Normal global left ventricular systolic function.   3. Normal right ventricular size and function, inadequate estimation of   RVSP.   4. Normal left atrial size.   5. No left atrial appendage thrombus and normal left atrial appendage   velocities.   6. Trace mitral valve regurgitation.   7. Mobile intra-atrial septum.   8. Color flow doppler and intravenous injection of agitated saline   demonstrates the presence of an intact intra atrial septum.   9. Mild simple atheroma at the aortic arch.  10. There is no evidence of pericardial effusion.  11. No prior KATHRINE study available for comparison. No obvious cardiogenic   source of emboli.

## 2023-08-10 NOTE — PROGRESS NOTE ADULT - ASSESSMENT
64 y/o male with hx of Cocaine/THC use, now with confusion and CTH showing possible hemorrhagic metastatic lesions vs CVA with hemorrhagic conversion, elevated transaminases    #Mets with hemorrhagic CVA  -CT Chest showing MIGEL spiculated mass - will contact IR next week for biopsy   -Unlikely CVA or primary CNS neoplasm  -MRI brain w/wo contrast reviewed - concern for embolic CVA  -CTA head and neck without occlusion noted  -neuro-checks Q4hr  -s/p KATHRINE without acute findings  -ILR on discharge  -neurology recs appreciated  -Hold aspirin with ICH on CT  -Repeat CTH 8/9 with mildly increased vasogenic edema and a new 2.2 mm focus of acute hemorrhage, neurosx called back to see if steroid indicated   -s/p Keppra x 7 days  -Telemetry   -OOB w/assistance  -Fall/seizure precautions   -PT/OT - home outpatient PT  -VC boots no AC with ICH  -Neurosx signed off - per neurosx, rec rad/onc for whole brain radiation after biopsy establishes the primary     #Left spiculated lung mass  -CT C/A/P with contrast  shows left lung spiculated mass  -s/p attempted IR biopsy of the lung m ass 8/10, failed  -CT surgery consulted to see if they can obtain biopsy, pending recs  -Heme onc recs appreciated    #ETOH abuse/substance abuse  -Utox positive for cocaine/THC  -s/p CIWA  -FA/MVI/Thiamine for possible thiamine deficiency  -Seizure/fall risk protocol     #Elevated transaminases  -Possibly related to ETOH   -Cocaine induced from vasospasm?  -Viral hepatitis panel  -RUQ sono wnl    DVT prophylaxis -SCD   Dispo - Awaiting CT surg eval for lung mass biopsy, neurosx re-eval and eventual ILR    PT - home with outpatient PT 62 y/o male with hx of Cocaine/THC use, now with confusion and CTH showing possible hemorrhagic metastatic lesions vs CVA with hemorrhagic conversion, elevated transaminases    #Mets with hemorrhagic CVA  -CT Chest showing MIGEL spiculated mass - will contact IR next week for biopsy   -Unlikely CVA or primary CNS neoplasm  -MRI brain w/wo contrast reviewed - concern for embolic CVA  -CTA head and neck without occlusion noted  -neuro-checks Q4hr  -s/p KATHRINE without acute findings  -ILR on discharge  -neurology recs appreciated  -Hold aspirin with ICH on CT  -Repeat CTH 8/9 with mildly increased vasogenic edema and a new 2.2 mm focus of acute hemorrhage, neurosx called back to see if steroid indicated   -s/p Keppra x 7 days  -Telemetry   -OOB w/assistance  -Fall/seizure precautions   -PT/OT - home outpatient PT  -VC boots no AC with ICH  -Neurosx signed off - per neurosx, rec rad/onc for whole brain radiation after biopsy establishes the primary     #Left spiculated lung mass  -CT C/A/P with contrast  shows left lung spiculated mass  -s/p attempted IR biopsy of the lung m ass 8/10, failed  -CT surgery consulted to see if they can obtain biopsy, pending recs  -Heme onc recs appreciated    #Leukocytosis  -?etiology, 2/2 recent procedures?  -pt without symptoms  -f/u repeat CBC and monitor fever curves    #ETOH abuse/substance abuse  -Utox positive for cocaine/THC  -s/p CIWA  -FA/MVI/Thiamine for possible thiamine deficiency  -Seizure/fall risk protocol     #Elevated transaminases  -Possibly related to ETOH   -Cocaine induced from vasospasm?  -Viral hepatitis panel  -RUQ sono wnl    DVT prophylaxis -SCD   Dispo - Awaiting CT surg eval for lung mass biopsy, neurosx re-eval and eventual ILR    PT - home with outpatient PT 62 y/o male with hx of Cocaine/THC use, now with confusion and CTH showing possible hemorrhagic metastatic lesions vs CVA with hemorrhagic conversion, elevated transaminases    #Mets with hemorrhagic CVA  -CT Chest showing MIGEL spiculated mass - will contact IR next week for biopsy   -Unlikely CVA or primary CNS neoplasm  -MRI brain w/wo contrast reviewed - concern for embolic CVA  -CTA head and neck without occlusion noted  -neuro-checks Q4hr  -s/p KATHRINE without acute findings  -ILR on discharge  -neurology recs appreciated  -Hold aspirin with ICH on CT  -Repeat CTH 8/9 with mildly increased vasogenic edema and a new 2.2 mm focus of acute hemorrhage, neurosx called back to see if steroid indicated - per discussion, will repeat CTH  -s/p Keppra x 7 days  -Telemetry   -OOB w/assistance  -Fall/seizure precautions   -PT/OT - home outpatient PT  -VC boots no AC with ICH  -Neurosx signed off - per neurosx, rec rad/onc for whole brain radiation after biopsy establishes the primary     #Left spiculated lung mass  -CT C/A/P with contrast  shows left lung spiculated mass  -s/p attempted IR biopsy of the lung m ass 8/10, failed  -CT surgery consulted to see if they can obtain biopsy, pending recs  -Heme onc recs appreciated    #Leukocytosis  -?etiology, 2/2 recent procedures?  -pt without symptoms  -f/u repeat CBC and monitor fever curves    #ETOH abuse/substance abuse  -Utox positive for cocaine/THC  -s/p CIWA  -FA/MVI/Thiamine for possible thiamine deficiency  -Seizure/fall risk protocol     #Elevated transaminases  -Possibly related to ETOH   -Cocaine induced from vasospasm?  -Viral hepatitis panel  -RUQ sono wnl    DVT prophylaxis -SCD   Dispo - Awaiting CT surg eval for lung mass biopsy, neurosx re-eval and eventual ILR    PT - home with outpatient PT

## 2023-08-10 NOTE — PROGRESS NOTE ADULT - ASSESSMENT
63 year old  male presents to [facility] ER for weakness of Right arm and leg which began >2 days ago.  Pt also noted speech slurring.  Further questioning of pt. positive for visual disturbances for the past 2 months.  + cocaine and THC use, as well as Alcohol abuse (daily) stopped yesterday. CT of head done by ER shows age indeterminate L MCA territory, Right occipital PCA territory infarct as well as scattered hemorrhagic lesions with surrounding edema in frontal/posterior/temporal areas.  CT chest demonstrated MIGEL spiculated lung lesion suspicious for primary lung CA. Thoracic surgery consulted for possible lung biopsy.  63 year old  male presents to [facility] ER for weakness of Right arm and leg.  Pt also noted speech slurring.  Further questioning of pt positive for visual disturbances for the past 2 months.  + cocaine and THC use, as well as Alcohol abuse (daily) stopped yesterday. CT of head done by ER shows age indeterminate L MCA territory, Right occipital PCA territory infarct as well as scattered hemorrhagic lesions with surrounding edema in frontal/posterior/temporal areas. CT chest demonstrated MIGEL spiculated lung lesion suspicious for primary lung CA. Thoracic surgery consulted for possible lung biopsy.

## 2023-08-10 NOTE — PROGRESS NOTE ADULT - SUBJECTIVE AND OBJECTIVE BOX
Brief summary:  63 year old  male presents to [facility] ER for weakness of Right arm and leg which began >2 days ago.  Pt also noted speech slurring.  Further questioning of pt. positive for visual disturbances for the past 2 months.  + cocaine and THC use, as well as Alcohol abuse (daily) stopped yesterday. CT of head done by ER shows age indeterminate L MCA territory, Right occipital PCA territory infarct as well as scattered hemorrhagic lesions with surrounding edema in frontal/posterior/temporal areas.   CT chest demonstrated MIGEL spiculated lung lesion suspicious for primary lung CA. Thoracic surgery consulted for possible lung biopsy.        PAST MEDICAL & SURGICAL HISTORY:  Hypertension      No significant past surgical history        Medications:  acetaminophen     Tablet .. 650 milliGRAM(s) Oral every 6 hours PRN  aluminum hydroxide/magnesium hydroxide/simethicone Suspension 30 milliLiter(s) Oral every 4 hours PRN  atorvastatin 80 milliGRAM(s) Oral at bedtime  folic acid 1 milliGRAM(s) Oral daily  gabapentin 300 milliGRAM(s) Oral <User Schedule>  hydrALAZINE Injectable 10 milliGRAM(s) IV Push every 4 hours PRN  hydrALAZINE Injectable 5 milliGRAM(s) IV Push every 4 hours PRN  labetalol Injectable 10 milliGRAM(s) IV Push every 10 minutes PRN  lidocaine   4% Patch 1 Patch Transdermal daily  melatonin 3 milliGRAM(s) Oral <User Schedule>  methocarbamol 750 milliGRAM(s) Oral three times a day PRN  multivitamin 1 Tablet(s) Oral daily  ondansetron Injectable 4 milliGRAM(s) IV Push every 8 hours PRN  oxyCODONE    IR 5 milliGRAM(s) Oral four times a day PRN  pantoprazole   Suspension 40 milliGRAM(s) Oral before breakfast  senna 2 Tablet(s) Oral at bedtime      MEDICATIONS  (PRN):  acetaminophen     Tablet .. 650 milliGRAM(s) Oral every 6 hours PRN Temp greater or equal to 38C (100.4F), Mild Pain (1 - 3)  aluminum hydroxide/magnesium hydroxide/simethicone Suspension 30 milliLiter(s) Oral every 4 hours PRN Dyspepsia  hydrALAZINE Injectable 5 milliGRAM(s) IV Push every 4 hours PRN SBP >140  hydrALAZINE Injectable 10 milliGRAM(s) IV Push every 4 hours PRN SBP >160  labetalol Injectable 10 milliGRAM(s) IV Push every 10 minutes PRN SBP> 160 and DBP> 90  methocarbamol 750 milliGRAM(s) Oral three times a day PRN Muscle Spasm  ondansetron Injectable 4 milliGRAM(s) IV Push every 8 hours PRN Nausea and/or Vomiting  oxyCODONE    IR 5 milliGRAM(s) Oral four times a day PRN Moderate Pain (4 - 6) and Severe Pain (7-10)      Daily Review:                            14.0   15.09 )-----------( 324      ( 10 Aug 2023 05:38 )             41.8   08-10    134<L>  |  97  |  5.1<L>  ----------------------------<  107<H>  3.4<L>   |  24.0  |  0.55    Ca    8.9      10 Aug 2023 05:38            T(C): 36.6 (08-10-23 @ 12:31), Max: 37.5 (08-09-23 @ 20:00)  HR: 80 (08-10-23 @ 12:31) (68 - 86)  BP: 123/76 (08-10-23 @ 12:31) (99/62 - 169/93)  RR: 16 (08-10-23 @ 12:31) (16 - 18)  SpO2: 98% (08-10-23 @ 12:31) (93% - 98%)  Wt(kg): --          Physical Exam  General: NAD, Maori speaking   Neuro: A+O x 3, non-focal, speech clear and intact  Pulm: CTA bilaterally, no accessory muscle use  CV: RRR, +S1S2  Abd: soft, NT, ND  Ext: +DP Pulses b/l, +PT pulses, no edema      < from: CT Chest w/ IV Cont (08.01.23 @ 13:12) >  IMPRESSION: Slightly spiculated left upper lobe lesion, suspicious for   primary lung carcinoma, including small cell carcinoma. Scattered   semisolid lung lesions, which may represent sites of semiinvasive   adenocarcinoma of the lung.    --- End of Report ---    < end of copied text >               Brief summary:  63 year old  male presents to [facility] ER for weakness of Right arm and leg.  Pt also noted speech slurring.  Further questioning of pt positive for visual disturbances for the past 2 months.  + cocaine and THC use, as well as Alcohol abuse (daily) stopped yesterday. CT of head done by ER shows age indeterminate L MCA territory, Right occipital PCA territory infarct as well as scattered hemorrhagic lesions with surrounding edema in frontal/posterior/temporal areas.   CT chest demonstrated MIGEL spiculated lung lesion suspicious for primary lung CA. Thoracic surgery consulted for possible lung biopsy.        PAST MEDICAL & SURGICAL HISTORY:  Hypertension      No significant past surgical history        Medications:  acetaminophen     Tablet .. 650 milliGRAM(s) Oral every 6 hours PRN  aluminum hydroxide/magnesium hydroxide/simethicone Suspension 30 milliLiter(s) Oral every 4 hours PRN  atorvastatin 80 milliGRAM(s) Oral at bedtime  folic acid 1 milliGRAM(s) Oral daily  gabapentin 300 milliGRAM(s) Oral <User Schedule>  hydrALAZINE Injectable 10 milliGRAM(s) IV Push every 4 hours PRN  hydrALAZINE Injectable 5 milliGRAM(s) IV Push every 4 hours PRN  labetalol Injectable 10 milliGRAM(s) IV Push every 10 minutes PRN  lidocaine   4% Patch 1 Patch Transdermal daily  melatonin 3 milliGRAM(s) Oral <User Schedule>  methocarbamol 750 milliGRAM(s) Oral three times a day PRN  multivitamin 1 Tablet(s) Oral daily  ondansetron Injectable 4 milliGRAM(s) IV Push every 8 hours PRN  oxyCODONE    IR 5 milliGRAM(s) Oral four times a day PRN  pantoprazole   Suspension 40 milliGRAM(s) Oral before breakfast  senna 2 Tablet(s) Oral at bedtime      MEDICATIONS  (PRN):  acetaminophen     Tablet .. 650 milliGRAM(s) Oral every 6 hours PRN Temp greater or equal to 38C (100.4F), Mild Pain (1 - 3)  aluminum hydroxide/magnesium hydroxide/simethicone Suspension 30 milliLiter(s) Oral every 4 hours PRN Dyspepsia  hydrALAZINE Injectable 5 milliGRAM(s) IV Push every 4 hours PRN SBP >140  hydrALAZINE Injectable 10 milliGRAM(s) IV Push every 4 hours PRN SBP >160  labetalol Injectable 10 milliGRAM(s) IV Push every 10 minutes PRN SBP> 160 and DBP> 90  methocarbamol 750 milliGRAM(s) Oral three times a day PRN Muscle Spasm  ondansetron Injectable 4 milliGRAM(s) IV Push every 8 hours PRN Nausea and/or Vomiting  oxyCODONE    IR 5 milliGRAM(s) Oral four times a day PRN Moderate Pain (4 - 6) and Severe Pain (7-10)      Daily Review:                            14.0   15.09 )-----------( 324      ( 10 Aug 2023 05:38 )             41.8   08-10    134<L>  |  97  |  5.1<L>  ----------------------------<  107<H>  3.4<L>   |  24.0  |  0.55    Ca    8.9      10 Aug 2023 05:38            T(C): 36.6 (08-10-23 @ 12:31), Max: 37.5 (08-09-23 @ 20:00)  HR: 80 (08-10-23 @ 12:31) (68 - 86)  BP: 123/76 (08-10-23 @ 12:31) (99/62 - 169/93)  RR: 16 (08-10-23 @ 12:31) (16 - 18)  SpO2: 98% (08-10-23 @ 12:31) (93% - 98%)  Wt(kg): --          Physical Exam  General: NAD, Sami speaking   Neuro: A+O x 3, non-focal, speech clear and intact  Pulm: CTA bilaterally, no accessory muscle use  CV: RRR, +S1S2  Abd: soft, NT, ND  Ext: +DP Pulses b/l, +PT pulses, no edema      < from: CT Chest w/ IV Cont (08.01.23 @ 13:12) >  IMPRESSION: Slightly spiculated left upper lobe lesion, suspicious for   primary lung carcinoma, including small cell carcinoma. Scattered   semisolid lung lesions, which may represent sites of semiinvasive   adenocarcinoma of the lung.    --- End of Report ---    < end of copied text >

## 2023-08-10 NOTE — PROGRESS NOTE ADULT - ASSESSMENT
63M presented to [facility] with right hemiparesis and dysarthria, 'visual disturbances' for 2 months, found with multiple areas concerning for infarcts with hemorrhagic conversion versus hemorrhagic lesions.    Recommendations   - q4 neuro checks   - Repeat CT scan today   - If CT scan is stable can restart aspirin / lovenox   - Continue with previous recommendations of MRI +/- 2-3 weeks to eval if underlying lesion   - Continue with lung biopsy and involve rad/onc once path is back

## 2023-08-10 NOTE — PROGRESS NOTE ADULT - SUBJECTIVE AND OBJECTIVE BOX
Destiny Moser M.D.    Patient is a 63y old  Male who presents with a chief complaint of CVA with R sided weakness (10 Aug 2023 11:02)      SUBJECTIVE / OVERNIGHT EVENTS: no event overnight. IR unable to obtain biopsy due to poor window. right sided rib pain better.     Patient denies chest pain, SOB, abd pain, N/V, fever, chills, dysuria or any other complaints. All remainder ROS negative.     MEDICATIONS  (STANDING):  atorvastatin 80 milliGRAM(s) Oral at bedtime  folic acid 1 milliGRAM(s) Oral daily  gabapentin 300 milliGRAM(s) Oral <User Schedule>  lidocaine   4% Patch 1 Patch Transdermal daily  melatonin 3 milliGRAM(s) Oral <User Schedule>  multivitamin 1 Tablet(s) Oral daily  pantoprazole   Suspension 40 milliGRAM(s) Oral before breakfast  senna 2 Tablet(s) Oral at bedtime    MEDICATIONS  (PRN):  acetaminophen     Tablet .. 650 milliGRAM(s) Oral every 6 hours PRN Temp greater or equal to 38C (100.4F), Mild Pain (1 - 3)  aluminum hydroxide/magnesium hydroxide/simethicone Suspension 30 milliLiter(s) Oral every 4 hours PRN Dyspepsia  hydrALAZINE Injectable 5 milliGRAM(s) IV Push every 4 hours PRN SBP >140  hydrALAZINE Injectable 10 milliGRAM(s) IV Push every 4 hours PRN SBP >160  labetalol Injectable 10 milliGRAM(s) IV Push every 10 minutes PRN SBP> 160 and DBP> 90  methocarbamol 750 milliGRAM(s) Oral three times a day PRN Muscle Spasm  ondansetron Injectable 4 milliGRAM(s) IV Push every 8 hours PRN Nausea and/or Vomiting  oxyCODONE    IR 5 milliGRAM(s) Oral four times a day PRN Moderate Pain (4 - 6) and Severe Pain (7-10)      I&O's Summary      PHYSICAL EXAM:  Vital Signs Last 24 Hrs  T(C): 36.6 (10 Aug 2023 12:31), Max: 37.5 (09 Aug 2023 20:00)  T(F): 97.8 (10 Aug 2023 12:31), Max: 99.5 (09 Aug 2023 20:00)  HR: 80 (10 Aug 2023 12:31) (68 - 86)  BP: 123/76 (10 Aug 2023 12:31) (99/62 - 169/93)  BP(mean): --  RR: 16 (10 Aug 2023 12:31) (16 - 18)  SpO2: 98% (10 Aug 2023 12:31) (93% - 98%)    Parameters below as of 10 Aug 2023 12:31  Patient On (Oxygen Delivery Method): room air    CONSTITUTIONAL: NAD, well-groomed  ENMT: Moist oral mucosa, no pharyngeal injection or exudates; normal dentition  RESPIRATORY: Normal respiratory effort; lungs are clear to auscultation bilaterally  CARDIOVASCULAR: Regular rate and rhythm; No lower extremity edema. TTP in the right rib area  ABDOMEN: Nontender to palpation, normoactive bowel sounds  PSYCH: A+O x3; affect appropriate  NEUROLOGY: CN 2-12 are intact and symmetric; no gross sensory deficits;   SKIN: No rashes; no palpable lesions    LABS:                        14.0   15.09 )-----------( 324      ( 10 Aug 2023 05:38 )             41.8     08-10    134<L>  |  97  |  5.1<L>  ----------------------------<  107<H>  3.4<L>   |  24.0  |  0.55    Ca    8.9      10 Aug 2023 05:38        Urinalysis Basic - ( 10 Aug 2023 05:38 )    Color: x / Appearance: x / SG: x / pH: x  Gluc: 107 mg/dL / Ketone: x  / Bili: x / Urobili: x   Blood: x / Protein: x / Nitrite: x   Leuk Esterase: x / RBC: x / WBC x   Sq Epi: x / Non Sq Epi: x / Bacteria: x      CAPILLARY BLOOD GLUCOSE      RADIOLOGY & ADDITIONAL TESTS:  Results Reviewed:   Imaging Personally Reviewed:  Electrocardiogram Personally Reviewed:

## 2023-08-10 NOTE — PROGRESS NOTE ADULT - ATTENDING COMMENTS
Patient seen and examined, discussed patient with Dr. Bueno and agree with recommendations.    Rehab/Impaired mobility and function - Patient continues to require hospitalization for the above diagnoses and ongoing active management of comorbid complications  that are substantially impairing functional ability and impairing quality of life.     Recommend cock-up splint for right UE support.   Recommend Neurontin HS for headaches.     Based on the patient's diagnosis and current functional status recommend DC HOME with OUTPATIENT OT.    Will sign off at this time. Thank you for allowing me to be part of your patient's care. Please reconsult PMR for additional rehab recommendations or dispo needs if functional status changes. Discussed the specific management and recommendations above with rehab clinical care team/rehab liaison.
Patient seen and examined, discussed patient with Dr. Bueno and agree with recommendations.    Rehab/Impaired mobility and function - Patient continues to require hospitalization for the above diagnoses and ongoing active management of comorbid complications (pending workup of brain mass - MRI) that are substantially impairing functional ability and impairing quality of life.     However, patient with complex psychosocial factors and limited resources for rehab. Therefore, CM and therapy services will need to work on support in community for disposition of HOME.     Will continue to follow. Rehab recommendations are dependent on how functional progress changes as well as how patient continues to participate and tolerate therapeutic interventions, which may change. Recommend ongoing mobilization by staff to maintain cardiopulmonary function and prevention of secondary complications related to debility. Discussed the specific management and recommendations above with rehab clinical care team/rehab liaison.
Patient was seen and examined by me. History and exam as documented above by resident/fellow was confirmed by me.  Agree with plan as outlined above.
Seen and examined with resident  Assessment and Plan discussed with Resident and summarized below    MRI brain is more suggestive of stroke than neoplasm/metastatic disease as there is no enhancement of lesions, however would repeat MRI brain with contrast in 2 weeks to reassess if they are evolving as is typically seen in strokes.    Will need cardioembolic work up KATHRINE/ILR.  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
Seen and examined with resident  Assessment and Plan discussed with Resident and summarized below    Await MRI to evaluate for intracranial metastases    will follow with you    Kostas Carrasco MD PhD   [id number]

## 2023-08-10 NOTE — PROGRESS NOTE ADULT - ASSESSMENT
ASSESSMENT:   Patient is a 63 year old male who presented to [facility] ER on 7/31/23 for right sided hemiparesis for the past 2 days. Pt also noted speech slurring. Also noted to have visual disturbances and photophobia for the past 2 months. Patient noted for possible hx of HTN in the past, but does not see a primary care doctor. On admission patient was found to have + cocaine and THC use, as well as Alcohol abuse (daily) stopped day prior. CT of head showed age indeterminate left MCA territory and right occipital (PCA) infarcts. Also noted left posterior parietal IPH and hemorraghic lesions in the temporal, frontal,occipital and cerebellar regions. Findings were concerning for presence of underlying hemorrhagic mass lesions, possibly metastatic in etiology. Possibility of mycotic aneurysm should also be considered in the differential diagnosis. CT chest showed slightly spiculated left upper lobe lesion, suspicious for primary lung carcinoma. CT angio head/neck showed no significant stenopsis, occlusion, or aneurysm. 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     Workup pending biopsy of left lung mass. Unfortunately, IR lung biopsy of mass cannot be completed given proximity to the aorta. The primary team will consult with CT surgery on other possibilities to obtain biopsy. Repeat CT head on 8/9 showed new 2.2 mm focus of acute hemorrhage. The patient's does not have new neuro defecits from admit and his right side strength is stable.     Etiology: Possible hemorraghic metastatic lesions vs. acute infarct with hemorraghic conversion. MRI in 3 weeks for further evaluation and plan of management. Possible hypercoagulable state in setting of malignancy vs. hemorrhagic mets? Cannot rule out cardioembolic source as well.    Patient continues to have back pain on the right side. No cord compression signs. Given high concern of primary lung malignancy, would recommend imaging of the L-spine to rule out a spinal metastasis.    NEURO:   -Neurologically unchanged but with improvement in right upper and lower extremity from day of presentation.   - Stroke neuro checks q 4h  -  SBP goal: 120-160 given hemorrhagic intracranial lesions. Avoid rapid fluctuations and hypotension  ANTITHROMBOTICS: Avoid all antithrombotic agents-intercranial lesion is not stable on repeat CT head   - Neurosurgery recommendations appreciated given change in parietal lesion  - heme/onc recommendations appreciated   - repeat MRI w/ and w/out contrast in 3 weeks to determine etiology of lesions to determine if these are hemorrhagic mets due to possible lung carcinoma or infarctions with hemmorrgaic conversion.   - Dysphagia screen: pass  - continue aspiration precautions  - continue Physical therapy/OT/Speech eval/treatment.    - agree with [facility] protocol   - keppra 500mg bid for seizure prophylaxis for 7 days as per neurosx    -CARDIOVASCULAR: TTE and KATHRINE as noted with no significant findings of shunting or thrombus. Cardiac monitoring w/ telemetry for now. Suggest 30 day Holter Monitor with +/- ILR depending on results for long term monitoring for atrial fibrillation                 -HEMATOLOGY: Hgb 14.1. Platelets 324.. Heme/onc consult and workup as above. Patient should have all age and risk appropriate malignancy screenings with PCP outpatient     chemical DVT ppx contraindicated as hemorrhagic lesions intracranial. SCD as DVT ppx for now.    PULMONARY: protecting airway, saturating well. CT chest showed slightly spiculated left upper lobe lesion, suspicious for   primary lung carcinoma. thoracic surgery and IR consulted and workup as above     RENAL: CULLEN resolved. BUN/CR 10.0/0.70.  monitor urine output. maintain adequate hydration       Na Goal:  135-145    ID: afebrile, no leukocytosis continue to monitor for signs/symptoms of infection    OTHER:  Strongly advised to stop drug use and alcohol consumption Risks of drug use and alcohol were discussed with patient. condition and plan of care d/w patient, questions and concerns addressed.     DISPOSITION: Rehab or home depending on PT eval once stable and workup is complete      CORE MEASURES:        Admission NIHSS: 6     Tenecteplase : [] YES [x] NO      LDL/HDL/A1C: 67 mg/dL/   53 mg/dl   /    5.2 %     Depression Screen- if depression hx and/or present      Statin Therapy: Atorvastatin 80mg if no medical contraindications     Dysphagia Screen: [x] PASS [] FAIL     Smoking [x] YES [] NO      Afib [] YES [x] NO - on cardiac monitor     Stroke Education [x] YES [] NO    Obtain screening lower extremity venous ultrasound in patients who meet 1 or more of the following criteria as patient is high risk for DVT/PE on admission:   [] History of DVT/PE  []Hypercoagulable states (Factor V Leiden, Cancer, OCP, etc. )  []Prolonged immobility (hemiplegia/hemiparesis/post operative or any other extended immobilization)  [] Transferred from outside facility (Rehab or Long term care)  [] Age </= to 50 ASSESSMENT:   Patient is a 63 year old male who presented to [facility] ER on 7/31/23 for right sided hemiparesis for the past 2 days. Pt also noted speech slurring. Also noted to have visual disturbances and photophobia for the past 2 months. Patient noted for possible hx of HTN in the past, but does not see a primary care doctor. On admission patient was found to have + cocaine and THC use, as well as Alcohol abuse (daily) stopped day prior. CT of head showed age indeterminate left MCA territory and right occipital (PCA) infarcts. Also noted left posterior parietal IPH and hemorraghic lesions in the temporal, frontal,occipital and cerebellar regions. Findings were concerning for presence of underlying hemorrhagic mass lesions, possibly metastatic in etiology. Possibility of mycotic aneurysm should also be considered in the differential diagnosis. CT chest showed slightly spiculated left upper lobe lesion, suspicious for primary lung carcinoma. CT angio head/neck showed no significant stenopsis, occlusion, or aneurysm. 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     Workup pending biopsy of left lung mass. Unfortunately, IR lung biopsy of mass cannot be completed given proximity to the aorta. The primary team will consult with CT surgery on other possibilities to obtain biopsy. Repeat CT head on 8/9 showed new 2.2 mm focus of acute hemorrhage. The patient's does not have new neuro defecits from admit and his right side strength is stable.     Etiology of intercranial lesions: Possible hemorraghic metastatic lesions vs. acute infarct with hemorraghic conversion. MRI in 3 weeks for further evaluation and plan of management. Possible hypercoagulable state in setting of malignancy vs. hemorrhagic mets? Cannot rule out cardioembolic source as well.    Patient continues to have back pain on the right side. No cord compression signs. Given high concern of primary lung malignancy, would recommend imaging of the L-spine to rule out a spinal metastasis.    NEURO:   -Neurologically unchanged but with improvement in right upper and lower extremity from day of presentation.   - Stroke neuro checks q 4h  -  SBP goal: 120-160 given hemorrhagic intracranial lesions. Avoid rapid fluctuations and hypotension  ANTITHROMBOTICS: Avoid all antithrombotic agents-intercranial lesion is not stable on repeat CT head   - Neurosurgery recommendations appreciated given change in parietal lesion  - heme/onc recommendations appreciated  - CT noncontrast lumbar spine to eval for mets    - repeat MRI w/ and w/out contrast in 3 weeks to determine etiology of lesions to determine if these are hemorrhagic mets due to possible lung carcinoma or infarctions with hemmorrgaic conversion.   - Dysphagia screen: pass  - continue aspiration precautions  - continue Physical therapy/OT/Speech eval/treatment.    - agree with [facility] protocol   - keppra 500mg bid for seizure prophylaxis for 7 days as per neurosx    -CARDIOVASCULAR: TTE and KATHRINE as noted with no significant findings of shunting or thrombus. Cardiac monitoring w/ telemetry for now. Suggest 30 day Holter Monitor with +/- ILR depending on results for long term monitoring for atrial fibrillation                 -HEMATOLOGY: Hgb 14.1. Platelets 324.. Heme/onc consult and workup as above. Patient should have all age and risk appropriate malignancy screenings with PCP outpatient     chemical DVT ppx contraindicated as hemorrhagic lesions intracranial. SCD as DVT ppx for now.    PULMONARY: protecting airway, saturating well. CT chest showed slightly spiculated left upper lobe lesion, suspicious for   primary lung carcinoma. thoracic surgery and IR consulted  - CT surgery recs regarding biopsy appreciated    RENAL: CULLEN resolved. BUN/CR 10.0/0.70.  monitor urine output. maintain adequate hydration       Na Goal:  135-145    ID: afebrile, leukocytosis could be reactive vs infection continue to monitor for signs/symptoms of infection    OTHER:  Strongly advised to stop drug use and alcohol consumption Risks of drug use and alcohol were discussed with patient. condition and plan of care d/w patient, questions and concerns addressed.     DISPOSITION: Rehab or home depending on PT eval once stable and workup is complete      CORE MEASURES:        Admission NIHSS: 6     Tenecteplase : [] YES [x] NO      LDL/HDL/A1C: 67 mg/dL/   53 mg/dl   /    5.2 %     Depression Screen- if depression hx and/or present      Statin Therapy: Atorvastatin 80mg if no medical contraindications     Dysphagia Screen: [x] PASS [] FAIL     Smoking [x] YES [] NO      Afib [] YES [x] NO - on cardiac monitor     Stroke Education [x] YES [] NO    Obtain screening lower extremity venous ultrasound in patients who meet 1 or more of the following criteria as patient is high risk for DVT/PE on admission:   [] History of DVT/PE  []Hypercoagulable states (Factor V Leiden, Cancer, OCP, etc. )  []Prolonged immobility (hemiplegia/hemiparesis/post operative or any other extended immobilization)  [] Transferred from outside facility (Rehab or Long term care)  [] Age </= to 50

## 2023-08-10 NOTE — PROGRESS NOTE ADULT - SUBJECTIVE AND OBJECTIVE BOX
HPI: 63y Male presented to [facility] with right hemiparesis and dysarthria, 'visual disturbances' for 2 months, found with multiple areas concerning for infarcts with hemorrhagic conversion versus hemorrhagic lesions.     Interval History: patient seen and examined, his exam is improving. He had a repeat CT head 8/9 that shows slight increase in edema and small focus of acute hemorrhage.     Vital Signs Last 24 Hrs  T(C): 36.6 (10 Aug 2023 12:31), Max: 37.5 (09 Aug 2023 20:00)  T(F): 97.8 (10 Aug 2023 12:31), Max: 99.5 (09 Aug 2023 20:00)  HR: 80 (10 Aug 2023 12:31) (68 - 86)  BP: 123/76 (10 Aug 2023 12:31) (99/62 - 169/93)  BP(mean): --  RR: 16 (10 Aug 2023 12:31) (16 - 18)  SpO2: 98% (10 Aug 2023 12:31) (93% - 98%)    Parameters below as of 10 Aug 2023 12:31  Patient On (Oxygen Delivery Method): room air    PHYSICAL EXAM:  GENERAL: NAD  HEAD: Atraumatic  ARIANE COMA SCORE: E- 4 V- 5 M- 6=15  MENTAL STATUS: AAO x3; Appropriately conversant without aphasia; following commands  CRANIAL NERVES: Visual fields grossly intact. PERRL. EOMI without nystagmus. Facial sensation intact V1-3 distribution b/l. Mild flattening of the nasolabial fold on right. Hearing grossly intact. Speech clear  MOTOR: RUE 4+/5. RLE 5/5. LUE/LLE 5/5  SENSATION: grossly intact to light touch all extremities. Extinction intact    < from: CT Head No Cont (08.09.23 @ 11:06) >  IMPRESSION: Redemonstrated bilateral supratentorial hemorrhagic and   nonhemorrhagic infarctions. Slightly increased vasogenic edema in the   left parietal with a new 2.2 mm focus of acute hemorrhage. Stable edema   in the posterior right temporal lobe and left occipital lobe. Stable   edema in the high anterior left frontal lobe. Differential diagnosis can   include hemorrhagic metastatic lesions. Continued follow-up is   recommended to exclude underlying metastasis.    < end of copied text >

## 2023-08-10 NOTE — PROGRESS NOTE ADULT - SUBJECTIVE AND OBJECTIVE BOX
Pt brought to CT suite for bx however unable to perform given unaccessible window into the nodule due to subclavian vessels, fissures, ribs, scapula, shoulder. Small GG nodule in R lung however small and not well defined. Recommend outpatient workup and followup to determine if nodule warrants biopsy or if inflammatory/ infectious.  Pt brought to CT suite for bx however unable to perform given unaccessible window into the nodule due to subclavian vessels, fissures, ribs, scapula, shoulder. Small GG nodule in R lung however small and not well defined, bx at this point would likely result in insufficient sample while incurring risk. Recommend outpatient workup and followup to determine if nodule warrants biopsy or if inflammatory/ infectious.

## 2023-08-11 LAB
ANION GAP SERPL CALC-SCNC: 10 MMOL/L — SIGNIFICANT CHANGE UP (ref 5–17)
BASOPHILS # BLD AUTO: 0.02 K/UL — SIGNIFICANT CHANGE UP (ref 0–0.2)
BASOPHILS NFR BLD AUTO: 0.1 % — SIGNIFICANT CHANGE UP (ref 0–2)
BUN SERPL-MCNC: 8.1 MG/DL — SIGNIFICANT CHANGE UP (ref 8–20)
CALCIUM SERPL-MCNC: 8.9 MG/DL — SIGNIFICANT CHANGE UP (ref 8.4–10.5)
CHLORIDE SERPL-SCNC: 98 MMOL/L — SIGNIFICANT CHANGE UP (ref 96–108)
CO2 SERPL-SCNC: 28 MMOL/L — SIGNIFICANT CHANGE UP (ref 22–29)
CREAT SERPL-MCNC: 0.7 MG/DL — SIGNIFICANT CHANGE UP (ref 0.5–1.3)
EGFR: 104 ML/MIN/1.73M2 — SIGNIFICANT CHANGE UP
EOSINOPHIL # BLD AUTO: 0.06 K/UL — SIGNIFICANT CHANGE UP (ref 0–0.5)
EOSINOPHIL NFR BLD AUTO: 0.4 % — SIGNIFICANT CHANGE UP (ref 0–6)
GLUCOSE SERPL-MCNC: 105 MG/DL — HIGH (ref 70–99)
HCT VFR BLD CALC: 40.4 % — SIGNIFICANT CHANGE UP (ref 39–50)
HGB BLD-MCNC: 13.3 G/DL — SIGNIFICANT CHANGE UP (ref 13–17)
IMM GRANULOCYTES NFR BLD AUTO: 0.5 % — SIGNIFICANT CHANGE UP (ref 0–0.9)
LYMPHOCYTES # BLD AUTO: 1.22 K/UL — SIGNIFICANT CHANGE UP (ref 1–3.3)
LYMPHOCYTES # BLD AUTO: 8.4 % — LOW (ref 13–44)
MCHC RBC-ENTMCNC: 28.9 PG — SIGNIFICANT CHANGE UP (ref 27–34)
MCHC RBC-ENTMCNC: 32.9 GM/DL — SIGNIFICANT CHANGE UP (ref 32–36)
MCV RBC AUTO: 87.6 FL — SIGNIFICANT CHANGE UP (ref 80–100)
MONOCYTES # BLD AUTO: 1.34 K/UL — HIGH (ref 0–0.9)
MONOCYTES NFR BLD AUTO: 9.2 % — SIGNIFICANT CHANGE UP (ref 2–14)
NEUTROPHILS # BLD AUTO: 11.86 K/UL — HIGH (ref 1.8–7.4)
NEUTROPHILS NFR BLD AUTO: 81.4 % — HIGH (ref 43–77)
PLATELET # BLD AUTO: 354 K/UL — SIGNIFICANT CHANGE UP (ref 150–400)
POTASSIUM SERPL-MCNC: 3.6 MMOL/L — SIGNIFICANT CHANGE UP (ref 3.5–5.3)
POTASSIUM SERPL-SCNC: 3.6 MMOL/L — SIGNIFICANT CHANGE UP (ref 3.5–5.3)
PROCALCITONIN SERPL-MCNC: 0.12 NG/ML — HIGH (ref 0.02–0.1)
RBC # BLD: 4.61 M/UL — SIGNIFICANT CHANGE UP (ref 4.2–5.8)
RBC # FLD: 12.7 % — SIGNIFICANT CHANGE UP (ref 10.3–14.5)
SODIUM SERPL-SCNC: 136 MMOL/L — SIGNIFICANT CHANGE UP (ref 135–145)
WBC # BLD: 14.58 K/UL — HIGH (ref 3.8–10.5)
WBC # FLD AUTO: 14.58 K/UL — HIGH (ref 3.8–10.5)

## 2023-08-11 PROCEDURE — 99232 SBSQ HOSP IP/OBS MODERATE 35: CPT

## 2023-08-11 PROCEDURE — 70450 CT HEAD/BRAIN W/O DYE: CPT | Mod: 26

## 2023-08-11 PROCEDURE — 99233 SBSQ HOSP IP/OBS HIGH 50: CPT

## 2023-08-11 RX ORDER — POLYETHYLENE GLYCOL 3350 17 G/17G
17 POWDER, FOR SOLUTION ORAL DAILY
Refills: 0 | Status: COMPLETED | OUTPATIENT
Start: 2023-08-11 | End: 2023-08-15

## 2023-08-11 RX ADMIN — ATORVASTATIN CALCIUM 80 MILLIGRAM(S): 80 TABLET, FILM COATED ORAL at 21:34

## 2023-08-11 RX ADMIN — OXYCODONE HYDROCHLORIDE 5 MILLIGRAM(S): 5 TABLET ORAL at 10:30

## 2023-08-11 RX ADMIN — Medication 1 TABLET(S): at 11:26

## 2023-08-11 RX ADMIN — OXYCODONE HYDROCHLORIDE 5 MILLIGRAM(S): 5 TABLET ORAL at 19:19

## 2023-08-11 RX ADMIN — OXYCODONE HYDROCHLORIDE 5 MILLIGRAM(S): 5 TABLET ORAL at 17:37

## 2023-08-11 RX ADMIN — PANTOPRAZOLE SODIUM 40 MILLIGRAM(S): 20 TABLET, DELAYED RELEASE ORAL at 06:03

## 2023-08-11 RX ADMIN — Medication 1 MILLIGRAM(S): at 11:26

## 2023-08-11 RX ADMIN — GABAPENTIN 300 MILLIGRAM(S): 400 CAPSULE ORAL at 21:34

## 2023-08-11 RX ADMIN — Medication 3 MILLIGRAM(S): at 21:35

## 2023-08-11 RX ADMIN — Medication 650 MILLIGRAM(S): at 12:30

## 2023-08-11 RX ADMIN — Medication 650 MILLIGRAM(S): at 11:27

## 2023-08-11 RX ADMIN — SENNA PLUS 2 TABLET(S): 8.6 TABLET ORAL at 21:34

## 2023-08-11 RX ADMIN — POLYETHYLENE GLYCOL 3350 17 GRAM(S): 17 POWDER, FOR SOLUTION ORAL at 11:27

## 2023-08-11 RX ADMIN — OXYCODONE HYDROCHLORIDE 5 MILLIGRAM(S): 5 TABLET ORAL at 09:33

## 2023-08-11 NOTE — PROGRESS NOTE ADULT - SUBJECTIVE AND OBJECTIVE BOX
Subjective:  "I had a headache this morning."  Patient denies acute pain with radiating or aggravating factors.  He denies chest pain, shortness of breath, palpitations, headache, dizziness, nausea, or vomiting.     Vital Signs:  Vital Signs Last 24 Hrs  T(C): 36.8 (08-11-23 @ 12:36), Max: 37.3 (08-11-23 @ 05:00)  T(F): 98.2 (08-11-23 @ 12:36), Max: 99.1 (08-11-23 @ 05:00)  HR: 65 (08-11-23 @ 12:36) (65 - 73)  BP: 108/60 (08-11-23 @ 12:36) (100/64 - 112/72)  RR: 18 (08-11-23 @ 12:36) (17 - 18)  SpO2: 98% (08-11-23 @ 12:36) (94% - 98%) on (O2)    Relevant labs, radiology and Medications reviewed    Pertinent Physical Exam  General: Well appearing, NAD  Neuro: AxO x3, non-focal, GRIGSBY  Cardiac: S1S2, no murmurs  Pulm: CTA b/l, no wheezing or rales  Abdomen: Soft, NT, ND  Peripheral: +DP pulses b/l, no peripheral edema

## 2023-08-11 NOTE — DIETITIAN INITIAL EVALUATION ADULT - ORAL INTAKE PTA/DIET HISTORY
Interview conducted with assistance from  [place], ID #[id number]. Pt reports having decreased appetite/PO intake x 2 weeks. UBW ~140lbs, current RD bedscale weight consistent with admission weight 122lbs. Pt denies following any dietary restrictions at home, no BM x5 days. Discussed importance of adequate protein-energy, consuming small/frequent meals throughout the day, agreeable to trial Ensure in between meals (prefers strawberry). Encouraged adequate fluid intake.

## 2023-08-11 NOTE — PROGRESS NOTE ADULT - NS ATTEND AMEND GEN_ALL_CORE FT
Agree with above.
see my addendum to initial consult
Patient was seen and examined by me. History and exam as documented above by resident/fellow was confirmed by me.  Agree with plan as outlined above.
NSGY Attg:    see above    patient seen and examined    repeat CT reviewed    agree with exam and plan as above  if patient ultimately noted to have metastatic lesions (in setting of possible primary lung lesion), patient would be at increased risk antiplatelet use
Patient was seen and examined by me. History and exam as documented above by PA/NP was confirmed by me.  Agree with plan as outlined above.
Patient was seen and examined by me. History and exam as documented above by PA/NP was confirmed by me.  Agree with plan as outlined above.

## 2023-08-11 NOTE — PROGRESS NOTE ADULT - SUBJECTIVE AND OBJECTIVE BOX
Preliminary note, offical recommendations pending attending review/signature   [facility] Stroke Team  Progress Note     HPI:  63 year old  male presents to [facility] ER for weakness of Right arm and leg which began >2 days ago.  Pt also noted speech slurring.  Further questioning of pt. positive for visual disturbances for the past 2 months.  Pt unable to describe visual field defects/loss but noted positive for photophobia.  Pt. also c/o pain, hot in quality" in both right leg/arm, with need for assistance for ambulation by sister.  No h/o falls.  Pt note for h/o ?HTN in past, does not see a PMD on any regular basis.  + cocaine and THC use, as well as Alcohol abuse (daily) stopped yesterday  CT of head done by ER shows age indeterminate L MCA territory, Right occipital PCA territory infarct as well as scattered hemorrhagic lesions with surrounding edema in frontal/posterior/temporal areas.   Concern for underlying hemorrhagic mass lesions probably metastatic in origin cannot be excluded.  Pt admitted for further workup    Hospital  [facility] for history and physical examination  (31 Jul 2023 23:00)      Admission NIHSS  Pre-MRS  ICH Score (if applicable)    SUBJECTIVE: No events overnight.  No new neurologic complaints.  ROS reported negative unless otherwise noted.    acetaminophen     Tablet .. 650 milliGRAM(s) Oral every 6 hours PRN  aluminum hydroxide/magnesium hydroxide/simethicone Suspension 30 milliLiter(s) Oral every 4 hours PRN  atorvastatin 80 milliGRAM(s) Oral at bedtime  folic acid 1 milliGRAM(s) Oral daily  gabapentin 300 milliGRAM(s) Oral <User Schedule>  hydrALAZINE Injectable 5 milliGRAM(s) IV Push every 4 hours PRN  hydrALAZINE Injectable 10 milliGRAM(s) IV Push every 4 hours PRN  labetalol Injectable 10 milliGRAM(s) IV Push every 10 minutes PRN  lidocaine   4% Patch 1 Patch Transdermal daily  melatonin 3 milliGRAM(s) Oral <User Schedule>  methocarbamol 750 milliGRAM(s) Oral three times a day PRN  multivitamin 1 Tablet(s) Oral daily  ondansetron Injectable 4 milliGRAM(s) IV Push every 8 hours PRN  oxyCODONE    IR 5 milliGRAM(s) Oral four times a day PRN  pantoprazole   Suspension 40 milliGRAM(s) Oral before breakfast  polyethylene glycol 3350 17 Gram(s) Oral daily  senna 2 Tablet(s) Oral at bedtime      PHYSICAL EXAM:   Vital Signs Last 24 Hrs  T(C): 36.8 (11 Aug 2023 12:36), Max: 37.3 (11 Aug 2023 05:00)  T(F): 98.2 (11 Aug 2023 12:36), Max: 99.1 (11 Aug 2023 05:00)  HR: 65 (11 Aug 2023 12:36) (65 - 86)  BP: 108/60 (11 Aug 2023 12:36) (100/64 - 130/83)  BP(mean): --  RR: 18 (11 Aug 2023 12:36) (17 - 18)  SpO2: 98% (11 Aug 2023 12:36) (94% - 98%)    Parameters below as of 11 Aug 2023 12:36  Patient On (Oxygen Delivery Method): room air        General: No acute distress      NEUROLOGICAL EXAM:    Mental status: Awake, alert, oriented to self, month, location, time (aox3) IDs objects, simple commands and eyes cross midline, repetition intact   Cranial Nerves: very subtle right facial weakness, no nystagmus, dysarthria not present, PERRL b/l, EOMI, VF overall full (some hesitation at times patient notes wears glasses at baseline)  Motor exam: 4/5 RUE, 5/5 RLE, 5/5 LUE, 5/5 LLE  Sensation: Intact to light touch , no extinction   Coordination/ Gait: No dysmetria,     LABS:                        13.3   14.58 )-----------( 354      ( 11 Aug 2023 06:30 )             40.4    08-11    136  |  98  |  8.1  ----------------------------<  105<H>  3.6   |  28.0  |  0.70    Ca    8.9      11 Aug 2023 06:30          IMAGING: < from: CT Head No Cont (08.11.23 @ 09:56) >    IMPRESSION: No change since 8/9/2023. Left parietal parenchymal   hemorrhage with mass effect on the left lateral ventricle.    CT Head No Cont (08.09.23 @ 11:06)  IMPRESSION: Redemonstrated bilateral supratentorial hemorrhagic and   nonhemorrhagic infarctions. Slightly increased vasogenic edema in the   left parietal with a new 2.2 mm focus of acute hemorrhage. Stable edema   in the posterior right temporal lobe and left occipital lobe. Stable   edema in the high anterior left frontal lobe. Differential diagnosis can   include hemorrhagic metastatic lesions. Continued follow-up is   recommended to exclude underlying metastasis.          MR Brain Stereotactic w/wo IV Cont (08.04.23 @ 08:43  FINDINGS:  No abnormal leptomeningeal or parenchymal enhancement.  There multiple areas of abnormal restricted diffusion in the bilateral   frontal lobes, bilateral occipital lobes, bilateral cerebral hemispheres   compatible with acute embolic infarctions. The left parietal lobe, left   occipital lobe, right temporal lobe lesions demonstrates hemosiderin   deposition. The hemorrhagic lesions demonstrates adjacent edema.    Scattered periventricular and subcortical white matter T2 /FLAIR   hyperintensities are seen without mass effect, nonspecific, likely   representing mild chronic microvascular changes. Normal T2 flow-voids are   seen within  the intracranial vasculature. The lateral ventricles and   cortical sulci are age-appropriate in size and configuration. There is no   mass, mass effect, or extra-axial fluid collection.There is no   susceptibility artifact to suggest hemorrhage. Midline structures are   normal.  The visualized paranasal sinuses, mastoid air cells and orbits   are unremarkable.      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      IMAGING: CT Angio Neck w/ IV Cont (08.01.23 @ 13:12)   IMPRESSION:  1. Bilateral vertebral arteries patent, with left-sided dominance.  2. No evidence of stenosis, occlusion or dissection bilateral   extracranial carotid arteries.  3. No evidence of large vessel occlusion, aneurysm or vascular   malformation intracranial circulation.  4. Additional findings described in detail above.    CT Chest w/ IV Cont (08.01.23 @ 13:12)   IMPRESSION: Slightly spiculated left upper lobe lesion, suspicious for   primary lung carcinoma, including small cell carcinoma. Scattered   semisolid lung lesions, which may represent sites of semiinvasive   adenocarcinoma of the lung.    CT Angio Head w/ IV Cont (08.01.23 @ 13:11)   IMPRESSION:  1. Bilateral vertebral arteries patent, with left-sided dominance.  2. No evidence of stenosis, occlusion or dissection bilateral   extracranial carotid arteries.  3. No evidence of large vessel occlusion, aneurysm or vascular   malformation intracranial circulation.  4. Additional findings described in detail above.      CT Head No Cont (07.31.23 @ 19:13)   IMPRESSION:  1. Findings suggestive of a age indeterminate left MCA territory and   right occipital (PCA territory) infarcts..  2. Left posterior parietal intraparenchymal hemorrhage measuring   approximately 2.1 cm in greatest diameter, with surrounding edema.   Smaller eft inferior frontal hemorrhagic lesion with surrounding edema,   small left posterior temporal hemorrhagic lesion with surrounding edema   and small right temporal hemorrhagic lesion with surrounding edema. Small   foci of abnormal signal are also appreciated bilaterally within the   cerebellar regions. Findings are concerning for presence of underlying   hemorrhagic mass lesions, possibly metastatic in etiology. Possibility of   mycotic aneurysm should also be considered in the differential diagnosis.   Consider follow-up pre and postcontrast MR imaging of the brain, to   include DWI imaging and ADC mapping techniques. MRA of the [place] of   [place] may also prove useful.  3. Punctate focus of calcification in a left occipital location without   surrounding edema, may represent prior infectious/inflammatory process   versus nonacute hemorrhagic event.    TTE Echo Complete w/ Contrast w/ Doppler (08.01.23 @ 11:32)  Summary:   1. Left ventricular ejection fraction, by visual estimation, is 50 to   55%.   2. Low normal global left ventricular systolic function.   3. Spectral Doppler shows impaired relaxation pattern of left   ventricular myocardial filling (Grade I diastolic dysfunction).   4. No shunt of the IAS seen by color doppler.   5. There is no evidence of pericardial effusion.   6. Mild mitral valve regurgitation.   7. Mild thickening of the anterior and posterior mitral valve leaflets.   8. Mild tricuspid regurgitation.   9. Endocardial visualization was enhanced with intravenous echo contrast.    KATHRINE Echo Doppler (08.07.23 @ 15:37)   Summary:   1. Left ventricular ejection fraction, by visual estimation, is 60 to   65%.   2. Normal global left ventricular systolic function.   3. Normal right ventricular size and function, inadequate estimation of   RVSP.   4. Normal left atrial size.   5. No left atrial appendage thrombus and normal left atrial appendage   velocities.   6. Trace mitral valve regurgitation.   7. Mobile intra-atrial septum.   8. Color flow doppler and intravenous injection of agitated saline   demonstrates the presence of an intact intra atrial septum.   9. Mild simple atheroma at the aortic arch.  10. There is no evidence of pericardial effusion.  11. No prior KATHRINE study available for comparison. No obvious cardiogenic   source of emboli.     Preliminary note, offical recommendations pending attending review/signature   [facility] Stroke Team  Progress Note     HPI:  63 year old  male presents to [facility] ER for weakness of Right arm and leg which began >2 days ago.  Pt also noted speech slurring.  Further questioning of pt. positive for visual disturbances for the past 2 months.  Pt unable to describe visual field defects/loss but noted positive for photophobia.  Pt. also c/o pain, hot in quality" in both right leg/arm, with need for assistance for ambulation by sister.  No h/o falls.  Pt note for h/o ?HTN in past, does not see a PMD on any regular basis.  + cocaine and THC use, as well as Alcohol abuse (daily) stopped yesterday  CT of head done by ER shows age indeterminate L MCA territory, Right occipital PCA territory infarct as well as scattered hemorrhagic lesions with surrounding edema in frontal/posterior/temporal areas.   Concern for underlying hemorrhagic mass lesions probably metastatic in origin cannot be excluded.  Pt admitted for further workup    [facility]ospital  [facility] for history and physical examination  (31 Jul 2023 23:00)      Admission NIHSS  Pre-MRS  ICH Score (if applicable)    SUBJECTIVE: No events overnight.  No new neurologic complaints, no headache. back pain greatly improved from yesterday.  ROS reported negative unless otherwise noted.    acetaminophen     Tablet .. 650 milliGRAM(s) Oral every 6 hours PRN  aluminum hydroxide/magnesium hydroxide/simethicone Suspension 30 milliLiter(s) Oral every 4 hours PRN  atorvastatin 80 milliGRAM(s) Oral at bedtime  folic acid 1 milliGRAM(s) Oral daily  gabapentin 300 milliGRAM(s) Oral <User Schedule>  hydrALAZINE Injectable 5 milliGRAM(s) IV Push every 4 hours PRN  hydrALAZINE Injectable 10 milliGRAM(s) IV Push every 4 hours PRN  labetalol Injectable 10 milliGRAM(s) IV Push every 10 minutes PRN  lidocaine   4% Patch 1 Patch Transdermal daily  melatonin 3 milliGRAM(s) Oral <User Schedule>  methocarbamol 750 milliGRAM(s) Oral three times a day PRN  multivitamin 1 Tablet(s) Oral daily  ondansetron Injectable 4 milliGRAM(s) IV Push every 8 hours PRN  oxyCODONE    IR 5 milliGRAM(s) Oral four times a day PRN  pantoprazole   Suspension 40 milliGRAM(s) Oral before breakfast  polyethylene glycol 3350 17 Gram(s) Oral daily  senna 2 Tablet(s) Oral at bedtime      PHYSICAL EXAM:   Vital Signs Last 24 Hrs  T(C): 36.8 (11 Aug 2023 12:36), Max: 37.3 (11 Aug 2023 05:00)  T(F): 98.2 (11 Aug 2023 12:36), Max: 99.1 (11 Aug 2023 05:00)  HR: 65 (11 Aug 2023 12:36) (65 - 86)  BP: 108/60 (11 Aug 2023 12:36) (100/64 - 130/83)  BP(mean): --  RR: 18 (11 Aug 2023 12:36) (17 - 18)  SpO2: 98% (11 Aug 2023 12:36) (94% - 98%)    Parameters below as of 11 Aug 2023 12:36  Patient On (Oxygen Delivery Method): room air        General: No acute distress      NEUROLOGICAL EXAM:    Mental status: Awake, alert, oriented to self, month, location, time (aox3) IDs objects, simple commands and eyes cross midline, repetition intact   Cranial Nerves: very subtle right facial weakness, no nystagmus, dysarthria not present, PERRL b/l, EOMI, VF overall full (some hesitation at times patient notes wears glasses at baseline)  Motor exam: 4/5 RUE, 5/5 RLE, 5/5 LUE, 5/5 LLE  Sensation: Intact to light touch , no extinction   Coordination/ Gait: No dysmetria,     LABS:                        13.3   14.58 )-----------( 354      ( 11 Aug 2023 06:30 )             40.4    08-11    136  |  98  |  8.1  ----------------------------<  105<H>  3.6   |  28.0  |  0.70    Ca    8.9      11 Aug 2023 06:30          IMAGING: < from: CT Head No Cont (08.11.23 @ 09:56) >    IMPRESSION: No change since 8/9/2023. Left parietal parenchymal   hemorrhage with mass effect on the left lateral ventricle.    CT Head No Cont (08.09.23 @ 11:06)  IMPRESSION: Redemonstrated bilateral supratentorial hemorrhagic and   nonhemorrhagic infarctions. Slightly increased vasogenic edema in the   left parietal with a new 2.2 mm focus of acute hemorrhage. Stable edema   in the posterior right temporal lobe and left occipital lobe. Stable   edema in the high anterior left frontal lobe. Differential diagnosis can   include hemorrhagic metastatic lesions. Continued follow-up is   recommended to exclude underlying metastasis.          MR Brain Stereotactic w/wo IV Cont (08.04.23 @ 08:43  FINDINGS:  No abnormal leptomeningeal or parenchymal enhancement.  There multiple areas of abnormal restricted diffusion in the bilateral   frontal lobes, bilateral occipital lobes, bilateral cerebral hemispheres   compatible with acute embolic infarctions. The left parietal lobe, left   occipital lobe, right temporal lobe lesions demonstrates hemosiderin   deposition. The hemorrhagic lesions demonstrates adjacent edema.    Scattered periventricular and subcortical white matter T2 /FLAIR   hyperintensities are seen without mass effect, nonspecific, likely   representing mild chronic microvascular changes. Normal T2 flow-voids are   seen within  the intracranial vasculature. The lateral ventricles and   cortical sulci are age-appropriate in size and configuration. There is no   mass, mass effect, or extra-axial fluid collection.There is no   susceptibility artifact to suggest hemorrhage. Midline structures are   normal.  The visualized paranasal sinuses, mastoid air cells and orbits   are unremarkable.      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      IMAGING: CT Angio Neck w/ IV Cont (08.01.23 @ 13:12)   IMPRESSION:  1. Bilateral vertebral arteries patent, with left-sided dominance.  2. No evidence of stenosis, occlusion or dissection bilateral   extracranial carotid arteries.  3. No evidence of large vessel occlusion, aneurysm or vascular   malformation intracranial circulation.  4. Additional findings described in detail above.    CT Chest w/ IV Cont (08.01.23 @ 13:12)   IMPRESSION: Slightly spiculated left upper lobe lesion, suspicious for   primary lung carcinoma, including small cell carcinoma. Scattered   semisolid lung lesions, which may represent sites of semiinvasive   adenocarcinoma of the lung.    CT Angio Head w/ IV Cont (08.01.23 @ 13:11)   IMPRESSION:  1. Bilateral vertebral arteries patent, with left-sided dominance.  2. No evidence of stenosis, occlusion or dissection bilateral   extracranial carotid arteries.  3. No evidence of large vessel occlusion, aneurysm or vascular   malformation intracranial circulation.  4. Additional findings described in detail above.      CT Head No Cont (07.31.23 @ 19:13)   IMPRESSION:  1. Findings suggestive of a age indeterminate left MCA territory and   right occipital (PCA territory) infarcts..  2. Left posterior parietal intraparenchymal hemorrhage measuring   approximately 2.1 cm in greatest diameter, with surrounding edema.   Smaller eft inferior frontal hemorrhagic lesion with surrounding edema,   small left posterior temporal hemorrhagic lesion with surrounding edema   and small right temporal hemorrhagic lesion with surrounding edema. Small   foci of abnormal signal are also appreciated bilaterally within the   cerebellar regions. Findings are concerning for presence of underlying   hemorrhagic mass lesions, possibly metastatic in etiology. Possibility of   mycotic aneurysm should also be considered in the differential diagnosis.   Consider follow-up pre and postcontrast MR imaging of the brain, to   include DWI imaging and ADC mapping techniques. MRA of the [place]   [place] may also prove useful.  3. Punctate focus of calcification in a left occipital location without   surrounding edema, may represent prior infectious/inflammatory process   versus nonacute hemorrhagic event.    TTE Echo Complete w/ Contrast w/ Doppler (08.01.23 @ 11:32)  Summary:   1. Left ventricular ejection fraction, by visual estimation, is 50 to   55%.   2. Low normal global left ventricular systolic function.   3. Spectral Doppler shows impaired relaxation pattern of left   ventricular myocardial filling (Grade I diastolic dysfunction).   4. No shunt of the IAS seen by color doppler.   5. There is no evidence of pericardial effusion.   6. Mild mitral valve regurgitation.   7. Mild thickening of the anterior and posterior mitral valve leaflets.   8. Mild tricuspid regurgitation.   9. Endocardial visualization was enhanced with intravenous echo contrast.    KATHRINE Echo Doppler (08.07.23 @ 15:37)   Summary:   1. Left ventricular ejection fraction, by visual estimation, is 60 to   65%.   2. Normal global left ventricular systolic function.   3. Normal right ventricular size and function, inadequate estimation of   RVSP.   4. Normal left atrial size.   5. No left atrial appendage thrombus and normal left atrial appendage   velocities.   6. Trace mitral valve regurgitation.   7. Mobile intra-atrial septum.   8. Color flow doppler and intravenous injection of agitated saline   demonstrates the presence of an intact intra atrial septum.   9. Mild simple atheroma at the aortic arch.  10. There is no evidence of pericardial effusion.  11. No prior KATHRINE study available for comparison. No obvious cardiogenic   source of emboli.       [facility] Stroke Team  Progress Note     HPI:  63 year old  male presents to [facility] ER for weakness of Right arm and leg which began >2 days ago.  Pt also noted speech slurring.  Further questioning of pt. positive for visual disturbances for the past 2 months.  Pt unable to describe visual field defects/loss but noted positive for photophobia.  Pt. also c/o pain, hot in quality" in both right leg/arm, with need for assistance for ambulation by sister.  No h/o falls.  Pt note for h/o ?HTN in past, does not see a PMD on any regular basis.  + cocaine and THC use, as well as Alcohol abuse (daily) stopped yesterday  CT of head done by ER shows age indeterminate L MCA territory, Right occipital PCA territory infarct as well as scattered hemorrhagic lesions with surrounding edema in frontal/posterior/temporal areas.   Concern for underlying hemorrhagic mass lesions probably metastatic in origin cannot be excluded.  Pt admitted for further workup    Hospital  [facility] utilized for history and physical examination  (31 Jul 2023 23:00)      Admission NIHSS  Pre-MRS  ICH Score (if applicable)    SUBJECTIVE: No events overnight.  No new neurologic complaints, no headache. back pain greatly improved from yesterday.  ROS reported negative unless otherwise noted.    acetaminophen     Tablet .. 650 milliGRAM(s) Oral every 6 hours PRN  aluminum hydroxide/magnesium hydroxide/simethicone Suspension 30 milliLiter(s) Oral every 4 hours PRN  atorvastatin 80 milliGRAM(s) Oral at bedtime  folic acid 1 milliGRAM(s) Oral daily  gabapentin 300 milliGRAM(s) Oral <User Schedule>  hydrALAZINE Injectable 5 milliGRAM(s) IV Push every 4 hours PRN  hydrALAZINE Injectable 10 milliGRAM(s) IV Push every 4 hours PRN  labetalol Injectable 10 milliGRAM(s) IV Push every 10 minutes PRN  lidocaine   4% Patch 1 Patch Transdermal daily  melatonin 3 milliGRAM(s) Oral <User Schedule>  methocarbamol 750 milliGRAM(s) Oral three times a day PRN  multivitamin 1 Tablet(s) Oral daily  ondansetron Injectable 4 milliGRAM(s) IV Push every 8 hours PRN  oxyCODONE    IR 5 milliGRAM(s) Oral four times a day PRN  pantoprazole   Suspension 40 milliGRAM(s) Oral before breakfast  polyethylene glycol 3350 17 Gram(s) Oral daily  senna 2 Tablet(s) Oral at bedtime      PHYSICAL EXAM:   Vital Signs Last 24 Hrs  T(C): 36.8 (11 Aug 2023 12:36), Max: 37.3 (11 Aug 2023 05:00)  T(F): 98.2 (11 Aug 2023 12:36), Max: 99.1 (11 Aug 2023 05:00)  HR: 65 (11 Aug 2023 12:36) (65 - 86)  BP: 108/60 (11 Aug 2023 12:36) (100/64 - 130/83)  BP(mean): --  RR: 18 (11 Aug 2023 12:36) (17 - 18)  SpO2: 98% (11 Aug 2023 12:36) (94% - 98%)    Parameters below as of 11 Aug 2023 12:36  Patient On (Oxygen Delivery Method): room air        General: No acute distress      NEUROLOGICAL EXAM:    Mental status: Awake, alert, oriented to self, month, location, time (aox3) IDs objects, simple commands and eyes cross midline, repetition intact   Cranial Nerves: very subtle right facial weakness, no nystagmus, dysarthria not present, PERRL b/l, EOMI, VF overall full (some hesitation at times patient notes wears glasses at baseline)  Motor exam: 4/5 RUE, 5/5 RLE, 5/5 LUE, 5/5 LLE  Sensation: Intact to light touch , no extinction   Coordination/ Gait: No dysmetria,     LABS:                        13.3   14.58 )-----------( 354      ( 11 Aug 2023 06:30 )             40.4    08-11    136  |  98  |  8.1  ----------------------------<  105<H>  3.6   |  28.0  |  0.70    Ca    8.9      11 Aug 2023 06:30          IMAGING: < from: CT Head No Cont (08.11.23 @ 09:56) >    IMPRESSION: No change since 8/9/2023. Left parietal parenchymal   hemorrhage with mass effect on the left lateral ventricle.    CT Head No Cont (08.09.23 @ 11:06)  IMPRESSION: Redemonstrated bilateral supratentorial hemorrhagic and   nonhemorrhagic infarctions. Slightly increased vasogenic edema in the   left parietal with a new 2.2 mm focus of acute hemorrhage. Stable edema   in the posterior right temporal lobe and left occipital lobe. Stable   edema in the high anterior left frontal lobe. Differential diagnosis can   include hemorrhagic metastatic lesions. Continued follow-up is   recommended to exclude underlying metastasis.          MR Brain Stereotactic w/wo IV Cont (08.04.23 @ 08:43  FINDINGS:  No abnormal leptomeningeal or parenchymal enhancement.  There multiple areas of abnormal restricted diffusion in the bilateral   frontal lobes, bilateral occipital lobes, bilateral cerebral hemispheres   compatible with acute embolic infarctions. The left parietal lobe, left   occipital lobe, right temporal lobe lesions demonstrates hemosiderin   deposition. The hemorrhagic lesions demonstrates adjacent edema.    Scattered periventricular and subcortical white matter T2 /FLAIR   hyperintensities are seen without mass effect, nonspecific, likely   representing mild chronic microvascular changes. Normal T2 flow-voids are   seen within  the intracranial vasculature. The lateral ventricles and   cortical sulci are age-appropriate in size and configuration. There is no   mass, mass effect, or extra-axial fluid collection.There is no   susceptibility artifact to suggest hemorrhage. Midline structures are   normal.  The visualized paranasal sinuses, mastoid air cells and orbits   are unremarkable.      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      IMAGING: CT Angio Neck w/ IV Cont (08.01.23 @ 13:12)   IMPRESSION:  1. Bilateral vertebral arteries patent, with left-sided dominance.  2. No evidence of stenosis, occlusion or dissection bilateral   extracranial carotid arteries.  3. No evidence of large vessel occlusion, aneurysm or vascular   malformation intracranial circulation.  4. Additional findings described in detail above.    CT Chest w/ IV Cont (08.01.23 @ 13:12)   IMPRESSION: Slightly spiculated left upper lobe lesion, suspicious for   primary lung carcinoma, including small cell carcinoma. Scattered   semisolid lung lesions, which may represent sites of semiinvasive   adenocarcinoma of the lung.    CT Angio Head w/ IV Cont (08.01.23 @ 13:11)   IMPRESSION:  1. Bilateral vertebral arteries patent, with left-sided dominance.  2. No evidence of stenosis, occlusion or dissection bilateral   extracranial carotid arteries.  3. No evidence of large vessel occlusion, aneurysm or vascular   malformation intracranial circulation.  4. Additional findings described in detail above.      CT Head No Cont (07.31.23 @ 19:13)   IMPRESSION:  1. Findings suggestive of a age indeterminate left MCA territory and   right occipital (PCA territory) infarcts..  2. Left posterior parietal intraparenchymal hemorrhage measuring   approximately 2.1 cm in greatest diameter, with surrounding edema.   Smaller eft inferior frontal hemorrhagic lesion with surrounding edema,   small left posterior temporal hemorrhagic lesion with surrounding edema   and small right temporal hemorrhagic lesion with surrounding edema. Small   foci of abnormal signal are also appreciated bilaterally within the   cerebellar regions. Findings are concerning for presence of underlying   hemorrhagic mass lesions, possibly metastatic in etiology. Possibility of   mycotic aneurysm should also be considered in the differential diagnosis.   Consider follow-up pre and postcontrast MR imaging of the brain, to   include DWI imaging and ADC mapping techniques. MRA of the [place]   [place] may also prove useful.  3. Punctate focus of calcification in a left occipital location without   surrounding edema, may represent prior infectious/inflammatory process   versus nonacute hemorrhagic event.    TTE Echo Complete w/ Contrast w/ Doppler (08.01.23 @ 11:32)  Summary:   1. Left ventricular ejection fraction, by visual estimation, is 50 to   55%.   2. Low normal global left ventricular systolic function.   3. Spectral Doppler shows impaired relaxation pattern of left   ventricular myocardial filling (Grade I diastolic dysfunction).   4. No shunt of the IAS seen by color doppler.   5. There is no evidence of pericardial effusion.   6. Mild mitral valve regurgitation.   7. Mild thickening of the anterior and posterior mitral valve leaflets.   8. Mild tricuspid regurgitation.   9. Endocardial visualization was enhanced with intravenous echo contrast.    KATHRINE Echo Doppler (08.07.23 @ 15:37)   Summary:   1. Left ventricular ejection fraction, by visual estimation, is 60 to   65%.   2. Normal global left ventricular systolic function.   3. Normal right ventricular size and function, inadequate estimation of   RVSP.   4. Normal left atrial size.   5. No left atrial appendage thrombus and normal left atrial appendage   velocities.   6. Trace mitral valve regurgitation.   7. Mobile intra-atrial septum.   8. Color flow doppler and intravenous injection of agitated saline   demonstrates the presence of an intact intra atrial septum.   9. Mild simple atheroma at the aortic arch.  10. There is no evidence of pericardial effusion.  11. No prior KATHRINE study available for comparison. No obvious cardiogenic   source of emboli.

## 2023-08-11 NOTE — PROGRESS NOTE ADULT - ASSESSMENT
Assessment:   63M presented to [facility] with right hemiparesis and dysarthria, 'visual disturbances' for 2 months, found with multiple areas concerning for infarcts with hemorrhagic conversion versus hemorrhagic lesions.      Plan:   - Discussed with Dr. Valdivia  - [facility] today stable  - Ok for ASA and lovenox  - Repeat MRI with and without contrast in 2-3 weeks to eval for possible underlying lesions  - Q4 hour neuro checks-  - SBP normotensive  - Rad/onc consult   - Further care per primary team  - Please re-call when MRI completed  - If discharged prior to MRI, can f/u with Dr. Valdivia 1-2 weeks after d/c    Assessment:   63M presented to [facility] with right hemiparesis and dysarthria, 'visual disturbances' for 2 months, found with multiple areas concerning for infarcts with hemorrhagic conversion versus hemorrhagic lesions.      Plan:   - Discussed with Dr. Valdivia  - [facility] today stable  - Ok for ASA and lovenox  - If IR lung biopsy shows cancer, high risk of hemorrhagic brain mets, weigh risk vs benefit of aspirin  - Repeat MRI with and without contrast in 2-3 weeks to eval for possible underlying lesions  - Q4 hour neuro checks  - SBP normotensive  - Rad/onc consult   - Further care per primary team  - Please re-call when MRI completed  - If discharged prior to MRI, can f/u with Dr. Valdivia 1-2 weeks after d/c

## 2023-08-11 NOTE — DIETITIAN NUTRITION RISK NOTIFICATION - ADDITIONAL COMMENTS/DIETITIAN RECOMMENDATIONS
1) Liberalize to low Na diet   2) Add Ensure HP/Enlive TID  3) Bowel regimen PRN  4) Continue MVI daily  5) Encourage HBV protein sources  6) Monitor weights daily for trend/accuracy

## 2023-08-11 NOTE — PROGRESS NOTE ADULT - SUBJECTIVE AND OBJECTIVE BOX
Destiny Moser M.D.    Patient is a 63y old  Male who presents with a chief complaint of CVA with R sided weakness (10 Aug 2023 15:31)      SUBJECTIVE / OVERNIGHT EVENTS: Pt reports worst headache this morning.     Patient denies chest pain, SOB, abd pain, N/V, fever, chills, dysuria or any other complaints. All remainder ROS negative.     MEDICATIONS  (STANDING):  atorvastatin 80 milliGRAM(s) Oral at bedtime  folic acid 1 milliGRAM(s) Oral daily  gabapentin 300 milliGRAM(s) Oral <User Schedule>  lidocaine   4% Patch 1 Patch Transdermal daily  melatonin 3 milliGRAM(s) Oral <User Schedule>  multivitamin 1 Tablet(s) Oral daily  pantoprazole   Suspension 40 milliGRAM(s) Oral before breakfast  polyethylene glycol 3350 17 Gram(s) Oral daily  senna 2 Tablet(s) Oral at bedtime    MEDICATIONS  (PRN):  acetaminophen     Tablet .. 650 milliGRAM(s) Oral every 6 hours PRN Temp greater or equal to 38C (100.4F), Mild Pain (1 - 3)  aluminum hydroxide/magnesium hydroxide/simethicone Suspension 30 milliLiter(s) Oral every 4 hours PRN Dyspepsia  hydrALAZINE Injectable 5 milliGRAM(s) IV Push every 4 hours PRN SBP >140  hydrALAZINE Injectable 10 milliGRAM(s) IV Push every 4 hours PRN SBP >160  labetalol Injectable 10 milliGRAM(s) IV Push every 10 minutes PRN SBP> 160 and DBP> 90  methocarbamol 750 milliGRAM(s) Oral three times a day PRN Muscle Spasm  ondansetron Injectable 4 milliGRAM(s) IV Push every 8 hours PRN Nausea and/or Vomiting  oxyCODONE    IR 5 milliGRAM(s) Oral four times a day PRN Moderate Pain (4 - 6) and Severe Pain (7-10)      I&O's Summary      PHYSICAL EXAM:  Vital Signs Last 24 Hrs  T(C): 37 (11 Aug 2023 08:10), Max: 37.3 (11 Aug 2023 05:00)  T(F): 98.6 (11 Aug 2023 08:10), Max: 99.1 (11 Aug 2023 05:00)  HR: 73 (11 Aug 2023 08:10) (70 - 86)  BP: 100/64 (11 Aug 2023 08:10) (100/64 - 130/83)  BP(mean): --  RR: 17 (11 Aug 2023 08:10) (16 - 17)  SpO2: 95% (11 Aug 2023 08:10) (94% - 98%)    Parameters below as of 11 Aug 2023 08:10  Patient On (Oxygen Delivery Method): room air      CONSTITUTIONAL: NAD, well-groomed  ENMT: Moist oral mucosa, no pharyngeal injection or exudates; normal dentition  RESPIRATORY: Normal respiratory effort; lungs are clear to auscultation bilaterally  CARDIOVASCULAR: Regular rate and rhythm, normal S1 and S2, no murmur/rub/gallop; No lower extremity edema; Peripheral pulses are 2+ bilaterally  ABDOMEN: Nontender to palpation, normoactive bowel sounds, no rebound/guarding; No hepatosplenomegaly  MUSCLOSKELETAL:  Normal gait; no clubbing or cyanosis of digits; no joint swelling or tenderness to palpation  PSYCH: A+O x3; affect appropriate  NEUROLOGY: CN 2-12 are intact and symmetric; no gross sensory deficits;   SKIN: No rashes; no palpable lesions    LABS:                        13.3   14.58 )-----------( 354      ( 11 Aug 2023 06:30 )             40.4     08-11    136  |  98  |  8.1  ----------------------------<  105<H>  3.6   |  28.0  |  0.70    Ca    8.9      11 Aug 2023 06:30            Urinalysis Basic - ( 11 Aug 2023 06:30 )    Color: x / Appearance: x / SG: x / pH: x  Gluc: 105 mg/dL / Ketone: x  / Bili: x / Urobili: x   Blood: x / Protein: x / Nitrite: x   Leuk Esterase: x / RBC: x / WBC x   Sq Epi: x / Non Sq Epi: x / Bacteria: x        CAPILLARY BLOOD GLUCOSE          RADIOLOGY & ADDITIONAL TESTS:  Results Reviewed:   Imaging Personally Reviewed:  Electrocardiogram Personally Reviewed: Destiny Moser M.D.    Patient is a 63y old  Male who presents with a chief complaint of CVA with R sided weakness (10 Aug 2023 15:31)      SUBJECTIVE / OVERNIGHT EVENTS: Pt reports very bad headache this morning.     Patient denies chest pain, SOB, abd pain, N/V, fever, chills, dysuria or any other complaints. All remainder ROS negative.     MEDICATIONS  (STANDING):  atorvastatin 80 milliGRAM(s) Oral at bedtime  folic acid 1 milliGRAM(s) Oral daily  gabapentin 300 milliGRAM(s) Oral <User Schedule>  lidocaine   4% Patch 1 Patch Transdermal daily  melatonin 3 milliGRAM(s) Oral <User Schedule>  multivitamin 1 Tablet(s) Oral daily  pantoprazole   Suspension 40 milliGRAM(s) Oral before breakfast  polyethylene glycol 3350 17 Gram(s) Oral daily  senna 2 Tablet(s) Oral at bedtime    MEDICATIONS  (PRN):  acetaminophen     Tablet .. 650 milliGRAM(s) Oral every 6 hours PRN Temp greater or equal to 38C (100.4F), Mild Pain (1 - 3)  aluminum hydroxide/magnesium hydroxide/simethicone Suspension 30 milliLiter(s) Oral every 4 hours PRN Dyspepsia  hydrALAZINE Injectable 5 milliGRAM(s) IV Push every 4 hours PRN SBP >140  hydrALAZINE Injectable 10 milliGRAM(s) IV Push every 4 hours PRN SBP >160  labetalol Injectable 10 milliGRAM(s) IV Push every 10 minutes PRN SBP> 160 and DBP> 90  methocarbamol 750 milliGRAM(s) Oral three times a day PRN Muscle Spasm  ondansetron Injectable 4 milliGRAM(s) IV Push every 8 hours PRN Nausea and/or Vomiting  oxyCODONE    IR 5 milliGRAM(s) Oral four times a day PRN Moderate Pain (4 - 6) and Severe Pain (7-10)      I&O's Summary      PHYSICAL EXAM:  Vital Signs Last 24 Hrs  T(C): 37 (11 Aug 2023 08:10), Max: 37.3 (11 Aug 2023 05:00)  T(F): 98.6 (11 Aug 2023 08:10), Max: 99.1 (11 Aug 2023 05:00)  HR: 73 (11 Aug 2023 08:10) (70 - 86)  BP: 100/64 (11 Aug 2023 08:10) (100/64 - 130/83)  BP(mean): --  RR: 17 (11 Aug 2023 08:10) (16 - 17)  SpO2: 95% (11 Aug 2023 08:10) (94% - 98%)    Parameters below as of 11 Aug 2023 08:10  Patient On (Oxygen Delivery Method): room air      CONSTITUTIONAL: NAD, well-groomed  ENMT: Moist oral mucosa, no pharyngeal injection or exudates; normal dentition  RESPIRATORY: Normal respiratory effort; lungs are clear to auscultation bilaterally  CARDIOVASCULAR: Regular rate and rhythm, normal S1 and S2, no murmur/rub/gallop; No lower extremity edema; Peripheral pulses are 2+ bilaterally  ABDOMEN: Nontender to palpation, normoactive bowel sounds, no rebound/guarding; No hepatosplenomegaly  MUSCLOSKELETAL:  Normal gait; no clubbing or cyanosis of digits; no joint swelling or tenderness to palpation  PSYCH: A+O x3; affect appropriate  NEUROLOGY: CN 2-12 are intact and symmetric; no gross sensory deficits;   SKIN: No rashes; no palpable lesions    LABS:                        13.3   14.58 )-----------( 354      ( 11 Aug 2023 06:30 )             40.4     08-11    136  |  98  |  8.1  ----------------------------<  105<H>  3.6   |  28.0  |  0.70    Ca    8.9      11 Aug 2023 06:30            Urinalysis Basic - ( 11 Aug 2023 06:30 )    Color: x / Appearance: x / SG: x / pH: x  Gluc: 105 mg/dL / Ketone: x  / Bili: x / Urobili: x   Blood: x / Protein: x / Nitrite: x   Leuk Esterase: x / RBC: x / WBC x   Sq Epi: x / Non Sq Epi: x / Bacteria: x        CAPILLARY BLOOD GLUCOSE          RADIOLOGY & ADDITIONAL TESTS:  Results Reviewed:   Imaging Personally Reviewed:  Electrocardiogram Personally Reviewed:

## 2023-08-11 NOTE — DIETITIAN INITIAL EVALUATION ADULT - PERTINENT LABORATORY DATA
08-11    136  |  98  |  8.1  ----------------------------<  105<H>  3.6   |  28.0  |  0.70    Ca    8.9      11 Aug 2023 06:30    A1C with Estimated Average Glucose Result: 5.2 % (08-01-23 @ 03:20)

## 2023-08-11 NOTE — DIETITIAN INITIAL EVALUATION ADULT - OTHER INFO
64 y/o male with hx of Cocaine/THC use, now with confusion and CTH showing possible hemorrhagic metastatic lesions vs CVA with hemorrhagic conversion, elevated transaminases.

## 2023-08-11 NOTE — PROGRESS NOTE ADULT - ASSESSMENT
ASSESSMENT:   Patient is a 63 year old male who presented to [facility] ER on 7/31/23 for right sided hemiparesis for the past 2 days. Pt also noted speech slurring. Also noted to have visual disturbances and photophobia for the past 2 months. Patient noted for possible hx of HTN in the past, but does not see a primary care doctor. On admission patient was found to have + cocaine and THC use, as well as Alcohol abuse (daily) stopped day prior. CT of head showed age indeterminate left MCA territory and right occipital (PCA) infarcts. Also noted left posterior parietal IPH and hemorraghic lesions in the temporal, frontal,occipital and cerebellar regions. Findings were concerning for presence of underlying hemorrhagic mass lesions, possibly metastatic in etiology. Possibility of mycotic aneurysm should also be considered in the differential diagnosis. CT chest showed slightly spiculated left upper lobe lesion, suspicious for primary lung carcinoma. CT angio head/neck showed no significant stenopsis, occlusion, or aneurysm. 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     Workup pending biopsy of left lung mass. Unfortunately, IR lung biopsy of mass cannot be completed given proximity to the aorta. The primary team will consult with CT surgery on other possibilities to obtain biopsy. Repeat CT head on 8/9 showed new 2.2 mm focus of acute hemorrhage. The patient's does not have new neuro defecits from admit and his right side strength is stable. CT head 8/11 showing lesion is stable.      Etiology of intercranial lesions: Possible hemorraghic metastatic lesions vs. acute infarct with hemorraghic conversion. MRI in 3 weeks for further evaluation and plan of management. Possible hypercoagulable state in setting of malignancy vs. hemorrhagic mets? Cannot rule out cardioembolic source as well.      NEURO:   -Neurologically unchanged but with improvement in right upper and lower extremity from day of presentation.   - Stroke neuro checks q 4h  -  SBP goal: 120-160 given hemorrhagic intracranial lesions. Avoid rapid fluctuations and hypotension  ANTITHROMBOTICS: per neurosurgery, patient is ok for aspirin and lovenox given stability of the intercranial bleed.   - From the stroke neuro perspective, would defer starting aspirin and other antithrombotics for possible ischemic stroke. Although we cannot rule out ischemic cva, aspirin could re-provoke ich if the lesions are metastasis. Please re-evaluate the need for anti-thrombotic therapy after the patient gets a repeat MRI and the etiology of lesions is determined.  - Neurosurgery recommendations appreciated  - heme/onc recommendations appreciated  - could consider CT noncontrast lumbar spine to eval for mets if back pain persists   - repeat MRI w/ and w/out contrast in 3 weeks to determine etiology of lesions to determine if these are hemorrhagic mets due to possible lung carcinoma or infarctions with hemmorrhgaic conversion.   - Dysphagia screen: pass  - continue aspiration precautions  - continue Physical therapy/OT/Speech eval/treatment.    - agree with [facility] protocol   - keppra 500mg bid for seizure prophylaxis for 7 days as per neurosx    -CARDIOVASCULAR: TTE and KATHRINE as noted with no significant findings of shunting or thrombus. Cardiac monitoring w/ telemetry for now. Suggest 30 day Holter Monitor with +/- ILR depending on results for long term monitoring for atrial fibrillation                 -HEMATOLOGY: Hgb 14.1. Platelets 324.. Heme/onc consult and workup as above. Patient should have all age and risk appropriate malignancy screenings with PCP outpatient     - ok for lovenox for dvt prophylaxis per neurosurgery, chemical vs scd dvt prophylaxis decision per primary team    PULMONARY: protecting airway, saturating well. CT chest showed slightly spiculated left upper lobe lesion, suspicious for   primary lung carcinoma. thoracic surgery and IR consulted  - CT surgery recs regarding biopsy appreciated    RENAL: CULLEN resolved. BUN/CR 10.0/0.70.  monitor urine output. maintain adequate hydration       Na Goal:  135-145    ID: afebrile, leukocytosis could be reactive vs infection, continue to monitor for signs/symptoms of infection    OTHER:  Strongly advised to stop drug use and alcohol consumption Risks of drug use and alcohol were discussed with patient. condition and plan of care d/w patient, questions and concerns addressed.     DISPOSITION: Rehab or home depending on PT eval once stable and workup is complete      CORE MEASURES:        Admission NIHSS: 6     Tenecteplase : [] YES [x] NO      LDL/HDL/A1C: 67 mg/dL/   53 mg/dl   /    5.2 %     Depression Screen- if depression hx and/or present      Statin Therapy: Atorvastatin 80mg if no medical contraindications     Dysphagia Screen: [x] PASS [] FAIL     Smoking [x] YES [] NO      Afib [] YES [x] NO - on cardiac monitor     Stroke Education [x] YES [] NO    Obtain screening lower extremity venous ultrasound in patients who meet 1 or more of the following criteria as patient is high risk for DVT/PE on admission:   [] History of DVT/PE  []Hypercoagulable states (Factor V Leiden, Cancer, OCP, etc. )  []Prolonged immobility (hemiplegia/hemiparesis/post operative or any other extended immobilization)  [] Transferred from outside facility (Rehab or Long term care)  [] Age </= to 50

## 2023-08-11 NOTE — DIETITIAN INITIAL EVALUATION ADULT - ETIOLOGY
related to inability to meet sufficient protein-energy needs in setting of possible hemorrhagic metastatic lesions vs CVA

## 2023-08-11 NOTE — PROGRESS NOTE ADULT - SUBJECTIVE AND OBJECTIVE BOX
Patient is a 63y old  Male who presents with a chief complaint of Cerebral infarction     (11 Aug 2023 12:25)    HPI:  63 year old  male presents to [facility] ER for weakness of Right arm and leg which began >2 days ago.  Pt also noted speech slurring.  Further questioning of pt. positive for visual disturbances for the past 2 months.  Pt unable to describe visual field defects/loss but noted positive for photophobia.  Pt. also c/o pain, hot in quality" in both right leg/arm, with need for assistance for ambulation by sister.  No h/o falls.  Pt note for h/o ?HTN in past, does not see a PMD on any regular basis.  + cocaine and THC use, as well as Alcohol abuse (daily) stopped yesterday  CT of head done by ER shows age indeterminate L MCA territory, Right occipital PCA territory infarct as well as scattered hemorrhagic lesions with surrounding edema in frontal/posterior/temporal areas.   Concern for underlying hemorrhagic mass lesions probably metastatic in origin cannot be excluded.  Pt admitted for further workup    [facility] utilized for history and physical examination  (31 Jul 2023 23:00)      Interval history:  Patient seen and examined by neurosurgery team. Patient had severe headache this am, stat CTH obtained, stable. HA much improved with oxycodone per patient. No other acute events reported.       Vital Signs Last 24 Hrs  T(C): 36.8 (11 Aug 2023 12:36), Max: 37.3 (11 Aug 2023 05:00)  T(F): 98.2 (11 Aug 2023 12:36), Max: 99.1 (11 Aug 2023 05:00)  HR: 65 (11 Aug 2023 12:36) (65 - 86)  BP: 108/60 (11 Aug 2023 12:36) (100/64 - 130/83)  RR: 18 (11 Aug 2023 12:36) (17 - 18)  SpO2: 98% (11 Aug 2023 12:36) (94% - 98%)    Parameters below as of 11 Aug 2023 12:36  Patient On (Oxygen Delivery Method): room air      Physical Exam:  Constitutional: NAD, lying in bed  Neuro  * Mental Status:  GCS 15: Awake, alert, oriented to conversation. No aphasia or difficulty speaking. No dysarthria.   * Cranial Nerves: Cnii-Cnxii grossly intact. PERRL, EOMI, tongue midline, no gaze deviation  * Motor: RUE 4+/5, LUE 5/5, RLE 5/5, LLE 5/5, no drift  * Sensory: Sensation intact to light touch  * Reflexes: not assessed       LABS:                        13.3   14.58 )-----------( 354      ( 11 Aug 2023 06:30 )             40.4     08-11    136  |  98  |  8.1  ----------------------------<  105<H>  3.6   |  28.0  |  0.70    Ca    8.9      11 Aug 2023 06:30    Urinalysis Basic - ( 11 Aug 2023 06:30 )  Color: x / Appearance: x / SG: x / pH: x  Gluc: 105 mg/dL / Ketone: x  / Bili: x / Urobili: x   Blood: x / Protein: x / Nitrite: x   Leuk Esterase: x / RBC: x / WBC x   Sq Epi: x / Non Sq Epi: x / Bacteria: x      Medications:  MEDICATIONS  (STANDING):  atorvastatin 80 milliGRAM(s) Oral at bedtime  folic acid 1 milliGRAM(s) Oral daily  gabapentin 300 milliGRAM(s) Oral <User Schedule>  lidocaine   4% Patch 1 Patch Transdermal daily  melatonin 3 milliGRAM(s) Oral <User Schedule>  multivitamin 1 Tablet(s) Oral daily  pantoprazole   Suspension 40 milliGRAM(s) Oral before breakfast  polyethylene glycol 3350 17 Gram(s) Oral daily  senna 2 Tablet(s) Oral at bedtime    MEDICATIONS  (PRN):  acetaminophen     Tablet .. 650 milliGRAM(s) Oral every 6 hours PRN Temp greater or equal to 38C (100.4F), Mild Pain (1 - 3)  aluminum hydroxide/magnesium hydroxide/simethicone Suspension 30 milliLiter(s) Oral every 4 hours PRN Dyspepsia  hydrALAZINE Injectable 5 milliGRAM(s) IV Push every 4 hours PRN SBP >140  hydrALAZINE Injectable 10 milliGRAM(s) IV Push every 4 hours PRN SBP >160  labetalol Injectable 10 milliGRAM(s) IV Push every 10 minutes PRN SBP> 160 and DBP> 90  methocarbamol 750 milliGRAM(s) Oral three times a day PRN Muscle Spasm  ondansetron Injectable 4 milliGRAM(s) IV Push every 8 hours PRN Nausea and/or Vomiting  oxyCODONE    IR 5 milliGRAM(s) Oral four times a day PRN Moderate Pain (4 - 6) and Severe Pain (7-10)      RADIOLOGY & ADDITIONAL STUDIES:  < from: CT Head No Cont (08.11.23 @ 09:56) >  IMPRESSION: No change since 8/9/2023. Left parietal parenchymal   hemorrhage with mass effect on the left lateral ventricle.    --- End of Report ---  LANDY ORTEGA MD; Attending Radiologist  This document has been electronically signed. Aug 11 2023 10:10AM    < end of copied text >

## 2023-08-11 NOTE — PROGRESS NOTE ADULT - ASSESSMENT
62 y/o male with hx of Cocaine/THC use, now with confusion and CTH showing possible hemorrhagic metastatic lesions vs CVA with hemorrhagic conversion, elevated transaminases    #Mets with hemorrhagic CVA  #Hemorrhagic mets  -CT Chest showing MIGEL spiculated mass - will contact IR next week for biopsy   -Unlikely CVA or primary CNS neoplasm  -MRI brain w/wo contrast reviewed - concern for embolic CVA  -CTA head and neck without occlusion noted  -neuro-checks Q4hr  -s/p KATHRINE without acute findings  -ILR on discharge  -neurology recs appreciated  -Hold aspirin with ICH on CT  -Repeat CTH 8/9 with mildly increased vasogenic edema and a new 2.2 mm focus of acute hemorrhage, repeat CTH 8/11 showed No change since 8/9/2023. Left parietal parenchymal hemorrhage with mass effect on the left lateral ventricle. D/w finding with neurosx on 8/11, both images appeared similar per their review, recommend repeat third CTH in AM to ensure stability before starting ASA/Lovenox  -Neurosx recs moira  -Ultimately wound benefit from rad/onc after biopsy result   -s/p Keppra x 7 days  -Telemetry   -OOB w/assistance  -Fall/seizure precautions   -PT/OT - home outpatient PT  -VC boots no AC with ICH    #Left spiculated lung mass  -CT C/A/P with contrast  shows left lung spiculated mass  -s/p attempted IR biopsy of the lung m ass 8/10, failed  -CT surgery consulted, tentatively planned for Flexible bronchoscopy for EBUS biopsy next week  -Heme onc recs appreciated    #Leukocytosis  -?etiology, 2/2 recent procedures?  -pt without symptoms  -f/u repeat CBC and monitor fever curves    #ETOH abuse/substance abuse  -Utox positive for cocaine/THC  -s/p CIWA  -FA/MVI/Thiamine for possible thiamine deficiency  -Seizure/fall risk protocol     #Elevated transaminases  -Possibly related to ETOH   -Cocaine induced from vasospasm?  -Viral hepatitis panel  -RUQ sono wnl    DVT prophylaxis -SCD   Dispo -  pending repeat CTH 8/11, and Flexible bronchoscopy for EBUS biopsy next week    PT - home with outpatient PT 62 y/o male with hx of Cocaine/THC use, now with confusion and CTH showing possible hemorrhagic metastatic lesions vs CVA with hemorrhagic conversion, elevated transaminases    #Mets with hemorrhagic CVA  #Hemorrhagic mets  -CT Chest showing MIGEL spiculated mass - will contact IR next week for biopsy   -Unlikely CVA or primary CNS neoplasm  -MRI brain w/wo contrast reviewed - concern for embolic CVA  -CTA head and neck without occlusion noted  -neuro-checks Q4hr  -s/p KATHRINE without acute findings  -ILR on discharge  -neurology recs appreciated  -Hold aspirin with ICH on CT  -Repeat CTH 8/9 with mildly increased vasogenic edema and a new 2.2 mm focus of acute hemorrhage, repeat CTH 8/11 showed No change since 8/9/2023. Left parietal parenchymal hemorrhage with mass effect on the left lateral ventricle. f/u neuro recs  -Neurosx recs moira  -Ultimately wound benefit from rad/onc after biopsy result   -s/p Keppra x 7 days  -Telemetry   -OOB w/assistance  -Fall/seizure precautions   -PT/OT - home outpatient PT  -VC boots no AC with ICH    #Left spiculated lung mass  -CT C/A/P with contrast  shows left lung spiculated mass  -s/p attempted IR biopsy of the lung m ass 8/10, failed  -CT surgery consulted, tentatively planned for Flexible bronchoscopy for EBUS biopsy next week  -Heme onc recs appreciated    #Leukocytosis  -?etiology, 2/2 recent procedures?  -pt without symptoms  -f/u repeat CBC and monitor fever curves    #ETOH abuse/substance abuse  -Utox positive for cocaine/THC  -s/p CIWA  -FA/MVI/Thiamine for possible thiamine deficiency  -Seizure/fall risk protocol     #Elevated transaminases  -Possibly related to ETOH   -Cocaine induced from vasospasm?  -Viral hepatitis panel  -RUQ sono wnl    DVT prophylaxis -SCD   Dispo -  pending repeat CTH 8/11, and Flexible bronchoscopy for EBUS biopsy next week    PT - home with outpatient PT 64 y/o male with hx of Cocaine/THC use, now with confusion and CTH showing possible hemorrhagic metastatic lesions vs CVA with hemorrhagic conversion, elevated transaminases    #Mets with hemorrhagic CVA  #Hemorrhagic mets  -CT Chest showing MIGEL spiculated mass - will contact IR next week for biopsy   -Unlikely CVA or primary CNS neoplasm  -MRI brain w/wo contrast reviewed - concern for embolic CVA  -CTA head and neck without occlusion noted  -neuro-checks Q4hr  -s/p KATHRINE without acute findings  -ILR on discharge  -neurology recs appreciated  -Hold aspirin with ICH on CT  -Repeat CTH 8/9 with mildly increased vasogenic edema and a new 2.2 mm focus of acute hemorrhage, repeat CTH 8/11 showed No change since 8/9/2023. Left parietal parenchymal hemorrhage with mass effect on the left lateral ventricle. f/u neuro recs  -Neurosx recs moira  -Ultimately wound benefit from rad/onc after biopsy result   -s/p Keppra x 7 days  -Telemetry   -OOB w/assistance  -Fall/seizure precautions   -PT/OT - home outpatient PT  -VC boots no AC with ICH    #Left spiculated lung mass  -CT C/A/P with contrast  shows left lung spiculated mass  -s/p attempted IR biopsy of the lung m ass 8/10, failed  -CT surgery consulted, tentatively planned for Flexible bronchoscopy for EBUS biopsy next week  -Heme onc recs appreciated    #Leukocytosis  -?etiology, 2/2 recent procedures?  -pt without symptoms  -f/u repeat CBC and monitor fever curves    #ETOH abuse/substance abuse  -Utox positive for cocaine/THC  -s/p CIWA  -FA/MVI/Thiamine for possible thiamine deficiency  -Seizure/fall risk protocol     #Elevated transaminases  -Possibly related to ETOH   -Cocaine induced from vasospasm?  -Viral hepatitis panel  -RUQ sono wnl    DVT prophylaxis -SCD, hold off Lovenox as pt ambulatory   Dispo -  pending Flexible bronchoscopy for EBUS biopsy next week    PT - home with outpatient PT

## 2023-08-11 NOTE — PROGRESS NOTE ADULT - ASSESSMENT
63 year old  male presents to [facility] ER for weakness of Right arm and leg.  Pt also noted speech slurring.  Further questioning of pt positive for visual disturbances for the past 2 months.  + cocaine and THC use, as well as Alcohol abuse (daily) stopped yesterday. CT of head done by ER shows age indeterminate L MCA territory, Right occipital PCA territory infarct as well as scattered hemorrhagic lesions with surrounding edema in frontal/posterior/temporal areas. CT chest demonstrated MIGEL spiculated lung lesion suspicious for primary lung CA. Thoracic surgery consulted for possible lung biopsy.

## 2023-08-11 NOTE — DIETITIAN INITIAL EVALUATION ADULT - PERTINENT MEDS FT
MEDICATIONS  (STANDING):  atorvastatin 80 milliGRAM(s) Oral at bedtime  folic acid 1 milliGRAM(s) Oral daily  gabapentin 300 milliGRAM(s) Oral <User Schedule>  lidocaine   4% Patch 1 Patch Transdermal daily  melatonin 3 milliGRAM(s) Oral <User Schedule>  multivitamin 1 Tablet(s) Oral daily  pantoprazole   Suspension 40 milliGRAM(s) Oral before breakfast  polyethylene glycol 3350 17 Gram(s) Oral daily  senna 2 Tablet(s) Oral at bedtime    MEDICATIONS  (PRN):  acetaminophen     Tablet .. 650 milliGRAM(s) Oral every 6 hours PRN Temp greater or equal to 38C (100.4F), Mild Pain (1 - 3)  aluminum hydroxide/magnesium hydroxide/simethicone Suspension 30 milliLiter(s) Oral every 4 hours PRN Dyspepsia  hydrALAZINE Injectable 5 milliGRAM(s) IV Push every 4 hours PRN SBP >140  hydrALAZINE Injectable 10 milliGRAM(s) IV Push every 4 hours PRN SBP >160  labetalol Injectable 10 milliGRAM(s) IV Push every 10 minutes PRN SBP> 160 and DBP> 90  methocarbamol 750 milliGRAM(s) Oral three times a day PRN Muscle Spasm  ondansetron Injectable 4 milliGRAM(s) IV Push every 8 hours PRN Nausea and/or Vomiting  oxyCODONE    IR 5 milliGRAM(s) Oral four times a day PRN Moderate Pain (4 - 6) and Severe Pain (7-10)

## 2023-08-12 LAB
ANION GAP SERPL CALC-SCNC: 11 MMOL/L — SIGNIFICANT CHANGE UP (ref 5–17)
BUN SERPL-MCNC: 8.1 MG/DL — SIGNIFICANT CHANGE UP (ref 8–20)
CALCIUM SERPL-MCNC: 9.1 MG/DL — SIGNIFICANT CHANGE UP (ref 8.4–10.5)
CHLORIDE SERPL-SCNC: 97 MMOL/L — SIGNIFICANT CHANGE UP (ref 96–108)
CO2 SERPL-SCNC: 27 MMOL/L — SIGNIFICANT CHANGE UP (ref 22–29)
CREAT SERPL-MCNC: 0.62 MG/DL — SIGNIFICANT CHANGE UP (ref 0.5–1.3)
EGFR: 108 ML/MIN/1.73M2 — SIGNIFICANT CHANGE UP
GLUCOSE SERPL-MCNC: 122 MG/DL — HIGH (ref 70–99)
HCT VFR BLD CALC: 38.6 % — LOW (ref 39–50)
HGB BLD-MCNC: 13.1 G/DL — SIGNIFICANT CHANGE UP (ref 13–17)
MCHC RBC-ENTMCNC: 29.4 PG — SIGNIFICANT CHANGE UP (ref 27–34)
MCHC RBC-ENTMCNC: 33.9 GM/DL — SIGNIFICANT CHANGE UP (ref 32–36)
MCV RBC AUTO: 86.5 FL — SIGNIFICANT CHANGE UP (ref 80–100)
PLATELET # BLD AUTO: 375 K/UL — SIGNIFICANT CHANGE UP (ref 150–400)
POTASSIUM SERPL-MCNC: 3.4 MMOL/L — LOW (ref 3.5–5.3)
POTASSIUM SERPL-SCNC: 3.4 MMOL/L — LOW (ref 3.5–5.3)
RBC # BLD: 4.46 M/UL — SIGNIFICANT CHANGE UP (ref 4.2–5.8)
RBC # FLD: 12.8 % — SIGNIFICANT CHANGE UP (ref 10.3–14.5)
SODIUM SERPL-SCNC: 135 MMOL/L — SIGNIFICANT CHANGE UP (ref 135–145)
WBC # BLD: 14.07 K/UL — HIGH (ref 3.8–10.5)
WBC # FLD AUTO: 14.07 K/UL — HIGH (ref 3.8–10.5)

## 2023-08-12 PROCEDURE — 99233 SBSQ HOSP IP/OBS HIGH 50: CPT

## 2023-08-12 PROCEDURE — 99231 SBSQ HOSP IP/OBS SF/LOW 25: CPT

## 2023-08-12 RX ORDER — POTASSIUM CHLORIDE 20 MEQ
40 PACKET (EA) ORAL ONCE
Refills: 0 | Status: COMPLETED | OUTPATIENT
Start: 2023-08-12 | End: 2023-08-12

## 2023-08-12 RX ORDER — LACTULOSE 10 G/15ML
20 SOLUTION ORAL DAILY
Refills: 0 | Status: DISCONTINUED | OUTPATIENT
Start: 2023-08-12 | End: 2023-08-16

## 2023-08-12 RX ADMIN — ATORVASTATIN CALCIUM 80 MILLIGRAM(S): 80 TABLET, FILM COATED ORAL at 21:27

## 2023-08-12 RX ADMIN — Medication 650 MILLIGRAM(S): at 09:46

## 2023-08-12 RX ADMIN — OXYCODONE HYDROCHLORIDE 5 MILLIGRAM(S): 5 TABLET ORAL at 18:30

## 2023-08-12 RX ADMIN — LACTULOSE 20 GRAM(S): 10 SOLUTION ORAL at 11:18

## 2023-08-12 RX ADMIN — Medication 40 MILLIEQUIVALENT(S): at 14:13

## 2023-08-12 RX ADMIN — OXYCODONE HYDROCHLORIDE 5 MILLIGRAM(S): 5 TABLET ORAL at 06:25

## 2023-08-12 RX ADMIN — SENNA PLUS 2 TABLET(S): 8.6 TABLET ORAL at 21:28

## 2023-08-12 RX ADMIN — OXYCODONE HYDROCHLORIDE 5 MILLIGRAM(S): 5 TABLET ORAL at 05:25

## 2023-08-12 RX ADMIN — Medication 1 TABLET(S): at 11:18

## 2023-08-12 RX ADMIN — PANTOPRAZOLE SODIUM 40 MILLIGRAM(S): 20 TABLET, DELAYED RELEASE ORAL at 05:24

## 2023-08-12 RX ADMIN — Medication 1 MILLIGRAM(S): at 11:18

## 2023-08-12 RX ADMIN — LIDOCAINE 1 PATCH: 4 CREAM TOPICAL at 11:18

## 2023-08-12 RX ADMIN — Medication 3 MILLIGRAM(S): at 21:27

## 2023-08-12 RX ADMIN — OXYCODONE HYDROCHLORIDE 5 MILLIGRAM(S): 5 TABLET ORAL at 17:53

## 2023-08-12 RX ADMIN — Medication 650 MILLIGRAM(S): at 10:30

## 2023-08-12 RX ADMIN — GABAPENTIN 300 MILLIGRAM(S): 400 CAPSULE ORAL at 21:27

## 2023-08-12 RX ADMIN — POLYETHYLENE GLYCOL 3350 17 GRAM(S): 17 POWDER, FOR SOLUTION ORAL at 11:18

## 2023-08-12 NOTE — PROGRESS NOTE ADULT - SUBJECTIVE AND OBJECTIVE BOX
Subjective - patient seen and evaluated bedside. Sitting comfortably in bed. Denies CP, SOB, HA, dizziness, n/v/d    Review of Systems: negative x 10 systems except as noted above    Brief summary:  63yMale with lung mass, s/p failed IR lung biopsy    Significant/Flul10dn events: no acute events      PAST MEDICAL & SURGICAL HISTORY:  Hypertension      No significant past surgical history      No significant past surgical history            acetaminophen     Tablet .. 650 milliGRAM(s) Oral every 6 hours PRN  aluminum hydroxide/magnesium hydroxide/simethicone Suspension 30 milliLiter(s) Oral every 4 hours PRN  atorvastatin 80 milliGRAM(s) Oral at bedtime  folic acid 1 milliGRAM(s) Oral daily  gabapentin 300 milliGRAM(s) Oral <User Schedule>  hydrALAZINE Injectable 5 milliGRAM(s) IV Push every 4 hours PRN  hydrALAZINE Injectable 10 milliGRAM(s) IV Push every 4 hours PRN  labetalol Injectable 10 milliGRAM(s) IV Push every 10 minutes PRN  lactulose Syrup 20 Gram(s) Oral daily PRN  lidocaine   4% Patch 1 Patch Transdermal daily  melatonin 3 milliGRAM(s) Oral <User Schedule>  methocarbamol 750 milliGRAM(s) Oral three times a day PRN  multivitamin 1 Tablet(s) Oral daily  ondansetron Injectable 4 milliGRAM(s) IV Push every 8 hours PRN  oxyCODONE    IR 5 milliGRAM(s) Oral four times a day PRN  pantoprazole   Suspension 40 milliGRAM(s) Oral before breakfast  polyethylene glycol 3350 17 Gram(s) Oral daily  senna 2 Tablet(s) Oral at bedtime  MEDICATIONS  (PRN):  acetaminophen     Tablet .. 650 milliGRAM(s) Oral every 6 hours PRN Temp greater or equal to 38C (100.4F), Mild Pain (1 - 3)  aluminum hydroxide/magnesium hydroxide/simethicone Suspension 30 milliLiter(s) Oral every 4 hours PRN Dyspepsia  hydrALAZINE Injectable 10 milliGRAM(s) IV Push every 4 hours PRN SBP >160  hydrALAZINE Injectable 5 milliGRAM(s) IV Push every 4 hours PRN SBP >140  labetalol Injectable 10 milliGRAM(s) IV Push every 10 minutes PRN SBP> 160 and DBP> 90  lactulose Syrup 20 Gram(s) Oral daily PRN constipation  methocarbamol 750 milliGRAM(s) Oral three times a day PRN Muscle Spasm  ondansetron Injectable 4 milliGRAM(s) IV Push every 8 hours PRN Nausea and/or Vomiting  oxyCODONE    IR 5 milliGRAM(s) Oral four times a day PRN Moderate Pain (4 - 6) and Severe Pain (7-10)      Daily     Daily                               13.1   14.07 )-----------( 375      ( 12 Aug 2023 06:18 )             38.6   08-12    135  |  97  |  8.1  ----------------------------<  122<H>  3.4<L>   |  27.0  |  0.62    Ca    9.1      12 Aug 2023 06:18              Objective:  T(C): 37.1 (08-12-23 @ 16:46), Max: 37.3 (08-11-23 @ 20:08)  HR: 64 (08-12-23 @ 16:46) (64 - 103)  BP: 112/70 (08-12-23 @ 16:46) (106/74 - 127/83)  RR: 18 (08-12-23 @ 16:46) (16 - 18)  SpO2: 95% (08-12-23 @ 16:46) (94% - 99%)  Wt(kg): --CAPILLARY BLOOD GLUCOSE      I&O's Summary    12 Aug 2023 07:01  -  12 Aug 2023 18:27  --------------------------------------------------------  IN: 500 mL / OUT: 0 mL / NET: 500 mL        Physical Exam  General: NAD  Neuro: A+O x 3, non-focal, speech clear and intact  Psych: Appropriate affect  HEENT:  NCAT, No conjuctival edema or icterus, no thrush.  Pulm: CTA, equal bilaterally  CV: RRR,  +S1S2  Abd: soft, NT, ND, +BS  Ext: +DP Pulses b/l, no edema  Skin: Warm, dry, intact          Imaging:  < from: CT Head No Cont (08.11.23 @ 09:56) >    IMPRESSION: No change since 8/9/2023. Left parietal parenchymal   hemorrhage with mass effect on the left lateral ventricle.    --- End of Report ---      < end of copied text >

## 2023-08-12 NOTE — PROGRESS NOTE ADULT - PROBLEM SELECTOR PLAN 1
MIGEL lung lesion suspicious for primary lung CA.  CT scan as above  Patient s/p failed IR lung biopsy  Thoracic surgery reconsulted for lung biopsy  Plan to be discussed with Dr. Coleman
MIGEL lung lesion suspicious for primary lung CA.  Patient s/p aborted IR lung biopsy  Thoracic surgery reconsulted for lung biopsy   Plan discussed with Dr. Coleman - he plans on discussing further with IR, otherwise may offer FB, EBUS.   Plan was discussed / reviewed with Thoracic Surgeons in AM rounds.
MIGEL lung lesion suspicious for primary lung CA.  Patient s/p aborted IR lung biopsy  Thoracic surgery reconsulted for lung biopsy   Plan discussed with Dr. Coleman - he plans on discussing further with IR - risks of further attempt at IR biopsy - crossing fissure to reach mass vs risk of intubation for FB, EBUS.   Concern that due to patient's pulmonary status would be high risk for prolonged intubation    Plan was discussed / reviewed with Thoracic Surgeons in AM rounds.

## 2023-08-12 NOTE — PROGRESS NOTE ADULT - ASSESSMENT
64 y/o male with hx of Cocaine/THC use, now with confusion and CTH showing possible hemorrhagic metastatic lesions vs CVA with hemorrhagic conversion, elevated transaminases    1-Mets with hemorrhagic CVA  -CT Chest showing MIGEL spiculated mass   attempted IR bx unsuccesful   see IR note   TS on board for broncoscopy / bx next week likely   -MRI brain w/wo contrast reviewed - concern for embolic CVA  -neuro-checks Q4hr  -s/p KATHRINE without acute findings  -neurology recs appreciated  -Hold aspirin due to ICH  on CT   -Neurosx recs moira  -Ultimately wound benefit from rad/onc after biopsy result   -s/p Keppra x 7 days    2- Constipated   no BM since Tuesday   add lactulose daily   cont miralax and senna     3- Hypokalemia   replace PO K ordered   monitor lytes     4-Left spiculated lung mass  -s/p attempted IR biopsy of the lung m ass 8/10, failed  -CT surgery consulted, tentatively planned for Flexible bronchoscopy for EBUS biopsy next week  -Heme onc recs appreciated    5-Leukocytosis  reactive ? , afebrile   -pt without symptoms    6-ETOH abuse/substance abuse  -Utox positive for cocaine/THC  -s/p CIWA  -FA/MVI/Thiamine for possible thiamine deficiency  -Seizure/fall risk protocol     7-Elevated transaminases  -Possibly related to ETOH   -Viral hepatitis panel  -RUQ unrulyo wnl    DVT prophylaxis -SCD, hold off Lovenox as pt ambulatory     Dispo -  pending Flexible bronchoscopy for EBUS biopsy next week    PT - home with outpatient PT

## 2023-08-12 NOTE — PROGRESS NOTE ADULT - NS ATTEST RISK PROBLEM GEN_ALL_CORE FT
Hemorrhagic Stroke
brain hemorrhagic mets
brain hemorrhagic mets
Hemorrhagic Stroke
Stroke, Mets
Hemorrhagic CVA
brain hemorrhagic mets

## 2023-08-12 NOTE — PROGRESS NOTE ADULT - SUBJECTIVE AND OBJECTIVE BOX
Follow  up for CVA . right sided weakness     Pt is seen in am , via  service Gustavo ID [id number]      MEDICATIONS  (STANDING):  atorvastatin 80 milliGRAM(s) Oral at bedtime  folic acid 1 milliGRAM(s) Oral daily  gabapentin 300 milliGRAM(s) Oral <User Schedule>  lidocaine   4% Patch 1 Patch Transdermal daily  melatonin 3 milliGRAM(s) Oral <User Schedule>  multivitamin 1 Tablet(s) Oral daily  pantoprazole   Suspension 40 milliGRAM(s) Oral before breakfast  polyethylene glycol 3350 17 Gram(s) Oral daily  senna 2 Tablet(s) Oral at bedtime    Vital Signs Last 24 Hrs  T(C): 36.5 (12 Aug 2023 07:20), Max: 37.3 (11 Aug 2023 20:08)  T(F): 97.7 (12 Aug 2023 07:20), Max: 99.1 (11 Aug 2023 20:08)  HR: 68 (12 Aug 2023 07:20) (65 - 103)  BP: 127/83 (12 Aug 2023 07:20) (106/74 - 127/83)  BP(mean): --  RR: 18 (12 Aug 2023 07:20) (17 - 18)  SpO2: 97% (12 Aug 2023 07:20) (94% - 99%)    Parameters below as of 12 Aug 2023 07:20  Patient On (Oxygen Delivery Method): room air      CONSTITUTIONAL: NAD, well-groomed  RESPIRATORY: Normal respiratory effort; lungs are clear to auscultation bilaterally  CARDIOVASCULAR: Regular rate and rhythm, normal S1 and S2, no murmur/rub/gallop  ABDOMEN: Nontender to palpation, normoactive bowel sounds, no rebound/guarding; No hepatosplenomegaly  MUSCLOSKELETAL:  Normal gait; no clubbing or cyanosis of digits  PSYCH: A+O x3; affect appropriate  NEUROLOGY: CN 2-12 are intact and symmetric; Right upper ext weakness MS decreased                            13.1   14.07 )-----------( 375      ( 12 Aug 2023 06:18 )             38.6   08-12    135  |  97  |  8.1  ----------------------------<  122<H>  3.4<L>   |  27.0  |  0.62    Ca    9.1      12 Aug 2023 06:18    Ca    8.9      11 Aug 2023 06:30            Urinalysis Basic - ( 11 Aug 2023 06:30 )    Color: x / Appearance: x / SG: x / pH: x  Gluc: 105 mg/dL / Ketone: x  / Bili: x / Urobili: x   Blood: x / Protein: x / Nitrite: x   Leuk Esterase: x / RBC: x / WBC x   Sq Epi: x / Non Sq Epi: x / Bacteria: x        CAPILLARY BLOOD GLUCOSE

## 2023-08-12 NOTE — PROGRESS NOTE ADULT - ASSESSMENT
63 year old  male presents to [facility] ER for weakness of Right arm and leg.  Pt also noted speech slurring.  Further questioning of pt positive for visual disturbances for the past 2 months.  + cocaine and THC use, as well as Alcohol abuse (daily) stopped yesterday. CT of head done by ER shows age indeterminate L MCA territory, Right occipital PCA territory infarct as well as scattered hemorrhagic lesions with surrounding edema in frontal/posterior/temporal areas. CT chest demonstrated MIGEL spiculated lung lesion suspicious for primary lung CA. Thoracic surgery consulted for possible lung biopsy.

## 2023-08-13 PROCEDURE — 99233 SBSQ HOSP IP/OBS HIGH 50: CPT

## 2023-08-13 RX ORDER — POTASSIUM CHLORIDE 20 MEQ
20 PACKET (EA) ORAL ONCE
Refills: 0 | Status: COMPLETED | OUTPATIENT
Start: 2023-08-13 | End: 2023-08-13

## 2023-08-13 RX ADMIN — Medication 1 MILLIGRAM(S): at 10:26

## 2023-08-13 RX ADMIN — OXYCODONE HYDROCHLORIDE 5 MILLIGRAM(S): 5 TABLET ORAL at 10:26

## 2023-08-13 RX ADMIN — OXYCODONE HYDROCHLORIDE 5 MILLIGRAM(S): 5 TABLET ORAL at 14:01

## 2023-08-13 RX ADMIN — METHOCARBAMOL 750 MILLIGRAM(S): 500 TABLET, FILM COATED ORAL at 14:01

## 2023-08-13 RX ADMIN — PANTOPRAZOLE SODIUM 40 MILLIGRAM(S): 20 TABLET, DELAYED RELEASE ORAL at 06:14

## 2023-08-13 RX ADMIN — OXYCODONE HYDROCHLORIDE 5 MILLIGRAM(S): 5 TABLET ORAL at 00:01

## 2023-08-13 RX ADMIN — OXYCODONE HYDROCHLORIDE 5 MILLIGRAM(S): 5 TABLET ORAL at 01:00

## 2023-08-13 RX ADMIN — OXYCODONE HYDROCHLORIDE 5 MILLIGRAM(S): 5 TABLET ORAL at 06:17

## 2023-08-13 RX ADMIN — LIDOCAINE 1 PATCH: 4 CREAM TOPICAL at 10:26

## 2023-08-13 RX ADMIN — SENNA PLUS 2 TABLET(S): 8.6 TABLET ORAL at 22:03

## 2023-08-13 RX ADMIN — ATORVASTATIN CALCIUM 80 MILLIGRAM(S): 80 TABLET, FILM COATED ORAL at 22:04

## 2023-08-13 RX ADMIN — Medication 650 MILLIGRAM(S): at 20:59

## 2023-08-13 RX ADMIN — Medication 1 TABLET(S): at 10:25

## 2023-08-13 RX ADMIN — Medication 650 MILLIGRAM(S): at 19:59

## 2023-08-13 RX ADMIN — OXYCODONE HYDROCHLORIDE 5 MILLIGRAM(S): 5 TABLET ORAL at 15:00

## 2023-08-13 RX ADMIN — Medication 3 MILLIGRAM(S): at 22:04

## 2023-08-13 RX ADMIN — GABAPENTIN 300 MILLIGRAM(S): 400 CAPSULE ORAL at 22:04

## 2023-08-13 RX ADMIN — OXYCODONE HYDROCHLORIDE 5 MILLIGRAM(S): 5 TABLET ORAL at 11:30

## 2023-08-13 RX ADMIN — LIDOCAINE 1 PATCH: 4 CREAM TOPICAL at 02:40

## 2023-08-13 RX ADMIN — POLYETHYLENE GLYCOL 3350 17 GRAM(S): 17 POWDER, FOR SOLUTION ORAL at 10:26

## 2023-08-13 RX ADMIN — Medication 20 MILLIEQUIVALENT(S): at 14:02

## 2023-08-13 NOTE — PROGRESS NOTE ADULT - SUBJECTIVE AND OBJECTIVE BOX
Follow  up for CVA . right sided weakness     Pt is seen in am  with  Suzanna   He is with c/o pain , HA intermittently   he has no complaints at present   had BM last night     d/w nurse    all questions answered  via                   MEDICATIONS  (STANDING):  atorvastatin 80 milliGRAM(s) Oral at bedtime  folic acid 1 milliGRAM(s) Oral daily  gabapentin 300 milliGRAM(s) Oral <User Schedule>  lidocaine   4% Patch 1 Patch Transdermal daily  melatonin 3 milliGRAM(s) Oral <User Schedule>  multivitamin 1 Tablet(s) Oral daily  pantoprazole   Suspension 40 milliGRAM(s) Oral before breakfast  polyethylene glycol 3350 17 Gram(s) Oral daily  senna 2 Tablet(s) Oral at bedtime    Vital Signs Last 24 Hrs  T(C): 36.9 (13 Aug 2023 11:26), Max: 37.7 (12 Aug 2023 20:15)  T(F): 98.4 (13 Aug 2023 11:26), Max: 99.8 (12 Aug 2023 20:15)  HR: 72 (13 Aug 2023 11:26) (64 - 76)  BP: 116/73 (13 Aug 2023 11:26) (96/61 - 118/72)  BP(mean): --  RR: 18 (13 Aug 2023 11:26) (16 - 18)  SpO2: 95% (13 Aug 2023 11:26) (92% - 96%)    Parameters below as of 13 Aug 2023 11:26  Patient On (Oxygen Delivery Method): room air        CONSTITUTIONAL: NAD, well-groomed  RESPIRATORY: ungs are clear to auscultation bilaterally  CARDIOVASCULAR: Regular rate and rhythm, normal S1 and S2, no murmur/rub/gallop  ABDOMEN: Nontender to palpation, normoactive bowel sounds, no rebound/guarding; No hepatosplenomegaly  MUSCLOSKELETAL:  Normal gait; no clubbing or cyanosis of digits  PSYCH: A+O x3; affect appropriate  NEUROLOGY: awake normal speech , oriented ,  Right upper ext weakness, decreased  MS some drifting   RLE MS normal   sensation decreased RUE > RLE                              13.1   14.07 )-----------( 375      ( 12 Aug 2023 06:18 )             38.6   08-12    135  |  97  |  8.1  ----------------------------<  122<H>  3.4<L>   |  27.0  |  0.62    Ca    9.1      12 Aug 2023 06:18

## 2023-08-13 NOTE — PROGRESS NOTE ADULT - ASSESSMENT
64 y/o male with hx of Cocaine/THC use, now with confusion and CTH showing possible hemorrhagic metastatic lesions vs CVA with hemorrhagic conversion, elevated transaminases    1-Metastatic brain lesions  with hemorrhagic CVA  -CT Chest showing MIGEL spiculated mass   attempted IR bx unsuccesful aborted   will follow up with TS team re plan  for broncoscopy / bx next week likely   -neuro-checks Q4hr  -neurology recs appreciated  -Hold aspirin due to ICH  on CT   -Ultimately wound benefit from rad/onc after biopsy result   -s/p Keppra x 7 days    2- Constipated   improving   cont bowel regimen , laxative as needed     3- Hypokalemia   replaced   repeat labs in am , check mg level    monitor lytes     4-Left spiculated lung mass  -s/p attempted IR biopsy of the lung m ass 8/10, aborted   -CT surgery consulted, tentatively planned for Flexible bronchoscopy for EBUS biopsy next week  -Heme onc recs appreciated    5-Leukocytosis  reactive ? , afebrile   -pt without symptoms    6-ETOH abuse/substance abuse  -Utox positive for cocaine/THC  -s/p CIWA  -FA/MVI/Thiamine for possible thiamine deficiency  -Seizure/fall risk protocol     7-Elevated transaminases  -Possibly related to ETOH   -Viral hepatitis panel  -RUQ sono wnl  monitor lfts repeat labs in am     DVT prophylaxis -add lovenox NS rec appreciated   CT of brain 8/11 stable      Dispo -  pending Flexible bronchoscopy for EBUS biopsy next week    PT - home with outpatient PT

## 2023-08-14 LAB
ALBUMIN SERPL ELPH-MCNC: 3.7 G/DL — SIGNIFICANT CHANGE UP (ref 3.3–5.2)
ALP SERPL-CCNC: 150 U/L — HIGH (ref 40–120)
ALT FLD-CCNC: 118 U/L — HIGH
ANION GAP SERPL CALC-SCNC: 11 MMOL/L — SIGNIFICANT CHANGE UP (ref 5–17)
AST SERPL-CCNC: 58 U/L — HIGH
BILIRUB DIRECT SERPL-MCNC: 0.1 MG/DL — SIGNIFICANT CHANGE UP (ref 0–0.3)
BILIRUB INDIRECT FLD-MCNC: 0.3 MG/DL — SIGNIFICANT CHANGE UP (ref 0.2–1)
BILIRUB SERPL-MCNC: 0.4 MG/DL — SIGNIFICANT CHANGE UP (ref 0.4–2)
BUN SERPL-MCNC: 8.6 MG/DL — SIGNIFICANT CHANGE UP (ref 8–20)
CALCIUM SERPL-MCNC: 9.4 MG/DL — SIGNIFICANT CHANGE UP (ref 8.4–10.5)
CHLORIDE SERPL-SCNC: 97 MMOL/L — SIGNIFICANT CHANGE UP (ref 96–108)
CO2 SERPL-SCNC: 30 MMOL/L — HIGH (ref 22–29)
CREAT SERPL-MCNC: 0.59 MG/DL — SIGNIFICANT CHANGE UP (ref 0.5–1.3)
EGFR: 110 ML/MIN/1.73M2 — SIGNIFICANT CHANGE UP
GLUCOSE SERPL-MCNC: 124 MG/DL — HIGH (ref 70–99)
MAGNESIUM SERPL-MCNC: 1.9 MG/DL — SIGNIFICANT CHANGE UP (ref 1.6–2.6)
POTASSIUM SERPL-MCNC: 4.2 MMOL/L — SIGNIFICANT CHANGE UP (ref 3.5–5.3)
POTASSIUM SERPL-SCNC: 4.2 MMOL/L — SIGNIFICANT CHANGE UP (ref 3.5–5.3)
PROT SERPL-MCNC: 6.9 G/DL — SIGNIFICANT CHANGE UP (ref 6.6–8.7)
SODIUM SERPL-SCNC: 138 MMOL/L — SIGNIFICANT CHANGE UP (ref 135–145)

## 2023-08-14 PROCEDURE — 99232 SBSQ HOSP IP/OBS MODERATE 35: CPT

## 2023-08-14 RX ADMIN — ATORVASTATIN CALCIUM 80 MILLIGRAM(S): 80 TABLET, FILM COATED ORAL at 22:22

## 2023-08-14 RX ADMIN — OXYCODONE HYDROCHLORIDE 5 MILLIGRAM(S): 5 TABLET ORAL at 15:01

## 2023-08-14 RX ADMIN — SENNA PLUS 2 TABLET(S): 8.6 TABLET ORAL at 22:22

## 2023-08-14 RX ADMIN — OXYCODONE HYDROCHLORIDE 5 MILLIGRAM(S): 5 TABLET ORAL at 14:02

## 2023-08-14 RX ADMIN — Medication 650 MILLIGRAM(S): at 21:29

## 2023-08-14 RX ADMIN — OXYCODONE HYDROCHLORIDE 5 MILLIGRAM(S): 5 TABLET ORAL at 22:22

## 2023-08-14 RX ADMIN — OXYCODONE HYDROCHLORIDE 5 MILLIGRAM(S): 5 TABLET ORAL at 23:22

## 2023-08-14 RX ADMIN — POLYETHYLENE GLYCOL 3350 17 GRAM(S): 17 POWDER, FOR SOLUTION ORAL at 12:25

## 2023-08-14 RX ADMIN — Medication 1 MILLIGRAM(S): at 12:23

## 2023-08-14 RX ADMIN — Medication 650 MILLIGRAM(S): at 05:44

## 2023-08-14 RX ADMIN — GABAPENTIN 300 MILLIGRAM(S): 400 CAPSULE ORAL at 22:22

## 2023-08-14 RX ADMIN — LIDOCAINE 1 PATCH: 4 CREAM TOPICAL at 18:09

## 2023-08-14 RX ADMIN — LIDOCAINE 1 PATCH: 4 CREAM TOPICAL at 12:24

## 2023-08-14 RX ADMIN — Medication 650 MILLIGRAM(S): at 20:29

## 2023-08-14 RX ADMIN — PANTOPRAZOLE SODIUM 40 MILLIGRAM(S): 20 TABLET, DELAYED RELEASE ORAL at 05:48

## 2023-08-14 RX ADMIN — Medication 650 MILLIGRAM(S): at 06:44

## 2023-08-14 RX ADMIN — Medication 3 MILLIGRAM(S): at 22:22

## 2023-08-14 RX ADMIN — Medication 1 TABLET(S): at 12:23

## 2023-08-14 RX ADMIN — METHOCARBAMOL 750 MILLIGRAM(S): 500 TABLET, FILM COATED ORAL at 14:02

## 2023-08-14 NOTE — PROGRESS NOTE ADULT - SUBJECTIVE AND OBJECTIVE BOX
Patient is a 63y old  Male who presents with a chief complaint of CVA with R sided weakness (13 Aug 2023 13:49)      Patient seen and examined at bedside with  Tyler   Pt is feeling better   night he is having more pain he says     no SOb , no HA , no shoulder pain today   weakness of RUE same     ALLERGIES:  No Known Allergies    MEDICATIONS  (STANDING):  atorvastatin 80 milliGRAM(s) Oral at bedtime  folic acid 1 milliGRAM(s) Oral daily  gabapentin 300 milliGRAM(s) Oral <User Schedule>  lidocaine   4% Patch 1 Patch Transdermal daily  melatonin 3 milliGRAM(s) Oral <User Schedule>  multivitamin 1 Tablet(s) Oral daily  pantoprazole   Suspension 40 milliGRAM(s) Oral before breakfast  polyethylene glycol 3350 17 Gram(s) Oral daily  senna 2 Tablet(s) Oral at bedtime    MEDICATIONS  (PRN):  acetaminophen     Tablet .. 650 milliGRAM(s) Oral every 6 hours PRN Temp greater or equal to 38C (100.4F), Mild Pain (1 - 3)  aluminum hydroxide/magnesium hydroxide/simethicone Suspension 30 milliLiter(s) Oral every 4 hours PRN Dyspepsia  hydrALAZINE Injectable 5 milliGRAM(s) IV Push every 4 hours PRN SBP >140  hydrALAZINE Injectable 10 milliGRAM(s) IV Push every 4 hours PRN SBP >160  labetalol Injectable 10 milliGRAM(s) IV Push every 10 minutes PRN SBP> 160 and DBP> 90  lactulose Syrup 20 Gram(s) Oral daily PRN constipation  methocarbamol 750 milliGRAM(s) Oral three times a day PRN Muscle Spasm  ondansetron Injectable 4 milliGRAM(s) IV Push every 8 hours PRN Nausea and/or Vomiting  oxyCODONE    IR 5 milliGRAM(s) Oral four times a day PRN Moderate Pain (4 - 6) and Severe Pain (7-10)    Vital Signs Last 24 Hrs  T(F): 98.2 (14 Aug 2023 07:20), Max: 100.3 (13 Aug 2023 21:48)  HR: 90 (14 Aug 2023 08:00) (62 - 90)  BP: 118/65 (14 Aug 2023 07:20) (105/71 - 126/76)  RR: 18 (14 Aug 2023 07:20) (18 - 18)  SpO2: 90% (14 Aug 2023 08:00) (90% - 97%)  I&O's Summary      PHYSICAL EXAM:  General: awake no distress  Neck: supple, no JVD   Lungs: CTA bilateral   Cardio: RRR, S1/S2, No murmur  Abdomen: Soft, Nontender, Nondistended; Bowel sounds present  Extremities: No calf tenderness, No pitting edema  Neuro : normal speech . RUE weak MS 2-3/5   sensory deficits in RLE ankle foot   LABS:                        13.1   14.07 )-----------( 375      ( 12 Aug 2023 06:18 )             38.6     08-14    138  |  97  |  8.6  ----------------------------<  124  4.2   |  30.0  |  0.59    Ca    9.4      14 Aug 2023 06:53  Mg     1.9     08-14    TPro  6.9  /  Alb  3.7  /  TBili  0.4  /  DBili  0.1  /  AST  58  /  ALT  118  /  AlkPhos  150  08-14                    08-01 Chol 156 mg/dL LDL -- HDL 53 mg/dL Trig 180 mg/dL                  Urinalysis Basic - ( 14 Aug 2023 06:53 )    Color: x / Appearance: x / SG: x / pH: x  Gluc: 124 mg/dL / Ketone: x  / Bili: x / Urobili: x   Blood: x / Protein: x / Nitrite: x   Leuk Esterase: x / RBC: x / WBC x   Sq Epi: x / Non Sq Epi: x / Bacteria: x          RADIOLOGY & ADDITIONAL TESTS:

## 2023-08-14 NOTE — CHART NOTE - NSCHARTNOTEFT_GEN_A_CORE
Patient admitted with lung mass  s/p aborted IR lung biopsy  Thoracic surgery reconsulted for possible lung biopsy   High risk of prolonged intubation.  IR re-consulted for possible repeat lung biopsy  Awaiting plan from IR  D/w Dr. Yoon Patient admitted with lung mass  s/p aborted IR lung biopsy  Thoracic surgery reconsulted for possible VATS/lung biopsy   High risk of prolonged intubation.  IR re-consulted for possible repeat lung biopsy  Awaiting plan from IR  D/w Dr. Yoon

## 2023-08-15 PROCEDURE — 99232 SBSQ HOSP IP/OBS MODERATE 35: CPT

## 2023-08-15 PROCEDURE — 88305 TISSUE EXAM BY PATHOLOGIST: CPT | Mod: 26

## 2023-08-15 PROCEDURE — 71045 X-RAY EXAM CHEST 1 VIEW: CPT | Mod: 26,76

## 2023-08-15 PROCEDURE — 32408 CORE NDL BX LNG/MED PERQ: CPT

## 2023-08-15 RX ORDER — BENZOCAINE AND MENTHOL 5; 1 G/100ML; G/100ML
1 LIQUID ORAL ONCE
Refills: 0 | Status: COMPLETED | OUTPATIENT
Start: 2023-08-15 | End: 2023-08-15

## 2023-08-15 RX ORDER — SODIUM CHLORIDE 9 MG/ML
1000 INJECTION INTRAMUSCULAR; INTRAVENOUS; SUBCUTANEOUS
Refills: 0 | Status: COMPLETED | OUTPATIENT
Start: 2023-08-15 | End: 2023-08-15

## 2023-08-15 RX ADMIN — SODIUM CHLORIDE 85 MILLILITER(S): 9 INJECTION INTRAMUSCULAR; INTRAVENOUS; SUBCUTANEOUS at 14:23

## 2023-08-15 RX ADMIN — OXYCODONE HYDROCHLORIDE 5 MILLIGRAM(S): 5 TABLET ORAL at 18:00

## 2023-08-15 RX ADMIN — OXYCODONE HYDROCHLORIDE 5 MILLIGRAM(S): 5 TABLET ORAL at 17:30

## 2023-08-15 RX ADMIN — LIDOCAINE 1 PATCH: 4 CREAM TOPICAL at 19:02

## 2023-08-15 RX ADMIN — SENNA PLUS 2 TABLET(S): 8.6 TABLET ORAL at 20:46

## 2023-08-15 RX ADMIN — Medication 3 MILLIGRAM(S): at 20:46

## 2023-08-15 RX ADMIN — PANTOPRAZOLE SODIUM 40 MILLIGRAM(S): 20 TABLET, DELAYED RELEASE ORAL at 05:27

## 2023-08-15 RX ADMIN — GABAPENTIN 300 MILLIGRAM(S): 400 CAPSULE ORAL at 20:46

## 2023-08-15 RX ADMIN — LIDOCAINE 1 PATCH: 4 CREAM TOPICAL at 00:13

## 2023-08-15 RX ADMIN — BENZOCAINE AND MENTHOL 1 LOZENGE: 5; 1 LIQUID ORAL at 06:26

## 2023-08-15 RX ADMIN — LIDOCAINE 1 PATCH: 4 CREAM TOPICAL at 13:34

## 2023-08-15 RX ADMIN — Medication 1 MILLIGRAM(S): at 13:30

## 2023-08-15 RX ADMIN — ATORVASTATIN CALCIUM 80 MILLIGRAM(S): 80 TABLET, FILM COATED ORAL at 20:45

## 2023-08-15 RX ADMIN — Medication 1 TABLET(S): at 13:29

## 2023-08-15 RX ADMIN — POLYETHYLENE GLYCOL 3350 17 GRAM(S): 17 POWDER, FOR SOLUTION ORAL at 13:34

## 2023-08-15 NOTE — CHART NOTE - NSCHARTNOTEFT_GEN_A_CORE
IR lung biopsy obtained.  No further intervention at this time from thoracic surgery perspective.  May follow up with Dr Yoon in CT surgery office if biopsy establishes lung primary  Will sign off at this time.    D/w Dr. Yoon

## 2023-08-15 NOTE — PROGRESS NOTE ADULT - ASSESSMENT
64 y/o male with hx of Cocaine/THC use, now with confusion and CTH showing possible hemorrhagic metastatic lesions vs CVA with hemorrhagic conversion, elevated transaminases    1-Metastatic brain lesions  with hemorrhagic CVA  -CT Chest showing MIGEL spiculated mass   attempted IR bx unsuccesful aborted   will follow up with TS team re plan  for broncoscopy / bx next week likely   -neuro-checks Q4hr  -neurology recs appreciated  -Hold aspirin due to ICH  on CT   -Ultimately wound benefit from rad/onc after biopsy result   -s/p Keppra x 7 days    2- Constipated   improving   cont bowel regimen , laxative as needed     3- Hypokalemia   replaced   repeat labs in am , check mg level    monitor lytes     4-Left spiculated lung mass  -s/p attempted IR biopsy of the lung m ass 8/10, aborted   -CT surgery consulted, tentatively planned for Flexible bronchoscopy for EBUS biopsy next week  -Heme onc recs appreciated    5-Leukocytosis  reactive ? , afebrile   -pt without symptoms    6-ETOH abuse/substance abuse  -Utox positive for cocaine/THC  -s/p CIWA  -FA/MVI/Thiamine for possible thiamine deficiency  -Seizure/fall risk protocol     7-Elevated transaminases  -Possibly related to ETOH   -Viral hepatitis panel  -RUQ sono wnl  monitor lfts repeat labs in am     DVT prophylaxis -add lovenox NS rec appreciated   CT of brain 8/11 stable      Dispo -  pending Flexible bronchoscopy for EBUS biopsy next week    PT - home with outpatient PT 62 y/o male with hx of Cocaine/THC use, now with confusion and CTH showing possible hemorrhagic metastatic lesions vs CVA with hemorrhagic conversion, elevated transaminases      1- Lung mass with Metastatic brain lesions  with hemorrhagic CVA  -CT Chest showed spikulated mass 'fir IR biopsy today  d/w Dr Ese RICHEY     attempted IR bx last week by IR unsuccesful aborted   will follow up with TS team pending bx attempt   neuro status stable   ambulates , pain meds as needed   -Hold aspirin due to ICH  on CT   -Ultimately wound benefit from rad/onc after biopsy result       2- Constipation   resolved   cont bowel regimen       3- Hypokalemia   replaced   repeat labs tomorrow     4-Left spiculated lung mass  -s/p attempted IR biopsy of the lung m ass 8/10, aborted   -CT surgery consulted, tentatively planned for Flexible bronchoscopy for EBUS biopsy   however IR to attempt again today for bx   -Heme onc recs appreciated    5-Leukocytosis  repeat cbc in am   last lab 8/12 14 k   no sign of infection   afebrile     6-ETOH abuse/substance abuse  -Utox positive for cocaine/THC  -s/p CIWA  -FA/MVI/Thiamine for possible thiamine deficiency    7-Elevated transaminases  -Possibly related to ETOH   stable  LFTS in am     8- Moderate protein virgie malnutrution   add ensure to meals   mVI   cont diet     DVT prophylaxis -cont lovenox NS rec appreciated   CT of brain 8/11 stable        PT - home with outpatient PT

## 2023-08-15 NOTE — CHART NOTE - NSCHARTNOTESELECT_GEN_ALL_CORE
Event Note
IR preprocedure note
EP PA/Event Note
Event Note
Event Note
Thoracic Surgery/Event Note
Thoracic Surgery/Event Note

## 2023-08-15 NOTE — BRIEF OPERATIVE NOTE - OPERATION/FINDINGS
17 g coaxial needle into MIGEL nodule. 18 g AIRSIS [place] core bx taken x 6. [employer] plug used (x 2).

## 2023-08-15 NOTE — CHART NOTE - NSCHARTNOTEFT_GEN_A_CORE
Vascular & Interventional Radiology Pre-Procedure Note    Procedure Name: CT guided left lung biopsy    HPI: 63y Male with lung nodules    63y Male with hx of Cocaine/THC use, now with confusion and CTH showing possible hemorrhagic metastatic lesions vs CVA with hemorrhagic conversion, elevated transaminases. CT showed lung nodules, largest in MIGEL. Now for biopsy.    PMH/PSH    Hypertension  Lung mass  CVA (cerebrovascular accident)  ETOH abuse      Allergies: No Known Allergies      Medications (Abx/Cardiac/Anticoagulation/Blood Products)  acetaminophen     Tablet .. 650 milliGRAM(s) Oral every 6 hours PRN  aluminum hydroxide/magnesium hydroxide/simethicone Suspension 30 milliLiter(s) Oral every 4 hours PRN  atorvastatin 80 milliGRAM(s) Oral at bedtime  folic acid 1 milliGRAM(s) Oral daily  gabapentin 300 milliGRAM(s) Oral <User Schedule>  hydrALAZINE Injectable 5 milliGRAM(s) IV Push every 4 hours PRN  hydrALAZINE Injectable 10 milliGRAM(s) IV Push every 4 hours PRN  labetalol Injectable 10 milliGRAM(s) IV Push every 10 minutes PRN  lactulose Syrup 20 Gram(s) Oral daily PRN  lidocaine   4% Patch 1 Patch Transdermal daily  melatonin 3 milliGRAM(s) Oral <User Schedule>  methocarbamol 750 milliGRAM(s) Oral three times a day PRN  multivitamin 1 Tablet(s) Oral daily  ondansetron Injectable 4 milliGRAM(s) IV Push every 8 hours PRN  oxyCODONE    IR 5 milliGRAM(s) Oral four times a day PRN  pantoprazole   Suspension 40 milliGRAM(s) Oral before breakfast  polyethylene glycol 3350 17 Gram(s) Oral daily  senna 2 Tablet(s) Oral at bedtime      Data:      T(C): 36.8  HR: 60  BP: 114/73  RR: 18  SpO2: 97%      Exam  General: WNL  Chest: WNL  Abdomen: WNL  Extremities: WNL      Lab  -WBC 14.07 / HgB 13.1 / Hct 38.6 / Plt 375  -Na 138 / Cl 97 / BUN 8.6 / Glucose 124  -K 4.2 / CO2 30.0 / Cr 0.59  - / Alk Phos 150 / T.Bili 0.4      Imaging: bilateral lung nodules      Plan:   -63y Male presents for CT guided left lung nodule bx

## 2023-08-15 NOTE — PROGRESS NOTE ADULT - NUTRITIONAL ASSESSMENT
This patient has been assessed with a concern for Malnutrition and has been determined to have a diagnosis/diagnoses of Moderate protein-calorie malnutrition.    This patient is being managed with:   Diet DASH/TLC-  Sodium & Cholesterol Restricted  Entered: Jul 31 2023 10:43PM  

## 2023-08-15 NOTE — PROGRESS NOTE ADULT - SUBJECTIVE AND OBJECTIVE BOX
Patient is a 63y old  Male who presents with a chief complaint of CVA with R sided weakness (14 Aug 2023 10:14)      Patient seen and examined at bedside with  , ( Incomplete )     ALLERGIES:  No Known Allergies    MEDICATIONS  (STANDING):  atorvastatin 80 milliGRAM(s) Oral at bedtime  folic acid 1 milliGRAM(s) Oral daily  gabapentin 300 milliGRAM(s) Oral <User Schedule>  lidocaine   4% Patch 1 Patch Transdermal daily  melatonin 3 milliGRAM(s) Oral <User Schedule>  multivitamin 1 Tablet(s) Oral daily  pantoprazole   Suspension 40 milliGRAM(s) Oral before breakfast  polyethylene glycol 3350 17 Gram(s) Oral daily  senna 2 Tablet(s) Oral at bedtime    MEDICATIONS  (PRN):  acetaminophen     Tablet .. 650 milliGRAM(s) Oral every 6 hours PRN Temp greater or equal to 38C (100.4F), Mild Pain (1 - 3)  aluminum hydroxide/magnesium hydroxide/simethicone Suspension 30 milliLiter(s) Oral every 4 hours PRN Dyspepsia  hydrALAZINE Injectable 5 milliGRAM(s) IV Push every 4 hours PRN SBP >140  hydrALAZINE Injectable 10 milliGRAM(s) IV Push every 4 hours PRN SBP >160  labetalol Injectable 10 milliGRAM(s) IV Push every 10 minutes PRN SBP> 160 and DBP> 90  lactulose Syrup 20 Gram(s) Oral daily PRN constipation  methocarbamol 750 milliGRAM(s) Oral three times a day PRN Muscle Spasm  ondansetron Injectable 4 milliGRAM(s) IV Push every 8 hours PRN Nausea and/or Vomiting  oxyCODONE    IR 5 milliGRAM(s) Oral four times a day PRN Moderate Pain (4 - 6) and Severe Pain (7-10)    Vital Signs Last 24 Hrs  T(F): 98.3 (15 Aug 2023 08:00), Max: 99.7 (14 Aug 2023 20:45)  HR: 60 (15 Aug 2023 08:00) (60 - 90)  BP: 114/73 (15 Aug 2023 08:00) (97/64 - 129/91)  RR: 18 (15 Aug 2023 08:00) (18 - 18)  SpO2: 97% (15 Aug 2023 08:00) (90% - 97%)  I&O's Summary      PHYSICAL EXAM:  General:   ENT:   Neck:   Lungs:   Cardio: RRR, S1/S2, No murmur  Abdomen: Soft, Nontender, Nondistended; Bowel sounds present  Extremities: No calf tenderness, No pitting edema    LABS:    08-14    138  |  97  |  8.6  ----------------------------<  124  4.2   |  30.0  |  0.59    Ca    9.4      14 Aug 2023 06:53  Mg     1.9     08-14    TPro  6.9  /  Alb  3.7  /  TBili  0.4  /  DBili  0.1  /  AST  58  /  ALT  118  /  AlkPhos  150  08-14                    08-01 Chol 156 mg/dL LDL -- HDL 53 mg/dL Trig 180 mg/dL                  Urinalysis Basic - ( 14 Aug 2023 06:53 )    Color: x / Appearance: x / SG: x / pH: x  Gluc: 124 mg/dL / Ketone: x  / Bili: x / Urobili: x   Blood: x / Protein: x / Nitrite: x   Leuk Esterase: x / RBC: x / WBC x   Sq Epi: x / Non Sq Epi: x / Bacteria: x          RADIOLOGY & ADDITIONAL TESTS:       Patient is a 63y old  Male who presents with a chief complaint of CVA with R sided weakness (14 Aug 2023 10:14)      Patient seen and examined at bedside with  Tyler this am   He is feeling same , right foot numbness , worsening pain in the body and shoulder trough the day     no overnight event   D/w IR for lung mass biopsy       ALLERGIES:  No Known Allergies    MEDICATIONS  (STANDING):  atorvastatin 80 milliGRAM(s) Oral at bedtime  folic acid 1 milliGRAM(s) Oral daily  gabapentin 300 milliGRAM(s) Oral <User Schedule>  lidocaine   4% Patch 1 Patch Transdermal daily  melatonin 3 milliGRAM(s) Oral <User Schedule>  multivitamin 1 Tablet(s) Oral daily  pantoprazole   Suspension 40 milliGRAM(s) Oral before breakfast  polyethylene glycol 3350 17 Gram(s) Oral daily  senna 2 Tablet(s) Oral at bedtime    MEDICATIONS  (PRN):  acetaminophen     Tablet .. 650 milliGRAM(s) Oral every 6 hours PRN Temp greater or equal to 38C (100.4F), Mild Pain (1 - 3)  aluminum hydroxide/magnesium hydroxide/simethicone Suspension 30 milliLiter(s) Oral every 4 hours PRN Dyspepsia  hydrALAZINE Injectable 5 milliGRAM(s) IV Push every 4 hours PRN SBP >140  hydrALAZINE Injectable 10 milliGRAM(s) IV Push every 4 hours PRN SBP >160  labetalol Injectable 10 milliGRAM(s) IV Push every 10 minutes PRN SBP> 160 and DBP> 90  lactulose Syrup 20 Gram(s) Oral daily PRN constipation  methocarbamol 750 milliGRAM(s) Oral three times a day PRN Muscle Spasm  ondansetron Injectable 4 milliGRAM(s) IV Push every 8 hours PRN Nausea and/or Vomiting  oxyCODONE    IR 5 milliGRAM(s) Oral four times a day PRN Moderate Pain (4 - 6) and Severe Pain (7-10)    Vital Signs Last 24 Hrs  T(F): 98.3 (15 Aug 2023 08:00), Max: 99.7 (14 Aug 2023 20:45)  HR: 60 (15 Aug 2023 08:00) (60 - 90)  BP: 114/73 (15 Aug 2023 08:00) (97/64 - 129/91)  RR: 18 (15 Aug 2023 08:00) (18 - 18)  SpO2: 97% (15 Aug 2023 08:00) (90% - 97%)  I&O's Summary      PHYSICAL EXAM:  General: lying in the bed , no distress  Neck: supple , no JVD   Lungs: CTA bilateral   Cardio: RRR, S1/S2, No murmur  Abdomen: Soft, Nontender, Nondistended; Bowel sounds present  Extremities: No calf tenderness, No pitting edema  Neuro : awake m rue weakness , RLE strength normal           LABS:    08-14    138  |  97  |  8.6  ----------------------------<  124  4.2   |  30.0  |  0.59    Ca    9.4      14 Aug 2023 06:53  Mg     1.9     08-14    TPro  6.9  /  Alb  3.7  /  TBili  0.4  /  DBili  0.1  /  AST  58  /  ALT  118  /  AlkPhos  150  08-14                    08-01 Chol 156 mg/dL LDL -- HDL 53 mg/dL Trig 180 mg/dL                  Urinalysis Basic - ( 14 Aug 2023 06:53 )    Color: x / Appearance: x / SG: x / pH: x  Gluc: 124 mg/dL / Ketone: x  / Bili: x / Urobili: x   Blood: x / Protein: x / Nitrite: x   Leuk Esterase: x / RBC: x / WBC x   Sq Epi: x / Non Sq Epi: x / Bacteria: x          RADIOLOGY & ADDITIONAL TESTS:

## 2023-08-15 NOTE — PROGRESS NOTE ADULT - PROVIDER SPECIALTY LIST ADULT
Hospitalist
Hospitalist
Neurology
Neurology
Brain Injury Medicine
Brain Injury Medicine
Hospitalist
Hospitalist
Intervent Radiology
Neurology
Neurosurgery
Brain Injury Medicine
Hospitalist
Neurology
Neurology
Neurosurgery
Neurosurgery
Hospitalist
Neurosurgery
Neurosurgery
Thoracic Surgery
Thoracic Surgery
Hospitalist
Hospitalist
Thoracic Surgery

## 2023-08-15 NOTE — PROGRESS NOTE ADULT - REASON FOR ADMISSION
CVA with R sided weakness
R sided weakness
CVA with R sided weakness
R sided weakness
CVA with R sided weakness
Encephalopathy and new onset Right hemiparesis
CVA with R sided weakness

## 2023-08-16 ENCOUNTER — TRANSCRIPTION ENCOUNTER (OUTPATIENT)
Age: 63
End: 2023-08-16

## 2023-08-16 VITALS
RESPIRATION RATE: 18 BRPM | TEMPERATURE: 99 F | HEART RATE: 70 BPM | DIASTOLIC BLOOD PRESSURE: 75 MMHG | SYSTOLIC BLOOD PRESSURE: 125 MMHG | OXYGEN SATURATION: 98 %

## 2023-08-16 LAB
ALBUMIN SERPL ELPH-MCNC: 3.2 G/DL — LOW (ref 3.3–5.2)
ALP SERPL-CCNC: 148 U/L — HIGH (ref 40–120)
ALT FLD-CCNC: 105 U/L — HIGH
ANION GAP SERPL CALC-SCNC: 9 MMOL/L — SIGNIFICANT CHANGE UP (ref 5–17)
AST SERPL-CCNC: 45 U/L — HIGH
BILIRUB SERPL-MCNC: 0.2 MG/DL — LOW (ref 0.4–2)
BUN SERPL-MCNC: 9.4 MG/DL — SIGNIFICANT CHANGE UP (ref 8–20)
CALCIUM SERPL-MCNC: 8.7 MG/DL — SIGNIFICANT CHANGE UP (ref 8.4–10.5)
CHLORIDE SERPL-SCNC: 99 MMOL/L — SIGNIFICANT CHANGE UP (ref 96–108)
CO2 SERPL-SCNC: 29 MMOL/L — SIGNIFICANT CHANGE UP (ref 22–29)
CREAT SERPL-MCNC: 0.62 MG/DL — SIGNIFICANT CHANGE UP (ref 0.5–1.3)
EGFR: 108 ML/MIN/1.73M2 — SIGNIFICANT CHANGE UP
GLUCOSE SERPL-MCNC: 110 MG/DL — HIGH (ref 70–99)
HCT VFR BLD CALC: 38.8 % — LOW (ref 39–50)
HGB BLD-MCNC: 12.5 G/DL — LOW (ref 13–17)
MCHC RBC-ENTMCNC: 28.7 PG — SIGNIFICANT CHANGE UP (ref 27–34)
MCHC RBC-ENTMCNC: 32.2 GM/DL — SIGNIFICANT CHANGE UP (ref 32–36)
MCV RBC AUTO: 89.2 FL — SIGNIFICANT CHANGE UP (ref 80–100)
PLATELET # BLD AUTO: 389 K/UL — SIGNIFICANT CHANGE UP (ref 150–400)
POTASSIUM SERPL-MCNC: 4.2 MMOL/L — SIGNIFICANT CHANGE UP (ref 3.5–5.3)
POTASSIUM SERPL-SCNC: 4.2 MMOL/L — SIGNIFICANT CHANGE UP (ref 3.5–5.3)
PROT SERPL-MCNC: 6.2 G/DL — LOW (ref 6.6–8.7)
RBC # BLD: 4.35 M/UL — SIGNIFICANT CHANGE UP (ref 4.2–5.8)
RBC # FLD: 12.5 % — SIGNIFICANT CHANGE UP (ref 10.3–14.5)
SODIUM SERPL-SCNC: 137 MMOL/L — SIGNIFICANT CHANGE UP (ref 135–145)
WBC # BLD: 9.44 K/UL — SIGNIFICANT CHANGE UP (ref 3.8–10.5)
WBC # FLD AUTO: 9.44 K/UL — SIGNIFICANT CHANGE UP (ref 3.8–10.5)

## 2023-08-16 PROCEDURE — 71045 X-RAY EXAM CHEST 1 VIEW: CPT | Mod: 26

## 2023-08-16 PROCEDURE — 99239 HOSP IP/OBS DSCHRG MGMT >30: CPT

## 2023-08-16 RX ORDER — ATORVASTATIN CALCIUM 80 MG/1
1 TABLET, FILM COATED ORAL
Qty: 30 | Refills: 0
Start: 2023-08-16 | End: 2023-09-14

## 2023-08-16 RX ORDER — SENNA PLUS 8.6 MG/1
2 TABLET ORAL
Qty: 0 | Refills: 0 | DISCHARGE
Start: 2023-08-16

## 2023-08-16 RX ORDER — GABAPENTIN 400 MG/1
1 CAPSULE ORAL
Qty: 60 | Refills: 0
Start: 2023-08-16 | End: 2023-09-14

## 2023-08-16 RX ORDER — ACETAMINOPHEN 500 MG
2 TABLET ORAL
Qty: 0 | Refills: 0 | DISCHARGE
Start: 2023-08-16

## 2023-08-16 RX ORDER — FOLIC ACID 0.8 MG
1 TABLET ORAL
Qty: 0 | Refills: 0 | DISCHARGE
Start: 2023-08-16

## 2023-08-16 RX ORDER — METHOCARBAMOL 500 MG/1
1 TABLET, FILM COATED ORAL
Qty: 20 | Refills: 0
Start: 2023-08-16

## 2023-08-16 RX ADMIN — Medication 1 TABLET(S): at 11:36

## 2023-08-16 RX ADMIN — LIDOCAINE 1 PATCH: 4 CREAM TOPICAL at 00:00

## 2023-08-16 RX ADMIN — OXYCODONE HYDROCHLORIDE 5 MILLIGRAM(S): 5 TABLET ORAL at 09:33

## 2023-08-16 RX ADMIN — OXYCODONE HYDROCHLORIDE 5 MILLIGRAM(S): 5 TABLET ORAL at 08:35

## 2023-08-16 RX ADMIN — OXYCODONE HYDROCHLORIDE 5 MILLIGRAM(S): 5 TABLET ORAL at 02:20

## 2023-08-16 RX ADMIN — Medication 1 MILLIGRAM(S): at 11:04

## 2023-08-16 RX ADMIN — PANTOPRAZOLE SODIUM 40 MILLIGRAM(S): 20 TABLET, DELAYED RELEASE ORAL at 05:03

## 2023-08-16 RX ADMIN — LIDOCAINE 1 PATCH: 4 CREAM TOPICAL at 11:02

## 2023-08-16 RX ADMIN — OXYCODONE HYDROCHLORIDE 5 MILLIGRAM(S): 5 TABLET ORAL at 01:18

## 2023-08-16 NOTE — DISCHARGE NOTE PROVIDER - CARE PROVIDER_API CALL
Ernie Soto [facility]  Neurosurgery  [address] [place] [address]  Phone: [phone]  Fax: [phone]  Follow Up Time: 2 weeks    Brigitte Munoz)  Hematology; Internal Medicine; Medical Oncology  [address] [place] [address]  Phone: [phone]  Fax: [phone]  Follow Up Time: 1 week

## 2023-08-16 NOTE — DISCHARGE NOTE PROVIDER - ATTENDING DISCHARGE PHYSICAL EXAMINATION:
pt is feeling well , no complaints seen with  Adenike     Vital Signs Last 24 Hrs  T(C): 37.2 (16 Aug 2023 07:42), Max: 37.2 (16 Aug 2023 00:57)  T(F): 98.9 (16 Aug 2023 07:42), Max: 98.9 (16 Aug 2023 00:57)  HR: 68 (16 Aug 2023 07:42) (65 - 84)  BP: 127/76 (16 Aug 2023 07:42) (100/62 - 127/76)  BP(mean): --  RR: 18 (16 Aug 2023 07:42) (17 - 18)  SpO2: 97% (16 Aug 2023 07:42) (94% - 98%)    Parameters below as of 16 Aug 2023 07:42  Patient On (Oxygen Delivery Method): room air    Lungs : CTA bilateral   Heart : regular s1 /s2   Abd soft no tenderness , BS   Ext : no edema

## 2023-08-16 NOTE — DISCHARGE NOTE PROVIDER - HOSPITAL COURSE
63 year old  male presents to [facility] ER for weakness of Right arm and leg which began >2 days ago.  Pt also noted speech slurring.  Further questioning of pt. positive for visual disturbances for the past 2 months.  Pt unable to describe visual field defects/loss but noted positive for photophobia.  Pt. also c/o pain, hot in quality" in both right leg/arm, with need for assistance for ambulation by sister.  No h/o falls.  Pt note for h/o ?HTN in past, does not see a PMD on any regular basis.  + cocaine and THC use, as well as Alcohol abuse (daily)   CT of head done by ER shows age indeterminate L MCA territory, Right occipital PCA territory infarct as well as scattered hemorrhagic lesions with surrounding edema in frontal/posterior/temporal areas.   Concern for underlying hemorrhagic mass lesions probably metastatic in origin cannot be excluded.  Pt admitted for further workup , seen by oncology , NS and neurology CT of  Chest showing MIGEL spiculated mass  -Unlikely CVA likely mets due to lung mass , Pt had repeat CT of brain 8/9 , stable reevaluated by NS . red to cont holding antiplatelet treatment . RUe weakness and RLE weakness improving , upper ext still weak , ambulates , speech normal    IR performed lung mass biopsy , cleared by PT to go home , pt need to follow up with NS in 2 weeks for repeat MR of brain   follow up with oncology and neurosurgery team

## 2023-08-16 NOTE — DISCHARGE NOTE PROVIDER - NSDCCPCAREPLAN_GEN_ALL_CORE_FT
PRINCIPAL DISCHARGE DIAGNOSIS  Diagnosis: Cerebral brain hemorrhage  Assessment and Plan of Treatment: with brain mass   see the neurosurgeon and oncologist in the office      SECONDARY DISCHARGE DIAGNOSES  Diagnosis: Lung mass  Assessment and Plan of Treatment: had biopsy please see the oncologist for result in 1-2 weeks    Diagnosis: Transaminitis  Assessment and Plan of Treatment: improved , avoid alcohol use    Diagnosis: Chronic alcoholism  Assessment and Plan of Treatment: stop drinking

## 2023-08-16 NOTE — DISCHARGE NOTE PROVIDER - PROVIDER TOKENS
PROVIDER:[TOKEN:[[id number]],FOLLOWUP:[2 weeks]],PROVIDER:[TOKEN:[[id number]],FOLLOWUP:[1 week]]

## 2023-08-16 NOTE — DISCHARGE NOTE PROVIDER - NSDCMRMEDTOKEN_GEN_ALL_CORE_FT
acetaminophen 325 mg oral tablet: 2 tab(s) orally every 6 hours As needed Temp greater or equal to 38C (100.4F), Mild Pain (1 - 3)  atorvastatin 80 mg oral tablet: 1 tab(s) orally once a day (at bedtime)  folic acid 1 mg oral tablet: 1 tab(s) orally once a day  gabapentin 300 mg oral capsule: 1 cap(s) orally 2 times a day  methocarbamol 750 mg oral tablet: 1 tab(s) orally 2 times a day as needed for Muscle Spasm  Multiple Vitamins oral tablet: 1 tab(s) orally once a day  senna leaf extract oral tablet: 2 tab(s) orally once a day (at bedtime)

## 2023-08-17 LAB — SURGICAL PATHOLOGY STUDY: SIGNIFICANT CHANGE UP

## 2023-08-22 PROBLEM — I10 ESSENTIAL (PRIMARY) HYPERTENSION: Chronic | Status: ACTIVE | Noted: 2023-07-31

## 2023-08-28 PROBLEM — Z00.00 ENCOUNTER FOR PREVENTIVE HEALTH EXAMINATION: Status: ACTIVE | Noted: 2023-08-28

## 2023-08-30 ENCOUNTER — INPATIENT (INPATIENT)
Facility: HOSPITAL | Age: 63
LOS: 36 days | Discharge: HOSPICE | DRG: 54 | End: 2023-10-06
Attending: STUDENT IN AN ORGANIZED HEALTH CARE EDUCATION/TRAINING PROGRAM | Admitting: HOSPITALIST
Payer: MEDICAID

## 2023-08-30 VITALS
DIASTOLIC BLOOD PRESSURE: 91 MMHG | HEART RATE: 66 BPM | HEIGHT: 64.96 IN | WEIGHT: 134.92 LBS | RESPIRATION RATE: 17 BRPM | TEMPERATURE: 98 F | OXYGEN SATURATION: 99 % | SYSTOLIC BLOOD PRESSURE: 146 MMHG

## 2023-08-30 LAB
ALBUMIN SERPL ELPH-MCNC: 4.2 G/DL — SIGNIFICANT CHANGE UP (ref 3.3–5.2)
ALP SERPL-CCNC: 106 U/L — SIGNIFICANT CHANGE UP (ref 40–120)
ALT FLD-CCNC: 60 U/L — HIGH
ANION GAP SERPL CALC-SCNC: 10 MMOL/L — SIGNIFICANT CHANGE UP (ref 5–17)
APAP SERPL-MCNC: <3 UG/ML — LOW (ref 10–26)
APTT BLD: 28.8 SEC — SIGNIFICANT CHANGE UP (ref 24.5–35.6)
AST SERPL-CCNC: 24 U/L — SIGNIFICANT CHANGE UP
BASOPHILS # BLD AUTO: 0.04 K/UL — SIGNIFICANT CHANGE UP (ref 0–0.2)
BASOPHILS NFR BLD AUTO: 0.4 % — SIGNIFICANT CHANGE UP (ref 0–2)
BILIRUB SERPL-MCNC: 0.2 MG/DL — LOW (ref 0.4–2)
BUN SERPL-MCNC: 13.7 MG/DL — SIGNIFICANT CHANGE UP (ref 8–20)
CALCIUM SERPL-MCNC: 9.5 MG/DL — SIGNIFICANT CHANGE UP (ref 8.4–10.5)
CHLORIDE SERPL-SCNC: 100 MMOL/L — SIGNIFICANT CHANGE UP (ref 96–108)
CO2 SERPL-SCNC: 30 MMOL/L — HIGH (ref 22–29)
CREAT SERPL-MCNC: 0.62 MG/DL — SIGNIFICANT CHANGE UP (ref 0.5–1.3)
EGFR: 108 ML/MIN/1.73M2 — SIGNIFICANT CHANGE UP
EOSINOPHIL # BLD AUTO: 0.17 K/UL — SIGNIFICANT CHANGE UP (ref 0–0.5)
EOSINOPHIL NFR BLD AUTO: 1.8 % — SIGNIFICANT CHANGE UP (ref 0–6)
ETHANOL SERPL-MCNC: <10 MG/DL — SIGNIFICANT CHANGE UP (ref 0–9)
GLUCOSE SERPL-MCNC: 95 MG/DL — SIGNIFICANT CHANGE UP (ref 70–99)
HCT VFR BLD CALC: 39.1 % — SIGNIFICANT CHANGE UP (ref 39–50)
HGB BLD-MCNC: 13 G/DL — SIGNIFICANT CHANGE UP (ref 13–17)
IMM GRANULOCYTES NFR BLD AUTO: 0.3 % — SIGNIFICANT CHANGE UP (ref 0–0.9)
INR BLD: 1.05 RATIO — SIGNIFICANT CHANGE UP (ref 0.85–1.18)
LYMPHOCYTES # BLD AUTO: 2.27 K/UL — SIGNIFICANT CHANGE UP (ref 1–3.3)
LYMPHOCYTES # BLD AUTO: 24.4 % — SIGNIFICANT CHANGE UP (ref 13–44)
MCHC RBC-ENTMCNC: 29 PG — SIGNIFICANT CHANGE UP (ref 27–34)
MCHC RBC-ENTMCNC: 33.2 GM/DL — SIGNIFICANT CHANGE UP (ref 32–36)
MCV RBC AUTO: 87.1 FL — SIGNIFICANT CHANGE UP (ref 80–100)
MONOCYTES # BLD AUTO: 0.71 K/UL — SIGNIFICANT CHANGE UP (ref 0–0.9)
MONOCYTES NFR BLD AUTO: 7.6 % — SIGNIFICANT CHANGE UP (ref 2–14)
NEUTROPHILS # BLD AUTO: 6.08 K/UL — SIGNIFICANT CHANGE UP (ref 1.8–7.4)
NEUTROPHILS NFR BLD AUTO: 65.5 % — SIGNIFICANT CHANGE UP (ref 43–77)
PLATELET # BLD AUTO: 338 K/UL — SIGNIFICANT CHANGE UP (ref 150–400)
POTASSIUM SERPL-MCNC: 4.2 MMOL/L — SIGNIFICANT CHANGE UP (ref 3.5–5.3)
POTASSIUM SERPL-SCNC: 4.2 MMOL/L — SIGNIFICANT CHANGE UP (ref 3.5–5.3)
PROT SERPL-MCNC: 7.1 G/DL — SIGNIFICANT CHANGE UP (ref 6.6–8.7)
PROTHROM AB SERPL-ACNC: 11.6 SEC — SIGNIFICANT CHANGE UP (ref 9.5–13)
RBC # BLD: 4.49 M/UL — SIGNIFICANT CHANGE UP (ref 4.2–5.8)
RBC # FLD: 12.9 % — SIGNIFICANT CHANGE UP (ref 10.3–14.5)
SALICYLATES SERPL-MCNC: <0.6 MG/DL — LOW (ref 10–20)
SODIUM SERPL-SCNC: 139 MMOL/L — SIGNIFICANT CHANGE UP (ref 135–145)
TROPONIN T SERPL-MCNC: <0.01 NG/ML — SIGNIFICANT CHANGE UP (ref 0–0.06)
WBC # BLD: 9.3 K/UL — SIGNIFICANT CHANGE UP (ref 3.8–10.5)
WBC # FLD AUTO: 9.3 K/UL — SIGNIFICANT CHANGE UP (ref 3.8–10.5)

## 2023-08-30 PROCEDURE — 71045 X-RAY EXAM CHEST 1 VIEW: CPT | Mod: 26

## 2023-08-30 PROCEDURE — 99285 EMERGENCY DEPT VISIT HI MDM: CPT

## 2023-08-30 PROCEDURE — 70450 CT HEAD/BRAIN W/O DYE: CPT | Mod: 26,MA

## 2023-08-30 RX ORDER — LEVETIRACETAM 250 MG/1
500 TABLET, FILM COATED ORAL ONCE
Refills: 0 | Status: COMPLETED | OUTPATIENT
Start: 2023-08-30 | End: 2023-08-30

## 2023-08-30 RX ADMIN — LEVETIRACETAM 400 MILLIGRAM(S): 250 TABLET, FILM COATED ORAL at 22:07

## 2023-08-30 NOTE — H&P ADULT - PROBLEM SELECTOR PLAN 3
urine tox negative  ams likely from small hemorrhage in brain with vasogenic edema  NPO FOR NOW AS PT. CONFUSED AND MAY ASPIRATE  continue 1 :1   further recommendations per mri brain , neuro surgery and oncology teams.

## 2023-08-30 NOTE — ED PROVIDER NOTE - OBJECTIVE STATEMENT
Pt is a 63 year old  male who discharged recently from [facility] for right occipital PCA territory infarct as well as scattered hemorrhagic lesions with surrounding edema in frontal/posterior/temporal areas, R-sided weakness at baseline, EtOH daily user, cocaine/THC abuse, presents to the ED for worsening AMS over the past 3 days. His sister is at bed side reporting that pt has been laughing to himself, having generalized weakness, yelling and screaming all night ,and refusing to eat or take his medications. Pt did not take his meds since 2 days ago. Pt is conformably lying on the ED bed.

## 2023-08-30 NOTE — ED ADULT NURSE NOTE - NSFALLRISKINTERV_ED_ALL_ED
Assistance OOB with selected safe patient handling equipment if applicable/Assistance with ambulation/Communicate fall risk and risk factors to all staff, patient, and family/Monitor gait and stability/Monitor for mental status changes and reorient to person, place, and time, as needed/Move patient closer to nursing station/within visual sight of ED staff/Provide visual cue: yellow wristband, yellow gown, etc/Reinforce activity limits and safety measures with patient and family/Toileting schedule using arm’s reach rule for commode and bathroom/Use of alarms - bed, stretcher, chair and/or video monitoring/Call bell, personal items and telephone in reach/Instruct patient to call for assistance before getting out of bed/chair/stretcher/Non-slip footwear applied when patient is off stretcher/[place] to call system/Physically safe environment - no spills, clutter or unnecessary equipment/Purposeful Proactive Rounding/Room/bathroom lighting operational, light cord in reach

## 2023-08-30 NOTE — ED ADULT NURSE NOTE - ED STAT RN HANDOFF DETAILS
handoff care to ZAIDA Berger. handoff care at this time. pt remains A+O x1. RR even and unlabored. pt placed on telebox. pt in NAD.

## 2023-08-30 NOTE — ED PROVIDER NOTE - PROGRESS NOTE DETAILS
MRI consent required. Tried calling patient's sister, Lea, twice at [phone]). No answer. Will attempt again. Emergency [facility] MRI consent form signed by Dr. Hernandez and Dr. Montejo. Sister could not be reached. Patient is altered and agitated. Previously received an MRI (2weeks ago) with no issues.

## 2023-08-30 NOTE — CONSULT NOTE ADULT - SUBJECTIVE AND OBJECTIVE BOX
Patient is a 63y old  Male who presents with a chief complaint of AMS  HPI: 63M previously here s/p stroke, hemorrhagic brain mass, new lung mass identified, pt has biopsy of lung mass on that admission which shows adenocarcinoma. Plan was for patient to follow up with nsg as outpatient and repeat MRI brain. However, pt now returns with altered mental status per the family and acting "off". Pt is seen in ED, sitting on bed calm but confused. Denies headache, nausea, vomiting, weakness.       PAST MEDICAL & SURGICAL HISTORY:  Hypertension      CVA (cerebral vascular accident)      Mild tetrahydrocannabinol (THC) abuse      ETOH abuse      Cocaine abuse      No significant past surgical history        FAMILY HISTORY:      Allergies    No Known Allergies    Intolerances        REVIEW OF SYSTEMS  Negative except as noted in HPI  CONSTITUTIONAL: No fever, weight loss, or fatigue  EYES: No eye pain, visual disturbances, or discharge  ENMT:  No difficulty hearing, tinnitus, vertigo; No sinus or throat pain  NECK: No pain or stiffness  BREASTS: No pain, masses, or nipple discharge  RESPIRATORY: No cough, wheezing, chills or hemoptysis; No shortness of breath  CARDIOVASCULAR: No chest pain, palpitations, dizziness, or leg swelling  GASTROINTESTINAL: No abdominal or epigastric pain. No nausea, vomiting, or hematemesis; No diarrhea or constipation. No melena or hematochezia.  GENITOURINARY: No dysuria, frequency, hematuria, or incontinence  NEUROLOGICAL: No headaches, memory loss, loss of strength, numbness, or tremors  SKIN: No itching, burning, rashes, or lesions   LYMPH NODES: No enlarged glands  ENDOCRINE: No heat or cold intolerance; No hair loss  MUSCULOSKELETAL: No joint pain or swelling; No muscle, back, or extremity pain  PSYCHIATRIC: No depression, anxiety, mood swings, or difficulty sleeping  HEME/LYMPH: No easy bruising, or bleeding gums  ALLERY AND IMMUNOLOGIC: No hives or eczema    HOME MEDICATIONS:  Home Medications:  acetaminophen 325 mg oral tablet: 2 tab(s) orally every 6 hours As needed Temp greater or equal to 38C (100.4F), Mild Pain (1 - 3) (16 Aug 2023 12:48)  folic acid 1 mg oral tablet: 1 tab(s) orally once a day (16 Aug 2023 12:48)  Multiple Vitamins oral tablet: 1 tab(s) orally once a day (16 Aug 2023 12:48)  senna leaf extract oral tablet: 2 tab(s) orally once a day (at bedtime) (16 Aug 2023 12:48)      MEDICATIONS:  Antibiotics:    Neuro:    Anticoagulation:    OTHER:    IVF:      Vital Signs Last 24 Hrs  T(C): 36.9 (30 Aug 2023 19:09), Max: 36.9 (30 Aug 2023 16:35)  T(F): 98.4 (30 Aug 2023 19:09), Max: 98.4 (30 Aug 2023 16:35)  HR: 67 (30 Aug 2023 19:09) (66 - 67)  BP: 163/87 (30 Aug 2023 19:09) (146/91 - 163/87)  BP(mean): --  RR: 18 (30 Aug 2023 19:09) (17 - 18)  SpO2: 100% (30 Aug 2023 19:09) (99% - 100%)    Parameters below as of 30 Aug 2023 19:09  Patient On (Oxygen Delivery Method): room air          PHYSICAL EXAM:  GENERAL: NAD, calm   HEAD:  Atraumatic  EYES: Conjunctiva and sclera clear  ENMT: no obvious lesions   ARIANE COMA SCORE: E-4 V-4 M-6 = 14  MENTAL STATUS: AAO x1; Awake; Opens eyes spontaneously; no aphasia; following simple commands  CRANIAL NERVES: + right facial, visual fields full to confrontation, PERRL. EOMI without nystagmus. Facial sensation intact V1-3 distribution b/l. tongue midline. Hearing grossly intact. Speech clear. Head turning and shoulder shrug intact.   MOTOR: RUe 4/5, RLE 4+/5, LUE/LLE 5/5  SENSATION: grossly intact to light touch all extremities  CHEST/LUNG: nonlabored   HEART: rrr  ABDOMEN: Soft, nontender, nondistended  EXTREMITIES:  no edema   SKIN: Warm, dry; no rashes or lesions    LABS:                        13.0   9.30  )-----------( 338      ( 30 Aug 2023 17:38 )             39.1     08-30    139  |  100  |  13.7  ----------------------------<  95  4.2   |  30.0<H>  |  0.62    Ca    9.5      30 Aug 2023 17:38    TPro  7.1  /  Alb  4.2  /  TBili  0.2<L>  /  DBili  x   /  AST  24  /  ALT  60<H>  /  AlkPhos  106  08-30    PT/INR - ( 30 Aug 2023 17:38 )   PT: 11.6 sec;   INR: 1.05 ratio         PTT - ( 30 Aug 2023 17:38 )  PTT:28.8 sec  Urinalysis Basic - ( 30 Aug 2023 17:38 )    Color: x / Appearance: x / SG: x / pH: x  Gluc: 95 mg/dL / Ketone: x  / Bili: x / Urobili: x   Blood: x / Protein: x / Nitrite: x   Leuk Esterase: x / RBC: x / WBC x   Sq Epi: x / Non Sq Epi: x / Bacteria: x        CULTURES:      RADIOLOGY & ADDITIONAL STUDIES:  < from: CT Head No Cont (08.30.23 @ 17:06) >  IMPRESSION:    Small hemorrhage with surrounding vasogenic edema in the left high   posterior parietal lobe is unchanged.    < end of copied text >      CAPRINI SCORE [CLOT]:  Patient has an estimated Caprini score of greater than 5.  However, the patient's unique clinical situation will be addressed in an individual manner to determine appropriate anticoagulation treatment, if any.

## 2023-08-30 NOTE — H&P ADULT - ASSESSMENT
pt. is a 63 male recently admitted at our facility and discharged  about 2 weeks ago, s/p stroke, hemorrhagic brain mass, new lung mass identified, pt had biopsy of lung mass on that admission which shows adenocarcinoma. Plan was out- pt follow up with neuro surgery, oncology and out patient repeat MRI brain. However, pt now returns with altered mental status per the family and acting "off". Pt is seen , lying in bed but at times tries to get out of bed , calm but confused.

## 2023-08-30 NOTE — ED ADULT TRIAGE NOTE - CHIEF COMPLAINT QUOTE
pt presents to ED for worsening AMS over the past 3 days, sister states pt has been laughing to himself, messing up all of his belongings, yelling and screaming all night and refusing to eat or take his medications.  pt recently dc'd from [facility] after having a stroke.  MD called for eval.

## 2023-08-30 NOTE — CONSULT NOTE ADULT - ASSESSMENT
63M with ?hemorrhagic brain mass, AMS     Plan   - q4 neuro checks   - MRI brain +/- with brain lab (ordered)  - Work up for causes of AMS, including tox screen - pt with known hx of cocaine and THC abuse   - Would restart Keppra   - Defer steroid decision to oncology for now   - Engage oncology in treatment discussion, lung bx from last admission shows adenocarcinoma, this is likely met, needs input if would benefit from surgery vs. radiation   - NSG to follow, further recs pending MRI and oncology input

## 2023-08-30 NOTE — ED PROVIDER NOTE - CLINICAL SUMMARY MEDICAL DECISION MAKING FREE TEXT BOX
Pt is a 63 year old  male who discharged recently from [facility] for right occipital PCA territory infarct as well as scattered hemorrhagic lesions with surrounding edema in frontal/posterior/temporal areas, R-sided weakness at baseline, EtOH daily user, cocaine/THC abuse, presents to the ED for worsening AMS over the past 3 days. His sister is at bed side reporting that pt has been laughing to himself, having generalized weakness, yelling and screaming all night, and refusing to eat or take his medications. Pt did not take his meds since 2 days ago. Pt is conformably lying on the ED bed.  Given AMS, priority CT called. Will check cbc, cmp, tox screening, acetaminophen level, SA level, Drug panel as well. Reassess.

## 2023-08-30 NOTE — H&P ADULT - NSHPPHYSICALEXAM_GEN_ALL_CORE
Vital Signs Last 24 Hrs  T(C): 36.9 (30 Aug 2023 19:09), Max: 36.9 (30 Aug 2023 16:35)  T(F): 98.4 (30 Aug 2023 19:09), Max: 98.4 (30 Aug 2023 16:35)  HR: 58 (30 Aug 2023 23:18) (58 - 67)  BP: 162/80 (30 Aug 2023 23:18) (146/91 - 163/87)  BP(mean): --  RR: 18 (30 Aug 2023 23:18) (17 - 18)  SpO2: 100% (30 Aug 2023 23:18) (99% - 100%)    Parameters below as of 30 Aug 2023 23:18  Patient On (Oxygen Delivery Method): room air    General: pt. in bed not in distress  eyes : PERRL. intact EOM  HENT: AT, NC.  no throat erythema or exudate. no facial droop  Neck: supple. no JVD.   Chest: CTA bilaterally  Heart: S1,S2. RRR. no heart murmur. no edema.  Abdomen: soft. non-tender. non-distended. + BS.   Ext: no calf tenderness. moving all ext without any noticeable restriction  Neuro: Alert, awake, knows that he is at [facility] , confused about the year and president . knows his name. follows command, mild RUE motor weakness appreciated , rest of the motor appears 5/5, speech is not broken but pt. is confused   Skin: warm and dry  vascular : distal pulses 2 + B/L   psych : confused but no agitation. Vital Signs Last 24 Hrs  T(C): 36.9 (30 Aug 2023 19:09), Max: 36.9 (30 Aug 2023 16:35)  T(F): 98.4 (30 Aug 2023 19:09), Max: 98.4 (30 Aug 2023 16:35)  HR: 58 (30 Aug 2023 23:18) (58 - 67)  BP: 162/80 (30 Aug 2023 23:18) (146/91 - 163/87)  BP(mean): --  RR: 18 (30 Aug 2023 23:18) (17 - 18)  SpO2: 100% (30 Aug 2023 23:18) (99% - 100%)    Parameters below as of 30 Aug 2023 23:18  Patient On (Oxygen Delivery Method): room air    General: pt. in bed not in distress  eyes : PERRL. intact EOM, no nystagmus  HENT: AT, NC.  no throat erythema or exudate. no facial droop  Neck: supple. no JVD.   Chest: CTA bilaterally  Heart: S1,S2. RRR. no heart murmur. no edema.  Abdomen: soft. non-tender. non-distended. + BS.   Ext: no calf tenderness. moving all ext without any noticeable restriction  Neuro: Alert, awake, knows that he is at [facility] , confused about the year and president . knows his name. follows command, mild RUE motor weakness appreciated , rest of the motor appears 5/5, speech is not broken but pt. is confused   Skin: warm and dry  vascular : distal pulses 2 + B/L   psych : confused but no agitation.

## 2023-08-30 NOTE — ED ADULT NURSE NOTE - CHIEF COMPLAINT QUOTE
pt presents to ED for worsening AMS over the past 3 days, sister states pt has been laughing to himself, messing up all of his belongings, yelling and screaming all night and refusing to eat or take his medications.  pt recently dc'd from [facility] after having a stroke.  MD called for eval.

## 2023-08-30 NOTE — ED ADULT NURSE REASSESSMENT NOTE - NS ED NURSE REASSESS COMMENT FT1
Assumed care of pt at 19:15 as stated in report from RN. Charting as noted. Patient A&O x1. reorientation provided.  non verbal indicators of pain/discomfort absent. RR even and unlabored.   Call bell within reach, bed locked in lowest position. IV site flushed w/ NS. No redness, swelling or pain noted to site. No signs of acute distress noted, safety maintained. Pt remains on CM in NSR. pt awaiting MRI. Assumed care of pt at 19:15 as stated in report from ZAIDA Eubanks. Charting as noted. Patient A&O x1. reorientation provided.  non verbal indicators of pain/discomfort absent. RR even and unlabored.   Call bell within reach, bed locked in lowest position. IV site flushed w/ NS. No redness, swelling or pain noted to site. No signs of acute distress noted, safety maintained. Pt remains on CM in NSR. pt awaiting MRI.

## 2023-08-30 NOTE — H&P ADULT - PROBLEM SELECTOR PLAN 1
hemorrhagic brain mass ?   neuro check q 4  hrs  ct head from today  Small hemorrhage with surrounding vasogenic edema in the left high   posterior parietal lobe is unchanged.  - keppra for seizure prophylaixs  - neuro surgery recommending oncology consult  and defer steroid to oncology team  - mri brain w/wo iv contrast hemorrhagic brain mass ?   neuro check q 4  hrs  ct head from today  Small hemorrhage with surrounding vasogenic edema in the left high   posterior parietal lobe is unchanged.  - keppra for seizure prophylaixs  - neuro surgery recommending oncology consult  and defer steroid to oncology team  - mri brain w/wo iv contrast  - vc boots for dvt prophylaxis

## 2023-08-30 NOTE — ED PROVIDER NOTE - ATTENDING CONTRIBUTION TO CARE
I, Jonathon Montejo, personally saw the patient with the resident, and completed the key components of the history and physical exam. I then discussed the management plan with the resident.    63 year old  male who discharged recently from [facility] for right occipital PCA territory infarct as well as scattered hemorrhagic lesion Brought in by family for altered mental status for the past 3 days.  Family report patient Getting out of bed, confused, not following directions.  Patient had prior history of alcohol and drug use.   Patient was supposed to get repeat MRI after discharge however patient has no insurance was not able to arrange follow-up. Lab values do not require emergent intervention.  CT scan showed hemorrhage with edema .  Patient seen by neurosurgery, will need MRI. 1:1 placed due to confusion and getting out of bed.  patient admitted for further work up.

## 2023-08-30 NOTE — H&P ADULT - PROBLEM SELECTOR PLAN 2
recently diagnosed with lung mass and biopsy adenocarcinoma  will request [facility] oncology consult  palliative care consult recently diagnosed with lung mass and biopsy adenocarcinoma  oncology consult called by ER  palliative care consult

## 2023-08-30 NOTE — H&P ADULT - HISTORY OF PRESENT ILLNESS
pt. is a 63 male recently admitted at our facility s/p stroke, hemorrhagic brain mass, new lung mass identified, pt had biopsy of lung mass on that admission which shows adenocarcinoma. Plan was for patient to follow up with neuro surgery as outpatient and repeat MRI brain. However, pt now returns with altered mental status per the family and acting "off". Pt is seen , lying in bed but at times tries to get out of bed , calm but confused. Denies headache, nausea, vomiting, weakness. cp, sob. abd. pain, diarrhea. pt. is a 63 male recently admitted at our facility and discharged  about 2 weeks ago, s/p stroke, hemorrhagic brain mass, new lung mass identified, pt had biopsy of lung mass on that admission which shows adenocarcinoma. Plan was out- pt follow up with neuro surgery, oncology and out patient repeat MRI brain. However, pt now returns with altered mental status per the family and acting "off". Pt is seen , lying in bed but at times tries to get out of bed , calm but confused. Denies headache, nausea, vomiting, weakness. cp, sob. abd. pain, diarrhea.  ct head from today :  Small hemorrhage with surrounding vasogenic edema in the left high   posterior parietal lobe is unchanged.

## 2023-08-30 NOTE — ED ADULT NURSE NOTE - NSICDXPASTMEDICALHX_GEN_ALL_CORE_FT
PAST MEDICAL HISTORY:  Cocaine abuse     CVA (cerebral vascular accident)     ETOH abuse     Hypertension     Mild tetrahydrocannabinol (THC) abuse

## 2023-08-30 NOTE — ED ADULT NURSE NOTE - OBJECTIVE STATEMENT
Pt a&ox1 brought to Emergency Department for altered mental status. As per family member pt had a stroke one month ago but became very altered since Sunday, family states pt stopped taking meds, was singing, dancing, making no sense. Pt still has residual right sided deficits. Pt placed in yellow gown, placed on  and cardiac monitoring, VSS, no distress noted.

## 2023-08-30 NOTE — ED PROVIDER NOTE - NSICDXPASTMEDICALHX_GEN_ALL_CORE_FT
PAST MEDICAL HISTORY:  Hypertension      PAST MEDICAL HISTORY:  Cocaine abuse     CVA (cerebral vascular accident)     ETOH abuse     Hypertension     Mild tetrahydrocannabinol (THC) abuse

## 2023-08-31 DIAGNOSIS — G93.89 OTHER SPECIFIED DISORDERS OF BRAIN: ICD-10-CM

## 2023-08-31 DIAGNOSIS — C34.90 MALIGNANT NEOPLASM OF UNSPECIFIED PART OF UNSPECIFIED BRONCHUS OR LUNG: ICD-10-CM

## 2023-08-31 DIAGNOSIS — I61.9 NONTRAUMATIC INTRACEREBRAL HEMORRHAGE, UNSPECIFIED: ICD-10-CM

## 2023-08-31 DIAGNOSIS — R41.82 ALTERED MENTAL STATUS, UNSPECIFIED: ICD-10-CM

## 2023-08-31 LAB
AMPHET UR-MCNC: NEGATIVE — SIGNIFICANT CHANGE UP
ANION GAP SERPL CALC-SCNC: 14 MMOL/L — SIGNIFICANT CHANGE UP (ref 5–17)
APPEARANCE UR: CLEAR — SIGNIFICANT CHANGE UP
BARBITURATES UR SCN-MCNC: NEGATIVE — SIGNIFICANT CHANGE UP
BASOPHILS # BLD AUTO: 0.03 K/UL — SIGNIFICANT CHANGE UP (ref 0–0.2)
BASOPHILS NFR BLD AUTO: 0.3 % — SIGNIFICANT CHANGE UP (ref 0–2)
BENZODIAZ UR-MCNC: NEGATIVE — SIGNIFICANT CHANGE UP
BILIRUB UR-MCNC: NEGATIVE — SIGNIFICANT CHANGE UP
BUN SERPL-MCNC: 11.4 MG/DL — SIGNIFICANT CHANGE UP (ref 8–20)
CALCIUM SERPL-MCNC: 9.7 MG/DL — SIGNIFICANT CHANGE UP (ref 8.4–10.5)
CHLORIDE SERPL-SCNC: 101 MMOL/L — SIGNIFICANT CHANGE UP (ref 96–108)
CO2 SERPL-SCNC: 27 MMOL/L — SIGNIFICANT CHANGE UP (ref 22–29)
COCAINE METAB.OTHER UR-MCNC: NEGATIVE — SIGNIFICANT CHANGE UP
COLOR SPEC: YELLOW — SIGNIFICANT CHANGE UP
CREAT SERPL-MCNC: 0.61 MG/DL — SIGNIFICANT CHANGE UP (ref 0.5–1.3)
DIFF PNL FLD: NEGATIVE — SIGNIFICANT CHANGE UP
EGFR: 109 ML/MIN/1.73M2 — SIGNIFICANT CHANGE UP
EOSINOPHIL # BLD AUTO: 0.18 K/UL — SIGNIFICANT CHANGE UP (ref 0–0.5)
EOSINOPHIL NFR BLD AUTO: 1.8 % — SIGNIFICANT CHANGE UP (ref 0–6)
GLUCOSE SERPL-MCNC: 91 MG/DL — SIGNIFICANT CHANGE UP (ref 70–99)
GLUCOSE UR QL: NEGATIVE MG/DL — SIGNIFICANT CHANGE UP
HCT VFR BLD CALC: 40.9 % — SIGNIFICANT CHANGE UP (ref 39–50)
HGB BLD-MCNC: 13.7 G/DL — SIGNIFICANT CHANGE UP (ref 13–17)
IMM GRANULOCYTES NFR BLD AUTO: 0.3 % — SIGNIFICANT CHANGE UP (ref 0–0.9)
KETONES UR-MCNC: NEGATIVE — SIGNIFICANT CHANGE UP
LEUKOCYTE ESTERASE UR-ACNC: NEGATIVE — SIGNIFICANT CHANGE UP
LYMPHOCYTES # BLD AUTO: 2.47 K/UL — SIGNIFICANT CHANGE UP (ref 1–3.3)
LYMPHOCYTES # BLD AUTO: 24.8 % — SIGNIFICANT CHANGE UP (ref 13–44)
MCHC RBC-ENTMCNC: 28.8 PG — SIGNIFICANT CHANGE UP (ref 27–34)
MCHC RBC-ENTMCNC: 33.5 GM/DL — SIGNIFICANT CHANGE UP (ref 32–36)
MCV RBC AUTO: 85.9 FL — SIGNIFICANT CHANGE UP (ref 80–100)
METHADONE UR-MCNC: NEGATIVE — SIGNIFICANT CHANGE UP
MONOCYTES # BLD AUTO: 0.81 K/UL — SIGNIFICANT CHANGE UP (ref 0–0.9)
MONOCYTES NFR BLD AUTO: 8.1 % — SIGNIFICANT CHANGE UP (ref 2–14)
NEUTROPHILS # BLD AUTO: 6.42 K/UL — SIGNIFICANT CHANGE UP (ref 1.8–7.4)
NEUTROPHILS NFR BLD AUTO: 64.7 % — SIGNIFICANT CHANGE UP (ref 43–77)
NITRITE UR-MCNC: NEGATIVE — SIGNIFICANT CHANGE UP
OPIATES UR-MCNC: NEGATIVE — SIGNIFICANT CHANGE UP
PCP SPEC-MCNC: SIGNIFICANT CHANGE UP
PCP UR-MCNC: NEGATIVE — SIGNIFICANT CHANGE UP
PH UR: 7 — SIGNIFICANT CHANGE UP (ref 5–8)
PLATELET # BLD AUTO: 340 K/UL — SIGNIFICANT CHANGE UP (ref 150–400)
POTASSIUM SERPL-MCNC: 3.7 MMOL/L — SIGNIFICANT CHANGE UP (ref 3.5–5.3)
POTASSIUM SERPL-SCNC: 3.7 MMOL/L — SIGNIFICANT CHANGE UP (ref 3.5–5.3)
PROT UR-MCNC: NEGATIVE — SIGNIFICANT CHANGE UP
RBC # BLD: 4.76 M/UL — SIGNIFICANT CHANGE UP (ref 4.2–5.8)
RBC # FLD: 12.6 % — SIGNIFICANT CHANGE UP (ref 10.3–14.5)
SODIUM SERPL-SCNC: 142 MMOL/L — SIGNIFICANT CHANGE UP (ref 135–145)
SP GR SPEC: 1 — LOW (ref 1.01–1.02)
THC UR QL: NEGATIVE — SIGNIFICANT CHANGE UP
UROBILINOGEN FLD QL: NEGATIVE MG/DL — SIGNIFICANT CHANGE UP
WBC # BLD: 9.94 K/UL — SIGNIFICANT CHANGE UP (ref 3.8–10.5)
WBC # FLD AUTO: 9.94 K/UL — SIGNIFICANT CHANGE UP (ref 3.8–10.5)

## 2023-08-31 PROCEDURE — 99232 SBSQ HOSP IP/OBS MODERATE 35: CPT

## 2023-08-31 PROCEDURE — 99222 1ST HOSP IP/OBS MODERATE 55: CPT

## 2023-08-31 PROCEDURE — 99223 1ST HOSP IP/OBS HIGH 75: CPT

## 2023-08-31 RX ORDER — PANTOPRAZOLE SODIUM 20 MG/1
40 TABLET, DELAYED RELEASE ORAL DAILY
Refills: 0 | Status: DISCONTINUED | OUTPATIENT
Start: 2023-08-31 | End: 2023-09-24

## 2023-08-31 RX ORDER — HYDRALAZINE HCL 50 MG
10 TABLET ORAL EVERY 4 HOURS
Refills: 0 | Status: DISCONTINUED | OUTPATIENT
Start: 2023-08-31 | End: 2023-10-05

## 2023-08-31 RX ORDER — OLANZAPINE 15 MG/1
5 TABLET, FILM COATED ORAL ONCE
Refills: 0 | Status: COMPLETED | OUTPATIENT
Start: 2023-08-31 | End: 2023-08-31

## 2023-08-31 RX ORDER — ACETAMINOPHEN 500 MG
1000 TABLET ORAL ONCE
Refills: 0 | Status: COMPLETED | OUTPATIENT
Start: 2023-08-31 | End: 2023-08-31

## 2023-08-31 RX ORDER — QUETIAPINE FUMARATE 200 MG/1
25 TABLET, FILM COATED ORAL THREE TIMES A DAY
Refills: 0 | Status: DISCONTINUED | OUTPATIENT
Start: 2023-08-31 | End: 2023-10-05

## 2023-08-31 RX ORDER — DEXAMETHASONE 0.5 MG/5ML
4 ELIXIR ORAL EVERY 6 HOURS
Refills: 0 | Status: DISCONTINUED | OUTPATIENT
Start: 2023-08-31 | End: 2023-09-02

## 2023-08-31 RX ORDER — LEVETIRACETAM 250 MG/1
500 TABLET, FILM COATED ORAL EVERY 12 HOURS
Refills: 0 | Status: DISCONTINUED | OUTPATIENT
Start: 2023-08-31 | End: 2023-09-08

## 2023-08-31 RX ORDER — OLANZAPINE 15 MG/1
2.5 TABLET, FILM COATED ORAL ONCE
Refills: 0 | Status: COMPLETED | OUTPATIENT
Start: 2023-08-31 | End: 2023-08-31

## 2023-08-31 RX ORDER — HALOPERIDOL DECANOATE 100 MG/ML
2.5 INJECTION INTRAMUSCULAR EVERY 6 HOURS
Refills: 0 | Status: DISCONTINUED | OUTPATIENT
Start: 2023-08-31 | End: 2023-10-05

## 2023-08-31 RX ADMIN — OLANZAPINE 2.5 MILLIGRAM(S): 15 TABLET, FILM COATED ORAL at 01:48

## 2023-08-31 RX ADMIN — Medication 10 MILLIGRAM(S): at 01:48

## 2023-08-31 RX ADMIN — Medication 4 MILLIGRAM(S): at 16:39

## 2023-08-31 RX ADMIN — Medication 400 MILLIGRAM(S): at 04:27

## 2023-08-31 RX ADMIN — Medication 1000 MILLIGRAM(S): at 04:50

## 2023-08-31 RX ADMIN — QUETIAPINE FUMARATE 25 MILLIGRAM(S): 200 TABLET, FILM COATED ORAL at 17:45

## 2023-08-31 RX ADMIN — HALOPERIDOL DECANOATE 2.5 MILLIGRAM(S): 100 INJECTION INTRAMUSCULAR at 16:38

## 2023-08-31 RX ADMIN — OLANZAPINE 5 MILLIGRAM(S): 15 TABLET, FILM COATED ORAL at 21:00

## 2023-08-31 RX ADMIN — LEVETIRACETAM 400 MILLIGRAM(S): 250 TABLET, FILM COATED ORAL at 14:42

## 2023-08-31 RX ADMIN — Medication 4 MILLIGRAM(S): at 23:54

## 2023-08-31 RX ADMIN — PANTOPRAZOLE SODIUM 40 MILLIGRAM(S): 20 TABLET, DELAYED RELEASE ORAL at 14:46

## 2023-08-31 NOTE — CONSULT NOTE ADULT - CONVERSATION DETAILS
Met with pt with Resident TY PGY1 Dr. Weston to introduce role and scope of palliative care team as supportive in the setting of complex comorbidities/serious illnesses, to assist with complex medical decision making and any associated pain or symptom management. Pt was unable to understand, as per aide, he is A&Ox1.    Will follow and engage with discussions with sisters.

## 2023-08-31 NOTE — PROGRESS NOTE ADULT - SUBJECTIVE AND OBJECTIVE BOX
[facility] Division of Hospital Medicine    Chief Complaint:      SUBJECTIVE / OVERNIGHT EVENTS:    Patient confused.     MEDICATIONS  (STANDING):  dexAMETHasone     Tablet 4 milliGRAM(s) Oral every 6 hours  levETIRAcetam  IVPB 500 milliGRAM(s) IV Intermittent every 12 hours  LORazepam     Tablet 1 milliGRAM(s) Oral once  pantoprazole  Injectable 40 milliGRAM(s) IV Push daily    MEDICATIONS  (PRN):  haloperidol    Injectable 2.5 milliGRAM(s) IV Push every 6 hours PRN agitaion 2nd line  hydrALAZINE Injectable 10 milliGRAM(s) IV Push every 4 hours PRN for sbp above 160  QUEtiapine 25 milliGRAM(s) Oral three times a day PRN agitation        I&O's Summary      PHYSICAL EXAM:  Vital Signs Last 24 Hrs  T(C): 36.4 (31 Aug 2023 15:05), Max: 36.9 (30 Aug 2023 19:09)  T(F): 97.5 (31 Aug 2023 15:05), Max: 98.4 (30 Aug 2023 19:09)  HR: 63 (31 Aug 2023 15:05) (58 - 67)  BP: 155/89 (31 Aug 2023 15:05) (133/83 - 163/87)  BP(mean): --  RR: 18 (31 Aug 2023 15:05) (18 - 20)  SpO2: 100% (31 Aug 2023 15:05) (98% - 100%)    Parameters below as of 31 Aug 2023 15:05  Patient On (Oxygen Delivery Method): room air            CONSTITUTIONAL: NAD  ENMT: normocephalic, atraumatic  RESPIRATORY: Normal respiratory effort; lungs are clear to auscultation bilaterally  CARDIOVASCULAR: Regular rate and rhythm, normal S1 and S2, no murmur/rub/gallop  ABDOMEN: Nontender to palpation, no rebound/guarding; No hepatosplenomegaly  MUSCLOSKELETAL:  No edema  PSYCH: A+O to person. restless  NEUROLOGY: CN 2-12 are intact and symmetric; no gross sensory deficits;   SKIN: No rashes; no palpable lesions    LABS:                        13.7   9.94  )-----------( 340      ( 31 Aug 2023 05:00 )             40.9     08-31    142  |  101  |  11.4  ----------------------------<  91  3.7   |  27.0  |  0.61    Ca    9.7      31 Aug 2023 05:00    TPro  7.1  /  Alb  4.2  /  TBili  0.2<L>  /  DBili  x   /  AST  24  /  ALT  60<H>  /  AlkPhos  106  08-30    PT/INR - ( 30 Aug 2023 17:38 )   PT: 11.6 sec;   INR: 1.05 ratio         PTT - ( 30 Aug 2023 17:38 )  PTT:28.8 sec  CARDIAC MARKERS ( 30 Aug 2023 17:38 )  x     / <0.01 ng/mL / x     / x     / x          Urinalysis Basic - ( 31 Aug 2023 05:00 )    Color: x / Appearance: x / SG: x / pH: x  Gluc: 91 mg/dL / Ketone: x  / Bili: x / Urobili: x   Blood: x / Protein: x / Nitrite: x   Leuk Esterase: x / RBC: x / WBC x   Sq Epi: x / Non Sq Epi: x / Bacteria: x        CAPILLARY BLOOD GLUCOSE            RADIOLOGY & ADDITIONAL TESTS:  Results Reviewed:   Imaging Personally Reviewed:  Electrocardiogram Personally Reviewed:

## 2023-08-31 NOTE — PATIENT PROFILE ADULT - FALL HARM RISK - HARM RISK INTERVENTIONS
Assistance with ambulation/Assistance OOB with selected safe patient handling equipment/Communicate Risk of Fall with Harm to all staff/Discuss with provider need for PT consult/Monitor gait and stability/Provide patient with walking aids - walker, cane, crutches/Reinforce activity limits and safety measures with patient and family/Tailored Fall Risk Interventions/Visual Cue: Yellow wristband and red socks/Bed in lowest position, wheels locked, appropriate side rails in place/Call bell, personal items and telephone in reach/Instruct patient to call for assistance before getting out of bed or chair/Non-slip footwear when patient is out of bed/[place] to call system/Physically safe environment - no spills, clutter or unnecessary equipment/Purposeful Proactive Rounding/Room/bathroom lighting operational, light cord in reach Assistance with ambulation/Assistance OOB with selected safe patient handling equipment/Communicate Risk of Fall with Harm to all staff/Discuss with provider need for PT consult/Monitor gait and stability/Provide patient with walking aids - walker, cane, crutches/Reinforce activity limits and safety measures with patient and family/Tailored Fall Risk Interventions/Use of alarms - bed, chair and/or voice tab/Visual Cue: Yellow wristband and red socks/Bed in lowest position, wheels locked, appropriate side rails in place/Call bell, personal items and telephone in reach/Instruct patient to call for assistance before getting out of bed or chair/Non-slip footwear when patient is out of bed/[place] to call system/Physically safe environment - no spills, clutter or unnecessary equipment/Purposeful Proactive Rounding/Room/bathroom lighting operational, light cord in reach

## 2023-08-31 NOTE — CONSULT NOTE ADULT - ASSESSMENT
63M Czech-speaking with sig pmh of HTN, stroke, hemorrhagic brain masses likely mets, newly identified lung adenocarcinoma, substance use (cocaine, daily EtOH, THC) presenting with altered mental status. Patients began being confused, agitated which worsened over a few days before family brought him to ED.Patient is AOx1 to self, intermittently restless. Palliative was consulted, awaiting further malignancy workup and family to discuss.    #1: Hemorrhagic brain masses, R PCA infarct  - Patient hospitalized last month found to have hemorrhagic brain masses likely mets and s/p stroke, stabilized.  - Neurosurgery recommended MRI, oncology recommended restarting dexamethasone  - monitor for further deteriorating signs of AMS  - pending MRI results  - restart dexamethasone 6mg q8 per oncology recommendation  - q4 neuro checks per NS recommendation    #2 Stage IV lung adenocarcinoma  - found during last hospitalization; likely source of brain mets  - pending radiation oncology consult per oncology recommendation    #3Acute pain  - patient does not seem to be in overt pain at the moment  - tylenol 650 mg q6 PRN if necessary    #4 AMS  - acute hyperactive delirium likely due to brain masses, initial presentation with and admitted for AMS symptoms   - continue 1:1         63M Hungarian-speaking with sig pmh of HTN, stroke, hemorrhagic brain masses likely mets, newly identified lung adenocarcinoma, substance use (cocaine, daily EtOH, THC) presenting with altered mental status and CTH with no acute pathology and prior hemorrhage unchanged; admitted for further workup.  Palliative was consulted for support and to assist with goals of care.      #1: Hemorrhagic brain masses, R PCA infarct  - Patient hospitalized last month found to have hemorrhagic brain masses likely mets and s/p stroke  - CTH this admission, hemorrhage unchanged from prior   - Neurosurgery recommended MRI, oncology recommended restarting dexamethasone  - monitor neuro status closely  - pending MRI brain  - currently on dexamethasone 4mg q6 and IV keppra for seizure prophylaxis     #2 Stage IV lung adenocarcinoma  - found during last hospitalization; likely source of brain mets  - pending radiation oncology consult per oncology recommendation    #3 Acute pain  - patient does not seem to be in overt pain at the moment  - tylenol 650 mg q6 PRN if necessary    #4 AMS  - acute hyperactive delirium likely due to brain masses, initial presentation with and admitted for AMS symptoms   - continue 1:1   - Would avoid/minimize known deliriogenic drugs such as benzodiazepines and anticholinergics.     # Palliative Care Encounter  - Pt currently being medically optimized including pending MRI and rad oncology eval per oncology recommendation  - Palliative team will support and follow clinical progress/workup to assist in further goals of care discussion        63M Pashto-speaking with sig pmh of HTN, stroke, hemorrhagic brain masses likely mets, newly identified lung adenocarcinoma, substance use (cocaine, daily EtOH, THC) presenting with altered mental status and CTH with no acute pathology and prior hemorrhage unchanged; admitted for further workup.  Palliative was consulted for support and to assist with goals of care.      #1: Hemorrhagic brain masses, R PCA infarct  - Patient hospitalized last month found to have hemorrhagic brain masses likely mets and s/p stroke  - CTH this admission, hemorrhage unchanged from prior   - Neurosurgery recommended MRI, oncology recommended restarting dexamethasone  - monitor neuro status closely  - pending MRI brain  - currently on dexamethasone 4mg q6 and IV keppra for seizure prophylaxis     #2 Stage IV lung adenocarcinoma  - found during last hospitalization; likely source of brain mets  - pathology confirmed adenocarcinoma  - oncology input appreciated: +EGRF, Will be a good candidate for anti-egfr directed therapy most likely with tagrisso once acute issues resolve and stable for discharge.   - pending radiation oncology consult per oncology recommendation    #3 Acute pain  - patient does not seem to be in overt pain at the moment  - tylenol 650 mg q6 PRN if necessary    #4 AMS  - acute hyperactive delirium likely due to brain masses, initial presentation with and admitted for AMS symptoms   - continue 1:1   - Would avoid/minimize known deliriogenic drugs such as benzodiazepines and anticholinergics.     # Palliative Care Encounter  - Pt currently being medically optimized including pending MRI and rad oncology eval per oncology recommendation  - Palliative team will support and follow clinical progress/workup to assist in further goals of care discussion

## 2023-08-31 NOTE — CONSULT NOTE ADULT - ASSESSMENT
Mr Fitzgerald is a very pleasant 63 year old man who was recently diagnosed with hospitalized and diagnosed with a stage IV EGFR+ lung adenocarcinoma with brain metastases who was discharged to follow up with med onc as an outpatient, and now returns to ER with altered mental status but with stable CT head imaging    -Stage IV lung adenocarcinoma: EGFR+ disease, with altered mental status.  CT head stable, but would restart dexamethasone 6mg q 8hrs given clinical presentation and noted edema on imaging.  Would appreciate radiation onc input. Will be a good candidate for anti-egfr directed therapy most likely with tagrisso once acute issues resolve and stable for discharge Mr Fitzgerald is a very pleasant 63 year old man who was recently hospitalized and diagnosed with a stage IV EGFR+ lung adenocarcinoma with brain metastases who was discharged to follow up with med onc as an outpatient, and now returns to ER with altered mental status but with stable CT head imaging    -Stage IV lung adenocarcinoma: EGFR+ disease, with altered mental status. He did not follow up with any doctors upon discharge.  CT head stable, but would restart dexamethasone 6mg q 8hrs given clinical presentation with confusion and noted edema on imaging.  Would appreciate radiation onc input. Will be a good candidate for anti-egfr directed therapy most likely with tagrisso once acute issues resolve and stable for discharge. Will continue to follow

## 2023-08-31 NOTE — PROGRESS NOTE ADULT - SUBJECTIVE AND OBJECTIVE BOX
INTERVAL HPI/OVERNIGHT EVENTS:   63M previously here s/p stroke, hemorrhagic brain mass, new lung mass identified, pt has biopsy of lung mass on that admission which shows adenocarcinoma. Plan was for patient to follow up with nsg as outpatient and repeat MRI brain. However, pt now returns with altered mental status per the family and acting "off". Pt is seen in ED, sitting on bed calm but confused. Denies headache, nausea, vomiting, weakness.     Pt lying in bed he denies headache or pain. Presently being watched with a 1 to 1  Pt awake alert resp oriented to self,   when given a choice states he is in hosp, age 33. year 1997    MEDICATIONS  (STANDING):  levETIRAcetam  IVPB 500 milliGRAM(s) IV Intermittent every 12 hours  MEDICATIONS  (PRN):  hydrALAZINE Injectable 10 milliGRAM(s) IV Push every 4 hours PRN for sbp above 160      Allergies  No Known Allergies  Intolerances    Vital Signs Last 24 Hrs  T(C): 36.8 (31 Aug 2023 08:19), Max: 36.9 (30 Aug 2023 16:35)  T(F): 98.2 (31 Aug 2023 08:19), Max: 98.4 (30 Aug 2023 16:35)  HR: 61 (31 Aug 2023 08:19) (58 - 67)  BP: 133/83 (31 Aug 2023 08:19) (133/83 - 163/87)  BP(mean): --  RR: 20 (31 Aug 2023 08:19) (17 - 20)  SpO2: 100% (31 Aug 2023 08:19) (98% - 100%)    Parameters below as of 31 Aug 2023 08:19  Patient On (Oxygen Delivery Method): room air     BMI (kg/m2): 22.5 (08-30-23 @ 16:35)      PHYSICAL EXAM  GENERAL: NAD,  HEAD:  Atraumatic, Normocephalic  EYES: EOMI, PERRLA, conjunctiva and sclera clear  ENMT: No tonsillar erythema, exudates, or enlargement; Moist mucous membranes,  NECK: Supple,NERVOUS SYSTEM:  Alert & Oriented X3, Good concentration; Motor Strength 5/5 B/L upper and lower extremities; DTRs 2+ intact and symmetric, neg facial  right tricep4/5 bicep 4/5 grasp 4/5 left triceps 5/5, biceps 5/5 grasp 5/5  lower extrem 5/5 bilat  EXTREMITIES:  2+ Peripheral Pulses, No  edema      LABS:                          13.7   9.94  )-----------( 340      ( 31 Aug 2023 05:00 )             40.9     08-31    142  |  101  |  11.4  ----------------------------<  91  3.7   |  27.0  |  0.61    Ca    9.7      31 Aug 2023 05:00    TPro  7.1  /  Alb  4.2  /  TBili  0.2<L>  /  DBili  x   /  AST  24  /  ALT  60<H>  /  AlkPhos  106  08-30    PT/INR - ( 30 Aug 2023 17:38 )   PT: 11.6 sec;   INR: 1.05 ratio         PTT - ( 30 Aug 2023 17:38 )  PTT:28.8 sec  Urinalysis Basic - ( 31 Aug 2023 05:00 )    Color: x / Appearance: x / SG: x / pH: x  Gluc: 91 mg/dL / Ketone: x  / Bili: x / Urobili: x   Blood: x / Protein: x / Nitrite: x   Leuk Esterase: x / RBC: x / WBC x   Sq Epi: x / Non Sq Epi: x / Bacteria: x      I&O's Detail    RADIOLOGY & ADDITIONAL TESTS:  < from: CT Head No Cont (08.30.23 @ 17:06) >  PROCEDURE DATE:  08/30/2023    IMPRESSION:  Small hemorrhage with surrounding vasogenic edema in the left high   posterior parietal lobe is unchanged.  --- End of Report ---

## 2023-08-31 NOTE — PROGRESS NOTE ADULT - ASSESSMENT
pt. is a 63 male with recent diagnosis of lung adenocarcinoma with brain mets presented with acute metabolic encephalopathy      1. acute metabolic encephalopathy. this could be due to brain mets but imaging findings are unchanged from prior so not sure how much of this is contributing to altered mental status.  no other etiology apparent. Patient restless so will start seroquel 25 mg TID PRN and IV haldol PRN as 2nd line.    2. Brain mets from lung adenocarcinoma. started on steroids. will consult radiation oncology tomorrow. MRI brain pending. on keppra for seizure prophylaxis.    3. DVT prophylaxis: SCD    4. Dispo: unclear.

## 2023-08-31 NOTE — CONSULT NOTE ADULT - ATTENDING COMMENTS
63 year old [place]ian speaking male with recent admission (7/31-8/16) to [facility] for acute R PCA stroke with scattered hemorrhagic lesion and subsequent workup also revealed a MIGEL lesion and lymphadenopathy s/p biopsy +EGFR adenocarcinoma with plans to follow up OP oncology readmitted on 8/31 for AMS. CTH this admission, with stable hemorrhage, unchanged from prior imaging. Currently patient on 1:1 observation due to intermittent restlessness. Seen by neurosurgery and oncology, recommending Rad oncology eval. Palliative consulted for support and to assist with goals of care. Pt is pending ongoing workup including MRI brain. Pt seen with medical resident at bedside. Pt awake but confused, oriented to self only. Aide at bedside. Pt comfortable with no overt sign of clinical distress.     Chart reviewed and discussed with hosptialsit Dr. Gusman. Palliative team will continue to support and follow clinical progress/workup and rad oncology consult to assist in further goals of care discussions.

## 2023-08-31 NOTE — PROGRESS NOTE ADULT - ASSESSMENT
This is a 63ym presented 2 weeks with acute stroke and heme noted. Pt noted to have a lung mass with a recent bx pos for adenocarcinoma    HX cocaine, THC  stage IV EGFR+ lung adenocarcinoma with brain metastases   Plan  -mri of the brain with and without pending.   -Keppra continue  -decadron 4mg q 6  -protonix continued   -hematology and Oncology consulted

## 2023-08-31 NOTE — CONSULT NOTE ADULT - SUBJECTIVE AND OBJECTIVE BOX
REASON FOR CONSULTATION:     HPI:  Mr Bush is a very pleasant 63 male recently admitted at our facility and discharged  about 2 weeks ago, s/p stroke, hemorrhagic brain mass, new lung mass identified, pt had biopsy of lung mass on that admission which which showed an EGFR+ lung adenocarcinoma. Plan was out- pt follow up with neuro surgery, oncology and out patient repeat MRI brain. However, pt now returns with altered mental status per the family and acting "off". Pt is seen , lying in bed but at times tries to get out of bed , calm but confused. Denies headache, nausea, vomiting, weakness. He also complains of sob. abd. pain, diarrhea. CT head from today : Small hemorrhage with surrounding vasogenic edema in the left high posterior parietal lobe is unchanged. Neurosurgery evaluated the patient and recommended no surgical intervention at this time.      REVIEW OF SYSTEMS:  Constitutional, Eyes, ENT, Cardiovascular, Respiratory, Gastrointestinal, Genitourinary, Musculoskeletal, Integumentary, Neurological, Psychiatric, Endocrine, Heme/Lymph, and Allergic/Immunologic review of systems are otherwise negative except as noted in the HPI.    PAST MEDICAL & SURGICAL HISTORY:  Hypertension      CVA (cerebral vascular accident)      Mild tetrahydrocannabinol (THC) abuse      ETOH abuse      Cocaine abuse      No significant past surgical history          FAMILY HISTORY:  No pertinent family history in first degree relatives        SOCIAL HISTORY:    Allergies    No Known Allergies    Intolerances        MEDICATIONS  (STANDING):  levETIRAcetam  IVPB 500 milliGRAM(s) IV Intermittent every 12 hours    MEDICATIONS  (PRN):  hydrALAZINE Injectable 10 milliGRAM(s) IV Push every 4 hours PRN for sbp above 160      Vital Signs Last 24 Hrs  T(C): 36.8 (31 Aug 2023 08:19), Max: 36.9 (30 Aug 2023 16:35)  T(F): 98.2 (31 Aug 2023 08:19), Max: 98.4 (30 Aug 2023 16:35)  HR: 61 (31 Aug 2023 08:19) (58 - 67)  BP: 133/83 (31 Aug 2023 08:19) (133/83 - 163/87)  BP(mean): --  RR: 20 (31 Aug 2023 08:19) (17 - 20)  SpO2: 100% (31 Aug 2023 08:19) (98% - 100%)    Parameters below as of 31 Aug 2023 08:19  Patient On (Oxygen Delivery Method): room air        PHYSICAL EXAM:    GENERAL: NAD, well-groomed, well-developed  HEAD:  Atraumatic, Normocephalic  EYES: EOMI, PERRLA, conjunctiva and sclera clear  ENMT: No tonsillar erythema, exudates, or enlargement; Moist mucous membranes, Good dentition, No lesions  NECK: Supple, No JVD, Normal thyroid  NERVOUS SYSTEM:  Alert & Oriented X3, Good concentration; Motor Strength 5/5 B/L upper and lower extremities; DTRs 2+ intact and symmetric  CHEST/LUNG: Clear to auscultation bilaterally; No rales, rhonchi, wheezing, or rubs  HEART: Regular rate and rhythm; No murmurs, rubs, or gallops  ABDOMEN: Soft, Nontender, Nondistended; Bowel sounds present  EXTREMITIES:  2+ Peripheral Pulses, No clubbing, cyanosis, or edema  LYMPH: No lymphadenopathy noted  SKIN: No rashes or lesions      LABS:                        13.7   9.94  )-----------( 340      ( 31 Aug 2023 05:00 )             40.9     08-31    142  |  101  |  11.4  ----------------------------<  91  3.7   |  27.0  |  0.61    Ca    9.7      31 Aug 2023 05:00    TPro  7.1  /  Alb  4.2  /  TBili  0.2<L>  /  DBili  x   /  AST  24  /  ALT  60<H>  /  AlkPhos  106  08-30    PT/INR - ( 30 Aug 2023 17:38 )   PT: 11.6 sec;   INR: 1.05 ratio         PTT - ( 30 Aug 2023 17:38 )  PTT:28.8 sec  Urinalysis Basic - ( 31 Aug 2023 05:00 )    Color: x / Appearance: x / SG: x / pH: x  Gluc: 91 mg/dL / Ketone: x  / Bili: x / Urobili: x   Blood: x / Protein: x / Nitrite: x   Leuk Esterase: x / RBC: x / WBC x   Sq Epi: x / Non Sq Epi: x / Bacteria: x          RADIOLOGY & ADDITIONAL STUDIES:    PATHOLOGY: Surgical Pathology Report (08.15.23 @ 12:00)    Surgical Pathology Report:   ACCESSION No:  [id number]      Accession:                             [id number]    Collected Date/Time:                   8/15/2023 12:00 EDT  Received Date/Time:                    8/15/2023 12:34 EDT    Addendum Report - Auth (Verified)    Addendum    IMMUNOSTAINS PERFORMED AND INTERPRETED ON BLOCK    "1-B" BY [facility]  ONCOLOGY, (SPECIMEN #[id number]).    PD-L1 (KEYTRUDA)  <1%  NO EXPRESSION      ALK:  No ALK mutation was detected in the provided specimen   (Specimen  #[id number])    ROS1:  No ROS1 mutation was detected in the provided specimen  (Specimen  #[id number])    KRAS:    No KRAS mutation was detected in the provided specimen  (Specimen  #[id number])    EGFR:  A missense mutation was detected within exon 21 ofthe EGFR gene.  This mutation is correlated with responsiveness to EGFR tyrosine kinase  inhibitor therapies( Specimen #[id number])      BRAF:  No BRAF V600 mutation was detected in the provided specimen  (Specimen #[id number])    Complete reports from [facility] ([facility]), [address] [place] [address], is on file in the Department of Pathology.  Verified by: Christoph Lazo MD  (Electronic Signature)  Reported on: 08/28/23 10:13 EDT, [facility], [address] [place] [id number]  _________________________________________________________________    Surgical Pathology Report - Auth (Verified)    Specimen(s) Submitted  1  Left lung biopsy    Final Diagnosis  Left lung, needle core biopsy:  -Adenocarcinoma, lepidic and invasive pattern.  See note.      Note: Case reviewed in intradepartmental conference.  Pending prognostic markers.    Verified by: Christoph Lazo MD  (Electronic Signature)  Reported on: 08/17/23 14:19 EDT, [facility], [address] [place] [id number]  _________________________________________________________________    Intraoperative Consultation  TOUCH PREP FOR IMMEDIATE ADEQUACY ASSESSMENT:  - Adequate    By: Dr Lazo    Clinical Information  Long nodule MIGEL spiculated lung mass brain mets      Gross Description  Received:  In formalin labeled  "left lung biopsy"  Size:  Multiple cores ranging from 0.2 cm to 0.6 cm  Description:  Tan-red stringy cores of soft tissue  Additional Comments: Intraoperative immediate assessment is performed  Submitted:  Entirely in six cassettes: 1A-1F    Rolando Melo 08/15/2023 12:51 PM    Disclaimer  In addition to other data that may appear on the specimen containers, all  labels have been inspected to confirm the presence of the patient's name  and date of birth.         REASON FOR CONSULTATION:     HPI:  Mr Bush is a very pleasant 63 male recently admitted at our facility and discharged  about 2 weeks ago, s/p stroke, hemorrhagic brain mass, new lung mass identified, pt had biopsy of lung mass on that admission which which showed an EGFR+ lung adenocarcinoma. Plan was out- pt follow up with neuro surgery, oncology and out patient repeat MRI brain. However, pt now returns with altered mental status per the family and acting "off". Pt is seen , lying in bed but at times tries to get out of bed , calm but confused. Denies headache, nausea, vomiting, weakness. He also complains of sob. abd. pain, diarrhea. CT head from today : Small hemorrhage with surrounding vasogenic edema in the left high posterior parietal lobe is unchanged. Neurosurgery evaluated the patient and recommended no surgical intervention at this time. Oncology consulted for stage IV lung ca.  He remains confused and reports that it is 2012 and he is at a [employer]. Not in any pain.       REVIEW OF SYSTEMS:  Constitutional, Eyes, ENT, Cardiovascular, Respiratory, Gastrointestinal, Genitourinary, Musculoskeletal, Integumentary, Neurological, Psychiatric, Endocrine, Heme/Lymph, and Allergic/Immunologic review of systems are otherwise negative except as noted in the HPI.    PAST MEDICAL & SURGICAL HISTORY:  Hypertension      CVA (cerebral vascular accident)      Mild tetrahydrocannabinol (THC) abuse      ETOH abuse      Cocaine abuse      No significant past surgical history          FAMILY HISTORY:  No pertinent family history in first degree relatives        SOCIAL HISTORY:    Allergies    No Known Allergies    Intolerances        MEDICATIONS  (STANDING):  levETIRAcetam  IVPB 500 milliGRAM(s) IV Intermittent every 12 hours    MEDICATIONS  (PRN):  hydrALAZINE Injectable 10 milliGRAM(s) IV Push every 4 hours PRN for sbp above 160      Vital Signs Last 24 Hrs  T(C): 36.8 (31 Aug 2023 08:19), Max: 36.9 (30 Aug 2023 16:35)  T(F): 98.2 (31 Aug 2023 08:19), Max: 98.4 (30 Aug 2023 16:35)  HR: 61 (31 Aug 2023 08:19) (58 - 67)  BP: 133/83 (31 Aug 2023 08:19) (133/83 - 163/87)  BP(mean): --  RR: 20 (31 Aug 2023 08:19) (17 - 20)  SpO2: 100% (31 Aug 2023 08:19) (98% - 100%)    Parameters below as of 31 Aug 2023 08:19  Patient On (Oxygen Delivery Method): room air        PHYSICAL EXAM:    GENERAL: NAD, well-groomed, well-developed  HEAD:  Atraumatic, Normocephalic  EYES: EOMI, PERRLA, conjunctiva and sclera clear  ENMT: No tonsillar erythema, exudates, or enlargement; Moist mucous membranes, Good dentition, No lesions  NECK: Supple, No JVD, Normal thyroid  NERVOUS SYSTEM:  Alert & Oriented X1  CHEST/LUNG: Clear to auscultation bilaterally  HEART: Regular rate and rhythm  ABDOMEN: Soft, Nontender, Nondistended; Bowel sounds present  EXTREMITIES:  2+ Peripheral Pulses, No clubbing, cyanosis, or edema  LYMPH: No lymphadenopathy noted  SKIN: No rashes or lesions      LABS:                        13.7   9.94  )-----------( 340      ( 31 Aug 2023 05:00 )             40.9     08-31    142  |  101  |  11.4  ----------------------------<  91  3.7   |  27.0  |  0.61    Ca    9.7      31 Aug 2023 05:00    TPro  7.1  /  Alb  4.2  /  TBili  0.2<L>  /  DBili  x   /  AST  24  /  ALT  60<H>  /  AlkPhos  106  08-30    PT/INR - ( 30 Aug 2023 17:38 )   PT: 11.6 sec;   INR: 1.05 ratio         PTT - ( 30 Aug 2023 17:38 )  PTT:28.8 sec  Urinalysis Basic - ( 31 Aug 2023 05:00 )    Color: x / Appearance: x / SG: x / pH: x  Gluc: 91 mg/dL / Ketone: x  / Bili: x / Urobili: x   Blood: x / Protein: x / Nitrite: x   Leuk Esterase: x / RBC: x / WBC x   Sq Epi: x / Non Sq Epi: x / Bacteria: x          RADIOLOGY & ADDITIONAL STUDIES:    PATHOLOGY: Surgical Pathology Report (08.15.23 @ 12:00)    Surgical Pathology Report:   ACCESSION No:  [id number]      Accession:                             [id number]    Collected Date/Time:                   8/15/2023 12:00 EDT  Received Date/Time:                    8/15/2023 12:34 EDT    Addendum Report - Auth (Verified)    Addendum    IMMUNOSTAINS PERFORMED AND INTERPRETED ON BLOCK    "1-B" BY [facility], (SPECIMEN #[id number]).    PD-L1 (KEYTRUDA)  <1%  NO EXPRESSION      ALK:  No ALK mutation was detected in the provided specimen   (Specimen  #[id number])    ROS1:  No ROS1 mutation was detected in the provided specimen  (Specimen  #[id number])    KRAS:    No KRAS mutation was detected in the provided specimen  (Specimen  #[id number])    EGFR:  A missense mutation was detected within exon 21 ofthe EGFR gene.  This mutation is correlated with responsiveness to EGFR tyrosine kinase  inhibitor therapies( Specimen #[id number])      BRAF:  No BRAF V600 mutation was detected in the provided specimen  (Specimen #[id number])    Complete reports from [facility] ([facility]), [address] [place] [address], is on file in the Department of Pathology.  Verified by: Christoph Lazo MD  (Electronic Signature)  Reported on: 08/28/23 10:13 EDT, [facility], [address] [place] [id number]  _________________________________________________________________    Surgical Pathology Report - Auth (Verified)    Specimen(s) Submitted  1  Left lung biopsy    Final Diagnosis  Left lung, needle core biopsy:  -Adenocarcinoma, lepidic and invasive pattern.  See note.      Note: Case reviewed in intradepartmental conference.  Pending prognostic markers.    Verified by: Christoph Lazo MD  (Electronic Signature)  Reported on: 08/17/23 14:19 EDT, [facility], [address] [place] [id number]  _________________________________________________________________    Intraoperative Consultation  TOUCH PREP FOR IMMEDIATE ADEQUACY ASSESSMENT:  - Adequate    By: Dr Lazo    Clinical Information  Long nodule MIGEL spiculated lung mass brain mets      Gross Description  Received:  In formalin labeled  "left lung biopsy"  Size:  Multiple cores ranging from 0.2 cm to 0.6 cm  Description:  Tan-red stringy cores of soft tissue  Additional Comments: Intraoperative immediate assessment is performed  Submitted:  Entirely in six cassettes: 1A-1F    Rolando Melo 08/15/2023 12:51 PM    Disclaimer  In addition to other data that may appear on the specimen containers, all  labels have been inspected to confirm the presence of the patient's name  and date of birth.

## 2023-08-31 NOTE — CONSULT NOTE ADULT - SUBJECTIVE AND OBJECTIVE BOX
Information obtained using   HPI:  pt. is a 63 male recently admitted at our facility and discharged  about 2 weeks ago, s/p stroke, hemorrhagic brain mass, new lung mass identified, pt had biopsy of lung mass on that admission which shows adenocarcinoma. Plan was out- pt follow up with neuro surgery, oncology and out patient repeat MRI brain. However, pt now returns with altered mental status per the family and acting "off". Pt is seen , lying in bed but at times tries to get out of bed , calm but confused. Denies headache, nausea, vomiting, weakness. cp, sob. abd. pain, diarrhea.  ct head from today :  Small hemorrhage with surrounding vasogenic edema in the left high   posterior parietal lobe is unchanged.   (30 Aug 2023 23:51)    Patient is 63M with sig pmh of HTN, stroke (right occipital PCA territory infarct), hemorrhagic brain masses likely due to mets, newly identified lung adenocarcinoma, substance use (cocaine, daily EtOH, THC) admitted 8/30 for altered mental status. Patient had a stroke in July which was worked up to be likely mets more than CVA, and diagnosed with adenocarcinoma of the lung then as well. Patients began being confused, agitated which worsened over a few days before family brought him to ED. On visit, jatinder ***    PERTINENT PMH REVIEWED: Yes  PAST MEDICAL & SURGICAL HISTORY:  Hypertension  CVA (cerebral vascular accident)  Mild tetrahydrocannabinol (THC) abuse  ETOH abuse  Cocaine abuse    No significant past surgical history    SOCIAL HISTORY:                      Substance history: cocaine, THC, EtOH                    Admitted from:  home                     Shinto/spirituality: Mandaeism                    Cultural concerns: Other/;  necessary                      Surrogate/HCP/Guardian: Lea Butler [phone] or Greg Butler, [phone] sisters    FAMILY HISTORY:  No pertinent family history in first degree relatives    Allergies  No Known Allergies  Intolerances    ADVANCE DIRECTIVES/TREATMENT PREFERENCES:  Full code, all aggressive measures desired   DNR/DNI - MOLST, continue all other medical treatments  DNR/DNI - MOLST, comfort measures only     Baseline ADLs (prior to admission):  Dependent      Palliative Performance Status Version 2:  %  http://npcrc.org/files/news/palliative_performance_scale_ppsv2.pdf    Present Symptoms:   Dyspnea: Mild Moderate Severe  Nausea/Vomiting: Yes No  Anxiety:  Yes No  Depression: Yes No  Fatigue: Yes No  Loss of appetite: Yes No  Constipation:     Pain: Yes No            Character-            Duration-            Effect-            Factors-            Frequency-            Location-            Severity-    Pain AD Score:  [email or web address] (press ctrl + left click to view)    Review of Systems: Reviewed                     Negative:                     Positive:  Unable to obtain due to poor mentation   All others negative    MEDICATIONS  (STANDING):  dexAMETHasone     Tablet 4 milliGRAM(s) Oral every 6 hours  levETIRAcetam  IVPB 500 milliGRAM(s) IV Intermittent every 12 hours  pantoprazole  Injectable 40 milliGRAM(s) IV Push daily    MEDICATIONS  (PRN):  hydrALAZINE Injectable 10 milliGRAM(s) IV Push every 4 hours PRN for sbp above 160      PHYSICAL EXAM:    Vital Signs Last 24 Hrs  T(C): 36.4 (31 Aug 2023 13:00), Max: 36.9 (30 Aug 2023 16:35)  T(F): 97.5 (31 Aug 2023 13:00), Max: 98.4 (30 Aug 2023 16:35)  HR: 64 (31 Aug 2023 13:00) (58 - 67)  BP: 142/86 (31 Aug 2023 13:00) (133/83 - 163/87)  BP(mean): --  RR: 20 (31 Aug 2023 13:00) (17 - 20)  SpO2: 98% (31 Aug 2023 13:00) (98% - 100%)    Parameters below as of 31 Aug 2023 13:00  Patient On (Oxygen Delivery Method): room air    General: alert  oriented x ____ lethargic agitated delirious                  cachexia  nonverbal  coma  HEENT: normal  dry mouth  ET tube/trach  Lungs: comfortable tachypnea/labored breathing  excessive secretions  CV: normal  tachycardia  GI: normal  distended  tender  no BS               PEG/NG/OG tube  constipation  last BM:   : normal  incontinent  oliguria/anuria  recio  MSK: normal  weakness  edema             ambulatory  bedbound/wheelchair bound  Neuro: no focal deficits cognitive impairment dysphagia dysarthria hemiplegia   Skin: normal  pressure ulcers- Stage_____  no rash    LABS:             13.7   9.94  )-----------( 340      ( 31 Aug 2023 05:00 )             40.9     08-31    142  |  101  |  11.4  ----------------------------<  91  3.7   |  27.0  |  0.61    Ca    9.7      31 Aug 2023 05:00    TPro  7.1  /  Alb  4.2  /  TBili  0.2<L>  /  DBili  x   /  AST  24  /  ALT  60<H>  /  AlkPhos  106  08-30    PT/INR - ( 30 Aug 2023 17:38 )   PT: 11.6 sec;   INR: 1.05 ratio       PTT - ( 30 Aug 2023 17:38 )  PTT:28.8 sec  Urinalysis Basic - ( 31 Aug 2023 05:00 )    Color: x / Appearance: x / SG: x / pH: x  Gluc: 91 mg/dL / Ketone: x  / Bili: x / Urobili: x   Blood: x / Protein: x / Nitrite: x   Leuk Esterase: x / RBC: x / WBC x   Sq Epi: x / Non Sq Epi: x / Bacteria: x    I&O's Summary    RADIOLOGY & ADDITIONAL STUDIES:  < from: Xray Chest 1 View- PORTABLE-Urgent (08.30.23 @ 18:06) >  IMPRESSION:   RIGHT infrahilar linear airspace disease/subsegmental   atelectasis versus overlapping pulmonary vessels.  If symptoms warrant further investigation follow-up chest CT recommended.    < end of copied text >    < from: CT Head No Cont (08.30.23 @ 17:06) >  IMPRESSION:    Small hemorrhage with surrounding vasogenic edema in the left high   posterior parietal lobe is unchanged.    < end of copied text >    < from: CT Head No Cont (07.31.23 @ 19:13) >  IMPRESSION:  1. Findings suggestive of a age indeterminate left MCA territory and   right occipital (PCA territory) infarcts..  2. Left posterior parietal intraparenchymal hemorrhage measuring   approximately 2.1 cm in greatest diameter, with surrounding edema.   Smaller eft inferior frontal hemorrhagic lesion with surrounding edema,   small left posterior temporal hemorrhagic lesion with surrounding edema   and small right temporal hemorrhagic lesion with surrounding edema. Small   foci of abnormal signal are also appreciated bilaterally within the   cerebellar regions.Findings are concerning for presence of underlying   hemorrhagic mass lesions, possibly metastatic in etiology. Possibility of   mycotic aneurysm should also be considered in the differential diagnosis.   Consider follow-up pre and postcontrast MR imaging of the brain, to   include DWI imaging and ADC mapping techniques. MRA of the [place] of   [place] may also prove useful.  3. Punctate focus of calcification in a left occipital location without   surrounding edema, may represent prior infectious/inflammatory process   versus nonacute hemorrhagic event.    < end of copied text >       Information obtained by staff on bedside  HPI:  pt. is a 63 male recently admitted at our facility and discharged  about 2 weeks ago, s/p stroke, hemorrhagic brain mass, new lung mass identified, pt had biopsy of lung mass on that admission which shows adenocarcinoma. Plan was out- pt follow up with neuro surgery, oncology and out patient repeat MRI brain. However, pt now returns with altered mental status per the family and acting "off". Pt is seen , lying in bed but at times tries to get out of bed , calm but confused. Denies headache, nausea, vomiting, weakness. cp, sob. abd. pain, diarrhea.  ct head from today :  Small hemorrhage with surrounding vasogenic edema in the left high   posterior parietal lobe is unchanged.   (30 Aug 2023 23:51)    Patient is 63M Romanian-speaking with sig pmh of HTN, stroke (right occipital PCA territory infarct), hemorrhagic brain masses likely due to mets, newly identified lung adenocarcinoma, substance use (cocaine, daily EtOH, THC) admitted 8/30 for altered mental status. Patient had a stroke in July which was worked up to be likely mets more than CVA, and diagnosed with adenocarcinoma of the lung then as well. Patients began being confused, agitated which worsened over a few days before family brought him to ED. On visit, patient is lying on bed mildly restless but in no acute distress. Per aide at bedside, patient is AOx1 to self, intermittently restless, sister does visit but left earlier today. Waiting for MRI.    PERTINENT PMH REVIEWED: Yes  PAST MEDICAL & SURGICAL HISTORY:  Hypertension  CVA (cerebral vascular accident)  Mild tetrahydrocannabinol (THC) abuse  ETOH abuse  Cocaine abuse    No significant past surgical history    SOCIAL HISTORY:                      Substance history: cocaine, THC, EtOH                    Admitted from:  home                     Yarsanism/spirituality: Quaker                    Cultural concerns: Other/;  necessary                      Surrogate/HCP/Guardian: Lea Butler [phone] or Greg Butler, [phone] sisters    FAMILY HISTORY:  No pertinent family history in first degree relatives    Allergies  No Known Allergies  Intolerances    ADVANCE DIRECTIVES/TREATMENT PREFERENCES:  Full code, all aggressive measures desired     Baseline ADLs (prior to admission):  Dependent      Palliative Performance Status Version 2:  50 %  http://npcrc.org/files/news/palliative_performance_scale_ppsv2.pdf    Present Symptoms: obtained from staff  Dyspnea: None  Nausea/Vomiting: No  Anxiety:  Unable to obtain  Depression: Unable to obtain  Fatigue: Unable to obtain  Loss of appetite: Yes No  Constipation: Unable to obtain    Pain: No  Pain AD Score: 0  [email or web address] (press ctrl + left click to view)    Review of Systems:  Unable to obtain due to poor mentation     MEDICATIONS  (STANDING):  dexAMETHasone     Tablet 4 milliGRAM(s) Oral every 6 hours  levETIRAcetam  IVPB 500 milliGRAM(s) IV Intermittent every 12 hours  pantoprazole  Injectable 40 milliGRAM(s) IV Push daily    MEDICATIONS  (PRN):  hydrALAZINE Injectable 10 milliGRAM(s) IV Push every 4 hours PRN for sbp above 160    PHYSICAL EXAM:    Vital Signs Last 24 Hrs  T(C): 36.4 (31 Aug 2023 13:00), Max: 36.9 (30 Aug 2023 16:35)  T(F): 97.5 (31 Aug 2023 13:00), Max: 98.4 (30 Aug 2023 16:35)  HR: 64 (31 Aug 2023 13:00) (58 - 67)  BP: 142/86 (31 Aug 2023 13:00) (133/83 - 163/87)  BP(mean): --  RR: 20 (31 Aug 2023 13:00) (17 - 20)  SpO2: 98% (31 Aug 2023 13:00) (98% - 100%)    Parameters below as of 31 Aug 2023 13:00  Patient On (Oxygen Delivery Method): room air    General:  A&Ox1, intermittent restlessness  HEENT: normal    Lungs: comfortable   CV: normal   GI: normal   : normal   MSK: unable to assess  Neuro: no focal deficits, cognitive impairment  Skin: normal    LABS:             13.7   9.94  )-----------( 340      ( 31 Aug 2023 05:00 )             40.9     08-31    142  |  101  |  11.4  ----------------------------<  91  3.7   |  27.0  |  0.61    Ca    9.7      31 Aug 2023 05:00    TPro  7.1  /  Alb  4.2  /  TBili  0.2<L>  /  DBili  x   /  AST  24  /  ALT  60<H>  /  AlkPhos  106  08-30    PT/INR - ( 30 Aug 2023 17:38 )   PT: 11.6 sec;   INR: 1.05 ratio       PTT - ( 30 Aug 2023 17:38 )  PTT:28.8 sec  Urinalysis Basic - ( 31 Aug 2023 05:00 )    Color: x / Appearance: x / SG: x / pH: x  Gluc: 91 mg/dL / Ketone: x  / Bili: x / Urobili: x   Blood: x / Protein: x / Nitrite: x   Leuk Esterase: x / RBC: x / WBC x   Sq Epi: x / Non Sq Epi: x / Bacteria: x    I&O's Summary    RADIOLOGY & ADDITIONAL STUDIES:  < from: Xray Chest 1 View- PORTABLE-Urgent (08.30.23 @ 18:06) >  IMPRESSION:   RIGHT infrahilar linear airspace disease/subsegmental   atelectasis versus overlapping pulmonary vessels.  If symptoms warrant further investigation follow-up chest CT recommended.    < end of copied text >    < from: CT Head No Cont (08.30.23 @ 17:06) >  IMPRESSION:    Small hemorrhage with surrounding vasogenic edema in the left high   posterior parietal lobe is unchanged.    < end of copied text >    < from: CT Head No Cont (07.31.23 @ 19:13) >  IMPRESSION:  1. Findings suggestive of a age indeterminate left MCA territory and   right occipital (PCA territory) infarcts..  2. Left posterior parietal intraparenchymal hemorrhage measuring   approximately 2.1 cm in greatest diameter, with surrounding edema.   Smaller eft inferior frontal hemorrhagic lesion with surrounding edema,   small left posterior temporal hemorrhagic lesion with surrounding edema   and small right temporal hemorrhagic lesion with surrounding edema. Small   foci of abnormal signal are also appreciated bilaterally within the   cerebellar regions.Findings are concerning for presence of underlying   hemorrhagic mass lesions, possibly metastatic in etiology. Possibility of   mycotic aneurysm should also be considered in the differential diagnosis.   Consider follow-up pre and postcontrast MR imaging of the brain, to   include DWI imaging and ADC mapping techniques. MRA of the [place] of   [place] may also prove useful.  3. Punctate focus of calcification in a left occipital location without   surrounding edema, may represent prior infectious/inflammatory process   versus nonacute hemorrhagic event.    < end of copied text >       Information obtained by staff on bedside  HPI:  pt. is a 63 male recently admitted at our facility and discharged  about 2 weeks ago, s/p stroke, hemorrhagic brain mass, new lung mass identified, pt had biopsy of lung mass on that admission which shows adenocarcinoma. Plan was out- pt follow up with neuro surgery, oncology and out patient repeat MRI brain. However, pt now returns with altered mental status per the family and acting "off". Pt is seen , lying in bed but at times tries to get out of bed , calm but confused. Denies headache, nausea, vomiting, weakness. cp, sob. abd. pain, diarrhea.  ct head from today :  Small hemorrhage with surrounding vasogenic edema in the left high   posterior parietal lobe is unchanged.   (30 Aug 2023 23:51)    Patient is 63M Portuguese-speaking with sig pmh of HTN, hx of substance use (cocaine, daily EtOH, THC) admitted 8/30 for altered mental status. Patient had a recent admission in July for right occipital PCA stroke with subsequent finding of hemorrhagic brain masses and lung mass s/p biopsy confirming adenocarcinoma. Patients began being confused, agitated which worsened over a few days before family brought him to ED. CTH with no acute findings, prior hemorrhage unchanged from previous imaging. Palliative consulted for support and to assist with goals of care.     On visit, patient is lying on bed mildly restless but in no acute distress. Per aide at bedside, patient is AOx1 to self, intermittently restless with 1:1 aide present, sister does visit but left earlier today. Waiting for MRI.     PERTINENT PMH REVIEWED: Yes  PAST MEDICAL & SURGICAL HISTORY:  Hypertension  CVA (cerebral vascular accident)  Mild tetrahydrocannabinol (THC) abuse  ETOH abuse  Cocaine abuse    No significant past surgical history    SOCIAL HISTORY:                      Substance history: cocaine, THC, EtOH                    Admitted from:  home                     Gnosticism/spirituality: Faith                    Cultural concerns: Other/;  necessary                      Surrogate/HCP/Guardian: Lea Butler [phone] or Greg Butler, [phone] sisters    FAMILY HISTORY:  No pertinent family history in first degree relatives    Allergies  No Known Allergies  Intolerances    ADVANCE DIRECTIVES/TREATMENT PREFERENCES:  Full code, all aggressive measures desired     Baseline ADLs (prior to admission):  Dependent      Palliative Performance Status Version 2:   %  http://npcrc.org/files/news/palliative_performance_scale_ppsv2.pdf    Present Symptoms: obtained from staff  Dyspnea: None  Nausea/Vomiting: No  Anxiety:  Unable to obtain  Depression: Unable to obtain  Fatigue: Unable to obtain  Loss of appetite:  No, ate most of breakfast per aide  Constipation: Unable to obtain    Pain: No  Pain AD Score: 0  [email or web address]/[email or web address] (press ctrl + left click to view)    Review of Systems:  Unable to obtain due to poor mentation     MEDICATIONS  (STANDING):  dexAMETHasone     Tablet 4 milliGRAM(s) Oral every 6 hours  levETIRAcetam  IVPB 500 milliGRAM(s) IV Intermittent every 12 hours  pantoprazole  Injectable 40 milliGRAM(s) IV Push daily    MEDICATIONS  (PRN):  hydrALAZINE Injectable 10 milliGRAM(s) IV Push every 4 hours PRN for sbp above 160    PHYSICAL EXAM:    Vital Signs Last 24 Hrs  T(C): 36.4 (31 Aug 2023 13:00), Max: 36.9 (30 Aug 2023 16:35)  T(F): 97.5 (31 Aug 2023 13:00), Max: 98.4 (30 Aug 2023 16:35)  HR: 64 (31 Aug 2023 13:00) (58 - 67)  BP: 142/86 (31 Aug 2023 13:00) (133/83 - 163/87)  BP(mean): --  RR: 20 (31 Aug 2023 13:00) (17 - 20)  SpO2: 98% (31 Aug 2023 13:00) (98% - 100%)    Parameters below as of 31 Aug 2023 13:00  Patient On (Oxygen Delivery Method): room air    General:  A&Ox1, intermittent restlessness  HEENT: normal    Lungs: comfortable   CV: normal   GI: normal   : normal   MSK: unable to assess  Neuro:  nonfocal. cognitive impairment, oriented to self only  Skin: normal      LABS:             13.7   9.94  )-----------( 340      ( 31 Aug 2023 05:00 )             40.9     08-31    142  |  101  |  11.4  ----------------------------<  91  3.7   |  27.0  |  0.61    Ca    9.7      31 Aug 2023 05:00    TPro  7.1  /  Alb  4.2  /  TBili  0.2<L>  /  DBili  x   /  AST  24  /  ALT  60<H>  /  AlkPhos  106  08-30    PT/INR - ( 30 Aug 2023 17:38 )   PT: 11.6 sec;   INR: 1.05 ratio       PTT - ( 30 Aug 2023 17:38 )  PTT:28.8 sec  Urinalysis Basic - ( 31 Aug 2023 05:00 )    Color: x / Appearance: x / SG: x / pH: x  Gluc: 91 mg/dL / Ketone: x  / Bili: x / Urobili: x   Blood: x / Protein: x / Nitrite: x   Leuk Esterase: x / RBC: x / WBC x   Sq Epi: x / Non Sq Epi: x / Bacteria: x    I&O's Summary    RADIOLOGY & ADDITIONAL STUDIES:  < from: Xray Chest 1 View- PORTABLE-Urgent (08.30.23 @ 18:06) >  IMPRESSION:   RIGHT infrahilar linear airspace disease/subsegmental   atelectasis versus overlapping pulmonary vessels.  If symptoms warrant further investigation follow-up chest CT recommended.    < end of copied text >    < from: CT Head No Cont (08.30.23 @ 17:06) >  IMPRESSION:    Small hemorrhage with surrounding vasogenic edema in the left high   posterior parietal lobe is unchanged.    < end of copied text >    < from: CT Head No Cont (07.31.23 @ 19:13) >  IMPRESSION:  1. Findings suggestive of a age indeterminate left MCA territory and   right occipital (PCA territory) infarcts..  2. Left posterior parietal intraparenchymal hemorrhage measuring   approximately 2.1 cm in greatest diameter, with surrounding edema.   Smaller eft inferior frontal hemorrhagic lesion with surrounding edema,   small left posterior temporal hemorrhagic lesion with surrounding edema   and small right temporal hemorrhagic lesion with surrounding edema. Small   foci of abnormal signal are also appreciated bilaterally within the   cerebellar regions.Findings are concerning for presence of underlying   hemorrhagic mass lesions, possibly metastatic in etiology. Possibility of   mycotic aneurysm should also be considered in the differential diagnosis.   Consider follow-up pre and postcontrast MR imaging of the brain, to   include DWI imaging and ADC mapping techniques. MRA of the [place] of   [place] may also prove useful.  3. Punctate focus of calcification in a left occipital location without   surrounding edema, may represent prior infectious/inflammatory process   versus nonacute hemorrhagic event.    < end of copied text >

## 2023-08-31 NOTE — CONSULT NOTE ADULT - TIME BILLING
MDM
D/W jasper, hospitalist Dr Gusman, med resident Dr Weston    Total time also includes discussion during interdisciplinary team rounds, chart review including but limited to prior admissions/ GOC discussions, review of established ACP documentations ( ex. living will, HCP, MOLST form),  review of medications/ labs/ imaging, examination, care coordination with other health care professionals, documentation EXCLUDING current goals of care or advance care planning discussions.

## 2023-09-01 LAB
CULTURE RESULTS: SIGNIFICANT CHANGE UP
SPECIMEN SOURCE: SIGNIFICANT CHANGE UP

## 2023-09-01 PROCEDURE — 70553 MRI BRAIN STEM W/O & W/DYE: CPT | Mod: 26

## 2023-09-01 PROCEDURE — 99231 SBSQ HOSP IP/OBS SF/LOW 25: CPT

## 2023-09-01 PROCEDURE — 99232 SBSQ HOSP IP/OBS MODERATE 35: CPT

## 2023-09-01 RX ADMIN — Medication 4 MILLIGRAM(S): at 17:30

## 2023-09-01 RX ADMIN — Medication 4 MILLIGRAM(S): at 13:59

## 2023-09-01 RX ADMIN — Medication 4 MILLIGRAM(S): at 05:42

## 2023-09-01 RX ADMIN — PANTOPRAZOLE SODIUM 40 MILLIGRAM(S): 20 TABLET, DELAYED RELEASE ORAL at 13:59

## 2023-09-01 RX ADMIN — Medication 1 MILLIGRAM(S): at 07:43

## 2023-09-01 RX ADMIN — LEVETIRACETAM 400 MILLIGRAM(S): 250 TABLET, FILM COATED ORAL at 05:41

## 2023-09-01 RX ADMIN — LEVETIRACETAM 400 MILLIGRAM(S): 250 TABLET, FILM COATED ORAL at 17:29

## 2023-09-01 NOTE — PROGRESS NOTE ADULT - SUBJECTIVE AND OBJECTIVE BOX
NSx PA Note   HD#1    HPI  63M previously here s/p stroke, hemorrhagic brain mass, new lung mass identified, pt has biopsy of lung mass on that admission which shows adenocarcinoma. Plan was for patient to follow up with nsg as outpatient and repeat MRI brain. However, pt now returns with altered mental status per the family and acting "off". Pt is seen in ED, sitting on bed calm but confused. Denies headache, nausea, vomiting, weakness.     Interval/Overnight Events  Patient seen bedside in ED. MRI completed this morning. Remains on 1:1 for confusion. Palliative following.     MEDICATIONS  (STANDING):  dexAMETHasone     Tablet 4 milliGRAM(s) Oral every 6 hours  levETIRAcetam  IVPB 500 milliGRAM(s) IV Intermittent every 12 hours  pantoprazole  Injectable 40 milliGRAM(s) IV Push daily    MEDICATIONS  (PRN):  haloperidol    Injectable 2.5 milliGRAM(s) IV Push every 6 hours PRN agitaion 2nd line  hydrALAZINE Injectable 10 milliGRAM(s) IV Push every 4 hours PRN for sbp above 160  QUEtiapine 25 milliGRAM(s) Oral three times a day PRN agitation    Vital Signs Last 24 Hrs  T(C): 36.3 (01 Sep 2023 07:47), Max: 36.6 (31 Aug 2023 20:08)  T(F): 97.4 (01 Sep 2023 07:47), Max: 97.9 (31 Aug 2023 20:08)  HR: 74 (01 Sep 2023 07:47) (63 - 82)  BP: 120/76 (01 Sep 2023 07:47) (120/76 - 155/89)  BP(mean): --  RR: 18 (01 Sep 2023 07:47) (18 - 20)  SpO2: 98% (01 Sep 2023 07:47) (92% - 100%)    Parameters below as of 01 Sep 2023 07:47  Patient On (Oxygen Delivery Method): room air    Neuro- Awakes to name with gentle stimulation, mostly non-verbal (does not answer in Serbian his name or where he is)  pupils equal and reactive, no gaze preference   follows only some simple commands in Swedish (sticks out his tongue and lifts his arms, but not his legs)  spontaneously bends both legs however  tongue ML, face appears symmetric                          13.7   9.94  )-----------( 340      ( 31 Aug 2023 05:00 )             40.9   08-31    142  |  101  |  11.4  ----------------------------<  91  3.7   |  27.0  |  0.61    Ca    9.7      31 Aug 2023 05:00    TPro  7.1  /  Alb  4.2  /  TBili  0.2<L>  /  DBili  x   /  AST  24  /  ALT  60<H>  /  AlkPhos  106  08-30    ACC: [id number] EXAM:  MR BRAIN WAW IC FOR SRS   ORDERED BY: JESSY ACEVES     PROCEDURE DATE:  09/01/2023          INTERPRETATION:  Exam Date: 9/1/2023 8:36 AM    MR brain with and without gadolinium    CLINICAL INFORMATION:   brain mass    TECHNIQUE:   Multiplanar imaging of the brain was performed pre- and   post-IV contrast.   6 cc of Gadavist was administered. 1.5 cc was   discarded.    COMPARISON: MRI brain 8/4/2023.    FINDINGS:    Of note, evaluation of the study is limited by motionartifact.    1.7 x 1.4 cm focus of enhancement in the left high posterior parietal   lobe in the region of prior hemorrhage, suggestive of a hemorrhagic   metastasis. There is surrounding vasogenic edema. No definite additional   areas of enhancementare identified, however evaluation of the   postcontrast sequence is limited by motion artifact, and consideration   for repeat postcontrast sequence may be helpful as clinically indicated.   Reidentified are additional scattered areas of abnormal T2/FLAIR   throughout the bilateral cerebri, compatible with recent infarcts,   several of which have associated hemosiderin. No significant mass effect.   No midline shift. No hydrocephalus.   The vertebral and internal carotid   arteries demonstrate expected flow voids indicating their patency.    The paranasal sinuses are clear.    IMPRESSION:    1.7 x 1.4 cm focus of enhancement in the left high posterior parietal   lobe in the region of prior hemorrhage, suggestive of a hemorrhagic   metastasis. There is surrounding vasogenic edema. No definite additional   areas of enhancement are identified, however evaluation of the   postcontrast sequence is limited by motion artifact, and consideration   for repeat postcontrast sequence may be helpful as clinically indicated.   Reidentified are additional scattered areas of abnormal T2/FLAIR   throughout the bilateral cerebri, compatible with recent infarcts,   several of which have associated hemosiderin.    --- End of Report ---            EMMA PHIPPS MD; Attending Radiologist  This document has been electronically signed. Sep  1 2023  9:17AM    Plan:  Dr. Soto to review MRI images.   Unclear if small hemorrhagic met is responsible for change in mental status- consider neuro consult, workup for TME versus infectious (diff with increasing lymphocytes, ?viral syndrome)  f/u heme/onc input   agree with rad/onc cons   CW keppra  CW decadron for cerebral edema and gi ppx   SCDs in bed  Incentive ramiro if patient can participate   Dr. Soto to see.    NSx PA Note   HD#1    HPI  63M previously here s/p stroke, hemorrhagic brain mass, new lung mass identified, pt has biopsy of lung mass on that admission which shows adenocarcinoma. Plan was for patient to follow up with nsg as outpatient and repeat MRI brain. However, pt now returns with altered mental status per the family and acting "off". Pt is seen in ED, sitting on bed calm but confused. Denies headache, nausea, vomiting, weakness.     Interval/Overnight Events  Patient seen bedside in ED. MRI completed this morning. Remains on 1:1 for confusion. Palliative following.     MEDICATIONS  (STANDING):  dexAMETHasone     Tablet 4 milliGRAM(s) Oral every 6 hours  levETIRAcetam  IVPB 500 milliGRAM(s) IV Intermittent every 12 hours  pantoprazole  Injectable 40 milliGRAM(s) IV Push daily    MEDICATIONS  (PRN):  haloperidol    Injectable 2.5 milliGRAM(s) IV Push every 6 hours PRN agitaion 2nd line  hydrALAZINE Injectable 10 milliGRAM(s) IV Push every 4 hours PRN for sbp above 160  QUEtiapine 25 milliGRAM(s) Oral three times a day PRN agitation    Vital Signs Last 24 Hrs  T(C): 36.3 (01 Sep 2023 07:47), Max: 36.6 (31 Aug 2023 20:08)  T(F): 97.4 (01 Sep 2023 07:47), Max: 97.9 (31 Aug 2023 20:08)  HR: 74 (01 Sep 2023 07:47) (63 - 82)  BP: 120/76 (01 Sep 2023 07:47) (120/76 - 155/89)  BP(mean): --  RR: 18 (01 Sep 2023 07:47) (18 - 20)  SpO2: 98% (01 Sep 2023 07:47) (92% - 100%)    Parameters below as of 01 Sep 2023 07:47  Patient On (Oxygen Delivery Method): room air    Neuro- Awakes to name with gentle stimulation, mostly non-verbal (does not answer in Romanian his name or where he is)  pupils equal and reactive, no gaze preference   follows only some simple commands in Wolof (sticks out his tongue and lifts his arms, but not his legs)  spontaneously bends both legs however  tongue ML, face appears symmetric                          13.7   9.94  )-----------( 340      ( 31 Aug 2023 05:00 )             40.9   08-31    142  |  101  |  11.4  ----------------------------<  91  3.7   |  27.0  |  0.61    Ca    9.7      31 Aug 2023 05:00    TPro  7.1  /  Alb  4.2  /  TBili  0.2<L>  /  DBili  x   /  AST  24  /  ALT  60<H>  /  AlkPhos  106  08-30    ACC: [id number] EXAM:  MR BRAIN WAW IC FOR SRS   ORDERED BY: JESSY ACEVES     PROCEDURE DATE:  09/01/2023          INTERPRETATION:  Exam Date: 9/1/2023 8:36 AM    MR brain with and without gadolinium    CLINICAL INFORMATION:   brain mass    TECHNIQUE:   Multiplanar imaging of the brain was performed pre- and   post-IV contrast.   6 cc of Gadavist was administered. 1.5 cc was   discarded.    COMPARISON: MRI brain 8/4/2023.    FINDINGS:    Of note, evaluation of the study is limited by motionartifact.    1.7 x 1.4 cm focus of enhancement in the left high posterior parietal   lobe in the region of prior hemorrhage, suggestive of a hemorrhagic   metastasis. There is surrounding vasogenic edema. No definite additional   areas of enhancementare identified, however evaluation of the   postcontrast sequence is limited by motion artifact, and consideration   for repeat postcontrast sequence may be helpful as clinically indicated.   Reidentified are additional scattered areas of abnormal T2/FLAIR   throughout the bilateral cerebri, compatible with recent infarcts,   several of which have associated hemosiderin. No significant mass effect.   No midline shift. No hydrocephalus.   The vertebral and internal carotid   arteries demonstrate expected flow voids indicating their patency.    The paranasal sinuses are clear.    IMPRESSION:    1.7 x 1.4 cm focus of enhancement in the left high posterior parietal   lobe in the region of prior hemorrhage, suggestive of a hemorrhagic   metastasis. There is surrounding vasogenic edema. No definite additional   areas of enhancement are identified, however evaluation of the   postcontrast sequence is limited by motion artifact, and consideration   for repeat postcontrast sequence may be helpful as clinically indicated.   Reidentified are additional scattered areas of abnormal T2/FLAIR   throughout the bilateral cerebri, compatible with recent infarcts,   several of which have associated hemosiderin.    --- End of Report ---            EMMA PHIPPS MD; Attending Radiologist  This document has been electronically signed. Sep  1 2023  9:17AM    Plan:  Dr. Soto to review MRI images.   Unclear if small hemorrhagic met is responsible for change in mental status- consider neuro consult, workup for TME versus infectious (diff with increasing lymphocytes, ?viral syndrome)  f/u heme/onc input   agree with rad/onc cons   CW keppra  CW decadron for cerebral edema and gi ppx   SCDs in bed  Incentive ramiro if patient can participate   Dr. Soto to see.     NS ADDN  6:18pm  Discussed with Dr. Soto.   No plans for surgical intervention.   Management per oncology and medicine.   No follow up needed.   Thank you, signing off.

## 2023-09-01 NOTE — PROGRESS NOTE ADULT - ASSESSMENT
pt. is a 63 male with recent diagnosis of lung adenocarcinoma with brain mets presented with acute metabolic encephalopathy      1. acute metabolic encephalopathy. this could be due to brain mets but imaging findings are unchanged from prior so not sure how much of this is contributing to altered mental status.  no other etiology apparent. Patient slightly restless. c/w seroquel 25 mg TID PRN and IV haldol PRN as 2nd line.    2. Brain mets from lung adenocarcinoma. c/w steroids. I have informed radiation oncology about the consult. MRI brain unchanged from prior. on keppra for seizure prophylaxis.    3. DVT prophylaxis: SCD

## 2023-09-01 NOTE — PROGRESS NOTE ADULT - SUBJECTIVE AND OBJECTIVE BOX
[facility] Division of Hospital Medicine    Chief Complaint:      SUBJECTIVE / OVERNIGHT EVENTS:    Patient still confused    MEDICATIONS  (STANDING):  dexAMETHasone     Tablet 4 milliGRAM(s) Oral every 6 hours  levETIRAcetam  IVPB 500 milliGRAM(s) IV Intermittent every 12 hours  pantoprazole  Injectable 40 milliGRAM(s) IV Push daily    MEDICATIONS  (PRN):  haloperidol    Injectable 2.5 milliGRAM(s) IV Push every 6 hours PRN agitaion 2nd line  hydrALAZINE Injectable 10 milliGRAM(s) IV Push every 4 hours PRN for sbp above 160  QUEtiapine 25 milliGRAM(s) Oral three times a day PRN agitation        I&O's Summary      PHYSICAL EXAM:  Vital Signs Last 24 Hrs  T(C): 36.3 (01 Sep 2023 07:47), Max: 36.6 (31 Aug 2023 20:08)  T(F): 97.4 (01 Sep 2023 07:47), Max: 97.9 (31 Aug 2023 20:08)  HR: 74 (01 Sep 2023 07:47) (74 - 82)  BP: 120/76 (01 Sep 2023 07:47) (120/76 - 135/89)  BP(mean): --  RR: 18 (01 Sep 2023 07:47) (18 - 18)  SpO2: 98% (01 Sep 2023 07:47) (92% - 98%)    Parameters below as of 01 Sep 2023 07:47  Patient On (Oxygen Delivery Method): room air            CONSTITUTIONAL: NAD  ENMT: normocephalic, atraumatic  RESPIRATORY: Normal respiratory effort; lungs are clear to auscultation bilaterally  CARDIOVASCULAR: Regular rate and rhythm, normal S1 and S2, no murmur/rub/gallop.  ABDOMEN: Nontender to palpation, no rebound/guarding; No hepatosplenomegaly  MUSCLOSKELETAL:  No edema  PSYCH: Awake, confused.  NEUROLOGY: CN 2-12 are intact and symmetric; no gross deficits;   SKIN: No rashes; no palpable lesions    LABS:                        13.7   9.94  )-----------( 340      ( 31 Aug 2023 05:00 )             40.9     08-31    142  |  101  |  11.4  ----------------------------<  91  3.7   |  27.0  |  0.61    Ca    9.7      31 Aug 2023 05:00    TPro  7.1  /  Alb  4.2  /  TBili  0.2<L>  /  DBili  x   /  AST  24  /  ALT  60<H>  /  AlkPhos  106  08-30    PT/INR - ( 30 Aug 2023 17:38 )   PT: 11.6 sec;   INR: 1.05 ratio         PTT - ( 30 Aug 2023 17:38 )  PTT:28.8 sec  CARDIAC MARKERS ( 30 Aug 2023 17:38 )  x     / <0.01 ng/mL / x     / x     / x          Urinalysis Basic - ( 31 Aug 2023 05:00 )    Color: x / Appearance: x / SG: x / pH: x  Gluc: 91 mg/dL / Ketone: x  / Bili: x / Urobili: x   Blood: x / Protein: x / Nitrite: x   Leuk Esterase: x / RBC: x / WBC x   Sq Epi: x / Non Sq Epi: x / Bacteria: x        Culture - Urine (collected 30 Aug 2023 23:45)  Source: Clean Catch Clean Catch (Midstream)  Final Report (01 Sep 2023 06:29):    <10,000 CFU/mL Normal Urogenital Angie      CAPILLARY BLOOD GLUCOSE            RADIOLOGY & ADDITIONAL TESTS:  Results Reviewed:   Imaging Personally Reviewed:  Electrocardiogram Personally Reviewed:

## 2023-09-02 PROCEDURE — 99233 SBSQ HOSP IP/OBS HIGH 50: CPT

## 2023-09-02 RX ORDER — DEXAMETHASONE 0.5 MG/5ML
6 ELIXIR ORAL EVERY 8 HOURS
Refills: 0 | Status: DISCONTINUED | OUTPATIENT
Start: 2023-09-02 | End: 2023-09-02

## 2023-09-02 RX ORDER — DEXAMETHASONE 0.5 MG/5ML
6 ELIXIR ORAL EVERY 8 HOURS
Refills: 0 | Status: DISCONTINUED | OUTPATIENT
Start: 2023-09-02 | End: 2023-09-12

## 2023-09-02 RX ADMIN — Medication 6 MILLIGRAM(S): at 13:35

## 2023-09-02 RX ADMIN — LEVETIRACETAM 400 MILLIGRAM(S): 250 TABLET, FILM COATED ORAL at 18:03

## 2023-09-02 RX ADMIN — Medication 6 MILLIGRAM(S): at 21:09

## 2023-09-02 RX ADMIN — PANTOPRAZOLE SODIUM 40 MILLIGRAM(S): 20 TABLET, DELAYED RELEASE ORAL at 13:35

## 2023-09-02 RX ADMIN — LEVETIRACETAM 400 MILLIGRAM(S): 250 TABLET, FILM COATED ORAL at 05:32

## 2023-09-02 NOTE — PROGRESS NOTE ADULT - SUBJECTIVE AND OBJECTIVE BOX
[facility] Division of Medicine    Chief Complaint:  AMS    SUBJECTIVE / OVERNIGHT EVENTS: Pt seen at the bedside. Awake, NAD but not following commands even with use of . Nurse at the bedside this morning states he knows his name is Josias but does not much else. ROS unable to be obtained due to mental status.    MEDICATIONS  (STANDING):  dexAMETHasone  Injectable 6 milliGRAM(s) IV Push every 8 hours  levETIRAcetam  IVPB 500 milliGRAM(s) IV Intermittent every 12 hours  pantoprazole  Injectable 40 milliGRAM(s) IV Push daily    MEDICATIONS  (PRN):  haloperidol    Injectable 2.5 milliGRAM(s) IV Push every 6 hours PRN agitaion 2nd line  hydrALAZINE Injectable 10 milliGRAM(s) IV Push every 4 hours PRN for sbp above 160  QUEtiapine 25 milliGRAM(s) Oral three times a day PRN agitation      I&O's Summary      PHYSICAL EXAM:  Vital Signs Last 24 Hrs  T(C): 36.5 (02 Sep 2023 08:37), Max: 36.8 (01 Sep 2023 19:35)  T(F): 97.7 (02 Sep 2023 08:37), Max: 98.2 (01 Sep 2023 19:35)  HR: 89 (02 Sep 2023 08:37) (66 - 89)  BP: 117/89 (02 Sep 2023 08:37) (109/70 - 149/88)  BP(mean): --  RR: 16 (02 Sep 2023 04:49) (16 - 18)  SpO2: 98% (02 Sep 2023 04:49) (97% - 98%)    Parameters below as of 02 Sep 2023 04:49  Patient On (Oxygen Delivery Method): room air      GENERAL: not in acute distress  HEENT:  Clear conjunctiva, PERRL, moist oral mucosa  RESP:  Non-labored breathing pattern, lungs clear to ausculation, no wheezes or crackles appreciated  CV: Regular rate and rhythm, no murmurs appreciated, no lower extremity edema  GI: Soft, non-tender, non-distended  NEURO: unable to assess  PSYCH: Calm, A&Ox0  SKIN: No rash or lesions, warm and dry      LABS:                    Culture - Urine (collected 30 Aug 2023 23:45)  Source: Clean Catch Clean Catch (Midstream)  Final Report (01 Sep 2023 06:29):    <10,000 CFU/mL Normal Urogenital Angie      CAPILLARY BLOOD GLUCOSE          IMAGING:

## 2023-09-02 NOTE — PROGRESS NOTE ADULT - ASSESSMENT
pt. is a 63 male with recent diagnosis of lung adenocarcinoma with brain mets presented with acute metabolic encephalopathy    Acute metabolic encephalopathy  - possibly secondary to brain mets with vasogenic edema yet imaging findings are unchanged from prior  - does not appear to be an infectious etiology as UA and CXR negative, WBC wnl and afebrile  - neuro sx state no intervention at this time, signed off  - continue to monitor for now, will consider neuro consult if no improvement  - c/w decadron as per oncology  - c/w seroquel 25 mg TID PRN and IV haldol PRN as 2nd line    lung adenocarcinoma with brain mets  - c/w decadron 6mg q8hr as per oncology   - previous provider informed radiation oncology about the consult, note pending   - keppra for seizure prophylaxis    DVT prophylaxis: SCD

## 2023-09-03 PROCEDURE — 99497 ADVNCD CARE PLAN 30 MIN: CPT

## 2023-09-03 PROCEDURE — 99233 SBSQ HOSP IP/OBS HIGH 50: CPT

## 2023-09-03 RX ORDER — SODIUM BICARBONATE 1 MEQ/ML
0.06 SYRINGE (ML) INTRAVENOUS
Qty: 75 | Refills: 0 | Status: DISCONTINUED | OUTPATIENT
Start: 2023-09-03 | End: 2023-09-03

## 2023-09-03 RX ORDER — SODIUM CHLORIDE 9 MG/ML
1000 INJECTION, SOLUTION INTRAVENOUS
Refills: 0 | Status: DISCONTINUED | OUTPATIENT
Start: 2023-09-03 | End: 2023-09-19

## 2023-09-03 RX ORDER — ACETAMINOPHEN 500 MG
1000 TABLET ORAL ONCE
Refills: 0 | Status: COMPLETED | OUTPATIENT
Start: 2023-09-03 | End: 2023-09-03

## 2023-09-03 RX ADMIN — LEVETIRACETAM 400 MILLIGRAM(S): 250 TABLET, FILM COATED ORAL at 05:16

## 2023-09-03 RX ADMIN — SODIUM CHLORIDE 75 MILLILITER(S): 9 INJECTION, SOLUTION INTRAVENOUS at 13:56

## 2023-09-03 RX ADMIN — Medication 6 MILLIGRAM(S): at 05:15

## 2023-09-03 RX ADMIN — Medication 400 MILLIGRAM(S): at 18:40

## 2023-09-03 RX ADMIN — Medication 6 MILLIGRAM(S): at 21:27

## 2023-09-03 RX ADMIN — Medication 6 MILLIGRAM(S): at 13:56

## 2023-09-03 RX ADMIN — LEVETIRACETAM 400 MILLIGRAM(S): 250 TABLET, FILM COATED ORAL at 17:59

## 2023-09-03 RX ADMIN — PANTOPRAZOLE SODIUM 40 MILLIGRAM(S): 20 TABLET, DELAYED RELEASE ORAL at 13:56

## 2023-09-03 NOTE — CONSULT NOTE ADULT - SUBJECTIVE AND OBJECTIVE BOX
[facility]                                        Ne[facility] [place]                                  [facility]                                      [address]                                           [place], [place], [address]                                                [phone]        CC: Brain metastases     HISTORY:  The patient is a 63y Male who initially presented 7/31/23 with right sided weakness.   He was seen by the stroke team.   Chest CT showed lesion suspicious for lung cancer.  Head CT showed left posterior parietal intraparenchymal hemorrhage measuring approximately 2.1 cm in greatest diameter, with surrounding edema. Smaller eft inferior frontal hemorrhagic lesion with surrounding edema, small left posterior temporal hemorrhagic lesion with surrounding edema and small right temporal hemorrhagic lesion with surrounding edema.  He had lung biopsy by IR and was seen by the oncology and neurosurgery services.   He was discharged on 8/16/23.  He was brought back on 8/30/23 with what was described as altered mental status.   He was again seen by oncology and neurosurgery.   MRI again shows hemorrhagic metastases with surrounding edema.   Neurology evaluation requested 9/3/23.    PAST MEDICAL & SURGICAL HISTORY:  Hypertension  CVA (cerebral vascular accident)  Mild tetrahydrocannabinol (THC) abuse  ETOH abuse  Cocaine abuse  No significant past surgical history    MEDICATIONS  (STANDING):  dexAMETHasone  Injectable 6 milliGRAM(s) IV Push every 8 hours  dextrose 5% + sodium chloride 0.9%. 1000 milliLiter(s) (75 mL/Hr) IV Continuous <Continuous>  levETIRAcetam  IVPB 500 milliGRAM(s) IV Intermittent every 12 hours  pantoprazole  Injectable 40 milliGRAM(s) IV Push daily    MEDICATIONS  (PRN):  haloperidol    Injectable 2.5 milliGRAM(s) IV Push every 6 hours PRN agitaion 2nd line  hydrALAZINE Injectable 10 milliGRAM(s) IV Push every 4 hours PRN for sbp above 160  QUEtiapine 25 milliGRAM(s) Oral three times a day PRN agitation      Allergies  No Known Allergies    SOCIAL HISTORY:  Smoker.  Alcohol use.   Prior cocaine use.     FAMILY HISTORY:  No pertinent family history in first degree relatives    ROS:  Constitutional: Unobtainable due to patient's condition.   Neuro: Unobtainable due to patient's condition.   Eyes: Unobtainable due to patient's condition.   Ears/nose/throat: Unobtainable due to patient's condition.   Cardiac: Unobtainable due to patient's condition.   Respiratory: Unobtainable due to patient's condition.   GI: Unobtainable due to patient's condition.   : Unobtainable due to patient's condition..  Integumentary: Unobtainable due to patient's condition.  Psych: Unobtainable due to patient's condition.  Heme: Unobtainable due to patient's condition.     Exam:  Vital Signs Last 24 Hrs  T(C): 36.5 (03 Sep 2023 08:07), Max: 36.9 (02 Sep 2023 18:15)  T(F): 97.7 (03 Sep 2023 08:07), Max: 98.5 (02 Sep 2023 18:15)  HR: 67 (03 Sep 2023 08:07) (58 - 78)  BP: 138/81 (03 Sep 2023 08:07) (119/78 - 145/77)  RR: 19 (03 Sep 2023 08:07) (16 - 19)  SpO2: 97% (03 Sep 2023 08:07) (92% - 98%)    Parameters below as of 03 Sep 2023 08:07  Patient On (Oxygen Delivery Method): room air    General: NAD.   Carotid bruits absent.     Mental status: The patient is awake but globally aphasic. He is able to give name but no other information. He is unable to follow any instructions.     Cranial nerves: There is no papilledema. Pupils react symmetrically to light. He blinks to threat bilaterally. He tracks with gaze. There is a subtle depression of the right nasolabial fold. Palate and tongue cannot be assessed.     Motor/sensory: There is normal bulk and tone.  Moving left more than right to stimuli.    Reflexes: 1+ throughout and plantar responses are flexor.    Cerebellar: Cannot be tested.     LABS:       RADIOLOGY   Brain MRI images reviewed. There is a 1.7 x 1.4 cm focus of enhancement in the left high posterior parietal lobe in the region of prior hemorrhage, suggestive of a hemorrhagic metastasis. There is surrounding vasogenic edema.

## 2023-09-03 NOTE — PROGRESS NOTE ADULT - ASSESSMENT
pt. is a 63 male with recent diagnosis of lung adenocarcinoma with brain mets presented with acute metabolic encephalopathy    Acute metabolic encephalopathy  - possibly secondary to brain mets with vasogenic edema yet imaging findings are unchanged from prior  - does not appear to be an infectious etiology as UA and CXR negative, WBC wnl and afebrile  - neuro sx state no intervention at this time, signed off  - spoke with oncology   - continue to monitor for now, will consider neuro consult if no improvement  - c/w decadron as per oncology  - c/w seroquel 25 mg TID PRN and IV haldol PRN as 2nd line    lung adenocarcinoma with brain mets  - c/w decadron 6mg q8hr as per oncology   - previous provider informed radiation oncology about the consult, note pending   - keppra for seizure prophylaxis    DVT prophylaxis: SCD   pt. is a 63 male with recent diagnosis of lung adenocarcinoma with brain mets presented with acute metabolic encephalopathy    Acute metabolic encephalopathy  - likely secondary to brain mets with vasogenic edema however imaging findings are unchanged from prior  - does not appear to be an infectious etiology as UA and CXR negative, WBC wnl and afebrile  - neuro sx state no intervention at this time, signed off  - spoke with oncology today, given minimal response to steroids pt's prognosis is poor, recommend neurology consult  - c/w decadron 6mg IV q8hr for now  - radiation oncology to follow up when available  - c/w seroquel 25 mg TID PRN and IV haldol PRN as 2nd line  - keppra for seizure prophylaxis    lung adenocarcinoma with brain mets  - c/w decadron 6mg q8hr as per oncology   - previous provider informed radiation oncology about the consult, note pending   - keppra for seizure prophylaxis    DVT prophylaxis: SCD

## 2023-09-03 NOTE — PROGRESS NOTE ADULT - SUBJECTIVE AND OBJECTIVE BOX
[facility] Division of Medicine    Chief Complaint:  AMS    SUBJECTIVE / OVERNIGHT EVENTS: Pt seen at the bedside. Awake, NAD but not following commands even with use of . Sisters at the bedside, state that one month ago patient was walking and talking without issues. Had decline in mental status on July31 and thus hospitalized. After discharge patient was relatively back at baseline but 6 days after d/c started to act strangely, taking off his clothes and trying to run away.    MEDICATIONS  (STANDING):  dexAMETHasone  Injectable 6 milliGRAM(s) IV Push every 8 hours  levETIRAcetam  IVPB 500 milliGRAM(s) IV Intermittent every 12 hours  pantoprazole  Injectable 40 milliGRAM(s) IV Push daily    MEDICATIONS  (PRN):  haloperidol    Injectable 2.5 milliGRAM(s) IV Push every 6 hours PRN agitaion 2nd line  hydrALAZINE Injectable 10 milliGRAM(s) IV Push every 4 hours PRN for sbp above 160  QUEtiapine 25 milliGRAM(s) Oral three times a day PRN agitation      I&O's Summary    02 Sep 2023 07:01  -  03 Sep 2023 07:00  --------------------------------------------------------  IN: 0 mL / OUT: 500 mL / NET: -500 mL        PHYSICAL EXAM:  Vital Signs Last 24 Hrs  T(C): 36.5 (03 Sep 2023 08:07), Max: 36.9 (02 Sep 2023 18:15)  T(F): 97.7 (03 Sep 2023 08:07), Max: 98.5 (02 Sep 2023 18:15)  HR: 67 (03 Sep 2023 08:07) (58 - 78)  BP: 138/81 (03 Sep 2023 08:07) (119/78 - 145/77)  BP(mean): --  RR: 19 (03 Sep 2023 08:07) (16 - 19)  SpO2: 97% (03 Sep 2023 08:07) (92% - 98%)    Parameters below as of 03 Sep 2023 08:07  Patient On (Oxygen Delivery Method): room air      GENERAL: not in acute distress  HEENT:  Clear conjunctiva, PERRL, moist oral mucosa  RESP:  Non-labored breathing pattern, lungs clear to ausculation, no wheezes or crackles appreciated  CV: Regular rate and rhythm, no murmurs appreciated, no lower extremity edema  GI: Soft, non-tender, non-distended  NEURO: unable to assess  PSYCH: Calm, A&Ox0  SKIN: No rash or lesions, warm and dry        LABS:                    CAPILLARY BLOOD GLUCOSE          IMAGING:                                   [facility] Division of Medicine    Chief Complaint:  AMS    SUBJECTIVE / OVERNIGHT EVENTS: Pt seen at the bedside. Awake, NAD but not following commands even with use of . Sisters at the bedside, state that one month ago patient was walking and talking without issues. Had decline in mental status on July31 and thus hospitalized. After discharge patient was relatively back at baseline but 6 days after d/c started to act strangely, taking off his clothes and trying to run away thus they brought him to the hospital. ROS unable to be obtained due to mental status     MEDICATIONS  (STANDING):  dexAMETHasone  Injectable 6 milliGRAM(s) IV Push every 8 hours  levETIRAcetam  IVPB 500 milliGRAM(s) IV Intermittent every 12 hours  pantoprazole  Injectable 40 milliGRAM(s) IV Push daily    MEDICATIONS  (PRN):  haloperidol    Injectable 2.5 milliGRAM(s) IV Push every 6 hours PRN agitaion 2nd line  hydrALAZINE Injectable 10 milliGRAM(s) IV Push every 4 hours PRN for sbp above 160  QUEtiapine 25 milliGRAM(s) Oral three times a day PRN agitation      I&O's Summary    02 Sep 2023 07:01  -  03 Sep 2023 07:00  --------------------------------------------------------  IN: 0 mL / OUT: 500 mL / NET: -500 mL        PHYSICAL EXAM:  Vital Signs Last 24 Hrs  T(C): 36.5 (03 Sep 2023 08:07), Max: 36.9 (02 Sep 2023 18:15)  T(F): 97.7 (03 Sep 2023 08:07), Max: 98.5 (02 Sep 2023 18:15)  HR: 67 (03 Sep 2023 08:07) (58 - 78)  BP: 138/81 (03 Sep 2023 08:07) (119/78 - 145/77)  BP(mean): --  RR: 19 (03 Sep 2023 08:07) (16 - 19)  SpO2: 97% (03 Sep 2023 08:07) (92% - 98%)    Parameters below as of 03 Sep 2023 08:07  Patient On (Oxygen Delivery Method): room air      GENERAL: not in acute distress  HEENT:  Clear conjunctiva, PERRL, moist oral mucosa  RESP:  Non-labored breathing pattern, lungs clear to ausculation, no wheezes or crackles appreciated  CV: Regular rate and rhythm, no murmurs appreciated, no lower extremity edema  GI: Soft, non-tender, non-distended  NEURO: unable to assess  PSYCH: Calm, A&Ox0  SKIN: No rash or lesions, warm and dry        LABS:                    CAPILLARY BLOOD GLUCOSE          IMAGING:

## 2023-09-03 NOTE — GOALS OF CARE CONVERSATION - ADVANCED CARE PLANNING - CONVERSATION DETAILS
Discussed patient's poor prognosis and limited possibility of recovery with patient's sisters at the bedside (Lea Butler and Greg Butler). Explained in detail the indications and possible adverse events of chest compressions and intubation in relation to patient's current condition and expected quality of life. Both parties verbally endorsed understanding. MOLST form was provided and explained. Patient's sisters, who are acting as the patient's next of kin as he does not have any children and they have been involved in his healthcare planning since prior admission/discharge, have decided to make the patient DNR/DNI.

## 2023-09-03 NOTE — CONSULT NOTE ADULT - ASSESSMENT
The patient is a 63y Male with lung cancer and brain metastases.     Brain metastases.  Continue decadron.   Continue Keppra.     Altered mental status.  Appears aphasic rather than confused.     Lung cancer   Plan per oncology.    Case discussed with Dr Candelario.

## 2023-09-04 LAB
ALBUMIN SERPL ELPH-MCNC: 3.9 G/DL — SIGNIFICANT CHANGE UP (ref 3.3–5.2)
ALP SERPL-CCNC: 95 U/L — SIGNIFICANT CHANGE UP (ref 40–120)
ALT FLD-CCNC: 29 U/L — SIGNIFICANT CHANGE UP
ANION GAP SERPL CALC-SCNC: 14 MMOL/L — SIGNIFICANT CHANGE UP (ref 5–17)
AST SERPL-CCNC: 12 U/L — SIGNIFICANT CHANGE UP
BILIRUB DIRECT SERPL-MCNC: 0.1 MG/DL — SIGNIFICANT CHANGE UP (ref 0–0.3)
BILIRUB INDIRECT FLD-MCNC: 0.4 MG/DL — SIGNIFICANT CHANGE UP (ref 0.2–1)
BILIRUB SERPL-MCNC: 0.5 MG/DL — SIGNIFICANT CHANGE UP (ref 0.4–2)
BUN SERPL-MCNC: 23.2 MG/DL — HIGH (ref 8–20)
CALCIUM SERPL-MCNC: 9.2 MG/DL — SIGNIFICANT CHANGE UP (ref 8.4–10.5)
CHLORIDE SERPL-SCNC: 104 MMOL/L — SIGNIFICANT CHANGE UP (ref 96–108)
CO2 SERPL-SCNC: 23 MMOL/L — SIGNIFICANT CHANGE UP (ref 22–29)
CREAT SERPL-MCNC: 0.52 MG/DL — SIGNIFICANT CHANGE UP (ref 0.5–1.3)
EGFR: 114 ML/MIN/1.73M2 — SIGNIFICANT CHANGE UP
GLUCOSE SERPL-MCNC: 130 MG/DL — HIGH (ref 70–99)
HCT VFR BLD CALC: 42.4 % — SIGNIFICANT CHANGE UP (ref 39–50)
HGB BLD-MCNC: 14.3 G/DL — SIGNIFICANT CHANGE UP (ref 13–17)
MCHC RBC-ENTMCNC: 29.2 PG — SIGNIFICANT CHANGE UP (ref 27–34)
MCHC RBC-ENTMCNC: 33.7 GM/DL — SIGNIFICANT CHANGE UP (ref 32–36)
MCV RBC AUTO: 86.7 FL — SIGNIFICANT CHANGE UP (ref 80–100)
PLATELET # BLD AUTO: 279 K/UL — SIGNIFICANT CHANGE UP (ref 150–400)
POTASSIUM SERPL-MCNC: 4 MMOL/L — SIGNIFICANT CHANGE UP (ref 3.5–5.3)
POTASSIUM SERPL-SCNC: 4 MMOL/L — SIGNIFICANT CHANGE UP (ref 3.5–5.3)
PROT SERPL-MCNC: 6.8 G/DL — SIGNIFICANT CHANGE UP (ref 6.6–8.7)
RBC # BLD: 4.89 M/UL — SIGNIFICANT CHANGE UP (ref 4.2–5.8)
RBC # FLD: 12.9 % — SIGNIFICANT CHANGE UP (ref 10.3–14.5)
SODIUM SERPL-SCNC: 141 MMOL/L — SIGNIFICANT CHANGE UP (ref 135–145)
T4 FREE+ TSH PNL SERPL: 1.04 UIU/ML — SIGNIFICANT CHANGE UP (ref 0.27–4.2)
VIT B12 SERPL-MCNC: 646 PG/ML — SIGNIFICANT CHANGE UP (ref 232–1245)
WBC # BLD: 10.56 K/UL — HIGH (ref 3.8–10.5)
WBC # FLD AUTO: 10.56 K/UL — HIGH (ref 3.8–10.5)

## 2023-09-04 PROCEDURE — 99232 SBSQ HOSP IP/OBS MODERATE 35: CPT

## 2023-09-04 RX ADMIN — Medication 6 MILLIGRAM(S): at 21:31

## 2023-09-04 RX ADMIN — LEVETIRACETAM 400 MILLIGRAM(S): 250 TABLET, FILM COATED ORAL at 05:18

## 2023-09-04 RX ADMIN — Medication 6 MILLIGRAM(S): at 14:19

## 2023-09-04 RX ADMIN — SODIUM CHLORIDE 75 MILLILITER(S): 9 INJECTION, SOLUTION INTRAVENOUS at 17:47

## 2023-09-04 RX ADMIN — Medication 10 MILLIGRAM(S): at 16:46

## 2023-09-04 RX ADMIN — Medication 6 MILLIGRAM(S): at 05:18

## 2023-09-04 RX ADMIN — PANTOPRAZOLE SODIUM 40 MILLIGRAM(S): 20 TABLET, DELAYED RELEASE ORAL at 14:19

## 2023-09-04 RX ADMIN — SODIUM CHLORIDE 75 MILLILITER(S): 9 INJECTION, SOLUTION INTRAVENOUS at 02:33

## 2023-09-04 RX ADMIN — LEVETIRACETAM 400 MILLIGRAM(S): 250 TABLET, FILM COATED ORAL at 16:48

## 2023-09-04 NOTE — PROGRESS NOTE ADULT - SUBJECTIVE AND OBJECTIVE BOX
[facility] Division of Medicine    Chief Complaint:  AMS    SUBJECTIVE / OVERNIGHT EVENTS: Pt seen at the bedside. Unable to follow commands or voice any concerns. ROS unable to be [id number] v    MEDICATIONS  (STANDING):  dexAMETHasone  Injectable 6 milliGRAM(s) IV Push every 8 hours  dextrose 5% + sodium chloride 0.9%. 1000 milliLiter(s) (75 mL/Hr) IV Continuous <Continuous>  levETIRAcetam  IVPB 500 milliGRAM(s) IV Intermittent every 12 hours  pantoprazole  Injectable 40 milliGRAM(s) IV Push daily    MEDICATIONS  (PRN):  haloperidol    Injectable 2.5 milliGRAM(s) IV Push every 6 hours PRN agitaion 2nd line  hydrALAZINE Injectable 10 milliGRAM(s) IV Push every 4 hours PRN for sbp above 160  QUEtiapine 25 milliGRAM(s) Oral three times a day PRN agitation      I&O's Summary    03 Sep 2023 07:01  -  04 Sep 2023 07:00  --------------------------------------------------------  IN: 971 mL / OUT: 1210 mL / NET: -239 mL    04 Sep 2023 07:01  -  04 Sep 2023 14:27  --------------------------------------------------------  IN: 0 mL / OUT: 200 mL / NET: -200 mL        PHYSICAL EXAM:  Vital Signs Last 24 Hrs  T(C): 36.4 (04 Sep 2023 08:42), Max: 36.8 (03 Sep 2023 16:38)  T(F): 97.5 (04 Sep 2023 08:42), Max: 98.3 (03 Sep 2023 16:38)  HR: 60 (04 Sep 2023 08:42) (60 - 64)  BP: 138/78 (04 Sep 2023 08:42) (131/71 - 156/94)  BP(mean): --  RR: 18 (04 Sep 2023 08:42) (18 - 19)  SpO2: 95% (04 Sep 2023 08:42) (95% - 99%)    Parameters below as of 04 Sep 2023 08:42  Patient On (Oxygen Delivery Method): room air    GENERAL: not in acute distress  HEENT:  Clear conjunctiva, PERRL, moist oral mucosa  RESP:  Non-labored breathing pattern, lungs clear to ausculation, no wheezes or crackles appreciated  CV: Regular rate and rhythm, no murmurs appreciated, no lower extremity edema  GI: Soft, non-tender, non-distended  NEURO: unable to assess, does not follow commands  PSYCH: Calm, A&Ox0  SKIN: No rash or lesions, warm and dry      LABS:                        14.3   10.56 )-----------( 279      ( 04 Sep 2023 06:49 )             42.4     09-04    141  |  104  |  23.2<H>  ----------------------------<  130<H>  4.0   |  23.0  |  0.52    Ca    9.2      04 Sep 2023 06:49    TPro  6.8  /  Alb  3.9  /  TBili  0.5  /  DBili  0.1  /  AST  12  /  ALT  29  /  AlkPhos  95  09-04          Urinalysis Basic - ( 04 Sep 2023 06:49 )    Color: x / Appearance: x / SG: x / pH: x  Gluc: 130 mg/dL / Ketone: x  / Bili: x / Urobili: x   Blood: x / Protein: x / Nitrite: x   Leuk Esterase: x / RBC: x / WBC x   Sq Epi: x / Non Sq Epi: x / Bacteria: x        CAPILLARY BLOOD GLUCOSE          IMAGING:

## 2023-09-04 NOTE — PROGRESS NOTE ADULT - SUBJECTIVE AND OBJECTIVE BOX
[facility]                                        Ne[facility] [place]                                 [facility]                                  [address]                                        [place], [place], [address]                                             [phone]        CC: brain metastases     HPI:   The patient is a 63y Male who initially presented 7/31/23 with right sided weakness.   He was seen by the stroke team.   Chest CT showed lesion suspicious for lung cancer.  Head CT showed left posterior parietal intraparenchymal hemorrhage measuring approximately 2.1 cm in greatest diameter, with surrounding edema. Smaller eft inferior frontal hemorrhagic lesion with surrounding edema, small left posterior temporal hemorrhagic lesion with surrounding edema and small right temporal hemorrhagic lesion with surrounding edema.  He had lung biopsy by IR and was seen by the oncology and neurosurgery services.   He was discharged on 8/16/23.  He was brought back on 8/30/23 with what was described as altered mental status.   He was again seen by oncology and neurosurgery.   MRI again shows hemorrhagic metastases with surrounding edema.   Neurology evaluation requested 9/3/23.    Interim history:  Remains on [address] [place].     ROS:   Unobtainable due to patient's condition.     MEDICATIONS  (STANDING):  dexAMETHasone  Injectable 6 milliGRAM(s) IV Push every 8 hours  dextrose 5% + sodium chloride 0.9%. 1000 milliLiter(s) (75 mL/Hr) IV Continuous <Continuous>  levETIRAcetam  IVPB 500 milliGRAM(s) IV Intermittent every 12 hours  pantoprazole  Injectable 40 milliGRAM(s) IV Push daily    Vital Signs Last 24 Hrs  T(C): 36.4 (04 Sep 2023 08:42), Max: 36.8 (03 Sep 2023 16:38)  T(F): 97.5 (04 Sep 2023 08:42), Max: 98.3 (03 Sep 2023 16:38)  HR: 60 (04 Sep 2023 08:42) (60 - 64)  BP: 138/78 (04 Sep 2023 08:42) (131/71 - 156/94)  RR: 18 (04 Sep 2023 08:42) (18 - 19)  SpO2: 95% (04 Sep 2023 08:42) (95% - 99%)    Parameters below as of 04 Sep 2023 08:42  Patient On (Oxygen Delivery Method): room air    Detailed Neurologic Exam:    Mental status: The patient is awake but globally aphasic. He is able to give name but no other information. He is unable to follow any instructions.     Cranial nerves: There is no papilledema. Pupils react symmetrically to light. He blinks to threat bilaterally. He tracks with gaze. There is a subtle depression of the right nasolabial fold. Palate and tongue cannot be assessed.     Motor/sensory: There is normal bulk and tone.  Moving left more than right to stimuli.    Reflexes: 1+ throughout and plantar responses are flexor.    Cerebellar: Cannot be tested.     Labs:     09-04    141  |  104  |  23.2<H>  ----------------------------<  130<H>  4.0   |  23.0  |  0.52    Ca    9.2      04 Sep 2023 06:49    TPro  6.8  /  Alb  3.9  /  TBili  0.5  /  DBili  0.1  /  AST  12  /  ALT  29  /  AlkPhos  95  09-04                            14.3   10.56 )-----------( 279      ( 04 Sep 2023 06:49 )             42.4       Rad:   Brain MRI: There is a 1.7 x 1.4 cm focus of enhancement in the left high posterior parietal lobe in the region of prior hemorrhage, suggestive of a hemorrhagic metastasis. There is surrounding vasogenic edema.

## 2023-09-04 NOTE — PROGRESS NOTE ADULT - ASSESSMENT
63y Male with lung cancer and brain metastases.     Brain metastases.  Continue decadron.   Continue Keppra.     Altered mental status.  Appears aphasic rather than confused.   Speech path for language ordered.     Lung cancer   Plan per oncology.    No further specific neurologic recommendations. Will be available as needed.

## 2023-09-05 LAB
ANION GAP SERPL CALC-SCNC: 14 MMOL/L — SIGNIFICANT CHANGE UP (ref 5–17)
BASOPHILS # BLD AUTO: 0.01 K/UL — SIGNIFICANT CHANGE UP (ref 0–0.2)
BASOPHILS NFR BLD AUTO: 0.1 % — SIGNIFICANT CHANGE UP (ref 0–2)
BUN SERPL-MCNC: 20.6 MG/DL — HIGH (ref 8–20)
CALCIUM SERPL-MCNC: 9.2 MG/DL — SIGNIFICANT CHANGE UP (ref 8.4–10.5)
CHLORIDE SERPL-SCNC: 103 MMOL/L — SIGNIFICANT CHANGE UP (ref 96–108)
CO2 SERPL-SCNC: 22 MMOL/L — SIGNIFICANT CHANGE UP (ref 22–29)
CREAT SERPL-MCNC: 0.5 MG/DL — SIGNIFICANT CHANGE UP (ref 0.5–1.3)
EGFR: 115 ML/MIN/1.73M2 — SIGNIFICANT CHANGE UP
EOSINOPHIL # BLD AUTO: 0 K/UL — SIGNIFICANT CHANGE UP (ref 0–0.5)
EOSINOPHIL NFR BLD AUTO: 0 % — SIGNIFICANT CHANGE UP (ref 0–6)
GLUCOSE SERPL-MCNC: 126 MG/DL — HIGH (ref 70–99)
HCT VFR BLD CALC: 41.7 % — SIGNIFICANT CHANGE UP (ref 39–50)
HGB BLD-MCNC: 13.8 G/DL — SIGNIFICANT CHANGE UP (ref 13–17)
IMM GRANULOCYTES NFR BLD AUTO: 1.1 % — HIGH (ref 0–0.9)
LYMPHOCYTES # BLD AUTO: 0.85 K/UL — LOW (ref 1–3.3)
LYMPHOCYTES # BLD AUTO: 9.6 % — LOW (ref 13–44)
MCHC RBC-ENTMCNC: 29.1 PG — SIGNIFICANT CHANGE UP (ref 27–34)
MCHC RBC-ENTMCNC: 33.1 GM/DL — SIGNIFICANT CHANGE UP (ref 32–36)
MCV RBC AUTO: 87.8 FL — SIGNIFICANT CHANGE UP (ref 80–100)
MONOCYTES # BLD AUTO: 0.65 K/UL — SIGNIFICANT CHANGE UP (ref 0–0.9)
MONOCYTES NFR BLD AUTO: 7.3 % — SIGNIFICANT CHANGE UP (ref 2–14)
NEUTROPHILS # BLD AUTO: 7.24 K/UL — SIGNIFICANT CHANGE UP (ref 1.8–7.4)
NEUTROPHILS NFR BLD AUTO: 81.9 % — HIGH (ref 43–77)
PLATELET # BLD AUTO: 276 K/UL — SIGNIFICANT CHANGE UP (ref 150–400)
POTASSIUM SERPL-MCNC: 3.6 MMOL/L — SIGNIFICANT CHANGE UP (ref 3.5–5.3)
POTASSIUM SERPL-SCNC: 3.6 MMOL/L — SIGNIFICANT CHANGE UP (ref 3.5–5.3)
RBC # BLD: 4.75 M/UL — SIGNIFICANT CHANGE UP (ref 4.2–5.8)
RBC # FLD: 12.7 % — SIGNIFICANT CHANGE UP (ref 10.3–14.5)
SODIUM SERPL-SCNC: 139 MMOL/L — SIGNIFICANT CHANGE UP (ref 135–145)
WBC # BLD: 8.85 K/UL — SIGNIFICANT CHANGE UP (ref 3.8–10.5)
WBC # FLD AUTO: 8.85 K/UL — SIGNIFICANT CHANGE UP (ref 3.8–10.5)

## 2023-09-05 PROCEDURE — 99233 SBSQ HOSP IP/OBS HIGH 50: CPT

## 2023-09-05 PROCEDURE — 99254 IP/OBS CNSLTJ NEW/EST MOD 60: CPT

## 2023-09-05 RX ADMIN — SODIUM CHLORIDE 75 MILLILITER(S): 9 INJECTION, SOLUTION INTRAVENOUS at 19:29

## 2023-09-05 RX ADMIN — Medication 6 MILLIGRAM(S): at 13:31

## 2023-09-05 RX ADMIN — Medication 6 MILLIGRAM(S): at 22:19

## 2023-09-05 RX ADMIN — SODIUM CHLORIDE 75 MILLILITER(S): 9 INJECTION, SOLUTION INTRAVENOUS at 13:30

## 2023-09-05 RX ADMIN — Medication 10 MILLIGRAM(S): at 13:29

## 2023-09-05 RX ADMIN — LEVETIRACETAM 400 MILLIGRAM(S): 250 TABLET, FILM COATED ORAL at 17:20

## 2023-09-05 RX ADMIN — LEVETIRACETAM 400 MILLIGRAM(S): 250 TABLET, FILM COATED ORAL at 05:06

## 2023-09-05 RX ADMIN — PANTOPRAZOLE SODIUM 40 MILLIGRAM(S): 20 TABLET, DELAYED RELEASE ORAL at 13:25

## 2023-09-05 RX ADMIN — Medication 6 MILLIGRAM(S): at 05:06

## 2023-09-05 NOTE — PROGRESS NOTE ADULT - ASSESSMENT
63M Swedish-speaking with sig pmh of HTN, stroke, hemorrhagic brain masses likely mets, newly identified lung adenocarcinoma, substance use (cocaine, daily EtOH, THC) presenting with altered mental status and CTH with no acute pathology and prior hemorrhage unchanged; admitted for further workup.  Palliative was consulted for support and to assist with goals of care.      #1: Hemorrhagic brain masses, R PCA infarct  - Patient hospitalized last month found to have hemorrhagic brain masses likely mets and s/p stroke  - CTH this admission, hemorrhage unchanged from prior   - Neurosurgery recommended MRI, oncology recommended restarting dexamethasone  - monitor neuro status closely  - pending MRI brain  - currently on dexamethasone 4mg q6 and IV keppra for seizure prophylaxis     #2 Stage IV lung adenocarcinoma  - found during last hospitalization; likely source of brain mets  - pathology confirmed adenocarcinoma  - oncology input appreciated: +EGRF, Will be a good candidate for anti-egfr directed therapy most likely with tagrisso once acute issues resolve and stable for discharge.   - pending radiation oncology consult per oncology recommendation    #3 Acute pain  - patient does not seem to be in overt pain at the moment  - tylenol 650 mg q6 PRN if necessary    #4 AMS  - acute hyperactive delirium likely due to brain masses, initial presentation with and admitted for AMS symptoms   - continue 1:1   - Would avoid/minimize known deliriogenic drugs such as benzodiazepines and anticholinergics.     # Palliative Care Encounter  - Pt currently being medically optimized including pending MRI and rad oncology eval per oncology recommendation  - Palliative team will support and follow clinical progress/workup to assist in further goals of care discussion       63M Jordanian-speaking with hx of HTN, recent stroke, hemorrhagic brain masses likely mets, newly identified lung adenocarcinoma, substance use (cocaine, daily EtOH, THC) presenting with altered mental status and CTH with no acute pathology and prior hemorrhage unchanged; admitted for further workup.  Palliative was consulted for support and to assist with goals of care.      Hemorrhagic brain masses, R PCA infarct  - Patient hospitalized last month found to have hemorrhagic brain masses likely mets and s/p stroke  - CTH this admission, hemorrhage unchanged from prior   - MRI reviewed and noted above  - NSX and med oncology input appreciated  - Rad Onc consulted noted, recommend repeat MRI due to previous one limited by motion  - continue with IV dexamethasone and IV keppra for seizure prophylaxis     Stage IV lung adenocarcinoma  - found during last hospitalization; likely source of brain mets  - pathology confirmed adenocarcinoma  - oncology input appreciated: +EGRF, Will be a good candidate for anti-egfr directed therapy most likely with tagrisso once acute issues resolve and stable for discharge.   - pending repeat MRI  - per hospitalist, family open to considering cancer treatment options at this time     Acute pain  - patient does not seem to be in overt pain at the moment  - tylenol 650 mg q6 PRN if necessary    AMS  - etiology unclear, likely multifactorial  - remains on constant 1:1 observation    - Would avoid/minimize known deliriogenic drugs such as benzodiazepines and anticholinergics  - supportive care, reorientation, sleep hygiene     Palliative Care Encounter  - surrogate are two sisters: Lea and Greg  - Case discussed with hospitalist and GOC noted 9/3: DNR/DNI MOLST completed. Family wishes to consider all treatment options including cancer directed therapies at this time. Pt continues to be medically optimized and workup pending including repeat MRI. Palliative team will continue to support and follow clinical progress/workup to assist in ongoing goals of care.

## 2023-09-05 NOTE — CONSULT NOTE ADULT - SUBJECTIVE AND OBJECTIVE BOX
Patient is a 63y old  Male who presents with a chief complaint of AMS (04 Sep 2023 14:21)      HPI:  pt. is a 63 male recently admitted at [facility] and discharged about 2 weeks ago, s/p stroke, hemorrhagic brain mass, new lung mass identified, pt had biopsy of lung mass on that admission which shows adenocarcinoma. Plan was out- pt follow up with neuro surgery, oncology and out patient repeat MRI brain. However, pt now returns with altered mental status per the family and acting "off". Pt is seen , lying in bed but at times tries to get out of bed , calm but confused. Denies headache, nausea, vomiting, weakness. cp, sob. abd. pain, diarrhea.   (30 Aug 2023 23:51)    < from: CT Head No Cont (08.30.23 @ 17:06) >  Small hemorrhage with surrounding vasogenic edema in the left high posterior parietal lobe is unchanged.    < from: MR Brain Stereotactic w/wo IV Cont (09.01.23 @ 08:36) >  1.7 x 1.4 cm focus of enhancement in the left high posterior parietal lobe in the region of prior hemorrhage, suggestive of a hemorrhagic metastasis. There is surrounding vasogenic edema.   No definite additional areas of enhancement are identified, however evaluation of the postcontrast sequence is limited by motion artifact, and consideration for repeat postcontrast sequence may be helpful as clinically indicated.   Reidentified are additional scattered areas of abnormal T2/FLAIR throughout the bilateral cerebri, compatible with recent infarcts, several of which have associated hemosiderin.    From prior:  < from: CT Chest w/ IV Cont (08.01.23 @ 13:12) >  LUNGS AND LARGE AIRWAYS: slightly spiculated 2.3 x 2.2 cm lesion in the suprahilar left upper lobe with retraction of the adjacent major fissure. There is minimal adjacent groundglass opacity. There is a bilobed 4 mm nodule in the left upper lobe at the apex anteriorly on image 40. There are scattered semisolid opacities in the bilateral lungs measuring up to 11 x 10 mm in the right upper lobe on image 67. There is mild patchy groundglass opacity in the superior segments of the lower lobes.  MEDIASTINUM AND BERNARD: There are shotty mediastinal and left hilar lymph nodes measuring up to 13 x 10 mm at the precarinal station. Small calcified subcarinal lymph node.     < from: IR CT Guided Needle Placement (08.15.23 @ 12:30) >  1. Successful CT-guided biopsy of a left upper lung nodule    Surgical Pathology Report:   ACCESSION No:  [id number]  Left lung, needle core biopsy:  -Adenocarcinoma, lepidic and invasive pattern.  See note.  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 This mutation is correlated with responsiveness to EGFR tyrosine kinase inhibitor therapies (Specimen #[id number])  BRAF:  No BRAF V600 mutation was detected in the provided specimen (Specimen #[id number])      PAST MEDICAL & SURGICAL HISTORY:  Hypertension  CVA (cerebral vascular accident)  Mild tetrahydrocannabinol (THC) abuse  ETOH abuse  Cocaine abuse      FAMILY HISTORY:  No pertinent family history in first degree relatives      ALLERGIES  No Known Allergies      MEDICATIONS  (STANDING):  dexAMETHasone  Injectable 6 milliGRAM(s) IV Push every 8 hours  dextrose 5% + sodium chloride 0.9%. 1000 milliLiter(s) (75 mL/Hr) IV Continuous <Continuous>  levETIRAcetam  IVPB 500 milliGRAM(s) IV Intermittent every 12 hours  pantoprazole  Injectable 40 milliGRAM(s) IV Push daily      PHYSICAL EXAM:  Vital Signs Last 24 Hrs  T(C): 37 (05 Sep 2023 04:41), Max: 37.1 (04 Sep 2023 09:11)  T(F): 98.6 (05 Sep 2023 04:41), Max: 98.8 (04 Sep 2023 09:11)  HR: 61 (05 Sep 2023 04:41) (60 - 90)  BP: 179/86 (05 Sep 2023 04:41) (126/72 - 179/86)  BP(mean): --  RR: 18 (05 Sep 2023 04:41) (18 - 18)  SpO2: 95% (05 Sep 2023 04:41) (91% - 96%)    Parameters below as of 05 Sep 2023 04:41  Patient On (Oxygen Delivery Method): room air      IMAGING/LABS/PATHOLOGY: I have personally reviewed the relevant labs, pathology, and imaging as noted in the HPI.  In addition,                        14.3   10.56 )-----------( 279      ( 04 Sep 2023 06:49 )             42.4     09-04    141  |  104  |  23.2<H>  ----------------------------<  130<H>  4.0   |  23.0  |  0.52    Ca    9.2      04 Sep 2023 06:49    TPro  6.8  /  Alb  3.9  /  TBili  0.5  /  DBili  0.1  /  AST  12  /  ALT  29  /  AlkPhos  95  09-04

## 2023-09-05 NOTE — PROGRESS NOTE ADULT - ASSESSMENT
63 male with recent diagnosis of lung adenocarcinoma with brain mets presented with acute metabolic encephalopathy    Acute metabolic encephalopathy likely in the setting of lung adenocarcinoma with brain mets  - likely secondary to brain mets with vasogenic edema however imaging findings are unchanged from prior  - does not appear to be an infectious etiology as UA and CXR negative, WBC wnl and afebrile  - neuro sx state no intervention at this time, signed off  - spoke with oncology today, given minimal response to steroids pt's prognosis is poor, recommend neurology consult  - c/w decadron 6mg IV q8hr for now  - neuro consult recs appreciated  - radiation oncology recommending repeat MRI to decide on best next management  - c/w seroquel 25 mg TID PRN and IV haldol PRN as 2nd line  - keppra for seizure prophylaxis      DVT prophylaxis: SCD

## 2023-09-05 NOTE — PROGRESS NOTE ADULT - SUBJECTIVE AND OBJECTIVE BOX
[facility]     CC: FOLLOW UP VISIT + GOC    INTERVAL HPI/OVERNIGHT EVENTS:  Source if other than patient:  []Family   [x]Team     No acute events overnight.   Remains on 1:1 for restlessness and intermittent agitation  Seen at bedside, awake and looking up at ceiling; did not respond to name and minimally engaged in conversation during visit.       PRESENT SYMPTOMS:     Dyspnea: no  Nausea/Vomiting:  No  Anxiety:   No  Depression: unable to obtain  Fatigue:  No  Loss of appetite: NPO, pending SLP eval  Constipation:  None documented     Pain: No signs of acute distress            Character-            Duration-            Effect-            Factors-            Frequency-            Location-            Severity-    Pain AD Score:  [email or web address] (press ctrl + left click to view)    Review of Systems: Unable to obtain due to poor mentation/encephalopathy     MEDICATIONS  (STANDING):  dexAMETHasone  Injectable 6 milliGRAM(s) IV Push every 8 hours  dextrose 5% + sodium chloride 0.9%. 1000 milliLiter(s) (75 mL/Hr) IV Continuous <Continuous>  levETIRAcetam  IVPB 500 milliGRAM(s) IV Intermittent every 12 hours  pantoprazole  Injectable 40 milliGRAM(s) IV Push daily    MEDICATIONS  (PRN):  haloperidol    Injectable 2.5 milliGRAM(s) IV Push every 6 hours PRN agitaion 2nd line  hydrALAZINE Injectable 10 milliGRAM(s) IV Push every 4 hours PRN for sbp above 160  QUEtiapine 25 milliGRAM(s) Oral three times a day PRN agitation      PHYSICAL EXAM:    Vital Signs Last 24 Hrs  T(C): 36.7 (05 Sep 2023 08:56), Max: 37 (05 Sep 2023 04:41)  T(F): 98.1 (05 Sep 2023 08:56), Max: 98.6 (05 Sep 2023 04:41)  HR: 75 (05 Sep 2023 08:56) (61 - 90)  BP: 148/88 (05 Sep 2023 08:56) (126/72 - 179/86)  BP(mean): --  RR: 18 (05 Sep 2023 08:56) (18 - 18)  SpO2: 100% (05 Sep 2023 08:56) (93% - 100%)    Parameters below as of 05 Sep 2023 08:56  Patient On (Oxygen Delivery Method): room air    Karnofsky:  40%    GEN: resting comfortably and no acute distress    HEENT: mucous membrane moist     Lungs: comfortable, nonlabored      CV: +s1/s2 regular rate and rhythm     GI: +BS, abdomen soft, nontender, nondistended      MSK: no cyanosis or edema     NEURO: nonfocal. awake but confused, minimally engaged in conversation    Skin: warm and dry.       LABS:                          13.8   8.85  )-----------( 276      ( 05 Sep 2023 06:46 )             41.7     09-05    139  |  103  |  20.6<H>  ----------------------------<  126<H>  3.6   |  22.0  |  0.50    Ca    9.2      05 Sep 2023 06:46    TPro  6.8  /  Alb  3.9  /  TBili  0.5  /  DBili  0.1  /  AST  12  /  ALT  29  /  AlkPhos  95  09-04      Urinalysis Basic - ( 05 Sep 2023 06:46 )    Color: x / Appearance: x / SG: x / pH: x  Gluc: 126 mg/dL / Ketone: x  / Bili: x / Urobili: x   Blood: x / Protein: x / Nitrite: x   Leuk Esterase: x / RBC: x / WBC x   Sq Epi: x / Non Sq Epi: x / Bacteria: x      I&O's Summary    04 Sep 2023 07:01  -  05 Sep 2023 07:00  --------------------------------------------------------  IN: 0 mL / OUT: 1050 mL / NET: -1050 mL        RADIOLOGY & ADDITIONAL STUDIES: Reviewed     MRI    ACC: [id number] EXAM:  MR BRAIN [facility] FOR SRS   ORDERED BY: JESSY ACEVES     PROCEDURE DATE:  09/01/2023          INTERPRETATION:  Exam Date: 9/1/2023 8:36 AM    MR brain with and without gadolinium    CLINICAL INFORMATION:   brain mass    TECHNIQUE:   Multiplanar imaging of the brain was performed pre- and   post-IV contrast.   6 cc of Gadavist was administered. 1.5 cc was   discarded.    COMPARISON: MRI brain 8/4/2023.    FINDINGS:    Of note, evaluation of the study is limited by motion artifact.    1.7 x 1.4 cm focus of enhancement in the left high posterior parietal   lobe in the region of prior hemorrhage, suggestive of a hemorrhagic   metastasis. There is surrounding vasogenic edema. No definite additional   areas of enhancement are identified, however evaluation of the   postcontrast sequence is limited by motion artifact, and consideration   for repeat postcontrast sequence may be helpful as clinically indicated.   Reidentified are additional scattered areas of abnormal T2/FLAIR   throughout the bilateral cerebri, compatible with recent infarcts,   several of which have associated hemosiderin. No significant mass effect.   No midline shift. No hydrocephalus.   The vertebral and internal carotid   arteries demonstrate expected flow voids indicating their patency.    The paranasal sinuses are clear.    IMPRESSION:    1.7 x 1.4 cm focus of enhancement in the left high posterior parietal   lobe in the region of prior hemorrhage, suggestive of a hemorrhagic   metastasis. There is surrounding vasogenic edema. No definite additional   areas of enhancement are identified, however evaluation of the   postcontrast sequence is limited by motion artifact, and consideration   for repeat postcontrast sequence may be helpful as clinically indicated.   Reidentified are additional scattered areas of abnormal T2/FLAIR   throughout the bilateral cerebri, compatible with recent infarcts,   several of which have associated hemosiderin.    --- End of Report ---  EMMA PHIPPS MD; Attending Radiologist  This document has been electronically signed. Sep  1 2023  9:17AM    ADVANCE DIRECTIVES/TREATMENT PREFERENCES: DNR/DNI MOLST by hospitalist 9/3    NEUROLOGICAL MEDICATIONS/OPIOIDS/BENZODIAZEPINE IN PAST 24 HOURS    levETIRAcetam  IVPB   400 mL/Hr IV Intermittent (09-05-23 @ 05:06)   400 mL/Hr IV Intermittent (09-04-23 @ 16:48)

## 2023-09-05 NOTE — CONSULT NOTE ADULT - ASSESSMENT
63 year old gentleman recently diagnosed with NSCLC (MIGEL, adenocarcinoma, EGRFR mut) and multifocal hemorrhagic stroke, presents with further altered mental status.

## 2023-09-05 NOTE — CONSULT NOTE ADULT - CONSULT REASON
EGFR+ Stage IV lung adenocarcinoma
"brain hemorhaic mass ?     recently diagnosed with lung cancer"
brain metastases
brain mass
AMS

## 2023-09-05 NOTE — CONSULT NOTE ADULT - PROBLEM SELECTOR RECOMMENDATION 9
- s/p MRI head w/wo contrast, which showed 1.7 cm focus of enhancement in the left high posterior parietal lobe in the region of prior hemorrhage, suggestive of a hemorrhagic metastasis. There is surrounding vasogenic edema.   - Continue dexamethasone for vasogenic edema, unless otherwise contraindicated.  - Repeat contrast portion of MRI head, as prior imaging degraded by motion.  Based on available imaging, AMS not clearly attributable to brain metastases.  Would evaluate for leptomeningeal disease.  - Would usually consider stereotactic radiosurgery in the setting of limited brain metastases.  However, given patient's AMS, uncertain that he would be able to comply with treatments.  - EGFR+ disease, and anti-EGFR directed therapy may have some CNS penetration.  - Alternatively, whole brain radiation may be a consideration.

## 2023-09-05 NOTE — PROGRESS NOTE ADULT - SUBJECTIVE AND OBJECTIVE BOX
[facility] Division of Medicine    Chief Complaint:  AMS    SUBJECTIVE / OVERNIGHT EVENTS: Pt seen at the bedside. Unable to follow commands but today was able to tell me he had pain and when asked where responded "my head" in Kenyan. ROS unable to be obtained.    MEDICATIONS  (STANDING):  dexAMETHasone  Injectable 6 milliGRAM(s) IV Push every 8 hours  dextrose 5% + sodium chloride 0.9%. 1000 milliLiter(s) (75 mL/Hr) IV Continuous <Continuous>  levETIRAcetam  IVPB 500 milliGRAM(s) IV Intermittent every 12 hours  pantoprazole  Injectable 40 milliGRAM(s) IV Push daily    MEDICATIONS  (PRN):  haloperidol    Injectable 2.5 milliGRAM(s) IV Push every 6 hours PRN agitaion 2nd line  hydrALAZINE Injectable 10 milliGRAM(s) IV Push every 4 hours PRN for sbp above 160  QUEtiapine 25 milliGRAM(s) Oral three times a day PRN agitation      I&O's Summary    04 Sep 2023 07:01  -  05 Sep 2023 07:00  --------------------------------------------------------  IN: 0 mL / OUT: 1050 mL / NET: -1050 mL        PHYSICAL EXAM:  Vital Signs Last 24 Hrs  T(C): 36.9 (05 Sep 2023 12:00), Max: 37 (05 Sep 2023 04:41)  T(F): 98.4 (05 Sep 2023 12:00), Max: 98.6 (05 Sep 2023 04:41)  HR: 69 (05 Sep 2023 12:00) (61 - 90)  BP: 166/91 (05 Sep 2023 12:00) (126/72 - 179/86)  BP(mean): --  RR: 18 (05 Sep 2023 12:00) (18 - 18)  SpO2: 99% (05 Sep 2023 12:00) (93% - 100%)    Parameters below as of 05 Sep 2023 12:00  Patient On (Oxygen Delivery Method): room air      GENERAL: not in acute distress  HEENT:  Clear conjunctiva, PERRL, moist oral mucosa  RESP:  Non-labored breathing pattern, lungs clear to ausculation, no wheezes or crackles appreciated  CV: Regular rate and rhythm, no murmurs appreciated, no lower extremity edema  GI: Soft, non-tender, non-distended  NEURO: unable to assess, does not follow commands  PSYCH: Calm, A&Ox0  SKIN: No rash or lesions, warm and dry      LABS:                        13.8   8.85  )-----------( 276      ( 05 Sep 2023 06:46 )             41.7     09-05    139  |  103  |  20.6<H>  ----------------------------<  126<H>  3.6   |  22.0  |  0.50    Ca    9.2      05 Sep 2023 06:46    TPro  6.8  /  Alb  3.9  /  TBili  0.5  /  DBili  0.1  /  AST  12  /  ALT  29  /  AlkPhos  95  09-04          Urinalysis Basic - ( 05 Sep 2023 06:46 )    Color: x / Appearance: x / SG: x / pH: x  Gluc: 126 mg/dL / Ketone: x  / Bili: x / Urobili: x   Blood: x / Protein: x / Nitrite: x   Leuk Esterase: x / RBC: x / WBC x   Sq Epi: x / Non Sq Epi: x / Bacteria: x        CAPILLARY BLOOD GLUCOSE          IMAGING:

## 2023-09-06 PROCEDURE — 99233 SBSQ HOSP IP/OBS HIGH 50: CPT

## 2023-09-06 RX ADMIN — PANTOPRAZOLE SODIUM 40 MILLIGRAM(S): 20 TABLET, DELAYED RELEASE ORAL at 13:01

## 2023-09-06 RX ADMIN — LEVETIRACETAM 400 MILLIGRAM(S): 250 TABLET, FILM COATED ORAL at 05:18

## 2023-09-06 RX ADMIN — LEVETIRACETAM 400 MILLIGRAM(S): 250 TABLET, FILM COATED ORAL at 17:09

## 2023-09-06 RX ADMIN — Medication 6 MILLIGRAM(S): at 13:00

## 2023-09-06 RX ADMIN — SODIUM CHLORIDE 75 MILLILITER(S): 9 INJECTION, SOLUTION INTRAVENOUS at 09:09

## 2023-09-06 RX ADMIN — Medication 6 MILLIGRAM(S): at 21:17

## 2023-09-06 RX ADMIN — Medication 6 MILLIGRAM(S): at 05:17

## 2023-09-06 NOTE — SWALLOW BEDSIDE ASSESSMENT ADULT - SWALLOW EVAL: DIAGNOSIS
Oral phase of swallow grossly WFL for puree trials w/ intermittent need for tactile cues to orient to utensil. Suspect pharyngeal dysphagia marked by multiple swallows &  throat clear post swallow. No other consistencies trialed 2' overt s/s w/ most conservative consistency.

## 2023-09-06 NOTE — PROGRESS NOTE ADULT - SUBJECTIVE AND OBJECTIVE BOX
[facility] Division of Medicine    Chief Complaint:  AMS    SUBJECTIVE / OVERNIGHT EVENTS: Pt seen at the bedside. Still not following commands or providing meaningful responses to my questions. ROS unable to be obtained.    MEDICATIONS  (STANDING):  dexAMETHasone  Injectable 6 milliGRAM(s) IV Push every 8 hours  dextrose 5% + sodium chloride 0.9%. 1000 milliLiter(s) (75 mL/Hr) IV Continuous <Continuous>  levETIRAcetam  IVPB 500 milliGRAM(s) IV Intermittent every 12 hours  pantoprazole  Injectable 40 milliGRAM(s) IV Push daily    MEDICATIONS  (PRN):  haloperidol    Injectable 2.5 milliGRAM(s) IV Push every 6 hours PRN agitaion 2nd line  hydrALAZINE Injectable 10 milliGRAM(s) IV Push every 4 hours PRN for sbp above 160  QUEtiapine 25 milliGRAM(s) Oral three times a day PRN agitation      I&O's Summary    05 Sep 2023 07:01  -  06 Sep 2023 07:00  --------------------------------------------------------  IN: 0 mL / OUT: 1350 mL / NET: -1350 mL        PHYSICAL EXAM:  Vital Signs Last 24 Hrs  T(C): 36.9 (06 Sep 2023 09:13), Max: 37.4 (05 Sep 2023 20:00)  T(F): 98.4 (06 Sep 2023 09:13), Max: 99.4 (05 Sep 2023 20:00)  HR: 59 (06 Sep 2023 09:13) (55 - 80)  BP: 153/87 (06 Sep 2023 09:13) (153/87 - 168/87)  BP(mean): --  RR: 18 (06 Sep 2023 09:13) (18 - 18)  SpO2: 97% (06 Sep 2023 09:13) (97% - 99%)    Parameters below as of 06 Sep 2023 09:13  Patient On (Oxygen Delivery Method): room air        GENERAL: not in acute distress  HEENT:  Clear conjunctiva, PERRL, moist oral mucosa  RESP:  Non-labored breathing pattern, lungs clear to ausculation, no wheezes or crackles appreciated  CV: Regular rate and rhythm, no murmurs appreciated, no lower extremity edema  GI: Soft, non-tender, non-distended  NEURO: unable to assess, does not follow commands  PSYCH: Calm, A&Ox0  SKIN: No rash or lesions, warm and dry      LABS:                        13.8   8.85  )-----------( 276      ( 05 Sep 2023 06:46 )             41.7     09-05    139  |  103  |  20.6<H>  ----------------------------<  126<H>  3.6   |  22.0  |  0.50    Ca    9.2      05 Sep 2023 06:46            Urinalysis Basic - ( 05 Sep 2023 06:46 )    Color: x / Appearance: x / SG: x / pH: x  Gluc: 126 mg/dL / Ketone: x  / Bili: x / Urobili: x   Blood: x / Protein: x / Nitrite: x   Leuk Esterase: x / RBC: x / WBC x   Sq Epi: x / Non Sq Epi: x / Bacteria: x        CAPILLARY BLOOD GLUCOSE          IMAGING:

## 2023-09-06 NOTE — PROGRESS NOTE ADULT - ASSESSMENT
63 male with recent diagnosis of lung adenocarcinoma with brain mets presented with acute metabolic encephalopathy    Acute metabolic encephalopathy likely in the setting of lung adenocarcinoma with brain mets  - likely secondary to brain mets with vasogenic edema however imaging findings are unchanged from prior  - does not appear to be an infectious etiology as UA and CXR negative, WBC wnl and afebrile  - neuro sx state no intervention at this time, signed off  - spoke with oncology today, given minimal response to steroids pt's prognosis is poor, recommend neurology consult  - c/w decadron 6mg IV q8hr for now  - neuro consult recs appreciated  - radiation oncology recommending repeat MRI to decide on best next management  - c/w seroquel 25 mg TID PRN and IV haldol PRN as 2nd line  - keppra for seizure prophylaxis  - SLP consulted, recommended alternative means of feeding, to be discussed with the family      DVT prophylaxis: SCD

## 2023-09-06 NOTE — SWALLOW BEDSIDE ASSESSMENT ADULT - SLP PERTINENT HISTORY OF CURRENT PROBLEM
As per Md note: "63 male with recent diagnosis of lung adenocarcinoma with brain mets presented with acute metabolic encephalopathy"

## 2023-09-06 NOTE — SWALLOW BEDSIDE ASSESSMENT ADULT - SLP GENERAL OBSERVATIONS
Pt recd a&a, reduced cognition, nonverbal, tolerating RA, NAD, 0/10 pain scale pre/post Pt recd a&a, reduced cognition, nonverbal, tolerating RA, NAD, 0/10 pain scale pre/post, Peruvian language line #[id number] utilized

## 2023-09-07 PROCEDURE — 99233 SBSQ HOSP IP/OBS HIGH 50: CPT

## 2023-09-07 PROCEDURE — 70553 MRI BRAIN STEM W/O & W/DYE: CPT | Mod: 26

## 2023-09-07 PROCEDURE — 99497 ADVNCD CARE PLAN 30 MIN: CPT | Mod: 25

## 2023-09-07 RX ADMIN — PANTOPRAZOLE SODIUM 40 MILLIGRAM(S): 20 TABLET, DELAYED RELEASE ORAL at 12:49

## 2023-09-07 RX ADMIN — SODIUM CHLORIDE 75 MILLILITER(S): 9 INJECTION, SOLUTION INTRAVENOUS at 07:57

## 2023-09-07 RX ADMIN — LEVETIRACETAM 400 MILLIGRAM(S): 250 TABLET, FILM COATED ORAL at 05:15

## 2023-09-07 RX ADMIN — LEVETIRACETAM 400 MILLIGRAM(S): 250 TABLET, FILM COATED ORAL at 17:13

## 2023-09-07 RX ADMIN — Medication 6 MILLIGRAM(S): at 21:54

## 2023-09-07 RX ADMIN — Medication 6 MILLIGRAM(S): at 05:14

## 2023-09-07 RX ADMIN — Medication 6 MILLIGRAM(S): at 12:49

## 2023-09-07 NOTE — PROGRESS NOTE ADULT - ASSESSMENT
63M [place]ruguayan-speaking with hx of HTN, recent stroke, hemorrhagic brain masses likely mets, newly identified lung adenocarcinoma, substance use (cocaine, daily EtOH, THC) presenting with altered mental status and CTH with no acute pathology and prior hemorrhage unchanged; admitted for further workup.  Palliative was consulted for support and to assist with goals of care.      Hemorrhagic brain masses, R PCA infarct  - Patient hospitalized last month found to have hemorrhagic brain masses likely mets and s/p stroke  - CTH this admission, hemorrhage unchanged from prior   - MRI reviewed and noted, pending repeat MRI due to limitation by motion on previous  - NSX and med oncology input appreciated  - Rad Onc following  - continue with IV dexamethasone and IV keppra for seizure prophylaxis     Persistent Encephalopathy  - etiology unclear, likely multifactorial   - no significant improvement since admission  - supportive care, reorientation, sleep hygiene     Stage IV lung adenocarcinoma  - diagnosed during last hospitalization with pathology confirming adenocarcinoma  - oncology input appreciated: +EGRF, Will be a good candidate for anti-egfr directed therapy most likely with tagrisso once acute issues resolve and stable for discharge.   - pending repeat MRI brain   - med oncology and rad oncology following    Acute Dysphagia  - remains NPO, failed SLP eval yesterday, pending follow up for interval improvement    Palliative Care Encounter  - surrogate are two sisters: Lea and Greg  - DNR/DNI MOLST by hospitalist    - see goals of care above. Sister to discuss with the rest of the family regarding treatment options. At present, will continue with active medical interventions.

## 2023-09-07 NOTE — PROGRESS NOTE ADULT - SUBJECTIVE AND OBJECTIVE BOX
[facility] PALLIATIVE MEDICINE     CC: FOLLOW UP VISIT + GOC    INTERVAL HPI/OVERNIGHT EVENTS:  Source if other than patient:  []Family   [x]Team     No acute events overnight    PRESENT SYMPTOMS:     Dyspnea: no  Nausea/Vomiting:  No  Anxiety:   No  Depression: unable to obtain  Fatigue:  No  Loss of appetite: NPO  Constipation:  None documented    Pain: No overt sign of distress            Character-            Duration-            Effect-            Factors-            Frequency-            Location-            Severity-    Pain AD Score:  [email or web address] (press ctrl + left click to view)    Review of Systems: Unable to obtain due to poor mentation/encephalopathy     MEDICATIONS  (STANDING):  dexAMETHasone  Injectable 6 milliGRAM(s) IV Push every 8 hours  dextrose 5% + sodium chloride 0.9%. 1000 milliLiter(s) (75 mL/Hr) IV Continuous <Continuous>  levETIRAcetam  IVPB 500 milliGRAM(s) IV Intermittent every 12 hours  pantoprazole  Injectable 40 milliGRAM(s) IV Push daily    MEDICATIONS  (PRN):  haloperidol    Injectable 2.5 milliGRAM(s) IV Push every 6 hours PRN agitaion 2nd line  hydrALAZINE Injectable 10 milliGRAM(s) IV Push every 4 hours PRN for sbp above 160  QUEtiapine 25 milliGRAM(s) Oral three times a day PRN agitation      PHYSICAL EXAM:    Vital Signs Last 24 Hrs  T(C): 36.6 (07 Sep 2023 08:20), Max: 37.2 (06 Sep 2023 16:39)  T(F): 97.8 (07 Sep 2023 08:20), Max: 98.9 (06 Sep 2023 16:39)  HR: 118 (07 Sep 2023 08:20) (49 - 118)  BP: 153/86 (07 Sep 2023 08:20) (127/71 - 153/86)  BP(mean): --  RR: 19 (07 Sep 2023 08:20) (18 - 19)  SpO2: 98% (07 Sep 2023 08:20) (95% - 98%)    Parameters below as of 07 Sep 2023 08:20  Patient On (Oxygen Delivery Method): room air    Karnofsky:  20%    GEN: resting comfortably and no acute distress    HEENT: mucous membrane moist      Lungs: comfortable, nonlabored      CV: +s1/s2 regular rate and rhythm      GI: +BS, abdomen soft, nontender, nondistended     MSK: no cyanosis or edema +weakness    NEURO: nonfocal. awake but nonverbal, confused    Skin: warm and dry.      LABS: reviewed     I&O's Summary    06 Sep 2023 07:01  -  07 Sep 2023 07:00  --------------------------------------------------------  IN: 0 mL / OUT: 2050 mL / NET: -2050 mL    07 Sep 2023 07:01  -  07 Sep 2023 13:42  --------------------------------------------------------  IN: 225 mL / OUT: 1200 mL / NET: -975 mL    RADIOLOGY & ADDITIONAL STUDIES: Reviewed     ADVANCE DIRECTIVES/TREATMENT PREFERENCES: DNR/DNI MOLST    NEUROLOGICAL MEDICATIONS/OPIOIDS/BENZODIAZEPINE IN PAST 24 HOURS    levETIRAcetam  IVPB   400 mL/Hr IV Intermittent (09-07-23 @ 05:15)   400 mL/Hr IV Intermittent (09-06-23 @ 17:09)

## 2023-09-07 NOTE — EEG REPORT - NS EEG TEXT BOX
NERYSHANTECAROLEEJOSÉ MIGUEL [id number]     Study Date: 09-07-23  Duration: 22 min  --------------------------------------------------------------------------------------------------  History:  CC/ HPI Patient is a 63y old  Male who presents with a chief complaint of AMS (07 Sep 2023 15:46)    MEDICATIONS  (STANDING):  dexAMETHasone  Injectable 6 milliGRAM(s) IV Push every 8 hours  dextrose 5% + sodium chloride 0.9%. 1000 milliLiter(s) (75 mL/Hr) IV Continuous <Continuous>  levETIRAcetam  IVPB 500 milliGRAM(s) IV Intermittent every 12 hours  pantoprazole  Injectable 40 milliGRAM(s) IV Push daily    --------------------------------------------------------------------------------------------------  Study Interpretation:    [[[Abbreviation Key:  PDR=alpha rhythm/posterior dominant rhythm. A-P=anterior posterior.  Amplitude: ‘very low’:<20; ‘low’:20-49; ‘medium’:; ‘high’:>150uV.  Persistence for periodic/rhythmic patterns (% of epoch) ‘rare’:<1%; ‘occasional’:1-10%; ‘frequent’:10-50%; ‘abundant’:50-90%; ‘continuous’:>90%.  Persistence for sporadic discharges: ‘rare’:<1/hr; ‘occasional’:1/min-1/hr; ‘frequent’:>1/min; ‘abundant’:>1/10 sec.  RPP=rhythmic and periodic patterns; GRDA=generalized rhythmic delta activity; FIRDA=frontal intermittent GRDA; LRDA=lateralized rhythmic delta activity; TIRDA=temporal intermittent rhythmic delta activity;  LPD=PLED=lateralized periodic discharges; GPD=generalized periodic discharges; BIPDs =bilateral independent periodic discharges; Mf=multifocal; SIRPDs=stimulus induced rhythmic, periodic, or ictal appearing discharges; BIRDs=brief potentially ictal rhythmic discharges >4 Hz, lasting .5-10s; PFA (paroxysmal bursts >13 Hz or =8 Hz <10s).  Modifiers: +F=with fast component; +S=with spike component; +R=with rhythmic component.  S-B=burst suppression pattern.  Max=maximal. N1-drowsy; N2-stage II sleep; N3-slow wave sleep. SSS/BETS=small sharp spikes/benign epileptiform transients of sleep. HV=hyperventilation; PS=photic stimulation]]]    Daily EEG Visual Analysis    FINDINGS:      Background:  The background is continuous and symmetric. The predominant background in the most wakeful state consists of diffuse polymorphic delta slowing with intermittent theta frequencies. There is an intermittent poorly formed 6-6.5-Hz posterior dominant rhythm on the right, absent on the left.    Background Slowing:  Generalized slowing: As above  Focal slowing: As above    State Changes:   Drowsiness and stage 2 sleep are not captured.    Interictal Findings:  None    Electrographic and Electroclinical seizures:  None    Other Clinical Events:  None    Activation Procedures:   Hyperventilation is not performed.    Photic stimulation is not performed.    Artifacts:  Intermittent myogenic and movement artifacts are present.    EKG:  Single-lead EKG is limited by artifact.    EEG Classification / Summary:  Abnormal routine EEG in the awake state.  Left hemispheric focal slowing.  Mild-moderate diffuse slowing.  No epileptiform abnormalities are captured.     Clinical Impression:  Left hemispheric focal cerebral dysfunction can be structural or functional in etiology.  Mild-moderate diffuse cerebral dysfunction is nonspecific in etiology.           -------------------------------------------------------------------------------------------------------  [facility] EEG Reading Room Ph#: [phone]  Epilepsy Answering Service after 5PM and before 8:30AM: Ph#: [phone]    Ana Cox MD  Attending Physician, [facility] Epilepsy [place]

## 2023-09-07 NOTE — PROGRESS NOTE ADULT - ASSESSMENT
63 male with recent diagnosis of lung adenocarcinoma with brain mets presented with acute metabolic encephalopathy    Acute metabolic encephalopathy likely in the setting of lung adenocarcinoma with brain mets  - likely secondary to brain mets with vasogenic edema however imaging findings are unchanged from prior  - does not appear to be an infectious etiology as UA and CXR negative, WBC wnl and afebrile  - neuro sx state no intervention at this time, signed off  - spoke with oncology, given minimal response to steroids pt's prognosis is poor, recommend neurology consult  - c/w decadron 6mg IV q8hr for now  - neuro consult recs appreciated  - radiation oncology recommending repeat MRI to decide on best next management  - c/w seroquel 25 mg TID PRN and IV haldol PRN as 2nd line  - keppra for seizure prophylaxis  - SLP consulted, recommended alternative means of feeding, to be discussed with the family      DVT prophylaxis: SCD

## 2023-09-07 NOTE — PROGRESS NOTE ADULT - ASSESSMENT
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     IN PROGRESS

## 2023-09-07 NOTE — PROGRESS NOTE ADULT - CONVERSATION DETAILS
Met with sister Lea at [facility] along with medical resident Dr Weston with assistance of [place]  Lizzy #[id number]. Sister acknowledged earlier conversation with hospitalist this morning regarding overall guarded prognosis in the setting of metastatic lung cancer, persistent encephalopathy, dysphagia, treatment and plan of care moving forward. She understands brother is not safe to swallow by mouth and that discussions were broached about temporary NGT placement and possible need for PEG if no improvement. She also spoke about "hospice" with a "comfort care" approach.     Writer again reviewed comorbidities including stage IV lung cancer with brain mets, his continued altered mentation, severely debilitated state now requiring full assist with ADLs. Writer expressed worry over brother's ability to tolerate any systemic cancer directed therapy and radiation given it's possible adverse side effects and how it can further worsen his overall quality of life. We also reviewed risk and benefits of PEG including risk that brother may try to self-dislodge given his lack of awareness and understanding of its necessity which can lead to other potential complications.     Writer also discussed hospice philosophy of care with a comfort measures approach that was broached by primary team. Explained that if family opted to forego cancer directed therapies and PEG with shift in focus towards natural disease progression and aggressive pain/symptom control, brother would be appropriate for hospice. Sister shared that family would not be able to return home as they could not provide the level of care needed and long term placement would be needed. Advised that if they made that decision for comfort measures/hospice, would request unit SW to assist in reviewing benefits as pt is also undocumented.     Sister appeared overwhelmed. Reassurance provided that no decision has to be made immediately and encouraged her to speak with her other sister and mother whom all make decisions together. She was appreciative of the information.

## 2023-09-07 NOTE — PROGRESS NOTE ADULT - SUBJECTIVE AND OBJECTIVE BOX
[facility] Division of Medicine    Chief Complaint:  AMS    SUBJECTIVE / OVERNIGHT EVENTS: Pt seen at the bedside. ROS unable to be obtained.      MEDICATIONS  (STANDING):  dexAMETHasone  Injectable 6 milliGRAM(s) IV Push every 8 hours  dextrose 5% + sodium chloride 0.9%. 1000 milliLiter(s) (75 mL/Hr) IV Continuous <Continuous>  levETIRAcetam  IVPB 500 milliGRAM(s) IV Intermittent every 12 hours  pantoprazole  Injectable 40 milliGRAM(s) IV Push daily    MEDICATIONS  (PRN):  haloperidol    Injectable 2.5 milliGRAM(s) IV Push every 6 hours PRN agitaion 2nd line  hydrALAZINE Injectable 10 milliGRAM(s) IV Push every 4 hours PRN for sbp above 160  QUEtiapine 25 milliGRAM(s) Oral three times a day PRN agitation      I&O's Summary    06 Sep 2023 07:01  -  07 Sep 2023 07:00  --------------------------------------------------------  IN: 0 mL / OUT: 2050 mL / NET: -2050 mL    07 Sep 2023 07:01  -  07 Sep 2023 15:46  --------------------------------------------------------  IN: 225 mL / OUT: 1200 mL / NET: -975 mL        PHYSICAL EXAM:  Vital Signs Last 24 Hrs  T(C): 36.6 (07 Sep 2023 08:20), Max: 37.2 (06 Sep 2023 16:39)  T(F): 97.8 (07 Sep 2023 08:20), Max: 98.9 (06 Sep 2023 16:39)  HR: 118 (07 Sep 2023 08:20) (49 - 118)  BP: 153/86 (07 Sep 2023 08:20) (127/71 - 153/86)  BP(mean): --  RR: 19 (07 Sep 2023 08:20) (18 - 19)  SpO2: 98% (07 Sep 2023 08:20) (95% - 98%)    Parameters below as of 07 Sep 2023 08:20  Patient On (Oxygen Delivery Method): room air      GENERAL: not in acute distress  HEENT:  Clear conjunctiva, PERRL, moist oral mucosa  RESP:  Non-labored breathing pattern, lungs clear to ausculation, no wheezes or crackles appreciated  CV: Regular rate and rhythm, no murmurs appreciated, no lower extremity edema  GI: Soft, non-tender, non-distended  NEURO: unable to assess, does not follow commands  PSYCH: Calm, A&Ox0  SKIN: No rash or lesions, warm and dry      LABS:                    CAPILLARY BLOOD GLUCOSE          IMAGING:

## 2023-09-07 NOTE — PROGRESS NOTE ADULT - SUBJECTIVE AND OBJECTIVE BOX
REASON FOR CONSULTATION:     Interval history      REVIEW OF SYSTEMS:  Constitutional, Eyes, ENT, Cardiovascular, Respiratory, Gastrointestinal, Genitourinary, Musculoskeletal, Integumentary, Neurological, Psychiatric, Endocrine, Heme/Lymph, and Allergic/Immunologic review of systems are otherwise negative except as noted in the HPI.            *****Copy/Paste individual medications to appropriate section (Continue, Start, Stop, Change Dose)*****                              *****Only copy inpatient medications that the patient will use at home*****    -------------------------------HOME and CURRENT MEDICATIONS------------------------------------    HOME MEDICATIONS/PRESCRIPTIONS:  acetaminophen 325 mg oral tablet: 2 tab(s) orally every 6 hours As needed Temp greater or equal to 38C (100.4F), Mild Pain (1 - 3)  atorvastatin 80 mg oral tablet: 1 tab(s) orally once a day (at bedtime)  folic acid 1 mg oral tablet: 1 tab(s) orally once a day  gabapentin 300 mg oral capsule: 1 cap(s) orally 2 times a day  methocarbamol 750 mg oral tablet: 1 tab(s) orally 2 times a day as needed for Muscle Spasm  Multiple Vitamins oral tablet: 1 tab(s) orally once a day  senna leaf extract oral tablet: 2 tab(s) orally once a day (at bedtime)        MEDICATIONS  (STANDING):  dexAMETHasone  Injectable 6 milliGRAM(s) IV Push every 8 hours  dextrose 5% + sodium chloride 0.9%. 1000 milliLiter(s) (75 mL/Hr) IV Continuous <Continuous>  levETIRAcetam  IVPB 500 milliGRAM(s) IV Intermittent every 12 hours  pantoprazole  Injectable 40 milliGRAM(s) IV Push daily    MEDICATIONS  (PRN):  haloperidol    Injectable 2.5 milliGRAM(s) IV Push every 6 hours PRN agitaion 2nd line  hydrALAZINE Injectable 10 milliGRAM(s) IV Push every 4 hours PRN for sbp above 160  QUEtiapine 25 milliGRAM(s) Oral three times a day PRN agitation      ----------------------------------MEDICATION RECONCILIATION----------------------------------------            Vital Signs Last 24 Hrs  T(C): 36.6 (07 Sep 2023 08:20), Max: 37.2 (06 Sep 2023 16:39)  T(F): 97.8 (07 Sep 2023 08:20), Max: 98.9 (06 Sep 2023 16:39)  HR: 118 (07 Sep 2023 08:20) (49 - 118)  BP: 153/86 (07 Sep 2023 08:20) (127/71 - 153/86)  BP(mean): --  RR: 19 (07 Sep 2023 08:20) (18 - 19)  SpO2: 98% (07 Sep 2023 08:20) (95% - 98%)    Parameters below as of 07 Sep 2023 08:20  Patient On (Oxygen Delivery Method): room air            PHYSICAL EXAM:    GENERAL: NAD, well-groomed, well-developed  HEAD:  Atraumatic, Normocephalic  EYES: EOMI, PERRLA,   NECK: Supple, No JVD, Normal thyroid  NERVOUS SYSTEM:  Alert & Oriented X1  CHEST/LUNG: Clear to auscultation bilaterally  HEART: Regular rate and rhythm  ABDOMEN: Soft, Nontender,  EXTREMITIES:  2+ Peripheral Pulses, No clubbing, cyanosis, or edema  LYMPH: No lymphadenopathy noted  SKIN: No rashes or lesions      LABS:                      RADIOLOGY & ADDITIONAL STUDIES:    < from: CT Chest w/ IV Cont (08.01.23 @ 13:12) >  IMPRESSION: Slightly spiculated left upper lobe lesion, suspicious for   primary lung carcinoma, including small cell carcinoma. Scattered   semisolid lung lesions, which may represent sites of semiinvasive   adenocarcinoma of the lung.    --- End of Report ---            LIANA SANTANA MD; Attending Radiologist  This document has been electronically signed. Aug  1 2023  3:04PM    < end of copied text >    < from: CT Head No Cont (08.30.23 @ 17:06) >  IMPRESSION:    Small hemorrhage with surrounding vasogenic edema in the left high   posterior parietal lobe is unchanged.    --- End of Report ---    < end of copied text >    < from: MR Brain Stereotactic w/wo IV Cont (09.01.23 @ 08:36) >  IMPRESSION:    1.7 x 1.4 cm focus of enhancement in the left high posterior parietal   lobe in the region of prior hemorrhage, suggestive of a hemorrhagic   metastasis. There is surrounding vasogenic edema. No definite additional   areas of enhancement are identified, however evaluation of the   postcontrast sequence is limited by motion artifact, and consideration   for repeat postcontrast sequence may be helpful as clinically indicated.   Reidentified are additional scattered areas of abnormal T2/FLAIR   throughout the bilateral cerebri, compatible with recent infarcts,   several of which have associated hemosiderin.    < end of copied text >        PATHOLOGY: Surgical Pathology Report (08.15.23 @ 12:00)    Surgical Pathology Report:   ACCESSION No:  [id number]      Accession:                             [id number]    Collected Date/Time:                   8/15/2023 12:00 EDT  Received Date/Time:                    8/15/2023 12:34 EDT    Addendum Report - Auth (Verified)    Addendum    IMMUNOSTAINS PERFORMED AND INTERPRETED ON BLOCK    "1-B" BY [facility], (SPECIMEN #[id number]).    PD-L1 (KEYTRUDA)  <1%  NO EXPRESSION      ALK:  No ALK mutation was detected in the provided specimen   (Specimen  #[id number])    ROS1:  No ROS1 mutation was detected in the provided specimen  (Specimen  #[id number])    KRAS:    No KRAS mutation was detected in the provided specimen  (Specimen  #[id number])    EGFR:  A missense mutation was detected within exon 21 ofthe EGFR gene.  This mutation is correlated with responsiveness to EGFR tyrosine kinase  inhibitor therapies( Specimen #[id number])      BRAF:  No BRAF V600 mutation was detected in the provided specimen  (Specimen #[id number])    Complete reports from [facility] ([facility] [address] [place] [address], is on file in the Department of Pathology.  Verified by: Christoph Lazo MD  (Electronic Signature)  Reported on: 08/28/23 10:13 EDT, [facility], [address] [place] [id number]  _________________________________________________________________    Surgical Pathology Report - Auth (Verified)    Specimen(s) Submitted  1  Left lung biopsy    Final Diagnosis  Left lung, needle core biopsy:  -Adenocarcinoma, lepidic and invasive pattern.  See note.      Note: Case reviewed in intradepartmental conference.  Pending prognostic markers.    Verified by: Christoph Lazo MD  (Electronic Signature)  Reported on: 08/17/23 14:19 EDT, [facility], [address] [place] [address], [place] [address]  _________________________________________________________________    Intraoperative Consultation  TOUCH PREP FOR IMMEDIATE ADEQUACY ASSESSMENT:  - Adequate    By: Dr Lazo    Clinical Information  Long nodule MIGEL spiculated lung mass brain mets      Gross Description  Received:  In formalin labeled  "left lung biopsy"  Size:  Multiple cores ranging from 0.2 cm to 0.6 cm  Description:  Tan-red stringy cores of soft tissue  Additional Comments: Intraoperative immediate assessment is performed  Submitted:  Entirely in six cassettes: 1A-1F    Rolando Melo 08/15/2023 12:51 PM    Disclaimer  In addition to other data that may appear on the specimen containers, all  labels have been inspected to confirm the presence of the patient's name  and date of birth.

## 2023-09-08 LAB
ANION GAP SERPL CALC-SCNC: 12 MMOL/L — SIGNIFICANT CHANGE UP (ref 5–17)
ANION GAP SERPL CALC-SCNC: 14 MMOL/L — SIGNIFICANT CHANGE UP (ref 5–17)
APPEARANCE CSF: CLEAR — SIGNIFICANT CHANGE UP
BUN SERPL-MCNC: 17.3 MG/DL — SIGNIFICANT CHANGE UP (ref 8–20)
CALCIUM SERPL-MCNC: 9 MG/DL — SIGNIFICANT CHANGE UP (ref 8.4–10.5)
CHLORIDE SERPL-SCNC: 97 MMOL/L — SIGNIFICANT CHANGE UP (ref 96–108)
CHLORIDE SERPL-SCNC: 97 MMOL/L — SIGNIFICANT CHANGE UP (ref 96–108)
CO2 SERPL-SCNC: 24 MMOL/L — SIGNIFICANT CHANGE UP (ref 22–29)
CO2 SERPL-SCNC: 26 MMOL/L — SIGNIFICANT CHANGE UP (ref 22–29)
COLOR CSF: SIGNIFICANT CHANGE UP
CREAT SERPL-MCNC: 0.48 MG/DL — LOW (ref 0.5–1.3)
CSF PCR RESULT: SIGNIFICANT CHANGE UP
EGFR: 117 ML/MIN/1.73M2 — SIGNIFICANT CHANGE UP
GLUCOSE CSF-MCNC: 74 MG/DLG/24H — HIGH (ref 40–70)
GLUCOSE SERPL-MCNC: 80 MG/DL — SIGNIFICANT CHANGE UP (ref 70–99)
GRAM STN FLD: SIGNIFICANT CHANGE UP
GRAM STN FLD: SIGNIFICANT CHANGE UP
HIV 1 & 2 AB SERPL IA.RAPID: SIGNIFICANT CHANGE UP
LABORATORY COMMENT REPORT: SIGNIFICANT CHANGE UP
LDH CSF L TO P-CCNC: 11 U/L — SIGNIFICANT CHANGE UP
LDH FLD-CCNC: 11 U/L — SIGNIFICANT CHANGE UP
MAGNESIUM SERPL-MCNC: 2 MG/DL — SIGNIFICANT CHANGE UP (ref 1.6–2.6)
NEUTROPHILS # CSF: SIGNIFICANT CHANGE UP % (ref 0–6)
NRBC NFR CSF: 0 /UL — SIGNIFICANT CHANGE UP (ref 0–5)
POTASSIUM SERPL-MCNC: 3.2 MMOL/L — LOW (ref 3.5–5.3)
POTASSIUM SERPL-MCNC: 3.5 MMOL/L — SIGNIFICANT CHANGE UP (ref 3.5–5.3)
POTASSIUM SERPL-SCNC: 3.2 MMOL/L — LOW (ref 3.5–5.3)
POTASSIUM SERPL-SCNC: 3.5 MMOL/L — SIGNIFICANT CHANGE UP (ref 3.5–5.3)
PROLACTIN SERPL-MCNC: 11.1 NG/ML — SIGNIFICANT CHANGE UP (ref 4.1–18.4)
PROT CSF-MCNC: 55 MG/DL — HIGH (ref 15–45)
RBC # CSF: 2 /CMM — HIGH (ref 0–1)
SODIUM SERPL-SCNC: 135 MMOL/L — SIGNIFICANT CHANGE UP (ref 135–145)
SODIUM SERPL-SCNC: 135 MMOL/L — SIGNIFICANT CHANGE UP (ref 135–145)
SOURCE HSV 1/2: SIGNIFICANT CHANGE UP
SPECIMEN SOURCE: SIGNIFICANT CHANGE UP
TUBE TYPE: SIGNIFICANT CHANGE UP

## 2023-09-08 PROCEDURE — 99232 SBSQ HOSP IP/OBS MODERATE 35: CPT

## 2023-09-08 PROCEDURE — 99233 SBSQ HOSP IP/OBS HIGH 50: CPT

## 2023-09-08 PROCEDURE — 62328 DX LMBR SPI PNXR W/FLUOR/CT: CPT

## 2023-09-08 PROCEDURE — 71045 X-RAY EXAM CHEST 1 VIEW: CPT | Mod: 26

## 2023-09-08 PROCEDURE — 88108 CYTOPATH CONCENTRATE TECH: CPT | Mod: 26

## 2023-09-08 RX ORDER — LEVETIRACETAM 250 MG/1
1500 TABLET, FILM COATED ORAL EVERY 12 HOURS
Refills: 0 | Status: DISCONTINUED | OUTPATIENT
Start: 2023-09-08 | End: 2023-09-12

## 2023-09-08 RX ORDER — LEVETIRACETAM 250 MG/1
1000 TABLET, FILM COATED ORAL ONCE
Refills: 0 | Status: COMPLETED | OUTPATIENT
Start: 2023-09-08 | End: 2023-09-08

## 2023-09-08 RX ADMIN — LEVETIRACETAM 400 MILLIGRAM(S): 250 TABLET, FILM COATED ORAL at 09:32

## 2023-09-08 RX ADMIN — Medication 10 MILLIGRAM(S): at 22:09

## 2023-09-08 RX ADMIN — Medication 6 MILLIGRAM(S): at 14:02

## 2023-09-08 RX ADMIN — Medication 6 MILLIGRAM(S): at 22:10

## 2023-09-08 RX ADMIN — PANTOPRAZOLE SODIUM 40 MILLIGRAM(S): 20 TABLET, DELAYED RELEASE ORAL at 14:02

## 2023-09-08 RX ADMIN — SODIUM CHLORIDE 75 MILLILITER(S): 9 INJECTION, SOLUTION INTRAVENOUS at 17:34

## 2023-09-08 RX ADMIN — Medication 6 MILLIGRAM(S): at 05:43

## 2023-09-08 RX ADMIN — Medication 2 MILLIGRAM(S): at 09:10

## 2023-09-08 RX ADMIN — LEVETIRACETAM 400 MILLIGRAM(S): 250 TABLET, FILM COATED ORAL at 05:46

## 2023-09-08 RX ADMIN — LEVETIRACETAM 400 MILLIGRAM(S): 250 TABLET, FILM COATED ORAL at 17:34

## 2023-09-08 NOTE — CHART NOTE - NSCHARTNOTEFT_GEN_A_CORE
EEG preliminary read (not final) on the initial recording hour(s) = >3 hr    No epileptiform abnormalities.  Severe diffuse cerebral dysfunction is nonspecific in etiology.       Final report to follow tomorrow morning after completion of study.    [facility] EEG Reading Room Ph#: [phone]  Epilepsy Answering Service after 5PM and before 8:30AM: Ph#: [phone] EEG preliminary read (not final) on the initial recording hour(s) = >3 hr    No epileptiform abnormalities.  Diffuse cerebral dysfunction is nonspecific in etiology.       Final report to follow tomorrow morning after completion of study.    [facility] EEG Reading Room Ph#: [phone]  Epilepsy Answering Service after 5PM and before 8:30AM: Ph#: [phone]

## 2023-09-08 NOTE — PROGRESS NOTE ADULT - SUBJECTIVE AND OBJECTIVE BOX
[facility]                                        Neurology at [place]                                 [facility]                                  [address]                                        [place], [place], [address]                                             [phone]        CC: brain metastases     HPI:   The patient is a 63y Male who initially presented 7/31/23 with right sided weakness.   He was seen by the stroke team.   Chest CT showed lesion suspicious for lung cancer.  Head CT showed left posterior parietal intraparenchymal hemorrhage measuring approximately 2.1 cm in greatest diameter, with surrounding edema. Smaller eft inferior frontal hemorrhagic lesion with surrounding edema, small left posterior temporal hemorrhagic lesion with surrounding edema and small right temporal hemorrhagic lesion with surrounding edema.  He had lung biopsy by IR and was seen by the oncology and neurosurgery services.   He was discharged on 8/16/23.  He was brought back on 8/30/23 with what was described as altered mental status.   He was again seen by oncology and neurosurgery.   MRI again shows hemorrhagic metastases with surrounding edema.   Neurology evaluation requested 9/3/23.    Interim history:  Asked to reevaluate due to persisting altered mental status.  Remains on 4 [address]otta.   He is now status post LP by IR.     ROS:   Unobtainable due to patient's condition.     MEDICATIONS  (STANDING):  dexAMETHasone  Injectable 6 milliGRAM(s) IV Push every 8 hours  dextrose 5% + sodium chloride 0.9%. 1000 milliLiter(s) (75 mL/Hr) IV Continuous <Continuous>  levETIRAcetam  IVPB 1500 milliGRAM(s) IV Intermittent every 12 hours  pantoprazole  Injectable 40 milliGRAM(s) IV Push daily    Vital Signs Last 24 Hrs  T(C): 36.4 (08 Sep 2023 13:30), Max: 36.7 (07 Sep 2023 17:00)  T(F): 97.5 (08 Sep 2023 13:30), Max: 98.1 (07 Sep 2023 17:00)  HR: 52 (08 Sep 2023 13:30) (52 - 69)  BP: 157/83 (08 Sep 2023 13:30) (110/70 - 160/90)  RR: 18 (08 Sep 2023 13:30) (17 - 20)  SpO2: 98% (08 Sep 2023 13:30) (96% - 99%)    Parameters below as of 08 Sep 2023 13:30  Patient On (Oxygen Delivery Method): room air  Has nasogastric tube in place.     Detailed Neurologic Exam:    Mental status: The patient is sleepy but opens eyes to voice. Non verbal. Not following instructions.     Cranial nerves: There is no papilledema. Pupils react symmetrically to light. He blinks to threat bilaterally. He tracks with gaze. There is a subtle depression of the right nasolabial fold. Palate and tongue cannot be assessed.     Motor/sensory: There is normal bulk and tone.  Moving left more than right to stimuli.    Reflexes: 1+ throughout and plantar responses are flexor.    Cerebellar: Cannot be tested.     Labs:     09-08    135  |  97  |  x   ----------------------------<  x   3.5   |  24.0  |  x     Ca    9.0      08 Sep 2023 06:06  Mg     2.0     09-08        Rad:   Brain MRI: There is a 1.7 x 1.4 cm focus of enhancement in the left high posterior parietal lobe in the region of prior hemorrhage, suggestive of a hemorrhagic metastasis. There is surrounding vasogenic edema.

## 2023-09-08 NOTE — DIETITIAN INITIAL EVALUATION ADULT - OTHER INFO
63 male with recent diagnosis of lung adenocarcinoma with brain mets presented with acute metabolic encephalopathy

## 2023-09-08 NOTE — DIETITIAN INITIAL EVALUATION ADULT - PERTINENT MEDS FT
MEDICATIONS  (STANDING):  dexAMETHasone  Injectable 6 milliGRAM(s) IV Push every 8 hours  dextrose 5% + sodium chloride 0.9%. 1000 milliLiter(s) (75 mL/Hr) IV Continuous <Continuous>  levETIRAcetam  IVPB 1500 milliGRAM(s) IV Intermittent every 12 hours  pantoprazole  Injectable 40 milliGRAM(s) IV Push daily    MEDICATIONS  (PRN):  haloperidol    Injectable 2.5 milliGRAM(s) IV Push every 6 hours PRN agitaion 2nd line  hydrALAZINE Injectable 10 milliGRAM(s) IV Push every 4 hours PRN for sbp above 160  QUEtiapine 25 milliGRAM(s) Oral three times a day PRN agitation

## 2023-09-08 NOTE — PROGRESS NOTE ADULT - PROBLEM SELECTOR PLAN 1
- 9/7/23 MRI brain showing unchanged 1.7 cm left parietal lobe hemorrhagic lesion.  No evidence of leptomeningeal disease or more extensive metastatic burden, but showed extensive white matter change compared to prior.  - Degree of neurologic symptoms not fully explained by solitary brain met.  - Management of brain met would usually include SRS vs WBRT.    - RT at this time would not likely improve neurologic status.  Side effects of RT would include increased local edema.  Patient's neurologic status has not been improved with dexamethasone.  - Patient underwent LP today, and currently undergoing EEG.  - Agree with further diagnostic evaluation of encephalopathy.  - Reconsider RT when clinically improved.

## 2023-09-08 NOTE — PROGRESS NOTE ADULT - ASSESSMENT
63 male with recent diagnosis of lung adenocarcinoma with brain mets presented with acute metabolic encephalopathy    Acute metabolic encephalopathy likely in the setting of lung adenocarcinoma with brain mets  - likely secondary to brain mets with vasogenic edema  - does not appear to be an infectious etiology as UA and CXR negative, WBC wnl and afebrile  - neuro sx state no intervention at this time, signed off  - spoke with oncology, c/w decadron 6mg IV q8hr for now  - neuro consult recs appreciated  - radiation oncology reviewed repeat MRI, see extensive white matter changes, suspect secondary to mets vs infection  - s/p LP by IR today, f/u results  - increase keppra to 1.5g q12hr  - check 24hr EEG  - neuro consult  - c/w seroquel 25 mg TID PRN and IV haldol PRN as 2nd line  - SLP consulted, recommended alternative means of feeding, to be discussed with the family

## 2023-09-08 NOTE — PROGRESS NOTE ADULT - ASSESSMENT
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  -plan for LP by IR - send CSF for paraneoplastic panel, viral panel, PRAKASH virus  -send serum paraneoplastic panel as well      -Stage IV lung adenocarcinoma: EGFR exon 21 mutation  -is a candidate for targeted systemic therapy but has social issues, and encephalopathy work up as above. QOL at this time is limited by encephalopathy. He does not have a lot of diease burden.  -can consider requesting compassionate use of Tagrisso if he improves clinically.    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  -plan for LP by IR - send CSF for paraneoplastic panel, viral panel, PRAKASH virus  -send serum paraneoplastic panel as well      -Stage IV lung adenocarcinoma: EGFR exon 21 mutation  -is a candidate for targeted systemic therapy but has social issues, and encephalopathy work up as above. QOL at this time is limited by encephalopathy. He does not have a lot of diease burden.  -can consider requesting compassionate use of Tagrisso if he improves clinically.    above discussed with his sister at bedside via  #[phone].

## 2023-09-08 NOTE — PROGRESS NOTE ADULT - ASSESSMENT
63 year old gentleman recently diagnosed with NSCLC (MIGEL, adenocarcinoma, EGFR exon 21 mut) and multifocal hemorrhagic stroke, re-admitted with altered mental status.

## 2023-09-08 NOTE — PROCEDURE NOTE - NSINFORMCONSENT_GEN_A_CORE
Sister Lea Butler/Benefits, risks, and possible complications of procedure explained to patient/caregiver who verbalized understanding and gave verbal consent.

## 2023-09-08 NOTE — PROGRESS NOTE ADULT - SUBJECTIVE AND OBJECTIVE BOX
[facility] Division of Medicine    Chief Complaint:  AMS    SUBJECTIVE / OVERNIGHT EVENTS: Pt seen at the bedside. Right arm spasms/shaking noted, ROS unable to be obtained due to mental status     MEDICATIONS  (STANDING):  dexAMETHasone  Injectable 6 milliGRAM(s) IV Push every 8 hours  dextrose 5% + sodium chloride 0.9%. 1000 milliLiter(s) (75 mL/Hr) IV Continuous <Continuous>  levETIRAcetam  IVPB 1500 milliGRAM(s) IV Intermittent every 12 hours  pantoprazole  Injectable 40 milliGRAM(s) IV Push daily    MEDICATIONS  (PRN):  haloperidol    Injectable 2.5 milliGRAM(s) IV Push every 6 hours PRN agitaion 2nd line  hydrALAZINE Injectable 10 milliGRAM(s) IV Push every 4 hours PRN for sbp above 160  QUEtiapine 25 milliGRAM(s) Oral three times a day PRN agitation      I&O's Summary    07 Sep 2023 07:01  -  08 Sep 2023 07:00  --------------------------------------------------------  IN: 975 mL / OUT: 1200 mL / NET: -225 mL    08 Sep 2023 07:01  -  08 Sep 2023 14:49  --------------------------------------------------------  IN: 0 mL / OUT: 880 mL / NET: -880 mL        PHYSICAL EXAM:  Vital Signs Last 24 Hrs  T(C): 36.4 (08 Sep 2023 13:30), Max: 36.7 (07 Sep 2023 17:00)  T(F): 97.5 (08 Sep 2023 13:30), Max: 98.1 (07 Sep 2023 17:00)  HR: 52 (08 Sep 2023 13:30) (52 - 69)  BP: 157/83 (08 Sep 2023 13:30) (110/70 - 160/90)  BP(mean): --  RR: 18 (08 Sep 2023 13:30) (17 - 20)  SpO2: 98% (08 Sep 2023 13:30) (96% - 99%)    Parameters below as of 08 Sep 2023 13:30  Patient On (Oxygen Delivery Method): room air      GENERAL: not in acute distress  HEENT:  Clear conjunctiva, PERRL, moist oral mucosa  RESP:  Non-labored breathing pattern, lungs clear to ausculation, no wheezes or crackles appreciated  CV: Regular rate and rhythm, no murmurs appreciated, no lower extremity edema  GI: Soft, non-tender, non-distended  NEURO: unable to assess, does not follow commands  PSYCH: Calm, A&Ox0  SKIN: No rash or lesions, warm and dry      LABS:    09-08    135  |  97  |  x   ----------------------------<  x   3.5   |  24.0  |  x     Ca    9.0      08 Sep 2023 06:06  Mg     2.0     09-08            Urinalysis Basic - ( 08 Sep 2023 06:06 )    Color: x / Appearance: x / SG: x / pH: x  Gluc: 80 mg/dL / Ketone: x  / Bili: x / Urobili: x   Blood: x / Protein: x / Nitrite: x   Leuk Esterase: x / RBC: x / WBC x   Sq Epi: x / Non Sq Epi: x / Bacteria: x        CAPILLARY BLOOD GLUCOSE          IMAGING:

## 2023-09-08 NOTE — DIETITIAN INITIAL EVALUATION ADULT - ENTER TO (G/KG)
Pt attended Phase II Cardiac Rehab Exercise Session. Further documentation completed in [address] [place] [address].
1.4

## 2023-09-08 NOTE — DIETITIAN INITIAL EVALUATION ADULT - ADD RECOMMEND
1) advance diet as medically feasible, tolerated and upon family wishes   Obtain daily weights to monitor trends  Monitor nutrition related labs, renal

## 2023-09-08 NOTE — PROGRESS NOTE ADULT - SUBJECTIVE AND OBJECTIVE BOX
REASON FOR CONSULTATION:     Interval history      REVIEW OF SYSTEMS:  Constitutional, Eyes, ENT, Cardiovascular, Respiratory, Gastrointestinal, Genitourinary, Musculoskeletal, Integumentary, Neurological, Psychiatric, Endocrine, Heme/Lymph, and Allergic/Immunologic review of systems are otherwise negative except as noted in the HPI.                MEDICATIONS  (STANDING):  dexAMETHasone  Injectable 6 milliGRAM(s) IV Push every 8 hours  dextrose 5% + sodium chloride 0.9%. 1000 milliLiter(s) (75 mL/Hr) IV Continuous <Continuous>  levETIRAcetam  IVPB 1500 milliGRAM(s) IV Intermittent every 12 hours  pantoprazole  Injectable 40 milliGRAM(s) IV Push daily    MEDICATIONS  (PRN):  haloperidol    Injectable 2.5 milliGRAM(s) IV Push every 6 hours PRN agitaion 2nd line  hydrALAZINE Injectable 10 milliGRAM(s) IV Push every 4 hours PRN for sbp above 160  QUEtiapine 25 milliGRAM(s) Oral three times a day PRN agitation          Vital Signs Last 24 Hrs  T(C): 36.4 (08 Sep 2023 08:45), Max: 36.7 (07 Sep 2023 17:00)  T(F): 97.5 (08 Sep 2023 08:45), Max: 98.1 (07 Sep 2023 17:00)  HR: 62 (08 Sep 2023 08:45) (54 - 96)  BP: 160/90 (08 Sep 2023 08:45) (110/70 - 160/90)  BP(mean): --  RR: 17 (08 Sep 2023 08:45) (17 - 19)  SpO2: 99% (08 Sep 2023 08:45) (96% - 99%)    Parameters below as of 08 Sep 2023 08:45  Patient On (Oxygen Delivery Method): room air            PHYSICAL EXAM:    GENERAL: NAD, well-groomed, well-developed  HEAD:  Atraumatic, Normocephalic  EYES: EOMI, PERRLA,   NECK: Supple, No JVD, Normal thyroid  NERVOUS SYSTEM:  Alert & Oriented X1  CHEST/LUNG: Clear to auscultation bilaterally  HEART: Regular rate and rhythm  ABDOMEN: Soft, Nontender,  EXTREMITIES:  2+ Peripheral Pulses, No clubbing, cyanosis, or edema  LYMPH: No lymphadenopathy noted  SKIN: No rashes or lesions      LABS:                      RADIOLOGY & ADDITIONAL STUDIES:    < from: CT Chest w/ IV Cont (08.01.23 @ 13:12) >  IMPRESSION: Slightly spiculated left upper lobe lesion, suspicious for   primary lung carcinoma, including small cell carcinoma. Scattered   semisolid lung lesions, which may represent sites of semiinvasive   adenocarcinoma of the lung.    --- End of Report ---            LIANA SANTANA MD; Attending Radiologist  This document has been electronically signed. Aug  1 2023  3:04PM    < end of copied text >    < from: CT Head No Cont (08.30.23 @ 17:06) >  IMPRESSION:    Small hemorrhage with surrounding vasogenic edema in the left high   posterior parietal lobe is unchanged.    --- End of Report ---    < end of copied text >    < from: MR Brain Stereotactic w/wo IV Cont (09.01.23 @ 08:36) >  IMPRESSION:    1.7 x 1.4 cm focus of enhancement in the left high posterior parietal   lobe in the region of prior hemorrhage, suggestive of a hemorrhagic   metastasis. There is surrounding vasogenic edema. No definite additional   areas of enhancement are identified, however evaluation of the   postcontrast sequence is limited by motion artifact, and consideration   for repeat postcontrast sequence may be helpful as clinically indicated.   Reidentified are additional scattered areas of abnormal T2/FLAIR   throughout the bilateral cerebri, compatible with recent infarcts,   several of which have associated hemosiderin.    < end of copied text >      < from: MR Head w/wo IV Cont (09.07.23 @ 15:26) >  IMPRESSION:    Study is limited secondary to motion artifact.    1.  Redemonstrated hemorrhagic metastatic lesion in the left parietal   lobe, relatively unchanged when compared to prior imaging.  2.  Subtle ill-defined focus of enhancement within the left frontal lobe   as discussed above. An underlying metastatic lesion could be present,   however this is limited. Consider reimaging as clinically indicated for   further assessment.  3.  Chronic infarcts throughout the brain parenchyma as discussed above.  4.  Confluent increased T2/FLAIR signal throughout the subcortical and   deep white matter. Findings are likely combination of chronic small   vessel disease and post treatment changes.  5.  Poor flow signal void within the left M1 segment. This could be   artifactual in nature. An underlying narrowing could present similarly.   Consider CTA/MRA for further evaluation if clinically indicated.    --- End of Report ---    ***Please see the addendum at the top of this report. It may contain   additional important information or changes.****    < end of copied text >        PATHOLOGY: Surgical Pathology Report (08.15.23 @ 12:00)    Surgical Pathology Report:   ACCESSION No:  [id number]      Accession:                             [id number]    Collected Date/Time:                   8/15/2023 12:00 EDT  Received Date/Time:                    8/15/2023 12:34 EDT    Addendum Report - Auth (Verified)    Addendum    IMMUNOSTAINS PERFORMED AND INTERPRETED ON BLOCK    "1-B" BY [facility]  ONCOLOGY, (SPECIMEN #[id number]).    PD-L1 (KEYTRUDA)  <1%  NO EXPRESSION      ALK:  No ALK mutation was detected in the provided specimen   (Specimen  #[id number])    ROS1:  No ROS1 mutation was detected in the provided specimen  (Specimen  #[id number])    KRAS:    No KRAS mutation was detected in the provided specimen  (Specimen  #[id number])    EGFR:  A missense mutation was detected within exon 21 ofthe EGFR gene.  This mutation is correlated with responsiveness to EGFR tyrosine kinase  inhibitor therapies( Specimen #[id number])      BRAF:  No BRAF V600 mutation was detected in the provided specimen  (Specimen #[id number])    Complete reports from [facility] ([facility]), [address] [place] [address], is on file in the Department of Pathology.  Verified by: Christoph Lazo MD  (Electronic Signature)  Reported on: 08/28/23 10:13 EDT, [place] [address]  _________________________________________________________________    Surgical Pathology Report - Auth (Verified)    Specimen(s) Submitted  1  Left lung biopsy    Final Diagnosis  Left lung, needle core biopsy:  -Adenocarcinoma, lepidic and invasive pattern.  See note.      Note: Case reviewed in intradepartmental conference.  Pending prognostic markers.    Verified by: Christoph Lazo MD  (Electronic Signature)  Reported on: 08/17/23 14:19 EDT, [place] [id number]  _________________________________________________________________    Intraoperative Consultation  TOUCH PREP FOR IMMEDIATE ADEQUACY ASSESSMENT:  - Adequate    By: Dr Lazo    Clinical Information  Long nodule MIGEL spiculated lung mass brain mets      Gross Description  Received:  In formalin labeled  "left lung biopsy"  Size:  Multiple cores ranging from 0.2 cm to 0.6 cm  Description:  Tan-red stringy cores of soft tissue  Additional Comments: Intraoperative immediate assessment is performed  Submitted:  Entirely in six cassettes: 1A-1F    Rolando Melo 08/15/2023 12:51 PM    Disclaimer  In addition to other data that may appear on the specimen containers, all  labels have been inspected to confirm the presence of the patient's name  and date of birth.         REASON FOR CONSULTATION:     Interval history  no responsive to commands    REVIEW OF SYSTEMS:  Constitutional, Eyes, ENT, Cardiovascular, Respiratory, Gastrointestinal, Genitourinary, Musculoskeletal, Integumentary, Neurological, Psychiatric, Endocrine, Heme/Lymph, and Allergic/Immunologic review of systems are otherwise negative except as noted in the HPI.                MEDICATIONS  (STANDING):  dexAMETHasone  Injectable 6 milliGRAM(s) IV Push every 8 hours  dextrose 5% + sodium chloride 0.9%. 1000 milliLiter(s) (75 mL/Hr) IV Continuous <Continuous>  levETIRAcetam  IVPB 1500 milliGRAM(s) IV Intermittent every 12 hours  pantoprazole  Injectable 40 milliGRAM(s) IV Push daily    MEDICATIONS  (PRN):  haloperidol    Injectable 2.5 milliGRAM(s) IV Push every 6 hours PRN agitaion 2nd line  hydrALAZINE Injectable 10 milliGRAM(s) IV Push every 4 hours PRN for sbp above 160  QUEtiapine 25 milliGRAM(s) Oral three times a day PRN agitation          Vital Signs Last 24 Hrs  T(C): 36.4 (08 Sep 2023 08:45), Max: 36.7 (07 Sep 2023 17:00)  T(F): 97.5 (08 Sep 2023 08:45), Max: 98.1 (07 Sep 2023 17:00)  HR: 62 (08 Sep 2023 08:45) (54 - 96)  BP: 160/90 (08 Sep 2023 08:45) (110/70 - 160/90)  BP(mean): --  RR: 17 (08 Sep 2023 08:45) (17 - 19)  SpO2: 99% (08 Sep 2023 08:45) (96% - 99%)    Parameters below as of 08 Sep 2023 08:45  Patient On (Oxygen Delivery Method): room air            PHYSICAL EXAM:    GENERAL: NAD, eyes closed  HEAD:  Atraumatic, Normocephalic,   NERVOUS SYSTEM:  somnolent,  CHEST/LUNG: Clear to auscultation bilaterally  HEART: Regular rate and rhythm  ABDOMEN: Soft, Nontender,  EXTREMITIES:  no edema  LYMPH: No lymphadenopathy noted  SKIN: No rashes or lesions      LABS:                      RADIOLOGY & ADDITIONAL STUDIES:    < from: CT Chest w/ IV Cont (08.01.23 @ 13:12) >  IMPRESSION: Slightly spiculated left upper lobe lesion, suspicious for   primary lung carcinoma, including small cell carcinoma. Scattered   semisolid lung lesions, which may represent sites of semiinvasive   adenocarcinoma of the lung.    --- End of Report ---            LIANA SANTANA MD; Attending Radiologist  This document has been electronically signed. Aug  1 2023  3:04PM    < end of copied text >    < from: CT Head No Cont (08.30.23 @ 17:06) >  IMPRESSION:    Small hemorrhage with surrounding vasogenic edema in the left high   posterior parietal lobe is unchanged.    --- End of Report ---    < end of copied text >    < from: MR Brain Stereotactic w/wo IV Cont (09.01.23 @ 08:36) >  IMPRESSION:    1.7 x 1.4 cm focus of enhancement in the left high posterior parietal   lobe in the region of prior hemorrhage, suggestive of a hemorrhagic   metastasis. There is surrounding vasogenic edema. No definite additional   areas of enhancement are identified, however evaluation of the   postcontrast sequence is limited by motion artifact, and consideration   for repeat postcontrast sequence may be helpful as clinically indicated.   Reidentified are additional scattered areas of abnormal T2/FLAIR   throughout the bilateral cerebri, compatible with recent infarcts,   several of which have associated hemosiderin.    < end of copied text >      < from: MR Head w/wo IV Cont (09.07.23 @ 15:26) >  IMPRESSION:    Study is limited secondary to motion artifact.    1.  Redemonstrated hemorrhagic metastatic lesion in the left parietal   lobe, relatively unchanged when compared to prior imaging.  2.  Subtle ill-defined focus of enhancement within the left frontal lobe   as discussed above. An underlying metastatic lesion could be present,   however this is limited. Consider reimaging as clinically indicated for   further assessment.  3.  Chronic infarcts throughout the brain parenchyma as discussed above.  4.  Confluent increased T2/FLAIR signal throughout the subcortical and   deep white matter. Findings are likely combination of chronic small   vessel disease and post treatment changes.  5.  Poor flow signal void within the left M1 segment. This could be   artifactual in nature. An underlying narrowing could present similarly.   Consider CTA/MRA for further evaluation if clinically indicated.    --- End of Report ---    ***Please see the addendum at the top of this report. It may contain   additional important information or changes.****    < end of copied text >        PATHOLOGY: Surgical Pathology Report (08.15.23 @ 12:00)    Surgical Pathology Report:   ACCESSION No:  [id number]      Accession:                             [id number]    Collected Date/Time:                   8/15/2023 12:00 EDT  Received Date/Time:                    8/15/2023 12:34 EDT    Addendum Report - Auth (Verified)    Addendum    IMMUNOSTAINS PERFORMED AND INTERPRETED ON BLOCK    "1-B" BY [facility]  ONCOLOGY, (SPECIMEN #[id number]).    PD-L1 (KEYTRUDA)  <1%  NO EXPRESSION      ALK:  No ALK mutation was detected in the provided specimen   (Specimen  #[id number])    ROS1:  No ROS1 mutation was detected in the provided specimen  (Specimen  #[id number])    KRAS:    No KRAS mutation was detected in the provided specimen  (Specimen  #[id number])    EGFR:  A missense mutation was detected within exon 21 ofthe EGFR gene.  This mutation is correlated with responsiveness to EGFR tyrosine kinase  inhibitor therapies( Specimen #[id number])      BRAF:  No BRAF V600 mutation was detected in the provided specimen  (Specimen #[id number])    Complete reports from [facility] ([facility]), [address] [place] [address], is on file in the Department of Pathology.  Verified by: Christoph Lazo MD  (Electronic Signature)  Reported on: 08/28/23 10:13 EDT, [facility], [address] [place] [id number]  _________________________________________________________________    Surgical Pathology Report - Auth (Verified)    Specimen(s) Submitted  1  Left lung biopsy    Final Diagnosis  Left lung, needle core biopsy:  -Adenocarcinoma, lepidic and invasive pattern.  See note.      Note: Case reviewed in intradepartmental conference.  Pending prognostic markers.    Verified by: Christoph Lazo MD  (Electronic Signature)  Reported on: 08/17/23 14:19 EDT, [facility], [address] [place] [id number]  _________________________________________________________________    Intraoperative Consultation  TOUCH PREP FOR IMMEDIATE ADEQUACY ASSESSMENT:  - Adequate    By: Dr Lazo    Clinical Information  Long nodule MIGEL spiculated lung mass brain mets      Gross Description  Received:  In formalin labeled  "left lung biopsy"  Size:  Multiple cores ranging from 0.2 cm to 0.6 cm  Description:  Tan-red stringy cores of soft tissue  Additional Comments: Intraoperative immediate assessment is performed  Submitted:  Entirely in six cassettes: 1A-1F    Rolando Melo 08/15/2023 12:51 PM    Disclaimer  In addition to other data that may appear on the specimen containers, all  labels have been inspected to confirm the presence of the patient's name  and date of birth.

## 2023-09-08 NOTE — DIETITIAN INITIAL EVALUATION ADULT - PROBLEM SELECTOR PLAN 2
recently diagnosed with lung mass and biopsy adenocarcinoma  oncology consult called by ER  palliative care consult

## 2023-09-08 NOTE — DIETITIAN INITIAL EVALUATION ADULT - ORAL INTAKE PTA/DIET HISTORY
RD assessment completed at bedside, pt NPO, A&Ox1, unable to provide nutrition/ weight history. Education not appropriate. SLP consulted, recommended alternative means of feeding, to be discussed with the familyRD to remain available

## 2023-09-08 NOTE — DIETITIAN INITIAL EVALUATION ADULT - PERTINENT LABORATORY DATA
09-08    135  |  97  |  x   ----------------------------<  x   3.5   |  24.0  |  x     Ca    9.0      08 Sep 2023 06:06  Mg     2.0     09-08    A1C with Estimated Average Glucose Result: 5.2 % (08-01-23 @ 03:20)  09-08 Na135 mmol/L Glu n/a   K+ 3.5 mmol/L Cr  n/a   BUN n/a   Phos n/a   Alb n/a   PAB n/a

## 2023-09-08 NOTE — PROGRESS NOTE ADULT - SUBJECTIVE AND OBJECTIVE BOX
Patient is a 63y old  Male who presents with a chief complaint of AMS (08 Sep 2023 14:52)      The patient is a 63y gentleman with recently diagnosed stage IV lung cancer, who presented to [facility] with AMS/aphasia.    Interval history:  Patient seen at bedside.  Simpson   utilized.  Patient remains unable to cooperate with directions.  He is awake, and undergoing EEG.    Repeated MRI head due to motion during 9/1/23 scan.  < from: MR Head w/wo IV Cont (09.07.23 @ 15:26) >  Study is again limited secondary to motion artifact.  1.  Redemonstrated hemorrhagic metastatic lesion in the left parietal lobe, relatively unchanged when compared to prior imaging.  2.  Subtle ill-defined focus of enhancement within the left frontal lobe as discussed above. An underlying metastatic lesion could be present, however this is limited. Consider reimaging as clinically indicated for further assessment.  3.  Chronic infarcts throughout the brain parenchyma as discussed above.  4.  Confluent increased T2/FLAIR signal throughout the subcortical and deep white matter. Findings are likely combination of chronic small vessel disease and post treatment changes.  5.  Poor flow signal void within the left M1 segment. This could be artifactual in nature. An underlying narrowing could present similarly. Consider CTA/MRA for further evaluation if clinically indicated.  Upon further review, the confluence increase in T2/FLAIR signal throughout the subcortical and deep white matter has progressed fairly significantly from imaging on August 4, 2023. While postradiation changes could present similarly, the abrupt change in territory affected makes this differential less likely. If this patient is immunocompromised, given cancer diagnosis, a progressive multifocal encephalopathy (PML) should be considered. Other demyelinative processes are not excluded (ADEM, MS). If the patient is recovering in immune status, a immune reconstitution inflammatory syndrome (IRIS) could be considered. Other etiologies are not excluded but felt less likely including PRES, encephalitis, or lymphoma.      MEDICATIONS  (STANDING):  dexAMETHasone  Injectable 6 milliGRAM(s) IV Push every 8 hours  dextrose 5% + sodium chloride 0.9%. 1000 milliLiter(s) (75 mL/Hr) IV Continuous <Continuous>  levETIRAcetam  IVPB 1500 milliGRAM(s) IV Intermittent every 12 hours  pantoprazole  Injectable 40 milliGRAM(s) IV Push daily      PHYSICAL EXAM:  Vital Signs Last 24 Hrs  T(C): 36.8 (08 Sep 2023 16:35), Max: 36.8 (08 Sep 2023 16:35)  T(F): 98.3 (08 Sep 2023 16:35), Max: 98.3 (08 Sep 2023 16:35)  HR: 63 (08 Sep 2023 16:35) (52 - 63)  BP: 164/83 (08 Sep 2023 16:35) (110/70 - 164/83)  BP(mean): --  RR: 18 (08 Sep 2023 16:35) (17 - 20)  SpO2: 99% (08 Sep 2023 16:35) (96% - 99%)    Parameters below as of 08 Sep 2023 16:35  Patient On (Oxygen Delivery Method): room air    KPS: 30  GENERAL: laying in hospital bed, awake, but not interacting/engaging.    HEENT:  Clear conjunctiva, PERRL, oral mucosa dry.  RESP:  Non-labored breathing pattern, no wheezes or crackles appreciated  CV: Regular rate and rhythm,   GI: Soft, non-distended  NEURO: Not following instructions. Unable to access motor.  SKIN: No rash or lesions, warm and dry    09-08    135  |  97  |  x   ----------------------------<  x   3.5   |  24.0  |  x     Ca    9.0      08 Sep 2023 06:06  Mg     2.0     09-08      Tumor Markers: (***)

## 2023-09-08 NOTE — PROGRESS NOTE ADULT - PROBLEM SELECTOR PLAN 2
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  - Systemic therapy per [facility].

## 2023-09-08 NOTE — DIETITIAN INITIAL EVALUATION ADULT - PROBLEM SELECTOR PLAN 1
hemorrhagic brain mass ?   neuro check q 4  hrs  ct head from today  Small hemorrhage with surrounding vasogenic edema in the left high   posterior parietal lobe is unchanged.  - keppra for seizure prophylaixs  - neuro surgery recommending oncology consult  and defer steroid to oncology team  - mri brain w/wo iv contrast  - vc boots for dvt prophylaxis

## 2023-09-08 NOTE — PROGRESS NOTE ADULT - ASSESSMENT
63y Male with lung cancer and brain metastases.     Brain metastases.  Continue decadron.   Continue Keppra.     Lung cancer   Plan per oncology.    Altered mental status.  Appears aphasic.  Now more lethargic than previously noted.     He is followed by oncology and by palliative care and hospice care was discussed with the family.

## 2023-09-09 PROCEDURE — 99232 SBSQ HOSP IP/OBS MODERATE 35: CPT

## 2023-09-09 PROCEDURE — 71045 X-RAY EXAM CHEST 1 VIEW: CPT | Mod: 26

## 2023-09-09 PROCEDURE — 99233 SBSQ HOSP IP/OBS HIGH 50: CPT

## 2023-09-09 RX ADMIN — LEVETIRACETAM 400 MILLIGRAM(S): 250 TABLET, FILM COATED ORAL at 06:27

## 2023-09-09 RX ADMIN — LEVETIRACETAM 400 MILLIGRAM(S): 250 TABLET, FILM COATED ORAL at 17:16

## 2023-09-09 RX ADMIN — QUETIAPINE FUMARATE 25 MILLIGRAM(S): 200 TABLET, FILM COATED ORAL at 06:47

## 2023-09-09 RX ADMIN — Medication 6 MILLIGRAM(S): at 23:44

## 2023-09-09 RX ADMIN — PANTOPRAZOLE SODIUM 40 MILLIGRAM(S): 20 TABLET, DELAYED RELEASE ORAL at 13:29

## 2023-09-09 RX ADMIN — Medication 6 MILLIGRAM(S): at 05:16

## 2023-09-09 RX ADMIN — Medication 6 MILLIGRAM(S): at 13:30

## 2023-09-09 NOTE — PROGRESS NOTE ADULT - ASSESSMENT
63 male with recent diagnosis of lung adenocarcinoma with brain mets presented with acute metabolic encephalopathy    Acute metabolic encephalopathy likely in the setting of lung adenocarcinoma with brain mets  - likely secondary to brain mets with vasogenic edema  - does not appear to be an infectious etiology as UA and CXR negative, WBC wnl and afebrile  - neuro sx state no intervention at this time, signed off  - spoke with oncology, c/w decadron 6mg IV q8hr for now  - neuro consult recs appreciated  - radiation oncology reviewed repeat MRI, recommend radiation after improvement in mentation  - s/p LP, does not appear infectious   - c/w keppra to 1.5g q12hr  - c/w EEG for anonther 24 hour per neuro  - c/w seroquel 25 mg TID PRN and IV haldol PRN as 2nd line  - SLP consulted, recommended alternative means of feeding, to be discussed with the family

## 2023-09-09 NOTE — EEG REPORT - NS EEG TEXT BOX
[facility]   COMPREHENSIVE EPILEPSY CENTER   REPORT OF CONTINUOUS VIDEO EEG     [facility]: [address], [place] [address], Ph#: [phone]  [facility]: [address], [place] [address], Ph#: [phone]  Office: [address] [place] [address], [facility] [address], [place] [address] Ph#: [phone]    Patient Name: JOSÉ MIGUEL CHANEY  Age and : 63y (62)  MRN #: [id number]  Location: [facility] 4TWR [id number]  Referring Physician: Wang Candelario    Study Date: 23 - 23  Duration: 15 hr 15 min  _____________________________________________________________  STUDY INFORMATION    EEG Recording Technique:  The patient underwent continuous Video-EEG monitoring, using Telemetry System hardware on the XLTek Digital System. EEG and video data were stored on a computer hard drive with important events saved in digital archive files. The material was reviewed by a physician (electroencephalographer / epileptologist) on a daily basis. Aneudy and seizure detection algorithms were utilized and reviewed. An EEG Technician attended to the patient, and was available throughout daytime work hours.  The epilepsy center neurologist was available in person or on call 24-hours per day.    EEG Placement and Labeling of Electrodes:  The EEG was performed utilizing 20 channel referential EEG connections (coronal over temporal over parasagittal montage) using all standard 10-20 electrode placements with EKG, with additional electrodes placed in the inferior temporal region using the modified 10-10 montage electrode placements for elective admissions, or if deemed necessary. Recording was at a sampling rate of 256 samples per second per channel. Time synchronized digital video recording was done simultaneously with EEG recording. A low light infrared camera was used for low light recording.     _____________________________________________________________  HISTORY    Patient is a 63y old  Male who presents with a chief complaint of AMS (08 Sep 2023 17:21)      PERTINENT MEDICATION:  MEDICATIONS  (STANDING):  dexAMETHasone  Injectable 6 milliGRAM(s) IV Push every 8 hours  dextrose 5% + sodium chloride 0.9%. 1000 milliLiter(s) (75 mL/Hr) IV Continuous <Continuous>  levETIRAcetam  IVPB 1500 milliGRAM(s) IV Intermittent every 12 hours  pantoprazole  Injectable 40 milliGRAM(s) IV Push daily    _____________________________________________________________  INTERPRETATION    Findings: The background was continuous, spontaneously variable and reactive. The posterior dominant rhythm was not seen.     Background Slowing:  -Continuous diffuse theta and polymorphic delta slowing.    Focal Slowing:   None was present.    Sleep Background:  Stage II sleep transients were not recorded.  Drowsiness and stage II sleep transients were not recorded.    Other Non-Epileptiform Findings:  -Occasional generalized triphasic waves, maximum bifrontal, frequency 0.5 hz at times    Interictal Epileptiform Activity:   None were present.  Events:  Clinical events: None recorded.  Seizures: None recorded.    Artifacts:  Intermittent myogenic and movement artifacts were noted.    ECG:  The heart rate on single channel ECG was predominantly between 60-80 BPM.    _____________________________________________________________  EEG SUMMARY/CLASSIFICATION    Abnormal EEG in the awake, drowsy and asleep states.  -Occasional generalized triphasic waves, maximum bifrontal, frequency 0.5 hz at times  -Continuous diffuse theta and polymorphic delta slowing.  _____________________________________________________________  EEG IMPRESSION/CLINICAL CORRELATE    Abnormal EEG study.  1. Moderate diffuse or multifocal cerebral dysfunction, which may have a metabolic component.   2. No epileptiform pattern or seizure was recorded.    Roberto Downs MD  Neurology Attending Physician

## 2023-09-09 NOTE — PROGRESS NOTE ADULT - SUBJECTIVE AND OBJECTIVE BOX
[facility] Division of Hospital Medicine    Chief Complaint:  AMS    INTERVAL HISTORY:  Overnight, no acute events.     Patient seen and examined at bedside this morning. Patient arousable to sternal rub, non purposeful mvoements. R arm tremor noted, present yesterday as well.      Called by RN x2 given vomiting/spiting up tube feeds. Residuals ~10cc. Held TF, CXR ordered which does not show any developing infiltrate.     MEDICATIONS  (STANDING):  dexAMETHasone  Injectable 6 milliGRAM(s) IV Push every 8 hours  dextrose 5% + sodium chloride 0.9%. 1000 milliLiter(s) (75 mL/Hr) IV Continuous <Continuous>  levETIRAcetam  IVPB 1500 milliGRAM(s) IV Intermittent every 12 hours  pantoprazole  Injectable 40 milliGRAM(s) IV Push daily    MEDICATIONS  (PRN):  haloperidol    Injectable 2.5 milliGRAM(s) IV Push every 6 hours PRN agitaion 2nd line  hydrALAZINE Injectable 10 milliGRAM(s) IV Push every 4 hours PRN for sbp above 160  QUEtiapine 25 milliGRAM(s) Oral three times a day PRN agitation        I&O's Summary    08 Sep 2023 07:01  -  09 Sep 2023 07:00  --------------------------------------------------------  IN: 0 mL / OUT: 1830 mL / NET: -1830 mL    09 Sep 2023 07:01  -  09 Sep 2023 21:46  --------------------------------------------------------  IN: 825 mL / OUT: 1060 mL / NET: -235 mL        PHYSICAL EXAM:  Vital Signs Last 24 Hrs  T(C): 36.6 (09 Sep 2023 20:09), Max: 37 (09 Sep 2023 17:40)  T(F): 97.9 (09 Sep 2023 20:09), Max: 98.6 (09 Sep 2023 17:40)  HR: 65 (09 Sep 2023 20:09) (62 - 69)  BP: 145/81 (09 Sep 2023 20:09) (118/75 - 156/84)  BP(mean): --  RR: 18 (09 Sep 2023 20:09) (17 - 18)  SpO2: 93% (09 Sep 2023 20:09) (93% - 98%)    Parameters below as of 09 Sep 2023 20:09  Patient On (Oxygen Delivery Method): room air        GENERAL: not in acute distress  HEENT:  Clear conjunctiva, PERRL, moist oral mucosa  RESP:  Non-labored breathing pattern, lungs clear to ausculation, no wheezes or crackles appreciated  CV: Regular rate and rhythm, no murmurs appreciated, no lower extremity edema  GI: Soft, non-tender, non-distended  NEURO: unable to assess, does not follow commands  PSYCH: Calm, A&Ox0  SKIN: No rash or lesions, warm and dry    LABS:    09-08    135  |  97  |  x   ----------------------------<  x   3.5   |  24.0  |  x     Ca    9.0      08 Sep 2023 06:06  Mg     2.0     09-08            Urinalysis Basic - ( 08 Sep 2023 06:06 )    Color: x / Appearance: x / SG: x / pH: x  Gluc: 80 mg/dL / Ketone: x  / Bili: x / Urobili: x   Blood: x / Protein: x / Nitrite: x   Leuk Esterase: x / RBC: x / WBC x   Sq Epi: x / Non Sq Epi: x / Bacteria: x        Culture - CSF with Gram Stain (collected 08 Sep 2023 13:30)  Source: .CSF CSF  Gram Stain (08 Sep 2023 20:05):    No polymorphonuclear leukocytes seen    No organisms seen    by cytocentrifuge  Preliminary Report (09 Sep 2023 14:25):    No growth      CAPILLARY BLOOD GLUCOSE            RADIOLOGY & ADDITIONAL TESTS:  Results Reviewed:   Imaging Personally Reviewed:  Electrocardiogram Personally Reviewed:

## 2023-09-09 NOTE — PROGRESS NOTE ADULT - SUBJECTIVE AND OBJECTIVE BOX
[facility]                                        [facility]                                 Dan[facility] San, & Jl                                  [address]                                        [place], [place], [address]                                             [phone]        CC: brain metastases     HPI:   The patient is a 63y Male who initially presented 7/31/23 with right sided weakness.   He was seen by the stroke team.   Chest CT showed lesion suspicious for lung cancer.  Head CT showed left posterior parietal intraparenchymal hemorrhage measuring approximately 2.1 cm in greatest diameter, with surrounding edema. Smaller eft inferior frontal hemorrhagic lesion with surrounding edema, small left posterior temporal hemorrhagic lesion with surrounding edema and small right temporal hemorrhagic lesion with surrounding edema.  He had lung biopsy by IR and was seen by the oncology and neurosurgery services.   He was discharged on 8/16/23.  He was brought back on 8/30/23 with what was described as altered mental status.   He was again seen by oncology and neurosurgery.   MRI again shows hemorrhagic metastases with surrounding edema.   Neurology evaluation requested 9/3/23. (JW)    Interim history:  Remains lethargic/unresponsive.  s/p LP by IR. question of seizure activity earlier    ROS:   Unobtainable due to patient's condition.     MEDICATIONS  (STANDING):  dexAMETHasone  Injectable 6 milliGRAM(s) IV Push every 8 hours  dextrose 5% + sodium chloride 0.9%. 1000 milliLiter(s) (75 mL/Hr) IV Continuous <Continuous>  levETIRAcetam  IVPB 1500 milliGRAM(s) IV Intermittent every 12 hours  pantoprazole  Injectable 40 milliGRAM(s) IV Push daily    MEDICATIONS  (PRN):  haloperidol    Injectable 2.5 milliGRAM(s) IV Push every 6 hours PRN agitaion 2nd line  hydrALAZINE Injectable 10 milliGRAM(s) IV Push every 4 hours PRN for sbp above 160  QUEtiapine 25 milliGRAM(s) Oral three times a day PRN agitation      Vital Signs Last 24 Hrs  T(C): 36.6 (09 Sep 2023 12:00), Max: 37.1 (08 Sep 2023 21:30)  T(F): 97.9 (09 Sep 2023 12:00), Max: 98.7 (08 Sep 2023 21:30)  HR: 68 (09 Sep 2023 12:51) (62 - 69)  BP: 118/75 (09 Sep 2023 12:51) (118/75 - 164/83)  BP(mean): --  RR: 18 (09 Sep 2023 12:51) (17 - 18)  SpO2: 97% (09 Sep 2023 12:51) (93% - 99%)    Parameters below as of 09 Sep 2023 12:51  Patient On (Oxygen Delivery Method): room air      Detailed Neurologic Exam:    Mental status: The patient is lethargic, does not respond to voice, touch or sternal rub.  Non verbal. Not following instructions.     Cranial nerves:  Pupils react symmetrically to light. No blink to threat bilaterally. No tracking with gaze. There is a subtle depression of the right nasolabial fold. Palate and tongue cannot be assessed.     Motor/sensory: There is normal bulk and tone.  Moving left more than right to stimuli.    Reflexes: 1+ throughout and plantar responses are extensor b/l.    Cerebellar: Cannot be tested.     Labs:     09-08    135  |  97  |  x   ----------------------------<  x   3.5   |  24.0  |  x     Ca    9.0      08 Sep 2023 06:06  Mg     2.0     09-08    Cerebrospinal Fluid Cell Count-1 (09.08.23 @ 13:29)   Total Nucleated Cell Count, CSF: 0 /uL  Tube Type: Tube 4  CSF Appearance: Clear  CSF Neutrophils: n/a %  RBC Count - Spinal Fluid: 2 /cmm  CSF Color: No Color  Protein, CSF (09.08.23 @ 13:30)   Protein, CSF: 55 mg/dL  Glucose, CSF (09.08.23 @ 13:30)   Glucose, CSF: 74 mg/dLG/24h  CSF PCR Panel (09.08.23 @ 13:29)   CSF PCR Result: [facility]: The meningitis/encephalitis (ME) panel (CSFPCR) is a PCR based assay that   screens for: Escherichia coli; Haemophilus influenzae; Listeria   monocytogenes; Neisseria meningitidis; Streptococcus agalactiae;   Streptococcus pneumoniae; CMV; Enterovirus; HSV-1; HSV-2; Human   herpesvirus 6; Parechovirus; VZV and Cryptococcus. Result should be   interpreted in context of clinical presentation, imaging and other lab   tests. Positive predictive value may be lower in patients with normal CSF   chemistry and cell count.  Lactate Dehydrogenase, CSF (09.08.23 @ 13:30)   Lactate Dehydrogenase, CSF: 11: Test Repeated   Reference Ranges have NOT been established for CSF LDH.   The  has not determined the efficacy of this test when   performed on CSF specimens. The performance characteristics of this test   were determined by [facility] [employer] [facility] [employer]. U/L  Culture - CSF with Gram Stain . (09.08.23 @ 13:30)   Gram Stain:   No polymorphonuclear leukocytes seen   No organisms seen   by cytocentrifuge  Specimen Source: .CSF CSF    Rad:   MR Head w/wo IV Cont (09.07.23 @ 15:26)   IMPRESSION:    Study is limited secondary to motion artifact.    1.  Redemonstrated hemorrhagic metastatic lesion in the left parietal   lobe, relatively unchanged when compared to prior imaging.  2.  Subtle ill-defined focus of enhancement within the left frontal lobe   as discussed above. An underlying metastatic lesion could be present,   however this is limited. Consider reimaging as clinically indicated for   further assessment.  3.  Chronic infarcts throughout the brain parenchyma as discussed above.  4.  Confluent increased T2/FLAIR signal throughout the subcortical and   deep white matter. Findings are likely combination of chronic small   vessel disease and post treatment changes.  5.  Poor flow signal void within the left M1 segment. This could be   artifactual in nature. An underlying narrowing could present similarly.   Consider CTA/MRA for further evaluation if clinically indicated.

## 2023-09-10 LAB
ANION GAP SERPL CALC-SCNC: 13 MMOL/L — SIGNIFICANT CHANGE UP (ref 5–17)
BUN SERPL-MCNC: 16.8 MG/DL — SIGNIFICANT CHANGE UP (ref 8–20)
CALCIUM SERPL-MCNC: 8.8 MG/DL — SIGNIFICANT CHANGE UP (ref 8.4–10.5)
CHLORIDE SERPL-SCNC: 99 MMOL/L — SIGNIFICANT CHANGE UP (ref 96–108)
CO2 SERPL-SCNC: 23 MMOL/L — SIGNIFICANT CHANGE UP (ref 22–29)
CREAT SERPL-MCNC: 0.46 MG/DL — LOW (ref 0.5–1.3)
EGFR: 118 ML/MIN/1.73M2 — SIGNIFICANT CHANGE UP
GLUCOSE BLDC GLUCOMTR-MCNC: 122 MG/DL — HIGH (ref 70–99)
GLUCOSE SERPL-MCNC: 121 MG/DL — HIGH (ref 70–99)
HCT VFR BLD CALC: 40.7 % — SIGNIFICANT CHANGE UP (ref 39–50)
HGB BLD-MCNC: 14.2 G/DL — SIGNIFICANT CHANGE UP (ref 13–17)
MCHC RBC-ENTMCNC: 29.6 PG — SIGNIFICANT CHANGE UP (ref 27–34)
MCHC RBC-ENTMCNC: 34.9 GM/DL — SIGNIFICANT CHANGE UP (ref 32–36)
MCV RBC AUTO: 85 FL — SIGNIFICANT CHANGE UP (ref 80–100)
PLATELET # BLD AUTO: 201 K/UL — SIGNIFICANT CHANGE UP (ref 150–400)
POTASSIUM SERPL-MCNC: 3.4 MMOL/L — LOW (ref 3.5–5.3)
POTASSIUM SERPL-SCNC: 3.4 MMOL/L — LOW (ref 3.5–5.3)
RBC # BLD: 4.79 M/UL — SIGNIFICANT CHANGE UP (ref 4.2–5.8)
RBC # FLD: 12.6 % — SIGNIFICANT CHANGE UP (ref 10.3–14.5)
SODIUM SERPL-SCNC: 135 MMOL/L — SIGNIFICANT CHANGE UP (ref 135–145)
WBC # BLD: 19.89 K/UL — HIGH (ref 3.8–10.5)
WBC # FLD AUTO: 19.89 K/UL — HIGH (ref 3.8–10.5)

## 2023-09-10 PROCEDURE — 99233 SBSQ HOSP IP/OBS HIGH 50: CPT

## 2023-09-10 PROCEDURE — 99232 SBSQ HOSP IP/OBS MODERATE 35: CPT

## 2023-09-10 RX ORDER — POTASSIUM CHLORIDE 20 MEQ
10 PACKET (EA) ORAL
Refills: 0 | Status: COMPLETED | OUTPATIENT
Start: 2023-09-10 | End: 2023-09-11

## 2023-09-10 RX ADMIN — Medication 100 MILLIEQUIVALENT(S): at 23:46

## 2023-09-10 RX ADMIN — Medication 6 MILLIGRAM(S): at 14:21

## 2023-09-10 RX ADMIN — PANTOPRAZOLE SODIUM 40 MILLIGRAM(S): 20 TABLET, DELAYED RELEASE ORAL at 14:21

## 2023-09-10 RX ADMIN — LEVETIRACETAM 400 MILLIGRAM(S): 250 TABLET, FILM COATED ORAL at 18:17

## 2023-09-10 RX ADMIN — Medication 100 MILLIEQUIVALENT(S): at 22:29

## 2023-09-10 RX ADMIN — Medication 6 MILLIGRAM(S): at 05:14

## 2023-09-10 RX ADMIN — SODIUM CHLORIDE 75 MILLILITER(S): 9 INJECTION, SOLUTION INTRAVENOUS at 07:15

## 2023-09-10 RX ADMIN — LEVETIRACETAM 400 MILLIGRAM(S): 250 TABLET, FILM COATED ORAL at 05:14

## 2023-09-10 RX ADMIN — Medication 6 MILLIGRAM(S): at 22:22

## 2023-09-10 NOTE — PROGRESS NOTE ADULT - SUBJECTIVE AND OBJECTIVE BOX
[facility] Division of Hospital Medicine    Chief Complaint:      INTERVAL HISTORY:  Overnight,     Patient seen and examined at bedside this morning. Laying in bed, tremors noted again. Eyes open today and tracking. Continues to move extremities spontaneously.     MEDICATIONS  (STANDING):  dexAMETHasone  Injectable 6 milliGRAM(s) IV Push every 8 hours  dextrose 5% + sodium chloride 0.9%. 1000 milliLiter(s) (75 mL/Hr) IV Continuous <Continuous>  levETIRAcetam  IVPB 1500 milliGRAM(s) IV Intermittent every 12 hours  pantoprazole  Injectable 40 milliGRAM(s) IV Push daily  potassium chloride  10 mEq/100 mL IVPB 10 milliEquivalent(s) IV Intermittent every 1 hour    MEDICATIONS  (PRN):  haloperidol    Injectable 2.5 milliGRAM(s) IV Push every 6 hours PRN agitaion 2nd line  hydrALAZINE Injectable 10 milliGRAM(s) IV Push every 4 hours PRN for sbp above 160  QUEtiapine 25 milliGRAM(s) Oral three times a day PRN agitation        I&O's Summary    09 Sep 2023 07:01  -  10 Sep 2023 07:00  --------------------------------------------------------  IN: 825 mL / OUT: 2410 mL / NET: -1585 mL        PHYSICAL EXAM:  Vital Signs Last 24 Hrs  T(C): 37.2 (10 Sep 2023 16:03), Max: 37.2 (10 Sep 2023 16:03)  T(F): 98.9 (10 Sep 2023 16:03), Max: 98.9 (10 Sep 2023 16:03)  HR: 67 (10 Sep 2023 16:03) (63 - 79)  BP: 161/85 (10 Sep 2023 16:03) (135/79 - 161/85)  BP(mean): --  RR: 19 (10 Sep 2023 16:03) (16 - 20)  SpO2: 94% (10 Sep 2023 16:03) (94% - 95%)    Parameters below as of 10 Sep 2023 16:03  Patient On (Oxygen Delivery Method): room air          GENERAL: not in acute distress  HEENT:  Clear conjunctiva, PERRL, moist oral mucosa  RESP:  Non-labored breathing pattern, lungs clear to ausculation, no wheezes or crackles appreciated  CV: Regular rate and rhythm, no murmurs appreciated, no lower extremity edema  GI: Soft, non-tender, non-distended  NEURO: unable to assess, does not follow commands  PSYCH: Calm, A&Ox0  SKIN: No rash or lesions, warm and dry    LABS:                        14.2   19.89 )-----------( 201      ( 10 Sep 2023 07:44 )             40.7     09-10    135  |  99  |  16.8  ----------------------------<  121<H>  3.4<L>   |  23.0  |  0.46<L>    Ca    8.8      10 Sep 2023 07:44            Urinalysis Basic - ( 10 Sep 2023 07:44 )    Color: x / Appearance: x / SG: x / pH: x  Gluc: 121 mg/dL / Ketone: x  / Bili: x / Urobili: x   Blood: x / Protein: x / Nitrite: x   Leuk Esterase: x / RBC: x / WBC x   Sq Epi: x / Non Sq Epi: x / Bacteria: x        Culture - CSF with Gram Stain (collected 08 Sep 2023 13:30)  Source: .CSF CSF  Gram Stain (08 Sep 2023 20:05):    No polymorphonuclear leukocytes seen    No organisms seen    by cytocentrifuge  Preliminary Report (09 Sep 2023 14:25):    No growth      CAPILLARY BLOOD GLUCOSE      POCT Blood Glucose.: 122 mg/dL (10 Sep 2023 20:36)        RADIOLOGY & ADDITIONAL TESTS:  Results Reviewed:   Imaging Personally Reviewed:  Electrocardiogram Personally Reviewed:

## 2023-09-10 NOTE — PROGRESS NOTE ADULT - SUBJECTIVE AND OBJECTIVE BOX
[facility]                                        Neurology at [place]                                 [facility] San [facility] Jl                                  [address]                                        [place], [place], [address]                                             [phone]        CC: brain metastases     HPI:   The patient is a 63y Male who initially presented 7/31/23 with right sided weakness.   He was seen by the stroke team.   Chest CT showed lesion suspicious for lung cancer.  Head CT showed left posterior parietal intraparenchymal hemorrhage measuring approximately 2.1 cm in greatest diameter, with surrounding edema. Smaller eft inferior frontal hemorrhagic lesion with surrounding edema, small left posterior temporal hemorrhagic lesion with surrounding edema and small right temporal hemorrhagic lesion with surrounding edema.  He had lung biopsy by IR and was seen by the oncology and neurosurgery services.   He was discharged on 8/16/23.  He was brought back on 8/30/23 with what was described as altered mental status.   He was again seen by oncology and neurosurgery.   MRI again shows hemorrhagic metastases with surrounding edema.   Neurology evaluation requested 9/3/23. (JW)    Interim history: eyes open spontaneously today, not responding verbally to questions    ROS:   Unobtainable due to patient's condition.     MEDICATIONS  (STANDING):  dexAMETHasone  Injectable 6 milliGRAM(s) IV Push every 8 hours  dextrose 5% + sodium chloride 0.9%. 1000 milliLiter(s) (75 mL/Hr) IV Continuous <Continuous>  levETIRAcetam  IVPB 1500 milliGRAM(s) IV Intermittent every 12 hours  pantoprazole  Injectable 40 milliGRAM(s) IV Push daily    MEDICATIONS  (PRN):  haloperidol    Injectable 2.5 milliGRAM(s) IV Push every 6 hours PRN agitaion 2nd line  hydrALAZINE Injectable 10 milliGRAM(s) IV Push every 4 hours PRN for sbp above 160  QUEtiapine 25 milliGRAM(s) Oral three times a day PRN agitation      Vital Signs Last 24 Hrs  T(C): 36.6 (09 Sep 2023 12:00), Max: 37.1 (08 Sep 2023 21:30)  T(F): 97.9 (09 Sep 2023 12:00), Max: 98.7 (08 Sep 2023 21:30)  HR: 68 (09 Sep 2023 12:51) (62 - 69)  BP: 118/75 (09 Sep 2023 12:51) (118/75 - 164/83)  BP(mean): --  RR: 18 (09 Sep 2023 12:51) (17 - 18)  SpO2: 97% (09 Sep 2023 12:51) (93% - 99%)    Parameters below as of 09 Sep 2023 12:51  Patient On (Oxygen Delivery Method): room air      Detailed Neurologic Exam:    Mental status: The patient is lethargic, does not respond to voice, touch or sternal rub.  Non verbal. Not following instructions.     Cranial nerves:  Pupils react symmetrically to light. No blink to threat bilaterally. No tracking with gaze. There is a subtle depression of the right nasolabial fold. Palate and tongue cannot be assessed.     Motor/sensory: There is normal bulk and tone.  Moving left more than right to stimuli.    Reflexes: 1+ throughout and plantar responses are extensor b/l.    Cerebellar: Cannot be tested.     Labs:                           14.2   19.89 )-----------( 201      ( 10 Sep 2023 07:44 )             40.7     09-10    135  |  99  |  16.8  ----------------------------<  121<H>  3.4<L>   |  23.0  |  0.46<L>    Ca    8.8      10 Sep 2023 07:44    Cerebrospinal Fluid Cell Count-1 (09.08.23 @ 13:29)   Total Nucleated Cell Count, CSF: 0 /uL  Tube Type: Tube 4  CSF Appearance: Clear  CSF Neutrophils: n/a %  RBC Count - Spinal Fluid: 2 /cmm  CSF Color: No Color  Protein, CSF (09.08.23 @ 13:30)   Protein, CSF: 55 mg/dL  Glucose, CSF (09.08.23 @ 13:30)   Glucose, CSF: 74 mg/dLG/24h  CSF PCR Panel (09.08.23 @ 13:29)   CSF PCR Result: [facility]: The meningitis/encephalitis (ME) panel (CSFPCR) is a PCR based assay that   screens for: Escherichia coli; Haemophilus influenzae; Listeria   monocytogenes; Neisseria meningitidis; Streptococcus agalactiae;   Streptococcus pneumoniae; CMV; Enterovirus; HSV-1; HSV-2; Human   herpesvirus 6; Parechovirus; VZV and Cryptococcus. Result should be   interpreted in context of clinical presentation, imaging and other lab   tests. Positive predictive value may be lower in patients with normal CSF   chemistry and cell count.  Lactate Dehydrogenase, CSF (09.08.23 @ 13:30)   Lactate Dehydrogenase, CSF: 11: Test Repeated   Reference Ranges have NOT been established for CSF LDH.   The  has not determined the efficacy of this test when   performed on CSF specimens. The performance characteristics of this test   were determined by [facility]. U/L  Culture - CSF with Gram Stain . (09.08.23 @ 13:30)   Gram Stain:   No polymorphonuclear leukocytes seen   No organisms seen   by cytocentrifuge  Specimen Source: .CSF CSF  _____________________________________________________________  EEG SUMMARY/CLASSIFICATION 9/9-9/10/23    Abnormal EEG in the awake, drowsy and asleep states.  -Occasional generalized triphasic waves, maximum bifrontal, frequency 0.5 hz at times  -Continuous diffuse theta and polymorphic delta slowing.  _____________________________________________________________  EEG IMPRESSION/CLINICAL CORRELATE    Abnormal EEG study.  1. Moderate diffuse or multifocal cerebral dysfunction, which may have a metabolic component.   2. No epileptiform pattern or seizure was recorded.      _____________________________________________________________  EEG SUMMARY/CLASSIFICATION 9/8-9/9/23    Abnormal EEG in the awake, drowsy and asleep states.  -Occasional generalized triphasic waves, maximum bifrontal, frequency 0.5 hz at times  -Continuous diffuse theta and polymorphic delta slowing.  _____________________________________________________________  EEG IMPRESSION/CLINICAL CORRELATE    Abnormal EEG study.  1. Moderate diffuse or multifocal cerebral dysfunction, which may have a metabolic component.   2. No epileptiform pattern or seizure was recorded.      Rad:   MR Head w/wo IV Cont (09.07.23 @ 15:26)   IMPRESSION:    Study is limited secondary to motion artifact.    1.  Redemonstrated hemorrhagic metastatic lesion in the left parietal   lobe, relatively unchanged when compared to prior imaging.  2.  Subtle ill-defined focus of enhancement within the left frontal lobe   as discussed above. An underlying metastatic lesion could be present,   however this is limited. Consider reimaging as clinically indicated for   further assessment.  3.  Chronic infarcts throughout the brain parenchyma as discussed above.  4.  Confluent increased T2/FLAIR signal throughout the subcortical and   deep white matter. Findings are likely combination of chronic small   vessel disease and post treatment changes.  5.  Poor flow signal void within the left M1 segment. This could be   artifactual in nature. An underlying narrowing could present similarly.   Consider CTA/MRA for further evaluation if clinically indicated.

## 2023-09-10 NOTE — CHART NOTE - NSCHARTNOTEFT_GEN_A_CORE
Source: Patient [ ]  Family [ ]   other [ chart]  63 male with recent diagnosis of lung adenocarcinoma with brain mets presented with acute metabolic encephalopathy    Enteral /Parenteral Nutrition Current Diet: Diet, NPO:   Tube Feeding Modality: Nasogastric  Jevity 1.5 Rodrigo (JEVITY1.5RTH)  Total Volume for 24 Hours (mL): 1200  Continuous  Starting Tube Feed Rate {mL per Hour}: 20  Increase Tube Feed Rate by (mL): 10     Every 4 hours  Until Goal Tube Feed Rate (mL per Hour): 50  Tube Feed Duration (in Hours): 24  Tube Feed Start Time: 15:00  Free Water Flush  Bolus   Total Volume per Flush (mL): 250   Frequency: Every 6 Hours (09-08-23 @ 15:07)    Patient reports [ ] nausea  [X ] vomiting [X ] diarrhea [ ] constipation  [ ]chewing problems [ ] swallowing issues  [ ] other:     Source for PO intake [ ] Patient [ ] family [X ] chart [ ] staff [ ] other    Current Weight:   (9/10) 133.1lbs  (9/7) 135.5    Pertinent Medications: MEDICATIONS  (STANDING):  dexAMETHasone  Injectable 6 milliGRAM(s) IV Push every 8 hours  dextrose 5% + sodium chloride 0.9%. 1000 milliLiter(s) (75 mL/Hr) IV Continuous <Continuous>  levETIRAcetam  IVPB 1500 milliGRAM(s) IV Intermittent every 12 hours  pantoprazole  Injectable 40 milliGRAM(s) IV Push daily    MEDICATIONS  (PRN):  haloperidol    Injectable 2.5 milliGRAM(s) IV Push every 6 hours PRN agitaion 2nd line  hydrALAZINE Injectable 10 milliGRAM(s) IV Push every 4 hours PRN for sbp above 160  QUEtiapine 25 milliGRAM(s) Oral three times a day PRN agitation    Pertinent Labs: CBC Full  -  ( 10 Sep 2023 07:44 )  WBC Count : 19.89 K/uL  RBC Count : 4.79 M/uL  Hemoglobin : 14.2 g/dL  Hematocrit : 40.7 %  Platelet Count - Automated : 201 K/uL  Mean Cell Volume : 85.0 fl  Mean Cell Hemoglobin : 29.6 pg  Mean Cell Hemoglobin Concentration : 34.9 gm/dL    Nutrition focused physical exam conducted - found signs of malnutrition [ ]absent [ ]present    Subcutaneous fat loss: [ ] Orbital fat pads region, [ ]Buccal fat region, [ ]Triceps region,  [ ]Ribs region    Muscle wasting: [ ]Temples region, [ ]Clavicle region, [ ]Shoulder region, [ ]Scapula region, [ ]Interosseous region,  [ ]thigh region, [ ]Calf region    Estimated Needs:   [X ] no change since previous assessment  [ ] recalculated:     Current Nutrition Diagnosis: Pt started on NGT, increased nutrition needs related to increased physiological demand for nutrient as evidenced by intracerebral hemorrhage. Pt on jevity 1.5 with 10cc residuals, vomiting x2 and not tolerating TF. Will recommend to change formula to vital 1.5 starting at 20ml/hr increasing 10ml/hr every 6 hours with tolerance to goal rate of 55ml/hr.   Jevity 1.5 @ 55ml/hr provides 1980cal, 84g pro/day    Recommendations:   monitor tolerance of NGT  obtain daily weights   monitor nutrition related labs    Monitoring and Evaluation:   [ ] PO intake [ ] Tolerance to diet prescription [X] Weights  [X] Follow up per protocol [X] Labs: Source: Patient [ ]  Family [ ]   other [ chart]  63 male with recent diagnosis of lung adenocarcinoma with brain mets presented with acute metabolic encephalopathy    Enteral /Parenteral Nutrition Current Diet: Diet, NPO:   Tube Feeding Modality: Nasogastric  Jevity 1.5 Rodrigo (JEVITY1.5RTH)  Total Volume for 24 Hours (mL): 1200  Continuous  Starting Tube Feed Rate {mL per Hour}: 20  Increase Tube Feed Rate by (mL): 10     Every 4 hours  Until Goal Tube Feed Rate (mL per Hour): 50  Tube Feed Duration (in Hours): 24  Tube Feed Start Time: 15:00  Free Water Flush  Bolus   Total Volume per Flush (mL): 250   Frequency: Every 6 Hours (09-08-23 @ 15:07)    Patient reports [ ] nausea  [X ] vomiting [X ] diarrhea [ ] constipation  [ ]chewing problems [ ] swallowing issues  [ ] other:     Source for PO intake [ ] Patient [ ] family [X ] chart [ ] staff [ ] other    Current Weight:   (9/10) 133.1lbs  (9/7) 135.5    Pertinent Medications: MEDICATIONS  (STANDING):  dexAMETHasone  Injectable 6 milliGRAM(s) IV Push every 8 hours  dextrose 5% + sodium chloride 0.9%. 1000 milliLiter(s) (75 mL/Hr) IV Continuous <Continuous>  levETIRAcetam  IVPB 1500 milliGRAM(s) IV Intermittent every 12 hours  pantoprazole  Injectable 40 milliGRAM(s) IV Push daily    MEDICATIONS  (PRN):  haloperidol    Injectable 2.5 milliGRAM(s) IV Push every 6 hours PRN agitaion 2nd line  hydrALAZINE Injectable 10 milliGRAM(s) IV Push every 4 hours PRN for sbp above 160  QUEtiapine 25 milliGRAM(s) Oral three times a day PRN agitation    Pertinent Labs: CBC Full  -  ( 10 Sep 2023 07:44 )  WBC Count : 19.89 K/uL  RBC Count : 4.79 M/uL  Hemoglobin : 14.2 g/dL  Hematocrit : 40.7 %  Platelet Count - Automated : 201 K/uL  Mean Cell Volume : 85.0 fl  Mean Cell Hemoglobin : 29.6 pg  Mean Cell Hemoglobin Concentration : 34.9 gm/dL    Nutrition focused physical exam conducted - found signs of malnutrition [ ]absent [ ]present    Subcutaneous fat loss: [ ] Orbital fat pads region, [ ]Buccal fat region, [ ]Triceps region,  [ ]Ribs region    Muscle wasting: [ ]Temples region, [ ]Clavicle region, [ ]Shoulder region, [ ]Scapula region, [ ]Interosseous region,  [ ]thigh region, [ ]Calf region    Estimated Needs:   [X ] no change since previous assessment  [ ] recalculated:     Current Nutrition Diagnosis: Pt started on NGT, increased nutrition needs related to increased physiological demand for nutrient as evidenced by intracerebral hemorrhage. Pt on jevity 1.5 with 10cc residuals, vomiting x2 and not tolerating TF. Will recommend to change formula to vital 1.5 starting at 20ml/hr increasing 10ml/hr every 6 hours with tolerance to goal rate of 55ml/hr.   Jevity 1.5 @ 55ml/hr provides 1980cal, 84g pro/day    Recommendations:   RX: MVI, thiamine, folic acid daily   monitor tolerance of NGT  obtain daily weights   monitor nutrition related labs    Monitoring and Evaluation:   [ ] PO intake [ ] Tolerance to diet prescription [X] Weights  [X] Follow up per protocol [X] Labs:

## 2023-09-10 NOTE — PROGRESS NOTE ADULT - ASSESSMENT
63 male with recent diagnosis of lung adenocarcinoma with brain mets presented with acute metabolic encephalopathy    Acute metabolic encephalopathy likely in the setting of lung adenocarcinoma with brain mets  - likely secondary to brain mets with vasogenic edema  - does not appear to be an infectious etiology as UA and CXR negative, WBC wnl and afebrile  - neuro sx state no intervention at this time, signed off  - spoke with oncology, c/w decadron 6mg IV q8hr for now  - neuro consult recs appreciated  - radiation oncology reviewed repeat MRI, recommend radiation after improvement in mentation  - s/p LP, does not appear infectious   - c/w keppra to 1.5g q12hr  - c/w EEG for anonther 24 hour per neuro  - c/w seroquel 25 mg TID PRN and IV haldol PRN as 2nd line  - SLP consulted, recommended alternative means of feeding, to be discussed with the family    #leukocytosis  - wbc 19, pt afebrile, not tachycardiac, normotensive  - will get cbc w/ diff am labs  - hold off on abx for now  - consider pan scan in am  - f/u am procal, lactate, BCx  - consider ID consult    #diet  - unable to tolerate diet, SLP input appreciated  - SLP recommending to change diet

## 2023-09-10 NOTE — EEG REPORT - NS EEG TEXT BOX
[facility]   COMPREHENSIVE EPILEPSY CENTER   REPORT OF CONTINUOUS VIDEO EEG     [facility]: [address], [place] [address], Ph#: [phone]  [facility]: [address], [place] [address], Ph#: [phone]  Office: [address] [place] [address], [facility] [address], [place] [address] Ph#: [phone]    Patient Name: JOSÉ MIGUEL CHANEY  Age and : 63y (62)  MRN #: [id number]  Location: [facility] 4TWR [id number] 01  Referring Physician: Wang Candelario    Study Date: 23 - 09-10-23  Duration: 23 hr 25 min  _____________________________________________________________  STUDY INFORMATION    EEG Recording Technique:  The patient underwent continuous Video-EEG monitoring, using Telemetry System hardware on the XLTek Digital System. EEG and video data were stored on a computer hard drive with important events saved in digital archive files. The material was reviewed by a physician (electroencephalographer / epileptologist) on a daily basis. Aneudy and seizure detection algorithms were utilized and reviewed. An EEG Technician attended to the patient, and was available throughout daytime work hours.  The epilepsy center neurologist was available in person or on call 24-hours per day.    EEG Placement and Labeling of Electrodes:  The EEG was performed utilizing 20 channel referential EEG connections (coronal over temporal over parasagittal montage) using all standard 10-20 electrode placements with EKG, with additional electrodes placed in the inferior temporal region using the modified 10-10 montage electrode placements for elective admissions, or if deemed necessary. Recording was at a sampling rate of 256 samples per second per channel. Time synchronized digital video recording was done simultaneously with EEG recording. A low light infrared camera was used for low light recording.     _____________________________________________________________  HISTORY    Patient is a 63y old  Male who presents with a chief complaint of AMS (08 Sep 2023 17:21)      PERTINENT MEDICATION:  MEDICATIONS  (STANDING):  dexAMETHasone  Injectable 6 milliGRAM(s) IV Push every 8 hours  dextrose 5% + sodium chloride 0.9%. 1000 milliLiter(s) (75 mL/Hr) IV Continuous <Continuous>  levETIRAcetam  IVPB 1500 milliGRAM(s) IV Intermittent every 12 hours  pantoprazole  Injectable 40 milliGRAM(s) IV Push daily    _____________________________________________________________  INTERPRETATION    Findings: The background was continuous, spontaneously variable and reactive. The posterior dominant rhythm was not seen.     Background Slowing:  -Continuous diffuse theta and polymorphic delta slowing.    Focal Slowing:   None was present.    Sleep Background:  Stage II sleep transients were not recorded.  Drowsiness and stage II sleep transients were not recorded.    Other Non-Epileptiform Findings:  -Occasional generalized triphasic waves, maximum bifrontal, frequency 0.5 hz at times    Interictal Epileptiform Activity:   None were present.  Events:  Clinical events: None recorded.  Seizures: None recorded.    Artifacts:  Intermittent myogenic and movement artifacts were noted.    ECG:  The heart rate on single channel ECG was predominantly between 60-80 BPM.    _____________________________________________________________  EEG SUMMARY/CLASSIFICATION    Abnormal EEG in the awake, drowsy and asleep states.  -Occasional generalized triphasic waves, maximum bifrontal, frequency 0.5 hz at times  -Continuous diffuse theta and polymorphic delta slowing.  _____________________________________________________________  EEG IMPRESSION/CLINICAL CORRELATE    Abnormal EEG study.  1. Moderate diffuse or multifocal cerebral dysfunction, which may have a metabolic component.   2. No epileptiform pattern or seizure was recorded.    Roberto Downs MD  Neurology Attending Physician     [facility]   COMPREHENSIVE EPILEPSY CENTER   REPORT OF CONTINUOUS VIDEO EEG     [facility]: [address], [place] [address], Ph#: [phone]  [facility]: [address], [place] [address], Ph#: [phone]  Office: [address] [place] [address], [facility] [address], [place] [address] Ph#: [phone]    Patient Name: JOSÉ MIGUEL CHANEY  Age and : 63y (62)  MRN #: [id number]  Location: [facility] 4TWR 4206 01  Referring Physician: Wang Candelario    Study Date: 09-09-23 0800- 09-10-23 1200  Duration: 28 hr 00 min  _____________________________________________________________  STUDY INFORMATION    EEG Recording Technique:  The patient underwent continuous Video-EEG monitoring, using Telemetry System hardware on the XLTek Digital System. EEG and video data were stored on a computer hard drive with important events saved in digital archive files. The material was reviewed by a physician (electroencephalographer / epileptologist) on a daily basis. Aneudy and seizure detection algorithms were utilized and reviewed. An EEG Technician attended to the patient, and was available throughout daytime work hours.  The epilepsy center neurologist was available in person or on call 24-hours per day.    EEG Placement and Labeling of Electrodes:  The EEG was performed utilizing 20 channel referential EEG connections (coronal over temporal over parasagittal montage) using all standard 10-20 electrode placements with EKG, with additional electrodes placed in the inferior temporal region using the modified 10-10 montage electrode placements for elective admissions, or if deemed necessary. Recording was at a sampling rate of 256 samples per second per channel. Time synchronized digital video recording was done simultaneously with EEG recording. A low light infrared camera was used for low light recording.     _____________________________________________________________  HISTORY    Patient is a 63y old  Male who presents with a chief complaint of AMS (08 Sep 2023 17:21)      PERTINENT MEDICATION:  MEDICATIONS  (STANDING):  dexAMETHasone  Injectable 6 milliGRAM(s) IV Push every 8 hours  dextrose 5% + sodium chloride 0.9%. 1000 milliLiter(s) (75 mL/Hr) IV Continuous <Continuous>  levETIRAcetam  IVPB 1500 milliGRAM(s) IV Intermittent every 12 hours  pantoprazole  Injectable 40 milliGRAM(s) IV Push daily    _____________________________________________________________  INTERPRETATION    Findings: The background was continuous, spontaneously variable and reactive. The posterior dominant rhythm was not seen.     Background Slowing:  -Continuous diffuse theta and polymorphic delta slowing.    Focal Slowing:   None was present.    Sleep Background:  Stage II sleep transients were not recorded.  Drowsiness and stage II sleep transients were not recorded.    Other Non-Epileptiform Findings:  -Occasional generalized triphasic waves, maximum bifrontal, frequency 0.5 hz at times    Interictal Epileptiform Activity:   None were present.  Events:  Clinical events: None recorded.  Seizures: None recorded.    Artifacts:  Intermittent myogenic and movement artifacts were noted.    ECG:  The heart rate on single channel ECG was predominantly between 60-80 BPM.    _____________________________________________________________  EEG SUMMARY/CLASSIFICATION    Abnormal EEG in the awake, drowsy and asleep states.  -Occasional generalized triphasic waves, maximum bifrontal, frequency 0.5 hz at times  -Continuous diffuse theta and polymorphic delta slowing.  _____________________________________________________________  EEG IMPRESSION/CLINICAL CORRELATE    Abnormal EEG study.  1. Moderate diffuse or multifocal cerebral dysfunction, which may have a metabolic component.   2. No epileptiform pattern or seizure was recorded.    Roberto Downs MD  Neurology Attending Physician

## 2023-09-10 NOTE — PROGRESS NOTE ADULT - ASSESSMENT
63y Male with lung cancer and brain metastases.     Brain metastases.  Continue decadron for edema  Continue Keppra for seziure prevention  Overall plan per oncology to address metastatic cancer    Lung cancer   with brain mets as above  Plan per oncology.    Altered mental status.  Now more lethargic than previously noted.   LP done, CSF with no WBC, slightly elevated protein and glucose neg PCR meningitis/encephalitis panel  cvEEG did not show seziures, will d/c, triphasic waves may indicate metabolic encephalopathy    He is followed by oncology and by palliative care and hospice care was discussed with the family.    will follow with you    Kostas Carrasco MD PhD   [id number]

## 2023-09-11 DIAGNOSIS — C34.90 MALIGNANT NEOPLASM OF UNSPECIFIED PART OF UNSPECIFIED BRONCHUS OR LUNG: ICD-10-CM

## 2023-09-11 LAB
ALBUMIN SERPL ELPH-MCNC: 4.4 G/DL — SIGNIFICANT CHANGE UP (ref 3.3–5.2)
ALP SERPL-CCNC: 105 U/L — SIGNIFICANT CHANGE UP (ref 40–120)
ALT FLD-CCNC: 30 U/L — SIGNIFICANT CHANGE UP
ANION GAP SERPL CALC-SCNC: 13 MMOL/L — SIGNIFICANT CHANGE UP (ref 5–17)
APPEARANCE UR: CLEAR — SIGNIFICANT CHANGE UP
AST SERPL-CCNC: 35 U/L — SIGNIFICANT CHANGE UP
BACTERIA # UR AUTO: ABNORMAL
BASOPHILS # BLD AUTO: 0.02 K/UL — SIGNIFICANT CHANGE UP (ref 0–0.2)
BASOPHILS NFR BLD AUTO: 0.1 % — SIGNIFICANT CHANGE UP (ref 0–2)
BILIRUB SERPL-MCNC: 0.6 MG/DL — SIGNIFICANT CHANGE UP (ref 0.4–2)
BILIRUB UR-MCNC: NEGATIVE — SIGNIFICANT CHANGE UP
BUN SERPL-MCNC: 15.6 MG/DL — SIGNIFICANT CHANGE UP (ref 8–20)
CALCIUM SERPL-MCNC: 9.8 MG/DL — SIGNIFICANT CHANGE UP (ref 8.4–10.5)
CHLORIDE SERPL-SCNC: 99 MMOL/L — SIGNIFICANT CHANGE UP (ref 96–108)
CO2 SERPL-SCNC: 24 MMOL/L — SIGNIFICANT CHANGE UP (ref 22–29)
COLOR SPEC: YELLOW — SIGNIFICANT CHANGE UP
CREAT SERPL-MCNC: 0.51 MG/DL — SIGNIFICANT CHANGE UP (ref 0.5–1.3)
CULTURE RESULTS: SIGNIFICANT CHANGE UP
DIFF PNL FLD: ABNORMAL
EGFR: 115 ML/MIN/1.73M2 — SIGNIFICANT CHANGE UP
EOSINOPHIL # BLD AUTO: 0 K/UL — SIGNIFICANT CHANGE UP (ref 0–0.5)
EOSINOPHIL NFR BLD AUTO: 0 % — SIGNIFICANT CHANGE UP (ref 0–6)
EPI CELLS # UR: SIGNIFICANT CHANGE UP
GLUCOSE BLDC GLUCOMTR-MCNC: 113 MG/DL — HIGH (ref 70–99)
GLUCOSE BLDC GLUCOMTR-MCNC: 113 MG/DL — HIGH (ref 70–99)
GLUCOSE BLDC GLUCOMTR-MCNC: 116 MG/DL — HIGH (ref 70–99)
GLUCOSE BLDC GLUCOMTR-MCNC: 116 MG/DL — HIGH (ref 70–99)
GLUCOSE BLDC GLUCOMTR-MCNC: 126 MG/DL — HIGH (ref 70–99)
GLUCOSE SERPL-MCNC: 107 MG/DL — HIGH (ref 70–99)
GLUCOSE UR QL: NEGATIVE MG/DL — SIGNIFICANT CHANGE UP
HCT VFR BLD CALC: 47 % — SIGNIFICANT CHANGE UP (ref 39–50)
HGB BLD-MCNC: 16.1 G/DL — SIGNIFICANT CHANGE UP (ref 13–17)
IMM GRANULOCYTES NFR BLD AUTO: 1 % — HIGH (ref 0–0.9)
KETONES UR-MCNC: NEGATIVE — SIGNIFICANT CHANGE UP
LACTATE SERPL-SCNC: 1.9 MMOL/L — SIGNIFICANT CHANGE UP (ref 0.5–2)
LEGIONELLA AG UR QL: NEGATIVE — SIGNIFICANT CHANGE UP
LEUKOCYTE ESTERASE UR-ACNC: ABNORMAL
LYMPHOCYTES # BLD AUTO: 1.59 K/UL — SIGNIFICANT CHANGE UP (ref 1–3.3)
LYMPHOCYTES # BLD AUTO: 9.8 % — LOW (ref 13–44)
MCHC RBC-ENTMCNC: 29 PG — SIGNIFICANT CHANGE UP (ref 27–34)
MCHC RBC-ENTMCNC: 34.3 GM/DL — SIGNIFICANT CHANGE UP (ref 32–36)
MCV RBC AUTO: 84.5 FL — SIGNIFICANT CHANGE UP (ref 80–100)
MONOCYTES # BLD AUTO: 1.12 K/UL — HIGH (ref 0–0.9)
MONOCYTES NFR BLD AUTO: 6.9 % — SIGNIFICANT CHANGE UP (ref 2–14)
NEUTROPHILS # BLD AUTO: 13.3 K/UL — HIGH (ref 1.8–7.4)
NEUTROPHILS NFR BLD AUTO: 82.2 % — HIGH (ref 43–77)
NITRITE UR-MCNC: NEGATIVE — SIGNIFICANT CHANGE UP
PH UR: 7 — SIGNIFICANT CHANGE UP (ref 5–8)
PLATELET # BLD AUTO: 227 K/UL — SIGNIFICANT CHANGE UP (ref 150–400)
POTASSIUM SERPL-MCNC: 3.9 MMOL/L — SIGNIFICANT CHANGE UP (ref 3.5–5.3)
POTASSIUM SERPL-SCNC: 3.9 MMOL/L — SIGNIFICANT CHANGE UP (ref 3.5–5.3)
PROCALCITONIN SERPL-MCNC: 0.03 NG/ML — SIGNIFICANT CHANGE UP (ref 0.02–0.1)
PROT SERPL-MCNC: 7.1 G/DL — SIGNIFICANT CHANGE UP (ref 6.6–8.7)
PROT UR-MCNC: NEGATIVE — SIGNIFICANT CHANGE UP
RBC # BLD: 5.56 M/UL — SIGNIFICANT CHANGE UP (ref 4.2–5.8)
RBC # FLD: 12.5 % — SIGNIFICANT CHANGE UP (ref 10.3–14.5)
RBC CASTS # UR COMP ASSIST: ABNORMAL /HPF (ref 0–4)
S PNEUM AG UR QL: NEGATIVE — SIGNIFICANT CHANGE UP
SODIUM SERPL-SCNC: 136 MMOL/L — SIGNIFICANT CHANGE UP (ref 135–145)
SP GR SPEC: 1.01 — SIGNIFICANT CHANGE UP (ref 1.01–1.02)
SPECIMEN SOURCE: SIGNIFICANT CHANGE UP
UROBILINOGEN FLD QL: NEGATIVE MG/DL — SIGNIFICANT CHANGE UP
WBC # BLD: 16.19 K/UL — HIGH (ref 3.8–10.5)
WBC # FLD AUTO: 16.19 K/UL — HIGH (ref 3.8–10.5)
WBC UR QL: ABNORMAL /HPF (ref 0–5)

## 2023-09-11 PROCEDURE — 99232 SBSQ HOSP IP/OBS MODERATE 35: CPT

## 2023-09-11 PROCEDURE — 99233 SBSQ HOSP IP/OBS HIGH 50: CPT

## 2023-09-11 RX ORDER — OSIMERTINIB 80 1/1
80 TABLET, FILM COATED ORAL DAILY
Qty: 30 | Refills: 5 | Status: ACTIVE | COMMUNITY
Start: 2023-09-11 | End: 1900-01-01

## 2023-09-11 RX ADMIN — Medication 10 MILLIGRAM(S): at 11:29

## 2023-09-11 RX ADMIN — SODIUM CHLORIDE 75 MILLILITER(S): 9 INJECTION, SOLUTION INTRAVENOUS at 07:55

## 2023-09-11 RX ADMIN — Medication 100 MILLIEQUIVALENT(S): at 01:04

## 2023-09-11 RX ADMIN — Medication 6 MILLIGRAM(S): at 14:15

## 2023-09-11 RX ADMIN — PANTOPRAZOLE SODIUM 40 MILLIGRAM(S): 20 TABLET, DELAYED RELEASE ORAL at 14:15

## 2023-09-11 RX ADMIN — LEVETIRACETAM 400 MILLIGRAM(S): 250 TABLET, FILM COATED ORAL at 17:03

## 2023-09-11 RX ADMIN — LEVETIRACETAM 400 MILLIGRAM(S): 250 TABLET, FILM COATED ORAL at 06:23

## 2023-09-11 RX ADMIN — Medication 6 MILLIGRAM(S): at 22:04

## 2023-09-11 RX ADMIN — Medication 6 MILLIGRAM(S): at 06:26

## 2023-09-11 RX ADMIN — Medication 10 MILLIGRAM(S): at 01:28

## 2023-09-11 NOTE — PROGRESS NOTE ADULT - ASSESSMENT
63M [place]-speaking with hx of HTN, recent stroke, hemorrhagic brain masses likely mets, newly identified lung adenocarcinoma, substance use (cocaine, daily EtOH, THC) presenting with altered mental status and CTH with no acute pathology and prior hemorrhage unchanged; admitted for further workup.  Palliative was consulted for support and to assist with goals of care.      Hemorrhagic brain masses, R PCA infarct  - hospitalized last month found to have hemorrhagic brain masses likely mets and s/p stroke  - CTH this admission, hemorrhage unchanged from prior   - repeat MRI 9/7 reviewed and noted, white matter changes compared to prior; no evidence suggestive of leptomeningeal disease  - NSX and med oncology input appreciated  - Rad Onc following for eventual SRS vs WBRT    - continue with IV dexamethasone and IV keppra for seizure prophylaxis     Persistent Encephalopathy  - etiology unclear, workup thus far including LP has been mostly unremarkable, pending rheum consult to rule out any underlying rheumatologic disorder  - no significant improvement since admission  - supportive care, reorientation, sleep hygiene     Stage IV lung adenocarcinoma  - diagnosed during last hospitalization with pathology confirming adenocarcinoma  - oncology input appreciated: +EGRF, Will be a good candidate for anti-egfr directed therapy most likely with tagrisso once acute issues resolve and stable for discharge.   - med oncology and rad oncology following    Acute Dysphagia  - currently NPO  - discussed with SLP after repeat eval, recommending advancement to pureed only no liquids  - aspiration precaution     Palliative Care Encounter  - surrogate are two sisters: Mahin  - DNR/DNI MOLST by hospitalist    - Case discussed with hospitalist Dr Stallworth, whom had a discussion with sister yesterday on overall hospital course thus far and overall guarded prognosis. Of note, hospice was broached by primary team last week and writer but family are not a comfort measure page at this time. At present, it remains unclear etiology on persistent encephalopathy, unlikely all brain mets alone but workup thus far has been unrevealing. Rheumatology consult is pending. Palliative team will continue to support and follow clinical progress/workup to assist with any further collaborative goals of care discussion.

## 2023-09-11 NOTE — PROGRESS NOTE ADULT - SUBJECTIVE AND OBJECTIVE BOX
[facility] PALLIATIVE MEDICINE     CC: FOLLOW UP VISIT + GOC    INTERVAL HPI/OVERNIGHT EVENTS:  Source if other than patient:  []Family   [x]Team     No acute events overnight.  Seen with medical resident. Pt awake but mostly nonverbal and does not engage in conversation.     PRESENT SYMPTOMS:     Dyspnea: no  Nausea/Vomiting:  No  Anxiety:   No  Depression: unable to obtain  Fatigue:  No  Loss of appetite: NPO  Constipation:  None documented    Pain: No overt sign of distress            Character-            Duration-            Effect-            Factors-            Frequency-            Location-            Severity-    Pain AD Score:  [email or web address] (press ctrl + left click to view)    Review of Systems: Unable to obtain due to poor mentation/encephalopathy     MEDICATIONS  (STANDING):  dexAMETHasone  Injectable 6 milliGRAM(s) IV Push every 8 hours  dextrose 5% + sodium chloride 0.9%. 1000 milliLiter(s) (75 mL/Hr) IV Continuous <Continuous>  levETIRAcetam  IVPB 1500 milliGRAM(s) IV Intermittent every 12 hours  pantoprazole  Injectable 40 milliGRAM(s) IV Push daily    MEDICATIONS  (PRN):  haloperidol    Injectable 2.5 milliGRAM(s) IV Push every 6 hours PRN agitaion 2nd line  hydrALAZINE Injectable 10 milliGRAM(s) IV Push every 4 hours PRN for sbp above 160  QUEtiapine 25 milliGRAM(s) Oral three times a day PRN agitation    PHYSICAL EXAM:    Vital Signs Last 24 Hrs  T(C): 36.8 (11 Sep 2023 11:33), Max: 37.2 (10 Sep 2023 16:03)  T(F): 98.2 (11 Sep 2023 11:33), Max: 98.9 (10 Sep 2023 16:03)  HR: 80 (11 Sep 2023 11:33) (61 - 80)  BP: 162/90 (11 Sep 2023 11:33) (138/80 - 163/90)  BP(mean): --  RR: 18 (11 Sep 2023 11:33) (16 - 19)  SpO2: 99% (11 Sep 2023 11:33) (94% - 99%)    Parameters below as of 11 Sep 2023 11:33  Patient On (Oxygen Delivery Method): room air    Karnofsky:  20%    GEN: resting comfortably and no acute distress    HEENT: mucous membrane moist      Lungs: comfortable, nonlabored      CV: +s1/s2 regular rate and rhythm      GI: +BS, abdomen soft, nontender, nondistended     MSK: no cyanosis or edema +weakness    NEURO: nonfocal. awake but nonverbal     Skin: warm and dry.      LABS: reviewed                         16.1   16.19 )-----------( 227      ( 11 Sep 2023 07:34 )             47.0     09-11    136  |  99  |  15.6  ----------------------------<  107<H>  3.9   |  24.0  |  0.51    Ca    9.8      11 Sep 2023 07:34    TPro  7.1  /  Alb  4.4  /  TBili  0.6  /  DBili  x   /  AST  35  /  ALT  30  /  AlkPhos  105  09-11    RADIOLOGY & ADDITIONAL STUDIES: Reviewed     ADVANCE DIRECTIVES/TREATMENT PREFERENCES: DNR/DNI MOLST     NEUROLOGICAL MEDICATIONS/OPIOIDS/BENZODIAZEPINE IN PAST 24 HOURS:    levETIRAcetam  IVPB   400 mL/Hr IV Intermittent (09-11-23 @ 06:23)   400 mL/Hr IV Intermittent (09-10-23 @ 18:17)

## 2023-09-11 NOTE — PROGRESS NOTE ADULT - ASSESSMENT
63y Male with lung cancer and brain metastases.     Brain metastases.  Decadron should be tapered further.  Continue Keppra for seziure prevention.  Overall plan per oncology to address metastatic cancer    Lung cancer   with brain mets as above  Plan per oncology.    Altered mental status.  MRI brain shows diffuse demyelination of unclear etiology.  Paraneoplastic or PML are in the differential.  Now less lethargic than previously noted.   LP done, CSF with no WBC, slightly elevated protein and glucose neg PCR meningitis/encephalitis panel  cvEEG did not show seziures,  triphasic waves may indicate metabolic encephalopathy    He is followed by oncology and by palliative care and hospice care was discussed with the family.

## 2023-09-11 NOTE — PROGRESS NOTE ADULT - SUBJECTIVE AND OBJECTIVE BOX
Neurology    CC: brain metastases     HPI:   The patient is a 63y Male who initially presented 7/31/23 with right sided weakness.   He was seen by the stroke team.   Chest CT showed lesion suspicious for lung cancer.  Head CT showed left posterior parietal intraparenchymal hemorrhage measuring approximately 2.1 cm in greatest diameter, with surrounding edema. Smaller eft inferior frontal hemorrhagic lesion with surrounding edema, small left posterior temporal hemorrhagic lesion with surrounding edema and small right temporal hemorrhagic lesion with surrounding edema.  He had lung biopsy by IR and was seen by the oncology and neurosurgery services.   He was discharged on 8/16/23.  He was brought back on 8/30/23 with what was described as altered mental status.   He was again seen by oncology and neurosurgery.   MRI again shows hemorrhagic metastases with surrounding edema.   Neurology evaluation requested 9/3/23. (JW)            Vital Signs Last 24 Hrs  T(C): 36.7 (11 Sep 2023 16:42), Max: 36.9 (10 Sep 2023 22:16)  T(F): 98.1 (11 Sep 2023 16:42), Max: 98.5 (10 Sep 2023 22:16)  HR: 88 (11 Sep 2023 16:42) (61 - 88)  BP: 144/84 (11 Sep 2023 16:42) (138/80 - 163/90)  BP(mean): --  RR: 18 (11 Sep 2023 16:42) (16 - 18)  SpO2: 98% (11 Sep 2023 16:42) (97% - 99%)    Parameters below as of 11 Sep 2023 16:42  Patient On (Oxygen Delivery Method): room air        MEDICATIONS    dexAMETHasone  Injectable 6 milliGRAM(s) IV Push every 8 hours  dextrose 5% + sodium chloride 0.9%. 1000 milliLiter(s) IV Continuous <Continuous>  haloperidol    Injectable 2.5 milliGRAM(s) IV Push every 6 hours PRN  hydrALAZINE Injectable 10 milliGRAM(s) IV Push every 4 hours PRN  levETIRAcetam  IVPB 1500 milliGRAM(s) IV Intermittent every 12 hours  pantoprazole  Injectable 40 milliGRAM(s) IV Push daily  QUEtiapine 25 milliGRAM(s) Oral three times a day PRN         LABS:  CBC Full  -  ( 11 Sep 2023 07:34 )  WBC Count : 16.19 K/uL  RBC Count : 5.56 M/uL  Hemoglobin : 16.1 g/dL  Hematocrit : 47.0 %  Platelet Count - Automated : 227 K/uL  Mean Cell Volume : 84.5 fl  Mean Cell Hemoglobin : 29.0 pg  Mean Cell Hemoglobin Concentration : 34.3 gm/dL  Auto Neutrophil # : 13.30 K/uL  Auto Lymphocyte # : 1.59 K/uL  Auto Monocyte # : 1.12 K/uL  Auto Eosinophil # : 0.00 K/uL  Auto Basophil # : 0.02 K/uL  Auto Neutrophil % : 82.2 %  Auto Lymphocyte % : 9.8 %  Auto Monocyte % : 6.9 %  Auto Eosinophil % : 0.0 %  Auto Basophil % : 0.1 %    Urinalysis Basic - ( 11 Sep 2023 07:34 )    Color: x / Appearance: x / SG: x / pH: x  Gluc: 107 mg/dL / Ketone: x  / Bili: x / Urobili: x   Blood: x / Protein: x / Nitrite: x   Leuk Esterase: x / RBC: x / WBC x   Sq Epi: x / Non Sq Epi: x / Bacteria: x      09-11    136  |  99  |  15.6  ----------------------------<  107<H>  3.9   |  24.0  |  0.51    Ca    9.8      11 Sep 2023 07:34    TPro  7.1  /  Alb  4.4  /  TBili  0.6  /  DBili  x   /  AST  35  /  ALT  30  /  AlkPhos  105  09-11    LIVER FUNCTIONS - ( 11 Sep 2023 07:34 )  Alb: 4.4 g/dL / Pro: 7.1 g/dL / ALK PHOS: 105 U/L / ALT: 30 U/L / AST: 35 U/L / GGT: x           Cerebrospinal Fluid Cell Count-1 (09.08.23 @ 13:29)   Total Nucleated Cell Count, CSF: 0 /uL  Tube Type: Tube 4  CSF Appearance: Clear  CSF Neutrophils: n/a %  RBC Count - Spinal Fluid: 2 /cmm  CSF Color: No Color  Protein, CSF (09.08.23 @ 13:30)   Protein, CSF: 55 mg/dL  Glucose, CSF (09.08.23 @ 13:30)   Glucose, CSF: 74 mg/dLG/24h  CSF PCR Panel (09.08.23 @ 13:29)   CSF PCR Result: NotDetec: The meningitis/encephalitis (ME) panel (CSFPCR) is a PCR based assay that   screens for: Escherichia coli; Haemophilus influenzae; Listeria   monocytogenes; Neisseria meningitidis; Streptococcus agalactiae;   Streptococcus pneumoniae; CMV; Enterovirus; HSV-1; HSV-2; Human   herpesvirus 6; Parechovirus; VZV and Cryptococcus. Result should be   interpreted in context of clinical presentation, imaging and other lab   tests. Positive predictive value may be lower in patients with normal CSF   chemistry and cell count.  Lactate Dehydrogenase, CSF (09.08.23 @ 13:30)   Lactate Dehydrogenase, CSF: 11: Test Repeated   Reference Ranges have NOT been established for CSF LDH.   The  has not determined the efficacy of this test when   performed on CSF specimens. The performance characteristics of this test   were determined by [facility]. U/L  Culture - CSF with Gram Stain . (09.08.23 @ 13:30)   Gram Stain:   No polymorphonuclear leukocytes seen   No organisms seen   by cytocentrifuge  Specimen Source: .CSF CSF       Left lung biopsy   Final Diagnosis   Left lung, needle core biopsy:   -Adenocarcinoma, lepidic and invasive pattern.       Detailed Neurologic Exam:    Mental status: The patient is lethargic, but opens eye to voice. He was able to state his name Adonis but he perseverates on that word. Not following instructions.     Cranial nerves:  Pupils react symmetrically to light. Blinks to threat bilaterally. EOMS are conjugate. There is a subtle depression of the right nasolabial fold. Palate and tongue cannot be assessed.     Motor/sensory: There increased tone in all 4 extremities. Also noted is a constant tremor involving the R UE.  Moving left more than right to stimuli.    Cerebellar: Cannot be tested.     ____________________________________________________________  EEG SUMMARY/CLASSIFICATION 9/9-9/10/23    Abnormal EEG in the awake, drowsy and asleep states.  -Occasional generalized triphasic waves, maximum bifrontal, frequency 0.5 hz at times  -Continuous diffuse theta and polymorphic delta slowing.  _____________________________________________________________  EEG IMPRESSION/CLINICAL CORRELATE    Abnormal EEG study.  1. Moderate diffuse or multifocal cerebral dysfunction, which may have a metabolic component.   2. No epileptiform pattern or seizure was recorded.      _____________________________________________________________  EEG SUMMARY/CLASSIFICATION 9/8-9/9/23    Abnormal EEG in the awake, drowsy and asleep states.  -Occasional generalized triphasic waves, maximum bifrontal, frequency 0.5 hz at times  -Continuous diffuse theta and polymorphic delta slowing.  _____________________________________________________________  EEG IMPRESSION/CLINICAL CORRELATE    Abnormal EEG study.  1. Moderate diffuse or multifocal cerebral dysfunction, which may have a metabolic component.   2. No epileptiform pattern or seizure was recorded.      Rad:   MR Head w/wo IV Cont (09.07.23 @ 15:26)   IMPRESSION:    Study is limited secondary to motion artifact.    1.  Redemonstrated hemorrhagic metastatic lesion in the left parietal   lobe, relatively unchanged when compared to prior imaging.  2.  Subtle ill-defined focus of enhancement within the left frontal lobe   as discussed above. An underlying metastatic lesion could be present,   however this is limited. Consider reimaging as clinically indicated for   further assessment.  3.  Chronic infarcts throughout the brain parenchyma as discussed above.  4.  Confluent increased T2/FLAIR signal throughout the subcortical and   deep white matter. Findings are likely combination of chronic small   vessel disease and post treatment changes.  5.  Poor flow signal void within the left M1 segment. This could be   artifactual in nature. An underlying narrowing could present similarly.   Consider CTA/MRA for further evaluation if clinically indicated.

## 2023-09-11 NOTE — PROGRESS NOTE ADULT - ASSESSMENT
63 male with recent diagnosis of lung adenocarcinoma with brain mets presented with acute metabolic encephalopathy    Acute metabolic encephalopathy likely in the setting of lung adenocarcinoma with brain mets  - unclear reason   - does not appear to be an infectious etiology as UA and CXR negative, WBC wnl and afebrile  - neuro sx state no intervention at this time   - per onc c/w decadron 6mg IV q8hr for now  - neuro consult recs appreciated, will speak to them regarding MRI addendum which questions PML, ADEM, MS, etc   - spoke with rads about MRI, no evidence of leptomeningeal disease   - radiation oncology reviewed repeat MRI, recommend radiation after improvement in mentation  - s/p LP, does not appear infectious   - c/w keppra to 1.5g q12hr  - EEG no seizures notes   - c/w seroquel 25 mg TID PRN and IV haldol PRN as 2nd line  - SLP consulted, recommended alternative means of feeding, to be discussed with the family    #leukocytosis  - wbc 19, pt afebrile, not tachycardiac, normotensive  - likely from steroids, no fevers    #diet  - unable to tolerate diet, SLP input appreciated  - SLP recommending to change diet   - unclear why NGT was removed, will replace one tomorrow

## 2023-09-11 NOTE — PROGRESS NOTE ADULT - SUBJECTIVE AND OBJECTIVE BOX
[facility] Division of Hospital Medicine    Chief Complaint:      INTERVAL HISTORY:  Overnight, no acute events.     Patient seen and examined at bedside this morning. Does not engage, eyes opening. UNable to participate in ROS.     MEDICATIONS  (STANDING):  dexAMETHasone  Injectable 6 milliGRAM(s) IV Push every 8 hours  dextrose 5% + sodium chloride 0.9%. 1000 milliLiter(s) (75 mL/Hr) IV Continuous <Continuous>  levETIRAcetam  IVPB 1500 milliGRAM(s) IV Intermittent every 12 hours  pantoprazole  Injectable 40 milliGRAM(s) IV Push daily    MEDICATIONS  (PRN):  haloperidol    Injectable 2.5 milliGRAM(s) IV Push every 6 hours PRN agitaion 2nd line  hydrALAZINE Injectable 10 milliGRAM(s) IV Push every 4 hours PRN for sbp above 160  QUEtiapine 25 milliGRAM(s) Oral three times a day PRN agitation        I&O's Summary    10 Sep 2023 07:01  -  11 Sep 2023 07:00  --------------------------------------------------------  IN: 975 mL / OUT: 1900 mL / NET: -925 mL    11 Sep 2023 07:01  -  11 Sep 2023 18:14  --------------------------------------------------------  IN: 675 mL / OUT: 0 mL / NET: 675 mL        PHYSICAL EXAM:  Vital Signs Last 24 Hrs  T(C): 36.7 (11 Sep 2023 16:42), Max: 36.9 (10 Sep 2023 22:16)  T(F): 98.1 (11 Sep 2023 16:42), Max: 98.5 (10 Sep 2023 22:16)  HR: 88 (11 Sep 2023 16:42) (61 - 88)  BP: 144/84 (11 Sep 2023 16:42) (138/80 - 163/90)  BP(mean): --  RR: 18 (11 Sep 2023 16:42) (16 - 18)  SpO2: 98% (11 Sep 2023 16:42) (97% - 99%)    Parameters below as of 11 Sep 2023 16:42  Patient On (Oxygen Delivery Method): room air      GENERAL: not in acute distress  HEENT:  Clear conjunctiva, PERRL, moist oral mucosa  RESP:  Non-labored breathing pattern, lungs clear to ausculation, no wheezes or crackles appreciated  CV: Regular rate and rhythm, no murmurs appreciated, no lower extremity edema  GI: Soft, non-tender, non-distended  NEURO: unable to assess, does not follow commands  PSYCH: Calm, A&Ox0  SKIN: No rash or lesions, warm and dry    LABS:                        16.1   16.19 )-----------( 227      ( 11 Sep 2023 07:34 )             47.0     09-11    136  |  99  |  15.6  ----------------------------<  107<H>  3.9   |  24.0  |  0.51    Ca    9.8      11 Sep 2023 07:34    TPro  7.1  /  Alb  4.4  /  TBili  0.6  /  DBili  x   /  AST  35  /  ALT  30  /  AlkPhos  105  09-11          Urinalysis Basic - ( 11 Sep 2023 07:34 )    Color: x / Appearance: x / SG: x / pH: x  Gluc: 107 mg/dL / Ketone: x  / Bili: x / Urobili: x   Blood: x / Protein: x / Nitrite: x   Leuk Esterase: x / RBC: x / WBC x   Sq Epi: x / Non Sq Epi: x / Bacteria: x        CAPILLARY BLOOD GLUCOSE      POCT Blood Glucose.: 126 mg/dL (11 Sep 2023 17:45)  POCT Blood Glucose.: 113 mg/dL (11 Sep 2023 12:22)  POCT Blood Glucose.: 113 mg/dL (11 Sep 2023 08:39)  POCT Blood Glucose.: 116 mg/dL (11 Sep 2023 02:21)  POCT Blood Glucose.: 122 mg/dL (10 Sep 2023 20:36)        RADIOLOGY & ADDITIONAL TESTS:  Results Reviewed:   Imaging Personally Reviewed:  Electrocardiogram Personally Reviewed:

## 2023-09-11 NOTE — PROGRESS NOTE ADULT - ASSESSMENT
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  -  MRI brain, no evidence of leptomeningeal disease   - s/p LP, does not appear infectious CSF with no WBC, slightly elevated protein and glucose neg PCR meningitis/encephalitis panel  -  EEG did not show seziures,  triphasic waves may indicate metabolic encephalopathy        -Stage IV lung adenocarcinoma: EGFR exon 21 mutation  - AMS is not likely due to metastatic Lung cancer,   - Prescription sent for Tagrisso  (Targetted therapy for LUng cancer) to specialty Pharmacy Poor social situation. Chemo RN/ Navigator aware and assisting with same

## 2023-09-11 NOTE — PROGRESS NOTE ADULT - SUBJECTIVE AND OBJECTIVE BOX
Patient is a 63y old  Male who presents with a chief complaint of AMS (10 Sep 2023 22:13)      SUBJECTIVE / OVERNIGHT EVENTS:    MEDICATIONS  (STANDING):  dexAMETHasone  Injectable 6 milliGRAM(s) IV Push every 8 hours  dextrose 5% + sodium chloride 0.9%. 1000 milliLiter(s) (75 mL/Hr) IV Continuous <Continuous>  levETIRAcetam  IVPB 1500 milliGRAM(s) IV Intermittent every 12 hours  pantoprazole  Injectable 40 milliGRAM(s) IV Push daily    MEDICATIONS  (PRN):  haloperidol    Injectable 2.5 milliGRAM(s) IV Push every 6 hours PRN agitaion 2nd line  hydrALAZINE Injectable 10 milliGRAM(s) IV Push every 4 hours PRN for sbp above 160  QUEtiapine 25 milliGRAM(s) Oral three times a day PRN agitation    CAPILLARY BLOOD GLUCOSE      POCT Blood Glucose.: 113 mg/dL (11 Sep 2023 08:39)  POCT Blood Glucose.: 116 mg/dL (11 Sep 2023 02:21)  POCT Blood Glucose.: 122 mg/dL (10 Sep 2023 20:36)    I&O's Summary    10 Sep 2023 07:01  -  11 Sep 2023 07:00  --------------------------------------------------------  IN: 975 mL / OUT: 1900 mL / NET: -925 mL        PHYSICAL EXAM:  Vital Signs Last 24 Hrs  T(C): 36.6 (11 Sep 2023 08:34), Max: 37.2 (10 Sep 2023 16:03)  T(F): 97.8 (11 Sep 2023 08:34), Max: 98.9 (10 Sep 2023 16:03)  HR: 79 (11 Sep 2023 08:34) (61 - 79)  BP: 163/81 (11 Sep 2023 08:34) (138/80 - 163/90)  BP(mean): --  RR: 16 (11 Sep 2023 08:34) (16 - 19)  SpO2: 98% (11 Sep 2023 08:34) (94% - 99%)    Parameters below as of 11 Sep 2023 08:34  Patient On (Oxygen Delivery Method): room air          LABS:                        16.1   16.19 )-----------( 227      ( 11 Sep 2023 07:34 )             47.0     09-11    136  |  99  |  15.6  ----------------------------<  107<H>  3.9   |  24.0  |  0.51    Ca    9.8      11 Sep 2023 07:34    TPro  7.1  /  Alb  4.4  /  TBili  0.6  /  DBili  x   /  AST  35  /  ALT  30  /  AlkPhos  105  09-11          Urinalysis Basic - ( 11 Sep 2023 07:34 )    Color: x / Appearance: x / SG: x / pH: x  Gluc: 107 mg/dL / Ketone: x  / Bili: x / Urobili: x   Blood: x / Protein: x / Nitrite: x   Leuk Esterase: x / RBC: x / WBC x   Sq Epi: x / Non Sq Epi: x / Bacteria: x        Culture - CSF with Gram Stain (collected 08 Sep 2023 13:30)  Source: .CSF CSF  Gram Stain (08 Sep 2023 20:05):    No polymorphonuclear leukocytes seen    No organisms seen    by cytocentrifuge  Preliminary Report (09 Sep 2023 14:25):    No growth          RADIOLOGY & ADDITIONAL TESTS:  Imaging from Last 24 Hours:    Electrocardiogram/QTc Interval:    COORDINATION OF CARE:  Care Discussed with Consultants/Other Providers:   Patient is a 63y old  Male who presents with a chief complaint of AMS (10 Sep 2023 22:13)      SUBJECTIVE / OVERNIGHT EVENTS:  PAtient seen   MEDICATIONS  (STANDING):  dexAMETHasone  Injectable 6 milliGRAM(s) IV Push every 8 hours  dextrose 5% + sodium chloride 0.9%. 1000 milliLiter(s) (75 mL/Hr) IV Continuous <Continuous>  levETIRAcetam  IVPB 1500 milliGRAM(s) IV Intermittent every 12 hours  pantoprazole  Injectable 40 milliGRAM(s) IV Push daily    MEDICATIONS  (PRN):  haloperidol    Injectable 2.5 milliGRAM(s) IV Push every 6 hours PRN agitaion 2nd line  hydrALAZINE Injectable 10 milliGRAM(s) IV Push every 4 hours PRN for sbp above 160  QUEtiapine 25 milliGRAM(s) Oral three times a day PRN agitation    CAPILLARY BLOOD GLUCOSE      POCT Blood Glucose.: 113 mg/dL (11 Sep 2023 08:39)  POCT Blood Glucose.: 116 mg/dL (11 Sep 2023 02:21)  POCT Blood Glucose.: 122 mg/dL (10 Sep 2023 20:36)    I&O's Summary    10 Sep 2023 07:01  -  11 Sep 2023 07:00  --------------------------------------------------------  IN: 975 mL / OUT: 1900 mL / NET: -925 mL        PHYSICAL EXAM:  Vital Signs Last 24 Hrs  T(C): 36.6 (11 Sep 2023 08:34), Max: 37.2 (10 Sep 2023 16:03)  T(F): 97.8 (11 Sep 2023 08:34), Max: 98.9 (10 Sep 2023 16:03)  HR: 79 (11 Sep 2023 08:34) (61 - 79)  BP: 163/81 (11 Sep 2023 08:34) (138/80 - 163/90)  BP(mean): --  RR: 16 (11 Sep 2023 08:34) (16 - 19)  SpO2: 98% (11 Sep 2023 08:34) (94% - 99%)    Parameters below as of 11 Sep 2023 08:34  Patient On (Oxygen Delivery Method): room air          LABS:                        16.1   16.19 )-----------( 227      ( 11 Sep 2023 07:34 )             47.0     09-11    136  |  99  |  15.6  ----------------------------<  107<H>  3.9   |  24.0  |  0.51    Ca    9.8      11 Sep 2023 07:34    TPro  7.1  /  Alb  4.4  /  TBili  0.6  /  DBili  x   /  AST  35  /  ALT  30  /  AlkPhos  105  09-11          Urinalysis Basic - ( 11 Sep 2023 07:34 )    Color: x / Appearance: x / SG: x / pH: x  Gluc: 107 mg/dL / Ketone: x  / Bili: x / Urobili: x   Blood: x / Protein: x / Nitrite: x   Leuk Esterase: x / RBC: x / WBC x   Sq Epi: x / Non Sq Epi: x / Bacteria: x        Culture - CSF with Gram Stain (collected 08 Sep 2023 13:30)  Source: .CSF CSF  Gram Stain (08 Sep 2023 20:05):    No polymorphonuclear leukocytes seen    No organisms seen    by cytocentrifuge  Preliminary Report (09 Sep 2023 14:25):    No growth          RADIOLOGY & ADDITIONAL TESTS:  Imaging from Last 24 Hours:    Electrocardiogram/QTc Interval:    COORDINATION OF CARE:  Care Discussed with Consultants/Other Providers:

## 2023-09-12 LAB
EBV PCR: SIGNIFICANT CHANGE UP IU/ML
GLUCOSE BLDC GLUCOMTR-MCNC: 102 MG/DL — HIGH (ref 70–99)
JCPYV DNA # CSF NAA+PROBE: SIGNIFICANT CHANGE UP COPIES/ML
NON-GYNECOLOGICAL CYTOLOGY STUDY: SIGNIFICANT CHANGE UP

## 2023-09-12 PROCEDURE — 99233 SBSQ HOSP IP/OBS HIGH 50: CPT

## 2023-09-12 PROCEDURE — 99232 SBSQ HOSP IP/OBS MODERATE 35: CPT

## 2023-09-12 RX ORDER — LEVETIRACETAM 250 MG/1
1000 TABLET, FILM COATED ORAL EVERY 12 HOURS
Refills: 0 | Status: DISCONTINUED | OUTPATIENT
Start: 2023-09-12 | End: 2023-09-24

## 2023-09-12 RX ADMIN — LEVETIRACETAM 400 MILLIGRAM(S): 250 TABLET, FILM COATED ORAL at 05:46

## 2023-09-12 RX ADMIN — LEVETIRACETAM 400 MILLIGRAM(S): 250 TABLET, FILM COATED ORAL at 17:11

## 2023-09-12 RX ADMIN — PANTOPRAZOLE SODIUM 40 MILLIGRAM(S): 20 TABLET, DELAYED RELEASE ORAL at 11:48

## 2023-09-12 RX ADMIN — Medication 6 MILLIGRAM(S): at 05:46

## 2023-09-12 RX ADMIN — Medication 100 MILLIGRAM(S): at 11:48

## 2023-09-12 NOTE — PROGRESS NOTE ADULT - ASSESSMENT
63y Male with lung cancer and brain metastases.     Brain metastases.  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  Continue Keppra for seziure prevention.  Overall plan per oncology to address metastatic cancer    Lung cancer   with brain mets as above  Plan per oncology.    Altered mental status.  MRI brain shows diffuse demyelination of unclear etiology.  Paraneoplastic or PML are in the differential.  Now less lethargic than previously noted.   LP done, CSF with no WBC, slightly elevated protein and glucose neg PCR meningitis/encephalitis panel  cvEEG did not show seziures,  triphasic waves  indicate encephalopathy    He is followed by oncology and by palliative care and hospice care was discussed with the family.

## 2023-09-12 NOTE — PROGRESS NOTE ADULT - SUBJECTIVE AND OBJECTIVE BOX
Neurology    CC: brain metastases     HPI:   The patient is a 63y Male who initially presented 7/31/23 with right sided weakness.   He was seen by the stroke team.   Chest CT showed lesion suspicious for lung cancer.  Head CT showed left posterior parietal intraparenchymal hemorrhage measuring approximately 2.1 cm in greatest diameter, with surrounding edema. Smaller eft inferior frontal hemorrhagic lesion with surrounding edema, small left posterior temporal hemorrhagic lesion with surrounding edema and small right temporal hemorrhagic lesion with surrounding edema.  He had lung biopsy by IR and was seen by the oncology and neurosurgery services.   He was discharged on 8/16/23.  He was brought back on 8/30/23 with what was described as altered mental status.   He was again seen by oncology and neurosurgery.   MRI again shows hemorrhagic metastases with surrounding edema.   Neurology evaluation requested 9/3/23. (JW)        Vital Signs Last 24 Hrs  T(C): 36.9 (12 Sep 2023 16:52), Max: 36.9 (12 Sep 2023 16:52)  T(F): 98.4 (12 Sep 2023 16:52), Max: 98.4 (12 Sep 2023 16:52)  HR: 67 (12 Sep 2023 16:52) (62 - 98)  BP: 155/72 (12 Sep 2023 16:52) (135/79 - 157/84)  BP(mean): --  RR: 18 (12 Sep 2023 16:52) (16 - 19)  SpO2: 100% (12 Sep 2023 16:52) (95% - 100%)    Parameters below as of 12 Sep 2023 16:52  Patient On (Oxygen Delivery Method): room air        MEDICATIONS    dextrose 5% + sodium chloride 0.9%. 1000 milliLiter(s) IV Continuous <Continuous>  haloperidol    Injectable 2.5 milliGRAM(s) IV Push every 6 hours PRN  hydrALAZINE Injectable 10 milliGRAM(s) IV Push every 4 hours PRN  levETIRAcetam  IVPB 1000 milliGRAM(s) IV Intermittent every 12 hours  methylPREDNISolone sodium succinate IVPB 1000 milliGRAM(s) IV Intermittent daily  pantoprazole  Injectable 40 milliGRAM(s) IV Push daily  QUEtiapine 25 milliGRAM(s) Oral three times a day PRN         LABS:  CBC Full  -  ( 11 Sep 2023 07:34 )  WBC Count : 16.19 K/uL  RBC Count : 5.56 M/uL  Hemoglobin : 16.1 g/dL  Hematocrit : 47.0 %  Platelet Count - Automated : 227 K/uL  Mean Cell Volume : 84.5 fl  Mean Cell Hemoglobin : 29.0 pg  Mean Cell Hemoglobin Concentration : 34.3 gm/dL  Auto Neutrophil # : 13.30 K/uL  Auto Lymphocyte # : 1.59 K/uL  Auto Monocyte # : 1.12 K/uL  Auto Eosinophil # : 0.00 K/uL  Auto Basophil # : 0.02 K/uL  Auto Neutrophil % : 82.2 %  Auto Lymphocyte % : 9.8 %  Auto Monocyte % : 6.9 %  Auto Eosinophil % : 0.0 %  Auto Basophil % : 0.1 %    Urinalysis Basic - ( 11 Sep 2023 07:34 )    Color: x / Appearance: x / SG: x / pH: x  Gluc: 107 mg/dL / Ketone: x  / Bili: x / Urobili: x   Blood: x / Protein: x / Nitrite: x   Leuk Esterase: x / RBC: x / WBC x   Sq Epi: x / Non Sq Epi: x / Bacteria: x      09-11    136  |  99  |  15.6  ----------------------------<  107<H>  3.9   |  24.0  |  0.51    Ca    9.8      11 Sep 2023 07:34    TPro  7.1  /  Alb  4.4  /  TBili  0.6  /  DBili  x   /  AST  35  /  ALT  30  /  AlkPhos  105  09-11    LIVER FUNCTIONS - ( 11 Sep 2023 07:34 )  Alb: 4.4 g/dL / Pro: 7.1 g/dL / ALK PHOS: 105 U/L / ALT: 30 U/L / AST: 35 U/L / GGT: x           Hemoglobin A1C:     Cerebrospinal Fluid   Cell Count-1 (09.08.23 @ 13:29)   Total Nucleated Cell Count, CSF: 0 /uL  Tube Type: Tube 4  CSF Appearance: Clear  CSF Neutrophils: n/a %  RBC Count - Spinal Fluid: 2 /cmm  CSF Color: No Color  Protein, CSF (09.08.23 @ 13:30)   Protein, CSF: 55 mg/dL  Glucose, CSF (09.08.23 @ 13:30)   Glucose, CSF: 74 mg/dLG/24h  CSF PCR Panel (09.08.23 @ 13:29)   CSF PCR Result: [facility]: The meningitis/encephalitis (ME) panel (CSFPCR) is a PCR based assay that   screens for: Escherichia coli; Haemophilus influenzae; Listeria   monocytogenes; Neisseria meningitidis; Streptococcus agalactiae;   Streptococcus pneumoniae; CMV; Enterovirus; HSV-1; HSV-2; Human   herpesvirus 6; Parechovirus; VZV and Cryptococcus. Result should be   interpreted in context of clinical presentation, imaging and other lab   tests. Positive predictive value may be lower in patients with normal CSF   chemistry and cell count.  Lactate Dehydrogenase, CSF (09.08.23 @ 13:30)   Lactate Dehydrogenase, CSF: 11: Test Repeated   Reference Ranges have NOT been established for CSF LDH.   The  has not determined the efficacy of this test when   performed on CSF specimens. The performance characteristics of this test   were determined by [facility]. U/L  Culture - CSF with Gram Stain . (09.08.23 @ 13:30)   Gram Stain:   No polymorphonuclear leukocytes seen   No organisms seen   by cytocentrifuge  Specimen Source: .CSF CSF       Left lung biopsy   Final Diagnosis   Left lung, needle core biopsy:   -Adenocarcinoma, lepidic and invasive pattern.       Detailed Neurologic Exam:    Mental status: The patient is lethargic, but opens eye to voice. He was able to state his name Adonis but he perseverates on that word. Not following instructions.     Cranial nerves:  Pupils react symmetrically to light. Blinks to threat bilaterally. EOMS are conjugate. There is a subtle depression of the right nasolabial fold. Palate and tongue cannot be assessed.     Motor/sensory: There increased tone in all 4 extremities. Also noted is a constant tremor involving the R UE.  Moving left more than right to stimuli.    Cerebellar: Cannot be tested.     ____________________________________________________________  EEG SUMMARY/CLASSIFICATION 9/9-9/10/23    Abnormal EEG in the awake, drowsy and asleep states.  -Occasional generalized triphasic waves, maximum bifrontal, frequency 0.5 hz at times  -Continuous diffuse theta and polymorphic delta slowing.  _____________________________________________________________  EEG IMPRESSION/CLINICAL CORRELATE    Abnormal EEG study.  1. Moderate diffuse or multifocal cerebral dysfunction, which may have a metabolic component.   2. No epileptiform pattern or seizure was recorded.      _____________________________________________________________  EEG SUMMARY/CLASSIFICATION 9/8-9/9/23    Abnormal EEG in the awake, drowsy and asleep states.  -Occasional generalized triphasic waves, maximum bifrontal, frequency 0.5 hz at times  -Continuous diffuse theta and polymorphic delta slowing.  _____________________________________________________________  EEG IMPRESSION/CLINICAL CORRELATE    Abnormal EEG study.  1. Moderate diffuse or multifocal cerebral dysfunction, which may have a metabolic component.   2. No epileptiform pattern or seizure was recorded.      Rad:   MR Head w/wo IV Cont (09.07.23 @ 15:26)   IMPRESSION:    Study is limited secondary to motion artifact.    1.  Redemonstrated hemorrhagic metastatic lesion in the left parietal   lobe, relatively unchanged when compared to prior imaging.  2.  Subtle ill-defined focus of enhancement within the left frontal lobe   as discussed above. An underlying metastatic lesion could be present,   however this is limited. Consider reimaging as clinically indicated for   further assessment.  3.  Chronic infarcts throughout the brain parenchyma as discussed above.  4.  Confluent increased T2/FLAIR signal throughout the subcortical and   deep white matter. Findings are likely combination of chronic small   vessel disease and post treatment changes.  5.  Poor flow signal void within the left M1 segment. This could be   artifactual in nature. An underlying narrowing could present similarly.   Consider CTA/MRA for further evaluation if clinically indicated.

## 2023-09-12 NOTE — PROGRESS NOTE ADULT - ASSESSMENT
63 male with recent diagnosis of lung adenocarcinoma with brain mets presented with acute metabolic encephalopathy    Acute metabolic encephalopathy likely in the setting of lung adenocarcinoma with brain mets  - unclear reason   - does not appear to be an infectious etiology as UA and CXR negative, WBC wnl and afebrile  - neuro sx state no intervention at this time   - neuro consult recs appreciated, recommending pulse dose steroids 1g solumedrol qd x 5 days   - spoke with rads about MRI, no evidence of leptomeningeal disease   - radiation oncology reviewed repeat MRI, recommend radiation after improvement in mentation  - s/p LP, does not appear infectious   - neuro recommending decreasing keppra to 1g q12h; vEEG to 12-24 hours to make sure no seizures with lower dose   - c/w seroquel 25 mg TID PRN and IV haldol PRN as 2nd line  - SLP consulted, recommended alternative means of feeding, to be discussed with the family    #leukocytosis  - wbc 19, pt afebrile, not tachycardiac, normotensive  - likely from steroids, no fevers    #diet  - SLP evaluated pt 9/11, recommending puree diet

## 2023-09-12 NOTE — PROGRESS NOTE ADULT - SUBJECTIVE AND OBJECTIVE BOX
[facility] Division of Hospital Medicine    INTERVAL HISTORY:  Overnight, no acute events.     Patient seen and examined at bedside this morning. Patient wakes up to light stimulation, looking around. Non-conversant.     MEDICATIONS  (STANDING):  dextrose 5% + sodium chloride 0.9%. 1000 milliLiter(s) (75 mL/Hr) IV Continuous <Continuous>  levETIRAcetam  IVPB 1000 milliGRAM(s) IV Intermittent every 12 hours  methylPREDNISolone sodium succinate IVPB 1000 milliGRAM(s) IV Intermittent daily  pantoprazole  Injectable 40 milliGRAM(s) IV Push daily    MEDICATIONS  (PRN):  haloperidol    Injectable 2.5 milliGRAM(s) IV Push every 6 hours PRN agitaion 2nd line  hydrALAZINE Injectable 10 milliGRAM(s) IV Push every 4 hours PRN for sbp above 160  QUEtiapine 25 milliGRAM(s) Oral three times a day PRN agitation        I&O's Summary    11 Sep 2023 07:01  -  12 Sep 2023 07:00  --------------------------------------------------------  IN: 750 mL / OUT: 2000 mL / NET: -1250 mL    12 Sep 2023 07:01  -  12 Sep 2023 18:49  --------------------------------------------------------  IN: 525 mL / OUT: 500 mL / NET: 25 mL        PHYSICAL EXAM:  Vital Signs Last 24 Hrs  T(C): 36.9 (12 Sep 2023 16:52), Max: 36.9 (12 Sep 2023 16:52)  T(F): 98.4 (12 Sep 2023 16:52), Max: 98.4 (12 Sep 2023 16:52)  HR: 67 (12 Sep 2023 16:52) (62 - 98)  BP: 155/72 (12 Sep 2023 16:52) (135/79 - 167/88)  BP(mean): --  RR: 18 (12 Sep 2023 16:52) (16 - 19)  SpO2: 100% (12 Sep 2023 16:52) (95% - 100%)    Parameters below as of 12 Sep 2023 16:52  Patient On (Oxygen Delivery Method): room air      GENERAL: not in acute distress  HEENT:  Clear conjunctiva, PERRL, moist oral mucosa  RESP:  Non-labored breathing pattern, lungs clear to ausculation, no wheezes or crackles appreciated  CV: Regular rate and rhythm, no murmurs appreciated, no lower extremity edema  GI: Soft, non-tender, non-distended  NEURO: unable to assess, does not follow commands  PSYCH: Calm, A&Ox0  SKIN: No rash or lesions, warm and dry    LABS:                        16.1   16.19 )-----------( 227      ( 11 Sep 2023 07:34 )             47.0     09-11    136  |  99  |  15.6  ----------------------------<  107<H>  3.9   |  24.0  |  0.51    Ca    9.8      11 Sep 2023 07:34    TPro  7.1  /  Alb  4.4  /  TBili  0.6  /  DBili  x   /  AST  35  /  ALT  30  /  AlkPhos  105  09-11          Urinalysis Basic - ( 11 Sep 2023 07:34 )    Color: x / Appearance: x / SG: x / pH: x  Gluc: 107 mg/dL / Ketone: x  / Bili: x / Urobili: x   Blood: x / Protein: x / Nitrite: x   Leuk Esterase: x / RBC: x / WBC x   Sq Epi: x / Non Sq Epi: x / Bacteria: x        Culture - Blood (collected 11 Sep 2023 07:41)  Source: .Blood Blood  Preliminary Report (12 Sep 2023 14:01):    No growth at 24 hours    Culture - Blood (collected 11 Sep 2023 07:34)  Source: .Blood Blood  Preliminary Report (12 Sep 2023 14:01):    No growth at 24 hours      CAPILLARY BLOOD GLUCOSE      POCT Blood Glucose.: 102 mg/dL (12 Sep 2023 04:22)  POCT Blood Glucose.: 116 mg/dL (11 Sep 2023 23:32)        RADIOLOGY & ADDITIONAL TESTS:  Results Reviewed:   Imaging Personally Reviewed:  Electrocardiogram Personally Reviewed:

## 2023-09-12 NOTE — CHART NOTE - NSCHARTNOTEFT_GEN_A_CORE
EEG preliminary read (not final) on the initial recording hour(s) =     No epileptiform abnormalities.      Final report to follow tomorrow morning after completion of study.    [facility] EEG Reading Room Ph#: [phone]  Epilepsy Answering Service after 5PM and before 8:30AM: Ph#: [phone]

## 2023-09-13 LAB
APPEARANCE UR: ABNORMAL
BACTERIA # UR AUTO: ABNORMAL
BILIRUB UR-MCNC: NEGATIVE — SIGNIFICANT CHANGE UP
COLOR SPEC: YELLOW — SIGNIFICANT CHANGE UP
DIFF PNL FLD: ABNORMAL
EPI CELLS # UR: SIGNIFICANT CHANGE UP
GLUCOSE UR QL: NEGATIVE MG/DL — SIGNIFICANT CHANGE UP
KETONES UR-MCNC: ABNORMAL
LEUKOCYTE ESTERASE UR-ACNC: ABNORMAL
NITRITE UR-MCNC: NEGATIVE — SIGNIFICANT CHANGE UP
PH UR: 6 — SIGNIFICANT CHANGE UP (ref 5–8)
PROT UR-MCNC: NEGATIVE — SIGNIFICANT CHANGE UP
RBC CASTS # UR COMP ASSIST: ABNORMAL /HPF (ref 0–4)
SP GR SPEC: 1.02 — SIGNIFICANT CHANGE UP (ref 1.01–1.02)
UROBILINOGEN FLD QL: 1 MG/DL
WBC UR QL: ABNORMAL /HPF (ref 0–5)

## 2023-09-13 PROCEDURE — 99233 SBSQ HOSP IP/OBS HIGH 50: CPT

## 2023-09-13 RX ADMIN — LEVETIRACETAM 400 MILLIGRAM(S): 250 TABLET, FILM COATED ORAL at 17:52

## 2023-09-13 RX ADMIN — SODIUM CHLORIDE 75 MILLILITER(S): 9 INJECTION, SOLUTION INTRAVENOUS at 13:20

## 2023-09-13 RX ADMIN — LEVETIRACETAM 400 MILLIGRAM(S): 250 TABLET, FILM COATED ORAL at 05:08

## 2023-09-13 RX ADMIN — Medication 100 MILLIGRAM(S): at 05:08

## 2023-09-13 RX ADMIN — SODIUM CHLORIDE 75 MILLILITER(S): 9 INJECTION, SOLUTION INTRAVENOUS at 00:04

## 2023-09-13 RX ADMIN — PANTOPRAZOLE SODIUM 40 MILLIGRAM(S): 20 TABLET, DELAYED RELEASE ORAL at 13:18

## 2023-09-13 NOTE — PROGRESS NOTE ADULT - ASSESSMENT
63M Japanese-speaking with hx of HTN, recent stroke, hemorrhagic brain masses likely mets, newly identified lung adenocarcinoma, substance use (cocaine, daily EtOH, THC) presenting with altered mental status and CTH with no acute pathology and prior hemorrhage unchanged; admitted for further workup.  Palliative was consulted for support and to assist with goals of care.      Hemorrhagic brain masses, R PCA infarct  - hospitalized last month found to have hemorrhagic brain masses likely mets and s/p stroke  - CTH this admission, hemorrhage unchanged from prior   - repeat MRI 9/7 reviewed and noted, white matter changes compared to prior; no evidence suggestive of leptomeningeal disease  - NSX and med oncology input appreciated  - Rad Onc following for eventual SRS vs WBRT    - continue with IV dexamethasone and IV keppra for seizure prophylaxis     Persistent Encephalopathy  - etiology unclear, workup thus far including LP has been mostly unremarkable, pending rheum consult to rule out any underlying rheumatologic disorder  - no significant improvement since admission  - supportive care, reorientation, sleep hygiene     Stage IV lung adenocarcinoma  - diagnosed during last hospitalization with pathology confirming adenocarcinoma  - oncology input appreciated: +EGRF, Will be a good candidate for anti-egfr directed therapy most likely with tagrisso once acute issues resolve and stable for discharge.   - med oncology and rad oncology following    Acute Dysphagia  - currently NPO  - discussed with SLP after repeat eval, recommending advancement to pureed only no liquids  - aspiration precaution     Palliative Care Encounter  - surrogate are two sisters: Mahin  - DNR/DNI MOLST by hospitalist    - Case discussed with hospitalist Dr Stallworth, whom had a discussion with sister yesterday on overall hospital course thus far and overall guarded prognosis. Of note, hospice was broached by primary team last week and writer but family are not a comfort measure page at this time. At present, it remains unclear etiology on persistent encephalopathy, unlikely all brain mets alone but workup thus far has been unrevealing. Rheumatology consult is pending. Palliative team will continue to support and follow clinical progress/workup to assist with any further collaborative goals of care discussion.  63M Monegasque-speaking with hx of HTN, recent stroke, hemorrhagic brain masses likely mets, newly identified lung adenocarcinoma, substance use (cocaine, daily EtOH, THC) presenting with altered mental status and CTH with no acute pathology and prior hemorrhage unchanged; admitted for further workup.  Palliative was consulted for support and to assist with goals of care.      Persistent Encephalopathy  - etiology unclear, workup thus far including LP has been mostly unremarkable   - currently on cEEG, last read with no epileptiform activity noted  - no significant neuro improvement since admission  - supportive care, reorientation, sleep hygiene     Hemorrhagic brain masses, R PCA infarct  - hospitalized last month found to have hemorrhagic brain masses likely mets and s/p stroke  - CTH this admission, hemorrhage unchanged from prior   - repeat MRI 9/7 reviewed and noted, white matter changes compared to prior; no evidence suggestive of leptomeningeal disease  - NSX and med oncology input appreciated  - Rad Onc following for eventual SRS vs WBRT    - continue with IV dexamethasone and IV keppra for seizure prophylaxis     Stage IV lung adenocarcinoma  - diagnosed during last hospitalization with pathology confirming adenocarcinoma  - oncology input appreciated: +EGRF, Will be a good candidate for anti-egfr directed therapy most likely with tagrisso once acute issues resolve and stable for discharge.   - med oncology and rad oncology following    Acute Dysphagia  - currently NPO  - discussed with SLP after repeat eval, recommending advancement to pureed only no liquids  - aspiration precaution     Dysphagia  - on pureed only diet, pending reeval by SLP today to monitor for any clinical improvement  - please assist with feeding, aspiration precaution    Palliative Care Encounter  - surrogate are two sisters: Lea and Greg  - DNR/DNI MOLST by hospitalist     - Multiple GOC conversation by primary team and palliative with sister. Hospice philosophy of care has been broached but not currently in line with their goals of care. Family also indicated that once medically optimized, they would not be able to provide level of care at home and requested long term placement. Informed by unit SW that pt is undocumented. Will need eventual SW/CCM to assist for a safe disposition planning. Pt continues to be medically optimized at this time.     No further recommendations from a palliative standpoint. Will sign off. Please reconsult PRN if goals of care should change and assistance needed in further collaborative family discussions.  Ongoing medical mgnt per primary team.

## 2023-09-13 NOTE — PROGRESS NOTE ADULT - ASSESSMENT
63 male with recent diagnosis of lung adenocarcinoma with brain mets presented with acute metabolic encephalopathy    Acute metabolic encephalopathy likely in the setting of lung adenocarcinoma with brain mets  - unclear reason   - does not appear to be an infectious etiology as UA and CXR negative, WBC wnl and afebrile  - neuro sx state no intervention at this time   - neuro consult recs appreciated, recommending pulse dose steroids 1g solumedrol qd x 5 days   - spoke with rads about MRI, no evidence of leptomeningeal disease   - radiation oncology reviewed repeat MRI, recommend radiation after improvement in mentation  - s/p LP, does not appear infectious   - neuro recommending decreasing keppra to 1g q12h; vEEG to 12-24 hours to make sure no seizures with lower dose, will discuss with neuro tomorrow to d/c vEEG  - c/w seroquel 25 mg TID PRN and IV haldol PRN as 2nd line  - SLP consulted, recommended alternative means of feeding, to be discussed with the family    #leukocytosis  - wbc 19, pt afebrile, not tachycardiac, normotensive  - likely from steroids, no fevers    #diet  - SLP evaluated pt 9/11, recommending puree diet

## 2023-09-13 NOTE — PROGRESS NOTE ADULT - SUBJECTIVE AND OBJECTIVE BOX
[facility] Division of Hospital Medicine    Chief Complaint:      INTERVAL HISTORY:  Overnight, no acute events.     Patient seen and examined at bedside this morning. Patient wakes up to light stimulation, appears to be mouthing something. Non-conversant.     Patient denies chest pain, SOB, abd pain, N/V, fever, chills, dysuria or any other complaints. All remainder ROS negative.     MEDICATIONS  (STANDING):  dextrose 5% + sodium chloride 0.9%. 1000 milliLiter(s) (75 mL/Hr) IV Continuous <Continuous>  levETIRAcetam  IVPB 1000 milliGRAM(s) IV Intermittent every 12 hours  methylPREDNISolone sodium succinate IVPB 1000 milliGRAM(s) IV Intermittent daily  pantoprazole  Injectable 40 milliGRAM(s) IV Push daily    MEDICATIONS  (PRN):  haloperidol    Injectable 2.5 milliGRAM(s) IV Push every 6 hours PRN agitaion 2nd line  hydrALAZINE Injectable 10 milliGRAM(s) IV Push every 4 hours PRN for sbp above 160  QUEtiapine 25 milliGRAM(s) Oral three times a day PRN agitation        I&O's Summary    12 Sep 2023 07:01  -  13 Sep 2023 07:00  --------------------------------------------------------  IN: 1650 mL / OUT: 1350 mL / NET: 300 mL        PHYSICAL EXAM:  Vital Signs Last 24 Hrs  T(C): 36.5 (13 Sep 2023 16:26), Max: 37.2 (13 Sep 2023 05:07)  T(F): 97.7 (13 Sep 2023 16:), Max: 99 (13 Sep 2023 05:07)  HR: 62 (13 Sep 2023 16:) (61 - 85)  BP: 155/79 (13 Sep 2023 16:) (134/80 - 174/84)  BP(mean): 114 (13 Sep 2023 08:03) (114 - 114)  RR: 18 (13 Sep 2023 16:) (17 - 18)  SpO2: 99% (13 Sep 2023 16:) (98% - 99%)    Parameters below as of 13 Sep 2023 16:26  Patient On (Oxygen Delivery Method): room air      GENERAL: not in acute distress  HEENT:  Clear conjunctiva, PERRL, moist oral mucosa  RESP:  Non-labored breathing pattern, lungs clear to ausculation, no wheezes or crackles appreciated  CV: Regular rate and rhythm, no murmurs appreciated, no lower extremity edema  GI: Soft, non-tender, non-distended  NEURO: unable to assess, does not follow commands  PSYCH: Calm, A&Ox0  SKIN: No rash or lesions, warm and dry    LABS:                Urinalysis Basic - ( 13 Sep 2023 05:17 )    Color: Yellow / Appearance: very cloudy / S.020 / pH: x  Gluc: x / Ketone: Trace  / Bili: Negative / Urobili: 1 mg/dL   Blood: x / Protein: Negative / Nitrite: Negative   Leuk Esterase: Moderate / RBC: 3-5 /HPF / WBC 11-25 /HPF   Sq Epi: x / Non Sq Epi: x / Bacteria: Many        Culture - Blood (collected 11 Sep 2023 07:41)  Source: .Blood Blood  Preliminary Report (13 Sep 2023 14:01):    No growth at 48 Hours    Culture - Blood (collected 11 Sep 2023 07:34)  Source: .Blood Blood  Preliminary Report (13 Sep 2023 14:01):    No growth at 48 Hours      CAPILLARY BLOOD GLUCOSE            RADIOLOGY & ADDITIONAL TESTS:  Results Reviewed:   Imaging Personally Reviewed:  Electrocardiogram Personally Reviewed:

## 2023-09-13 NOTE — EEG REPORT - NS EEG TEXT BOX
WALTWILFREDGERARDOCAROLEEJOSÉ MIGUEL [id number]     Study Date: 9/12/23 11:37 - 9/13/23 08:00  Duration: 20 hr 22 min  --------------------------------------------------------------------------------------------------  History:  CC/ HPI Patient is a 63y old  Male who presents with a chief complaint of AMS (12 Sep 2023 20:44)    MEDICATIONS  (STANDING):  dextrose 5% + sodium chloride 0.9%. 1000 milliLiter(s) (75 mL/Hr) IV Continuous <Continuous>  levETIRAcetam  IVPB 1000 milliGRAM(s) IV Intermittent every 12 hours  methylPREDNISolone sodium succinate IVPB 1000 milliGRAM(s) IV Intermittent daily  pantoprazole  Injectable 40 milliGRAM(s) IV Push daily    --------------------------------------------------------------------------------------------------  Study Interpretation:    [[[Abbreviation Key:  PDR=alpha rhythm/posterior dominant rhythm. A-P=anterior posterior.  Amplitude: ‘very low’:<20; ‘low’:20-49; ‘medium’:; ‘high’:>150uV.  Persistence for periodic/rhythmic patterns (% of epoch) ‘rare’:<1%; ‘occasional’:1-10%; ‘frequent’:10-50%; ‘abundant’:50-90%; ‘continuous’:>90%.  Persistence for sporadic discharges: ‘rare’:<1/hr; ‘occasional’:1/min-1/hr; ‘frequent’:>1/min; ‘abundant’:>1/10 sec.  RPP=rhythmic and periodic patterns; GRDA=generalized rhythmic delta activity; FIRDA=frontal intermittent GRDA; LRDA=lateralized rhythmic delta activity; TIRDA=temporal intermittent rhythmic delta activity;  LPD=PLED=lateralized periodic discharges; GPD=generalized periodic discharges; BIPDs =bilateral independent periodic discharges; Mf=multifocal; SIRPDs=stimulus induced rhythmic, periodic, or ictal appearing discharges; BIRDs=brief potentially ictal rhythmic discharges >4 Hz, lasting .5-10s; PFA (paroxysmal bursts >13 Hz or =8 Hz <10s).  Modifiers: +F=with fast component; +S=with spike component; +R=with rhythmic component.  S-B=burst suppression pattern.  Max=maximal. N1-drowsy; N2-stage II sleep; N3-slow wave sleep. SSS/BETS=small sharp spikes/benign epileptiform transients of sleep. HV=hyperventilation; PS=photic stimulation]]]    Daily EEG Visual Analysis    FINDINGS:      Background:  The background is continuous and symmetric. The predominant background in the most wakeful state consists of diffuse polymorphic delta > theta slowing. There is no posterior dominant rhythm.     Background Slowing:  Generalized slowing: As above  Focal slowing: None    State Changes:   Drowsiness is characterized by fragmentation, attenuation, and slowing of the background activity.  No clear stage 2 sleep architecture is captured.    Interictal Findings:  None    Electrographic and Electroclinical seizures:  None    Other Clinical Events:  None    Activation Procedures:   Hyperventilation is not performed.    Photic stimulation is performed and does not elicit any abnormalities.      Artifacts:  Intermittent myogenic and movement artifacts are present.    EKG:  Single-lead EKG shows regular rhythm during readable portions. Uninterpretable for most of the study.    EEG Classification / Summary:  Abnormal EEG in the awake, drowsy states.  Moderate diffuse slowing.  No focal or epileptiform abnormalities are captured.     Clinical Impression:  Moderate diffuse cerebral dysfunction is nonspecific in etiology.           -------------------------------------------------------------------------------------------------------  [facility] EEG Reading Room Ph#: [phone]  Epilepsy Answering Service after 5PM and before 8:30AM: Ph#: [phone]    Ana Cox MD  Attending Physician, [facility]

## 2023-09-13 NOTE — PROGRESS NOTE ADULT - SUBJECTIVE AND OBJECTIVE BOX
[facility] PALLIATIVE MEDICINE     CC: FOLLOW UP VISIT + GOC    INTERVAL HPI/OVERNIGHT EVENTS:  Source if other than patient:  []Family   [x]Team     No acute events overnight.  Aide and SLP at bedside during visit. Aide assisted with [place] interpretation. Pt repeatedly saying his middle name "Adonis" otherwise minimally conversant and does not follow any commands. He is tolerating pureed only diet.     PRESENT SYMPTOMS:     Dyspnea: no  Nausea/Vomiting:  No  Anxiety:   No  Depression: unable to obtain  Fatigue:  No  Loss of appetite: NPO  Constipation:  None documented    Pain: No overt sign of distress            Character-            Duration-            Effect-            Factors-            Frequency-            Location-            Severity-    Pain AD Score:  [email or web address] (press ctrl + left click to view)    Review of Systems: Unable to obtain due to poor mentation/encephalopathy     MEDICATIONS  (STANDING):  dexAMETHasone  Injectable 6 milliGRAM(s) IV Push every 8 hours  dextrose 5% + sodium chloride 0.9%. 1000 milliLiter(s) (75 mL/Hr) IV Continuous <Continuous>  levETIRAcetam  IVPB 1500 milliGRAM(s) IV Intermittent every 12 hours  pantoprazole  Injectable 40 milliGRAM(s) IV Push daily    MEDICATIONS  (PRN):  haloperidol    Injectable 2.5 milliGRAM(s) IV Push every 6 hours PRN agitaion 2nd line  hydrALAZINE Injectable 10 milliGRAM(s) IV Push every 4 hours PRN for sbp above 160  QUEtiapine 25 milliGRAM(s) Oral three times a day PRN agitation    PHYSICAL EXAM:    Vital Signs Last 24 Hrs  T(C): 36.8 (11 Sep 2023 11:33), Max: 37.2 (10 Sep 2023 16:03)  T(F): 98.2 (11 Sep 2023 11:33), Max: 98.9 (10 Sep 2023 16:03)  HR: 80 (11 Sep 2023 11:33) (61 - 80)  BP: 162/90 (11 Sep 2023 11:33) (138/80 - 163/90)  BP(mean): --  RR: 18 (11 Sep 2023 11:33) (16 - 19)  SpO2: 99% (11 Sep 2023 11:33) (94% - 99%)    Parameters below as of 11 Sep 2023 11:33  Patient On (Oxygen Delivery Method): room air    Karnofsky:  20%    GEN: resting comfortably and no acute distress    HEENT: mucous membrane moist      Lungs: comfortable, nonlabored      CV: +s1/s2 regular rate and rhythm      GI: +BS, abdomen soft, nontender, nondistended     MSK: no cyanosis or edema +weakness    NEURO: nonfocal. awake but nonverbal     Skin: warm and dry.      LABS: reviewed                         16.1   16.19 )-----------( 227      ( 11 Sep 2023 07:34 )             47.0     09-11    136  |  99  |  15.6  ----------------------------<  107<H>  3.9   |  24.0  |  0.51    Ca    9.8      11 Sep 2023 07:34    TPro  7.1  /  Alb  4.4  /  TBili  0.6  /  DBili  x   /  AST  35  /  ALT  30  /  AlkPhos  105  09-11    RADIOLOGY & ADDITIONAL STUDIES: Reviewed     ADVANCE DIRECTIVES/TREATMENT PREFERENCES: DNR/DNI MOLST     NEUROLOGICAL MEDICATIONS/OPIOIDS/BENZODIAZEPINE IN PAST 24 HOURS:    levETIRAcetam  IVPB   400 mL/Hr IV Intermittent (09-11-23 @ 06:23)   400 mL/Hr IV Intermittent (09-10-23 @ 18:17)     [facility] PALLIATIVE MEDICINE     CC: FOLLOW UP VISIT + GOC    INTERVAL HPI/OVERNIGHT EVENTS:  Source if other than patient:  []Family   [x]Team     No acute events overnight.  Aide and SLP at bedside during visit. Aide assisted with [place]ian interpretation. Pt repeatedly saying his middle name "Adonis" otherwise minimally conversant and does not follow any commands. He has been tolerating pureed only diet.     PRESENT SYMPTOMS:     Dyspnea: no  Nausea/Vomiting:  No  Anxiety:   No  Depression: unable to obtain  Fatigue:  No  Loss of appetite: No  Constipation:  None documented    Pain: No overt sign of distress            Character-            Duration-            Effect-            Factors-            Frequency-            Location-            Severity-    Pain AD Score:  [email or web address] (press ctrl + left click to view)    Review of Systems: Unable to obtain due to poor mentation/encephalopathy     MEDICATIONS  (STANDING):  dextrose 5% + sodium chloride 0.9%. 1000 milliLiter(s) (75 mL/Hr) IV Continuous <Continuous>  levETIRAcetam  IVPB 1000 milliGRAM(s) IV Intermittent every 12 hours  methylPREDNISolone sodium succinate IVPB 1000 milliGRAM(s) IV Intermittent daily  pantoprazole  Injectable 40 milliGRAM(s) IV Push daily    MEDICATIONS  (PRN):  haloperidol    Injectable 2.5 milliGRAM(s) IV Push every 6 hours PRN agitaion 2nd line  hydrALAZINE Injectable 10 milliGRAM(s) IV Push every 4 hours PRN for sbp above 160  QUEtiapine 25 milliGRAM(s) Oral three times a day PRN agitation    PHYSICAL EXAM:    Vital Signs Last 24 Hrs  T(C): 36.8 (13 Sep 2023 08:03), Max: 37.2 (13 Sep 2023 05:07)  T(F): 98.3 (13 Sep 2023 08:03), Max: 99 (13 Sep 2023 05:07)  HR: 74 (13 Sep 2023 08:03) (61 - 85)  BP: 174/84 (13 Sep 2023 08:03) (134/80 - 174/84)  BP(mean): 114 (13 Sep 2023 08:03) (114 - 114)  RR: 17 (13 Sep 2023 08:03) (17 - 18)  SpO2: 99% (13 Sep 2023 08:03) (96% - 100%)    Parameters below as of 13 Sep 2023 08:03  Patient On (Oxygen Delivery Method): room air    Karnofsky:  20%    GEN: resting comfortably and no acute distress    HEENT: mucous membrane moist      Lungs: comfortable, nonlabored      CV: +s1/s2 regular rate and rhythm      GI: +BS, abdomen soft, nontender, nondistended     MSK: no cyanosis or edema +weakness    NEURO: nonfocal. awake but nonconversant    Skin: warm and dry.      LABS: reviewed     RADIOLOGY & ADDITIONAL STUDIES: Reviewed     ADVANCE DIRECTIVES/TREATMENT PREFERENCES: DNR/DNI MOLST     NEUROLOGICAL MEDICATIONS/OPIOIDS/BENZODIAZEPINE IN PAST 24 HOURS:    levETIRAcetam  IVPB   400 mL/Hr IV Intermittent (09-13-23 @ 05:08)   400 mL/Hr IV Intermittent (09-12-23 @ 17:11)

## 2023-09-14 LAB — VDRL CSF-TITR: SIGNIFICANT CHANGE UP

## 2023-09-14 PROCEDURE — 99232 SBSQ HOSP IP/OBS MODERATE 35: CPT

## 2023-09-14 RX ADMIN — PANTOPRAZOLE SODIUM 40 MILLIGRAM(S): 20 TABLET, DELAYED RELEASE ORAL at 07:44

## 2023-09-14 RX ADMIN — LEVETIRACETAM 400 MILLIGRAM(S): 250 TABLET, FILM COATED ORAL at 06:28

## 2023-09-14 RX ADMIN — Medication 100 MILLIGRAM(S): at 06:31

## 2023-09-14 RX ADMIN — LEVETIRACETAM 400 MILLIGRAM(S): 250 TABLET, FILM COATED ORAL at 16:56

## 2023-09-14 RX ADMIN — SODIUM CHLORIDE 75 MILLILITER(S): 9 INJECTION, SOLUTION INTRAVENOUS at 07:44

## 2023-09-14 RX ADMIN — SODIUM CHLORIDE 75 MILLILITER(S): 9 INJECTION, SOLUTION INTRAVENOUS at 18:28

## 2023-09-14 NOTE — PROGRESS NOTE ADULT - SUBJECTIVE AND OBJECTIVE BOX
Neurology    CC: brain metastases     HPI:   The patient is a 63y Male who initially presented 23 with right sided weakness.   He was seen by the stroke team.   Chest CT showed lesion suspicious for lung cancer.  Head CT showed left posterior parietal intraparenchymal hemorrhage measuring approximately 2.1 cm in greatest diameter, with surrounding edema. Smaller eft inferior frontal hemorrhagic lesion with surrounding edema, small left posterior temporal hemorrhagic lesion with surrounding edema and small right temporal hemorrhagic lesion with surrounding edema.  He had lung biopsy by IR and was seen by the oncology and neurosurgery services.   He was discharged on 23.  He was brought back on 23 with what was described as altered mental status.   He was again seen by oncology and neurosurgery.   MRI again shows hemorrhagic metastases with surrounding edema.   Neurology evaluation requested 9/3/23. (JW)        Vital Signs Last 24 Hrs  T(C): 36.7 (14 Sep 2023 08:01), Max: 36.8 (14 Sep 2023 00:42)  T(F): 98 (14 Sep 2023 08:01), Max: 98.2 (14 Sep 2023 00:42)  HR: 62 (14 Sep 2023 11:58) (58 - 95)  BP: 133/86 (14 Sep 2023 11:58) (133/86 - 165/81)  BP(mean): --  RR: 18 (14 Sep 2023 11:58) (16 - 18)  SpO2: 94% (14 Sep 2023 11:58) (94% - 99%)    Parameters below as of 14 Sep 2023 11:58  Patient On (Oxygen Delivery Method): room air        MEDICATIONS    dextrose 5% + sodium chloride 0.9%. 1000 milliLiter(s) IV Continuous <Continuous>  haloperidol    Injectable 2.5 milliGRAM(s) IV Push every 6 hours PRN  hydrALAZINE Injectable 10 milliGRAM(s) IV Push every 4 hours PRN  levETIRAcetam  IVPB 1000 milliGRAM(s) IV Intermittent every 12 hours  methylPREDNISolone sodium succinate IVPB 1000 milliGRAM(s) IV Intermittent daily  pantoprazole  Injectable 40 milliGRAM(s) IV Push daily  QUEtiapine 25 milliGRAM(s) Oral three times a day PRN         LABS:    Urinalysis Basic - ( 13 Sep 2023 05:17 )    Color: Yellow / Appearance: very cloudy / S.020 / pH: x  Gluc: x / Ketone: Trace  / Bili: Negative / Urobili: 1 mg/dL   Blood: x / Protein: Negative / Nitrite: Negative   Leuk Esterase: Moderate / RBC: 3-5 /HPF / WBC 11-25 /HPF   Sq Epi: x / Non Sq Epi: x / Bacteria: Many    Cerebrospinal Fluid   Cell Count-1 (23 @ 13:29)   Total Nucleated Cell Count, CSF: 0 /uL  Tube Type: Tube 4  CSF Appearance: Clear  CSF Neutrophils: n/a %  RBC Count - Spinal Fluid: 2 /cmm  CSF Color: No Color  Protein, CSF (23 @ 13:30)   Protein, CSF: 55 mg/dL  Glucose, CSF (23 @ 13:30)   Glucose, CSF: 74 mg/dLG/24h  CSF PCR Panel (23 @ 13:29)   CSF PCR Result: NotDete: The meningitis/encephalitis (ME) panel (CSFPCR) is a PCR based assay that   screens for: Escherichia coli; Haemophilus influenzae; Listeria   monocytogenes; Neisseria meningitidis; Streptococcus agalactiae;   Streptococcus pneumoniae; CMV; Enterovirus; HSV-1; HSV-2; Human   herpesvirus 6; Parechovirus; VZV and Cryptococcus. Result should be   interpreted in context of clinical presentation, imaging and other lab   tests. Positive predictive value may be lower in patients with normal CSF   chemistry and cell count.  Lactate Dehydrogenase, CSF (23 @ 13:30)   Lactate Dehydrogenase, CSF: 11: Test Repeated   Reference Ranges have NOT been established for CSF LDH.   The  has not determined the efficacy of this test when   performed on CSF specimens. The performance characteristics of this test   were determined by [facility]. U/L  Culture - CSF with Gram Stain . (23 @ 13:30)   Gram Stain:   No polymorphonuclear leukocytes seen   No organisms seen   by cytocentrifuge  Specimen Source: .CSF CSF  PRAKASH Virus DNA by PCR - CSF: NotDetec:      Left lung biopsy   Final Diagnosis   Left lung, needle core biopsy:   -Adenocarcinoma, lepidic and invasive pattern.       Detailed Neurologic Exam:    Mental status: The patient is lethargic, but opens eye to noxious stimukation. He is nonverbal today and Not following instructions.     Cranial nerves:  Pupils react symmetrically to light. No BTT bilaterally. EOMS are conjugate. There is a subtle depression of the right nasolabial fold. Palate and tongue cannot be assessed.     Motor/sensory: There increased tone in all 4 extremities. Also noted is a constant tremor involving the R UE.  Moving left UE more than right UE to stimuli. At times moves LUE spontaneously. Withdraws BLE briskly    Cerebellar: Cannot be tested.     ____________________________________________________________  EEG SUMMARY/CLASSIFICATION -9/10/23    Abnormal EEG in the awake, drowsy and asleep states.  -Occasional generalized triphasic waves, maximum bifrontal, frequency 0.5 hz at times  -Continuous diffuse theta and polymorphic delta slowing.  _____________________________________________________________  EEG IMPRESSION/CLINICAL CORRELATE    Abnormal EEG study.  1. Moderate diffuse or multifocal cerebral dysfunction, which may have a metabolic component.   2. No epileptiform pattern or seizure was recorded.      _____________________________________________________________  EEG SUMMARY/CLASSIFICATION -23    Abnormal EEG in the awake, drowsy and asleep states.  -Occasional generalized triphasic waves, maximum bifrontal, frequency 0.5 hz at times  -Continuous diffuse theta and polymorphic delta slowing.  _____________________________________________________________  EEG IMPRESSION/CLINICAL CORRELATE    Abnormal EEG study.  1. Moderate diffuse or multifocal cerebral dysfunction, which may have a metabolic component.   2. No epileptiform pattern or seizure was recorded.      Rad:   MR Head w/wo IV Cont (23 @ 15:26)   IMPRESSION:    Study is limited secondary to motion artifact.    1.  Redemonstrated hemorrhagic metastatic lesion in the left parietal   lobe, relatively unchanged when compared to prior imaging.  2.  Subtle ill-defined focus of enhancement within the left frontal lobe   as discussed above. An underlying metastatic lesion could be present,   however this is limited. Consider reimaging as clinically indicated for   further assessment.  3.  Chronic infarcts throughout the brain parenchyma as discussed above.  4.  Confluent increased T2/FLAIR signal throughout the subcortical and   deep white matter. Findings are likely combination of chronic small   vessel disease and post treatment changes.  5.  Poor flow signal void within the left M1 segment. This could be   artifactual in nature. An underlying narrowing could present similarly.   Consider CTA/MRA for further evaluation if clinically indicated.    EEG 2023    EEG Classification / Summary:  Abnormal EEG in the awake, drowsy states.  Moderate diffuse slowing.  No focal or epileptiform abnormalities are captured.     Clinical Impression:  Moderate diffuse cerebral dysfunction is nonspecific in etiology.     Ana Cox MD  Attending Physician, [facility]

## 2023-09-14 NOTE — PROGRESS NOTE ADULT - ASSESSMENT
63 male with recent diagnosis of lung adenocarcinoma with brain mets presented with acute metabolic encephalopathy    Acute metabolic encephalopathy likely in the setting of lung adenocarcinoma with brain mets  - unclear reason   - does not appear to be an infectious etiology as UA and CXR negative, WBC wnl and afebrile  - neuro sx state no intervention at this time   - neuro consult recs appreciated, recommending pulse dose steroids 1g solumedrol qd x 5 days   - spoke with rads about MRI, no evidence of leptomeningeal disease   - radiation oncology reviewed repeat MRI, recommend radiation after improvement in mentation  - s/p LP, does not appear infectious   - neuro recommending decreasing keppra to 1g q12h; vEEG d/c'd, no seizures notes   - c/w seroquel 25 mg TID PRN and IV haldol PRN as 2nd line; has not needed   - SLP consulted, recommended alternative means of feeding, to be discussed with the family    #leukocytosis  - wbc 19, pt afebrile, not tachycardiac, normotensive  - likely from steroids, no fevers    #diet  - SLP evaluated pt 9/11, recommending puree diet     F:-  N: pureed  A: ad cristian    DVT ppx: SCD   GI ppx: PPI    Code: DNR/DNI   no

## 2023-09-14 NOTE — EEG REPORT - NS EEG TEXT BOX
MICKYBHAVANASHANTECAROLEEJOSÉ MIGUEL [id number]     Study Date: 9/13/23 08:00 - 9/14/23 08:00  Duration: 23 hr 56 min  --------------------------------------------------------------------------------------------------  History:  CC/ HPI Patient is a 63y old  Male who presents with a chief complaint of AMS (13 Sep 2023 18:13)    MEDICATIONS  (STANDING):  dextrose 5% + sodium chloride 0.9%. 1000 milliLiter(s) (75 mL/Hr) IV Continuous <Continuous>  levETIRAcetam  IVPB 1000 milliGRAM(s) IV Intermittent every 12 hours  methylPREDNISolone sodium succinate IVPB 1000 milliGRAM(s) IV Intermittent daily  pantoprazole  Injectable 40 milliGRAM(s) IV Push daily    --------------------------------------------------------------------------------------------------  Study Interpretation:    [[[Abbreviation Key:  PDR=alpha rhythm/posterior dominant rhythm. A-P=anterior posterior.  Amplitude: ‘very low’:<20; ‘low’:20-49; ‘medium’:; ‘high’:>150uV.  Persistence for periodic/rhythmic patterns (% of epoch) ‘rare’:<1%; ‘occasional’:1-10%; ‘frequent’:10-50%; ‘abundant’:50-90%; ‘continuous’:>90%.  Persistence for sporadic discharges: ‘rare’:<1/hr; ‘occasional’:1/min-1/hr; ‘frequent’:>1/min; ‘abundant’:>1/10 sec.  RPP=rhythmic and periodic patterns; GRDA=generalized rhythmic delta activity; FIRDA=frontal intermittent GRDA; LRDA=lateralized rhythmic delta activity; TIRDA=temporal intermittent rhythmic delta activity;  LPD=PLED=lateralized periodic discharges; GPD=generalized periodic discharges; BIPDs =bilateral independent periodic discharges; Mf=multifocal; SIRPDs=stimulus induced rhythmic, periodic, or ictal appearing discharges; BIRDs=brief potentially ictal rhythmic discharges >4 Hz, lasting .5-10s; PFA (paroxysmal bursts >13 Hz or =8 Hz <10s).  Modifiers: +F=with fast component; +S=with spike component; +R=with rhythmic component.  S-B=burst suppression pattern.  Max=maximal. N1-drowsy; N2-stage II sleep; N3-slow wave sleep. SSS/BETS=small sharp spikes/benign epileptiform transients of sleep. HV=hyperventilation; PS=photic stimulation]]]    Daily EEG Visual Analysis    FINDINGS:      Background:  The background is continuous and symmetric. The predominant background in the most wakeful state consists of diffuse polymorphic delta > theta slowing with intermittent posteriorly predominant theta/alpha/beta frequencies. There is no clear posterior dominant rhythm.     Background Slowing:  Generalized slowing: As above  Focal slowing: None    State Changes:   Drowsiness is characterized by fragmentation, attenuation, and slowing of the background activity.  No clear stage 2 sleep architecture is captured.    Interictal Findings:  None    Electrographic and Electroclinical seizures:  None    Other Clinical Events:  None    Activation Procedures:   Hyperventilation is not performed.    Photic stimulation is not performed.    Artifacts:  Intermittent myogenic and movement artifacts are present.    EKG:  Single-lead EKG shows regular rhythm during readable portions. Uninterpretable due to artifact at times.    EEG Classification / Summary:  Abnormal EEG in the awake, drowsy states.  Moderate diffuse slowing.  No focal or epileptiform abnormalities are captured.     Clinical Impression:  Moderate diffuse cerebral dysfunction is nonspecific in etiology.           -------------------------------------------------------------------------------------------------------  [facility] EEG Reading Room Ph#: [phone]  Epilepsy Answering Service after 5PM and before 8:30AM: Ph#: [phone]    Ana Cox MD  Attending Physician, [facility] Epilepsy [place]   WALTWILFREDGERARDOCAROLEEJOSÉ MIGUEL [id number]     Study Date: 9/13/23 08:00 - 9/14/23 08:00  Duration: 31 hr 11 min  --------------------------------------------------------------------------------------------------  History:  CC/ HPI Patient is a 63y old  Male who presents with a chief complaint of AMS (13 Sep 2023 18:13)    MEDICATIONS  (STANDING):  dextrose 5% + sodium chloride 0.9%. 1000 milliLiter(s) (75 mL/Hr) IV Continuous <Continuous>  levETIRAcetam  IVPB 1000 milliGRAM(s) IV Intermittent every 12 hours  methylPREDNISolone sodium succinate IVPB 1000 milliGRAM(s) IV Intermittent daily  pantoprazole  Injectable 40 milliGRAM(s) IV Push daily    --------------------------------------------------------------------------------------------------  Study Interpretation:    [[[Abbreviation Key:  PDR=alpha rhythm/posterior dominant rhythm. A-P=anterior posterior.  Amplitude: ‘very low’:<20; ‘low’:20-49; ‘medium’:; ‘high’:>150uV.  Persistence for periodic/rhythmic patterns (% of epoch) ‘rare’:<1%; ‘occasional’:1-10%; ‘frequent’:10-50%; ‘abundant’:50-90%; ‘continuous’:>90%.  Persistence for sporadic discharges: ‘rare’:<1/hr; ‘occasional’:1/min-1/hr; ‘frequent’:>1/min; ‘abundant’:>1/10 sec.  RPP=rhythmic and periodic patterns; GRDA=generalized rhythmic delta activity; FIRDA=frontal intermittent GRDA; LRDA=lateralized rhythmic delta activity; TIRDA=temporal intermittent rhythmic delta activity;  LPD=PLED=lateralized periodic discharges; GPD=generalized periodic discharges; BIPDs =bilateral independent periodic discharges; Mf=multifocal; SIRPDs=stimulus induced rhythmic, periodic, or ictal appearing discharges; BIRDs=brief potentially ictal rhythmic discharges >4 Hz, lasting .5-10s; PFA (paroxysmal bursts >13 Hz or =8 Hz <10s).  Modifiers: +F=with fast component; +S=with spike component; +R=with rhythmic component.  S-B=burst suppression pattern.  Max=maximal. N1-drowsy; N2-stage II sleep; N3-slow wave sleep. SSS/BETS=small sharp spikes/benign epileptiform transients of sleep. HV=hyperventilation; PS=photic stimulation]]]    Daily EEG Visual Analysis    FINDINGS:      Background:  The background is continuous and symmetric. The predominant background in the most wakeful state consists of diffuse polymorphic delta > theta slowing with intermittent posteriorly predominant theta/alpha/beta frequencies. There is no clear posterior dominant rhythm.     Background Slowing:  Generalized slowing: As above  Focal slowing: None    State Changes:   Drowsiness is characterized by fragmentation, attenuation, and slowing of the background activity.  No clear stage 2 sleep architecture is captured.    Interictal Findings:  None    Electrographic and Electroclinical seizures:  None    Other Clinical Events:  None    Activation Procedures:   Hyperventilation is not performed.    Photic stimulation is not performed.    Artifacts:  Intermittent myogenic and movement artifacts are present.    EKG:  Single-lead EKG shows regular rhythm during readable portions. Uninterpretable due to artifact at times.    EEG Classification / Summary:  Abnormal EEG in the awake, drowsy states.  Moderate diffuse slowing.  No focal or epileptiform abnormalities are captured.     Clinical Impression:  Moderate diffuse cerebral dysfunction is nonspecific in etiology.           -------------------------------------------------------------------------------------------------------  [facility] EEG Reading Room Ph#: [phone]  Epilepsy Answering Service after 5PM and before 8:30AM: Ph#: [phone]    Ana Cox MD  Attending Physician, [facility] Epilepsy [place]

## 2023-09-14 NOTE — PROGRESS NOTE ADULT - ASSESSMENT
63y Male with lung cancer and brain metastases.     Brain metastases.  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 ( empiricallY)  Continue Keppra for seziure prevention.  Overall plan per oncology to address metastatic cancer    Lung cancer   with brain mets as above  Plan per oncology.    Altered mental status.  MRI brain shows diffuse demyelination of unclear etiology.  Paraneoplastic or PML are in the differential.  Now less lethargic than previously noted.   LP done, CSF with no WBC, slightly elevated protein and glucose neg PCR meningitis/encephalitis panel  cvEEG did not show seziures.    He is followed by oncology and by palliative care and hospice care was discussed with the family.

## 2023-09-14 NOTE — PROGRESS NOTE ADULT - SUBJECTIVE AND OBJECTIVE BOX
[facility] Division of Hospital Medicine    Chief Complaint:      INTERVAL HISTORY:  Overnight, no acute events.     Patient seen and examined at bedside this morning. Patient sleeping.  Unable to engage in ROS.     MEDICATIONS  (STANDING):  dextrose 5% + sodium chloride 0.9%. 1000 milliLiter(s) (75 mL/Hr) IV Continuous <Continuous>  levETIRAcetam  IVPB 1000 milliGRAM(s) IV Intermittent every 12 hours  methylPREDNISolone sodium succinate IVPB 1000 milliGRAM(s) IV Intermittent daily  pantoprazole  Injectable 40 milliGRAM(s) IV Push daily    MEDICATIONS  (PRN):  haloperidol    Injectable 2.5 milliGRAM(s) IV Push every 6 hours PRN agitaion 2nd line  hydrALAZINE Injectable 10 milliGRAM(s) IV Push every 4 hours PRN for sbp above 160  QUEtiapine 25 milliGRAM(s) Oral three times a day PRN agitation        I&O's Summary    13 Sep 2023 07:01  -  14 Sep 2023 07:00  --------------------------------------------------------  IN: 0 mL / OUT: 1900 mL / NET: -1900 mL    14 Sep 2023 07:01  -  14 Sep 2023 16:06  --------------------------------------------------------  IN: 300 mL / OUT: 1000 mL / NET: -700 mL        PHYSICAL EXAM:  Vital Signs Last 24 Hrs  T(C): 36.7 (14 Sep 2023 08:01), Max: 36.8 (14 Sep 2023 00:42)  T(F): 98 (14 Sep 2023 08:01), Max: 98.2 (14 Sep 2023 00:42)  HR: 62 (14 Sep 2023 11:58) (58 - 95)  BP: 133/86 (14 Sep 2023 11:58) (133/86 - 165/81)  BP(mean): --  RR: 18 (14 Sep 2023 11:58) (16 - 18)  SpO2: 94% (14 Sep 2023 11:58) (94% - 99%)    Parameters below as of 14 Sep 2023 11:58  Patient On (Oxygen Delivery Method): room air        GENERAL: not in acute distress  HEENT:  Clear conjunctiva, PERRL, moist oral mucosa  RESP:  Non-labored breathing pattern, lungs clear to ausculation, no wheezes or crackles appreciated  CV: Regular rate and rhythm, no murmurs appreciated, no lower extremity edema  GI: Soft, non-tender, non-distended  NEURO: unable to assess, does not follow commands  PSYCH: Calm, A&Ox0  SKIN: No rash or lesions, warm and dry      LABS:                Urinalysis Basic - ( 13 Sep 2023 05:17 )    Color: Yellow / Appearance: very cloudy / S.020 / pH: x  Gluc: x / Ketone: Trace  / Bili: Negative / Urobili: 1 mg/dL   Blood: x / Protein: Negative / Nitrite: Negative   Leuk Esterase: Moderate / RBC: 3-5 /HPF / WBC 11-25 /HPF   Sq Epi: x / Non Sq Epi: x / Bacteria: Many        CAPILLARY BLOOD GLUCOSE            RADIOLOGY & ADDITIONAL TESTS:  Results Reviewed:   Imaging Personally Reviewed:  Electrocardiogram Personally Reviewed:

## 2023-09-15 ENCOUNTER — APPOINTMENT (OUTPATIENT)
Dept: NEUROLOGY | Facility: CLINIC | Age: 63
End: 2023-09-15

## 2023-09-15 LAB
ANION GAP SERPL CALC-SCNC: 13 MMOL/L — SIGNIFICANT CHANGE UP (ref 5–17)
BUN SERPL-MCNC: 15.2 MG/DL — SIGNIFICANT CHANGE UP (ref 8–20)
CALCIUM SERPL-MCNC: 8.7 MG/DL — SIGNIFICANT CHANGE UP (ref 8.4–10.5)
CHLORIDE SERPL-SCNC: 101 MMOL/L — SIGNIFICANT CHANGE UP (ref 96–108)
CO2 SERPL-SCNC: 25 MMOL/L — SIGNIFICANT CHANGE UP (ref 22–29)
CREAT SERPL-MCNC: 0.42 MG/DL — LOW (ref 0.5–1.3)
EGFR: 122 ML/MIN/1.73M2 — SIGNIFICANT CHANGE UP
GLUCOSE SERPL-MCNC: 116 MG/DL — HIGH (ref 70–99)
HCT VFR BLD CALC: 37.5 % — LOW (ref 39–50)
HGB BLD-MCNC: 13.2 G/DL — SIGNIFICANT CHANGE UP (ref 13–17)
MCHC RBC-ENTMCNC: 29.1 PG — SIGNIFICANT CHANGE UP (ref 27–34)
MCHC RBC-ENTMCNC: 35.2 GM/DL — SIGNIFICANT CHANGE UP (ref 32–36)
MCV RBC AUTO: 82.6 FL — SIGNIFICANT CHANGE UP (ref 80–100)
PLATELET # BLD AUTO: 213 K/UL — SIGNIFICANT CHANGE UP (ref 150–400)
POTASSIUM SERPL-MCNC: 3.3 MMOL/L — LOW (ref 3.5–5.3)
POTASSIUM SERPL-SCNC: 3.3 MMOL/L — LOW (ref 3.5–5.3)
RBC # BLD: 4.54 M/UL — SIGNIFICANT CHANGE UP (ref 4.2–5.8)
RBC # FLD: 12.3 % — SIGNIFICANT CHANGE UP (ref 10.3–14.5)
SODIUM SERPL-SCNC: 138 MMOL/L — SIGNIFICANT CHANGE UP (ref 135–145)
WBC # BLD: 17.51 K/UL — HIGH (ref 3.8–10.5)
WBC # FLD AUTO: 17.51 K/UL — HIGH (ref 3.8–10.5)

## 2023-09-15 PROCEDURE — 99232 SBSQ HOSP IP/OBS MODERATE 35: CPT

## 2023-09-15 RX ORDER — POTASSIUM CHLORIDE 20 MEQ
20 PACKET (EA) ORAL
Refills: 0 | Status: COMPLETED | OUTPATIENT
Start: 2023-09-15 | End: 2023-09-15

## 2023-09-15 RX ADMIN — LEVETIRACETAM 400 MILLIGRAM(S): 250 TABLET, FILM COATED ORAL at 06:40

## 2023-09-15 RX ADMIN — Medication 10 MILLIGRAM(S): at 00:58

## 2023-09-15 RX ADMIN — SODIUM CHLORIDE 75 MILLILITER(S): 9 INJECTION, SOLUTION INTRAVENOUS at 20:26

## 2023-09-15 RX ADMIN — Medication 50 MILLIEQUIVALENT(S): at 11:51

## 2023-09-15 RX ADMIN — Medication 50 MILLIEQUIVALENT(S): at 09:37

## 2023-09-15 RX ADMIN — Medication 100 MILLIGRAM(S): at 06:40

## 2023-09-15 RX ADMIN — PANTOPRAZOLE SODIUM 40 MILLIGRAM(S): 20 TABLET, DELAYED RELEASE ORAL at 11:52

## 2023-09-15 RX ADMIN — LEVETIRACETAM 400 MILLIGRAM(S): 250 TABLET, FILM COATED ORAL at 18:30

## 2023-09-15 RX ADMIN — SODIUM CHLORIDE 75 MILLILITER(S): 9 INJECTION, SOLUTION INTRAVENOUS at 08:03

## 2023-09-15 NOTE — PROGRESS NOTE ADULT - ASSESSMENT
63 male with recent diagnosis of lung adenocarcinoma with brain mets presented with acute metabolic encephalopathy    Acute metabolic encephalopathy likely in the setting of lung adenocarcinoma with brain mets  - unclear reason   - does not appear to be an infectious etiology as UA and CXR negative, WBC wnl and afebrile  - neuro sx state no intervention at this time   - neuro consult recs appreciated, recommending pulse dose steroids 1g solumedrol qd x 5 days   - spoke with rads about MRI, no evidence of leptomeningeal disease   - radiation oncology reviewed repeat MRI, recommend radiation after improvement in mentation  - s/p LP, does not appear infectious   - neuro recommending decreasing keppra to 1g q12h; vEEG for 24 hours after lower dose, no seizure like activity   - c/w seroquel 25 mg TID PRN and IV haldol PRN as 2nd line; has not needed   - SLP consulted, puree diet     #leukocytosis  - wbc 19, pt afebrile, not tachycardiac, normotensive  - likely from steroids, no fevers    #diet  - SLP evaluated pt 9/11, recommending puree diet     F:-  E: 4, 3, 2  N: puree diet  A: ad cristian    DVT ppx: scds  gi ppx: IV ppi qd    code: dnr/dni; palliative involved but family not ready for hospice, continue discussions

## 2023-09-15 NOTE — PROGRESS NOTE ADULT - SUBJECTIVE AND OBJECTIVE BOX
[facility] Division of Hospital Medicine    Chief Complaint:      INTERVAL HISTORY:  Overnight, no acute events.     Patient seen and examined at bedside this morning. No change in mentation, wakes up easily, no real purposeful movement. Unable to engaged in ROS.     MEDICATIONS  (STANDING):  dextrose 5% + sodium chloride 0.9%. 1000 milliLiter(s) (75 mL/Hr) IV Continuous <Continuous>  levETIRAcetam  IVPB 1000 milliGRAM(s) IV Intermittent every 12 hours  methylPREDNISolone sodium succinate IVPB 1000 milliGRAM(s) IV Intermittent daily  pantoprazole  Injectable 40 milliGRAM(s) IV Push daily    MEDICATIONS  (PRN):  haloperidol    Injectable 2.5 milliGRAM(s) IV Push every 6 hours PRN agitaion 2nd line  hydrALAZINE Injectable 10 milliGRAM(s) IV Push every 4 hours PRN for sbp above 160  QUEtiapine 25 milliGRAM(s) Oral three times a day PRN agitation        I&O's Summary    14 Sep 2023 07:01  -  15 Sep 2023 07:00  --------------------------------------------------------  IN: 750 mL / OUT: 2100 mL / NET: -1350 mL    15 Sep 2023 07:01  -  15 Sep 2023 17:16  --------------------------------------------------------  IN: 0 mL / OUT: 900 mL / NET: -900 mL        PHYSICAL EXAM:  Vital Signs Last 24 Hrs  T(C): 37 (15 Sep 2023 13:04), Max: 37 (15 Sep 2023 13:04)  T(F): 98.6 (15 Sep 2023 13:04), Max: 98.6 (15 Sep 2023 13:04)  HR: 68 (15 Sep 2023 13:04) (61 - 84)  BP: 137/74 (15 Sep 2023 13:04) (127/84 - 162/82)  BP(mean): --  RR: 18 (15 Sep 2023 13:04) (17 - 18)  SpO2: 94% (15 Sep 2023 13:04) (94% - 98%)    Parameters below as of 15 Sep 2023 13:04  Patient On (Oxygen Delivery Method): room air        GENERAL: not in acute distress  HEENT:  Clear conjunctiva, PERRL, moist oral mucosa  RESP:  Non-labored breathing pattern, lungs clear to ausculation, no wheezes or crackles appreciated  CV: Regular rate and rhythm, no murmurs appreciated, no lower extremity edema  GI: Soft, non-tender, non-distended  NEURO: unable to assess, does not follow commands  PSYCH: Calm, A&Ox0  SKIN: No rash or lesions, warm and dry    LABS:                        13.2   17.51 )-----------( 213      ( 15 Sep 2023 06:31 )             37.5     09-15    138  |  101  |  15.2  ----------------------------<  116<H>  3.3<L>   |  25.0  |  0.42<L>    Ca    8.7      15 Sep 2023 06:31            Urinalysis Basic - ( 15 Sep 2023 06:31 )    Color: x / Appearance: x / SG: x / pH: x  Gluc: 116 mg/dL / Ketone: x  / Bili: x / Urobili: x   Blood: x / Protein: x / Nitrite: x   Leuk Esterase: x / RBC: x / WBC x   Sq Epi: x / Non Sq Epi: x / Bacteria: x        CAPILLARY BLOOD GLUCOSE            RADIOLOGY & ADDITIONAL TESTS:  Results Reviewed:   Imaging Personally Reviewed:  Electrocardiogram Personally Reviewed:

## 2023-09-16 LAB
CULTURE RESULTS: SIGNIFICANT CHANGE UP
CULTURE RESULTS: SIGNIFICANT CHANGE UP
SPECIMEN SOURCE: SIGNIFICANT CHANGE UP
SPECIMEN SOURCE: SIGNIFICANT CHANGE UP

## 2023-09-16 PROCEDURE — 99233 SBSQ HOSP IP/OBS HIGH 50: CPT

## 2023-09-16 RX ADMIN — Medication 100 MILLIGRAM(S): at 05:30

## 2023-09-16 RX ADMIN — QUETIAPINE FUMARATE 25 MILLIGRAM(S): 200 TABLET, FILM COATED ORAL at 21:27

## 2023-09-16 RX ADMIN — PANTOPRAZOLE SODIUM 40 MILLIGRAM(S): 20 TABLET, DELAYED RELEASE ORAL at 11:56

## 2023-09-16 RX ADMIN — LEVETIRACETAM 400 MILLIGRAM(S): 250 TABLET, FILM COATED ORAL at 17:20

## 2023-09-16 RX ADMIN — LEVETIRACETAM 400 MILLIGRAM(S): 250 TABLET, FILM COATED ORAL at 05:30

## 2023-09-16 RX ADMIN — SODIUM CHLORIDE 75 MILLILITER(S): 9 INJECTION, SOLUTION INTRAVENOUS at 05:30

## 2023-09-16 NOTE — PROGRESS NOTE ADULT - ASSESSMENT
63 male with recent diagnosis of lung adenocarcinoma with brain mets presented with acute metabolic encephalopathy    Acute metabolic encephalopathy likely in the setting of lung adenocarcinoma with brain mets  - unclear reason   - does not appear to be an infectious etiology as UA and CXR negative, WBC wnl and afebrile  - neuro sx state no intervention at this time   - neuro consult recs appreciated, recommending pulse dose steroids 1g solumedrol qd x 5 days   - spoke with rads about MRI, no evidence of leptomeningeal disease   - radiation oncology reviewed repeat MRI, recommend radiation after improvement in mentation  - s/p LP, does not appear infectious   - neuro recommending decreasing keppra to 1g q12h; vEEG for 24 hours after lower dose, no seizure like activity   - c/w seroquel 25 mg TID PRN and IV haldol PRN as 2nd line; has not needed   - SLP consulted, puree diet     #leukocytosis  - wbc 19, pt afebrile, not tachycardiac, normotensive  - likely from steroids, no fevers    #diet  - SLP evaluated pt 9/11, recommending puree diet     DVT ppx: scds  gi ppx: IV ppi qd    code: dnr/dni; palliative involved but family not ready for hospice, continue discussions   Overall abysmal prognosis

## 2023-09-16 NOTE — PROGRESS NOTE ADULT - SUBJECTIVE AND OBJECTIVE BOX
[facility] Division of Hospital Medicine    Chief Complaint:      INTERVAL HISTORY:  Overnight, no acute events.   Able to track and seems to acknowledge name with min nod (reportedly at baseline)  ROS not obtained 2/2 neuro status    MEDICATIONS  (STANDING):  dextrose 5% + sodium chloride 0.9%. 1000 milliLiter(s) (75 mL/Hr) IV Continuous <Continuous>  levETIRAcetam  IVPB 1000 milliGRAM(s) IV Intermittent every 12 hours  methylPREDNISolone sodium succinate IVPB 1000 milliGRAM(s) IV Intermittent daily  pantoprazole  Injectable 40 milliGRAM(s) IV Push daily    MEDICATIONS  (PRN):  haloperidol    Injectable 2.5 milliGRAM(s) IV Push every 6 hours PRN agitaion 2nd line  hydrALAZINE Injectable 10 milliGRAM(s) IV Push every 4 hours PRN for sbp above 160  QUEtiapine 25 milliGRAM(s) Oral three times a day PRN agitation        I&O's Summary    14 Sep 2023 07:01  -  15 Sep 2023 07:00  --------------------------------------------------------  IN: 750 mL / OUT: 2100 mL / NET: -1350 mL    15 Sep 2023 07:01  -  15 Sep 2023 17:16  --------------------------------------------------------  IN: 0 mL / OUT: 900 mL / NET: -900 mL        PHYSICAL EXAM:  Vital Signs Last 24 Hrs  T(C): 36.9 (16 Sep 2023 20:19), Max: 37.1 (16 Sep 2023 01:16)  T(F): 98.5 (16 Sep 2023 20:19), Max: 98.7 (16 Sep 2023 01:16)  HR: 61 (16 Sep 2023 20:19) (61 - 74)  BP: 162/84 (16 Sep 2023 20:19) (139/72 - 164/85)  BP(mean): 104 (16 Sep 2023 16:25) (104 - 104)  RR: 16 (16 Sep 2023 20:19) (16 - 18)  SpO2: 96% (16 Sep 2023 20:19) (92% - 97%)    Parameters below as of 16 Sep 2023 20:19  Patient On (Oxygen Delivery Method): room air      GENERAL: not in acute distress  HEENT:  Clear conjunctiva, Rt eye catartact, Left eye with min tracking, , moist oral mucosa  RESP:  Non-labored breathing pattern, lungs clear to ausculation, no wheezes or crackles appreciated  CV: Regular rate and rhythm, no murmurs appreciated, no lower extremity edema  GI: Soft, non-tender, non-distended  NEURO: unable to assess, does not follow commands  PSYCH: Awake and alert, non ot min verbal, not able to follow commands   SKIN: No rash or lesions, warm and dry    LABS:                        13.2   17.51 )-----------( 213      ( 15 Sep 2023 06:31 )             37.5   09-15    138  |  101  |  15.2  ----------------------------<  116<H>  3.3<L>   |  25.0  |  0.42<L>    Ca    8.7      15 Sep 2023 06:31                Urinalysis Basic - ( 15 Sep 2023 06:31 )    Color: x / Appearance: x / SG: x / pH: x  Gluc: 116 mg/dL / Ketone: x  / Bili: x / Urobili: x   Blood: x / Protein: x / Nitrite: x   Leuk Esterase: x / RBC: x / WBC x   Sq Epi: x / Non Sq Epi: x / Bacteria: x        CAPILLARY BLOOD GLUCOSE            RADIOLOGY & ADDITIONAL TESTS:  Results Reviewed:   Imaging Personally Reviewed:  Electrocardiogram Personally Reviewed:

## 2023-09-17 LAB
-  AMIKACIN: SIGNIFICANT CHANGE UP
-  AMIKACIN: SIGNIFICANT CHANGE UP
-  AMOXICILLIN/CLAVULANIC ACID: SIGNIFICANT CHANGE UP
-  AMOXICILLIN/CLAVULANIC ACID: SIGNIFICANT CHANGE UP
-  AMPICILLIN/SULBACTAM: SIGNIFICANT CHANGE UP
-  AMPICILLIN/SULBACTAM: SIGNIFICANT CHANGE UP
-  AMPICILLIN: SIGNIFICANT CHANGE UP
-  AMPICILLIN: SIGNIFICANT CHANGE UP
-  AZTREONAM: SIGNIFICANT CHANGE UP
-  AZTREONAM: SIGNIFICANT CHANGE UP
-  CEFAZOLIN: SIGNIFICANT CHANGE UP
-  CEFAZOLIN: SIGNIFICANT CHANGE UP
-  CEFEPIME: SIGNIFICANT CHANGE UP
-  CEFEPIME: SIGNIFICANT CHANGE UP
-  CEFOXITIN: SIGNIFICANT CHANGE UP
-  CEFOXITIN: SIGNIFICANT CHANGE UP
-  CEFTRIAXONE: SIGNIFICANT CHANGE UP
-  CEFTRIAXONE: SIGNIFICANT CHANGE UP
-  CEFUROXIME: SIGNIFICANT CHANGE UP
-  CIPROFLOXACIN: SIGNIFICANT CHANGE UP
-  CIPROFLOXACIN: SIGNIFICANT CHANGE UP
-  ERTAPENEM: SIGNIFICANT CHANGE UP
-  ERTAPENEM: SIGNIFICANT CHANGE UP
-  GENTAMICIN: SIGNIFICANT CHANGE UP
-  GENTAMICIN: SIGNIFICANT CHANGE UP
-  IMIPENEM: SIGNIFICANT CHANGE UP
-  IMIPENEM: SIGNIFICANT CHANGE UP
-  LEVOFLOXACIN: SIGNIFICANT CHANGE UP
-  LEVOFLOXACIN: SIGNIFICANT CHANGE UP
-  MEROPENEM: SIGNIFICANT CHANGE UP
-  MEROPENEM: SIGNIFICANT CHANGE UP
-  NITROFURANTOIN: SIGNIFICANT CHANGE UP
-  NITROFURANTOIN: SIGNIFICANT CHANGE UP
-  PIPERACILLIN/TAZOBACTAM: SIGNIFICANT CHANGE UP
-  PIPERACILLIN/TAZOBACTAM: SIGNIFICANT CHANGE UP
-  TOBRAMYCIN: SIGNIFICANT CHANGE UP
-  TOBRAMYCIN: SIGNIFICANT CHANGE UP
-  TRIMETHOPRIM/SULFAMETHOXAZOLE: SIGNIFICANT CHANGE UP
-  TRIMETHOPRIM/SULFAMETHOXAZOLE: SIGNIFICANT CHANGE UP
ANION GAP SERPL CALC-SCNC: 11 MMOL/L — SIGNIFICANT CHANGE UP (ref 5–17)
BUN SERPL-MCNC: 18.8 MG/DL — SIGNIFICANT CHANGE UP (ref 8–20)
CALCIUM SERPL-MCNC: 8.4 MG/DL — SIGNIFICANT CHANGE UP (ref 8.4–10.5)
CHLORIDE SERPL-SCNC: 100 MMOL/L — SIGNIFICANT CHANGE UP (ref 96–108)
CO2 SERPL-SCNC: 27 MMOL/L — SIGNIFICANT CHANGE UP (ref 22–29)
CREAT SERPL-MCNC: 0.46 MG/DL — LOW (ref 0.5–1.3)
CULTURE RESULTS: SIGNIFICANT CHANGE UP
EGFR: 118 ML/MIN/1.73M2 — SIGNIFICANT CHANGE UP
GLUCOSE SERPL-MCNC: 83 MG/DL — SIGNIFICANT CHANGE UP (ref 70–99)
HCT VFR BLD CALC: 36.9 % — LOW (ref 39–50)
HGB BLD-MCNC: 12.6 G/DL — LOW (ref 13–17)
MCHC RBC-ENTMCNC: 28.4 PG — SIGNIFICANT CHANGE UP (ref 27–34)
MCHC RBC-ENTMCNC: 34.1 GM/DL — SIGNIFICANT CHANGE UP (ref 32–36)
MCV RBC AUTO: 83.3 FL — SIGNIFICANT CHANGE UP (ref 80–100)
METHOD TYPE: SIGNIFICANT CHANGE UP
METHOD TYPE: SIGNIFICANT CHANGE UP
ORGANISM # SPEC MICROSCOPIC CNT: SIGNIFICANT CHANGE UP
PLATELET # BLD AUTO: 170 K/UL — SIGNIFICANT CHANGE UP (ref 150–400)
POTASSIUM SERPL-MCNC: 3.3 MMOL/L — LOW (ref 3.5–5.3)
POTASSIUM SERPL-SCNC: 3.3 MMOL/L — LOW (ref 3.5–5.3)
RBC # BLD: 4.43 M/UL — SIGNIFICANT CHANGE UP (ref 4.2–5.8)
RBC # FLD: 12.4 % — SIGNIFICANT CHANGE UP (ref 10.3–14.5)
SODIUM SERPL-SCNC: 138 MMOL/L — SIGNIFICANT CHANGE UP (ref 135–145)
SPECIMEN SOURCE: SIGNIFICANT CHANGE UP
WBC # BLD: 17.86 K/UL — HIGH (ref 3.8–10.5)
WBC # FLD AUTO: 17.86 K/UL — HIGH (ref 3.8–10.5)

## 2023-09-17 PROCEDURE — 99233 SBSQ HOSP IP/OBS HIGH 50: CPT

## 2023-09-17 RX ADMIN — Medication 100 MILLIGRAM(S): at 05:42

## 2023-09-17 RX ADMIN — LEVETIRACETAM 400 MILLIGRAM(S): 250 TABLET, FILM COATED ORAL at 05:42

## 2023-09-17 RX ADMIN — PANTOPRAZOLE SODIUM 40 MILLIGRAM(S): 20 TABLET, DELAYED RELEASE ORAL at 17:12

## 2023-09-17 RX ADMIN — SODIUM CHLORIDE 75 MILLILITER(S): 9 INJECTION, SOLUTION INTRAVENOUS at 11:44

## 2023-09-17 RX ADMIN — LEVETIRACETAM 400 MILLIGRAM(S): 250 TABLET, FILM COATED ORAL at 17:12

## 2023-09-17 RX ADMIN — QUETIAPINE FUMARATE 25 MILLIGRAM(S): 200 TABLET, FILM COATED ORAL at 21:07

## 2023-09-17 NOTE — PROGRESS NOTE ADULT - ASSESSMENT
63 male with recent diagnosis of lung adenocarcinoma with brain mets presented with acute metabolic encephalopathy    Acute metabolic encephalopathy likely in the setting of lung adenocarcinoma with brain mets  - Suspect 2/2 underlying malignancy   - does not appear to be an infectious etiology as UA and CXR negative, WBC wnl and afebrile  - EEG reviewed with neuro, AED dose reduced   - neuro sx state no intervention at this time   - neuro consult recs appreciated, recommending pulse dose steroids 1g solumedrol qd x 5 days   - spoke with rads about MRI, no evidence of leptomeningeal disease   - radiation oncology reviewed repeat MRI, recommend radiation after improvement in mentation  - s/p LP, does not appear infectious   - c/w seroquel 25 mg TID PRN and IV haldol PRN if 2nd agent needed   - SLP consulted, puree diet   - Unfortuately at this time, there does not appear to be a treatable etiology of pts advanced illness       #leukocytosis  - wbc 19, pt afebrile, not tachycardiac, normotensive  - likely from steroids, no fevers    #diet  - SLP evaluated pt 9/11, recommending puree diet     DVT ppx: scds  gi ppx: IV ppi qd    code: dnr/dni; palliative involved but family not ready for hospice, continue discussions   Overall abysmal prognosis

## 2023-09-17 NOTE — PROGRESS NOTE ADULT - SUBJECTIVE AND OBJECTIVE BOX
[facility] Division of Hospital Medicine    Chief Complaint:  AMS    INTERVAL HISTORY:  Overnight, no acute events.   ROS not obtained 2/2 neuro status    MEDICATIONS  (STANDING):  dextrose 5% + sodium chloride 0.9%. 1000 milliLiter(s) (75 mL/Hr) IV Continuous <Continuous>  levETIRAcetam  IVPB 1000 milliGRAM(s) IV Intermittent every 12 hours  methylPREDNISolone sodium succinate IVPB 1000 milliGRAM(s) IV Intermittent daily  pantoprazole  Injectable 40 milliGRAM(s) IV Push daily    MEDICATIONS  (PRN):  haloperidol    Injectable 2.5 milliGRAM(s) IV Push every 6 hours PRN agitaion 2nd line  hydrALAZINE Injectable 10 milliGRAM(s) IV Push every 4 hours PRN for sbp above 160  QUEtiapine 25 milliGRAM(s) Oral three times a day PRN agitation        PHYSICAL EXAM:  Vital Signs Last 24 Hrs  T(C): 37.2 (17 Sep 2023 16:49), Max: 37.2 (17 Sep 2023 16:49)  T(F): 98.9 (17 Sep 2023 16:49), Max: 98.9 (17 Sep 2023 16:49)  HR: 79 (17 Sep 2023 16:49) (61 - 95)  BP: 136/73 (17 Sep 2023 16:49) (136/73 - 168/79)  BP(mean): --  RR: 17 (17 Sep 2023 16:49) (16 - 18)  SpO2: 95% (17 Sep 2023 16:49) (95% - 98%)    Parameters below as of 17 Sep 2023 16:49  Patient On (Oxygen Delivery Method): room air      GENERAL: not in acute distress, stable RUE tremors  HEENT:  Clear conjunctiva, Rt eye catartact, Left eye with min tracking, , moist oral mucosa  RESP:  Non-labored breathing pattern, lungs clear to ausculation, no wheezes or crackles appreciated  CV: Regular rate and rhythm, no murmurs appreciated, no lower extremity edema  GI: Soft, non-tender, non-distended  NEURO: unable to assess, does not follow commands  PSYCH: Awake and alert, non ot min verbal, not able to follow commands   SKIN: No rash or lesions, warm and dry    LABS:                        13.2   17.51 )-----------( 213      ( 15 Sep 2023 06:31 )             37.5   09-15    138  |  101  |  15.2  ----------------------------<  116<H>  3.3<L>   |  25.0  |  0.42<L>    Ca    8.7      15 Sep 2023 06:31                Urinalysis Basic - ( 15 Sep 2023 06:31 )    Color: x / Appearance: x / SG: x / pH: x  Gluc: 116 mg/dL / Ketone: x  / Bili: x / Urobili: x   Blood: x / Protein: x / Nitrite: x   Leuk Esterase: x / RBC: x / WBC x   Sq Epi: x / Non Sq Epi: x / Bacteria: x        CAPILLARY BLOOD GLUCOSE            RADIOLOGY & ADDITIONAL TESTS:  Results Reviewed:   Imaging Personally Reviewed:  Electrocardiogram Personally Reviewed:

## 2023-09-18 PROCEDURE — 99232 SBSQ HOSP IP/OBS MODERATE 35: CPT

## 2023-09-18 RX ADMIN — LEVETIRACETAM 400 MILLIGRAM(S): 250 TABLET, FILM COATED ORAL at 17:08

## 2023-09-18 RX ADMIN — PANTOPRAZOLE SODIUM 40 MILLIGRAM(S): 20 TABLET, DELAYED RELEASE ORAL at 11:20

## 2023-09-18 RX ADMIN — SODIUM CHLORIDE 75 MILLILITER(S): 9 INJECTION, SOLUTION INTRAVENOUS at 11:22

## 2023-09-18 RX ADMIN — LEVETIRACETAM 400 MILLIGRAM(S): 250 TABLET, FILM COATED ORAL at 05:03

## 2023-09-18 NOTE — PROGRESS NOTE ADULT - SUBJECTIVE AND OBJECTIVE BOX
[facility]                                        Neurology at [place]                                 [facility]                                  [address]                                        [place], [place], [address]                                             [phone]        CC: Brain metastases and seizure     HPI:   The patient is a 63y Male who initially presented 7/31/23 with right sided weakness.   He was seen by the stroke team.   Chest CT showed lesion suspicious for lung cancer.  Head CT showed left posterior parietal intraparenchymal hemorrhage measuring approximately 2.1 cm in greatest diameter, with surrounding edema. Smaller eft inferior frontal hemorrhagic lesion with surrounding edema, small left posterior temporal hemorrhagic lesion with surrounding edema and small right temporal hemorrhagic lesion with surrounding edema.  He had lung biopsy by IR and was seen by the oncology and neurosurgery services.   He was discharged on 8/16/23.  He was brought back on 8/30/23 with what was described as altered mental status.   He was again seen by oncology and neurosurgery.   MRI again shows hemorrhagic metastases with surrounding edema.   Neurology evaluation requested 9/3/23.    Interim history:  Remains on 4 [facility].     ROS:   Unobtainable due to patient's condition. (Patient aphasic).    MEDICATIONS  (STANDING):  dextrose 5% + sodium chloride 0.9%. 1000 milliLiter(s) (75 mL/Hr) IV Continuous <Continuous>  levETIRAcetam  IVPB 1000 milliGRAM(s) IV Intermittent every 12 hours  pantoprazole  Injectable 40 milliGRAM(s) IV Push daily    Vital Signs Last 24 Hrs  T(C): 36.8 (18 Sep 2023 08:24), Max: 37.2 (17 Sep 2023 16:49)  T(F): 98.2 (18 Sep 2023 08:24), Max: 98.9 (17 Sep 2023 16:49)  HR: 60 (18 Sep 2023 08:24) (60 - 79)  BP: 148/82 (18 Sep 2023 08:24) (136/73 - 148/82)  RR: 18 (18 Sep 2023 08:24) (17 - 18)  SpO2: 96% (18 Sep 2023 08:24) (95% - 98%)    Parameters below as of 18 Sep 2023 08:24  Patient On (Oxygen Delivery Method): room air    Detailed Neurologic Exam:    Mental status: The patient is awake and alert. He is nonverbal today and Not following instructions.     Cranial nerves: Pupils equal and react symmetrically to light. There is no visual field deficit to threat. Extraocular motion is full with no nystagmus. Facial sensation is intact. Facial musculature is symmetric. Palate elevates symmetrically. Tongue is midline.    Motor: There is increased tone bilaterally. There is no tremor today.  Moving left UE more than right UE to stimuli. At times moves LUE spontaneously. Withdraws BLE briskly    Sensation: Grimaces to stimuli on left more than right.    Reflexes: 1+ throughout and plantar responses are extensor bilaterally.    Cerebellar: Cannot be tested.     Labs:     09-17    138  |  100  |  18.8  ----------------------------<  83  3.3<L>   |  27.0  |  0.46<L>    Ca    8.4      17 Sep 2023 06:24                              12.6   17.86 )-----------( 170      ( 17 Sep 2023 06:24 )             36.9     EEG diffusely slow but no epileptic activity.    Rad:   Brain MRI from 9/7/23  1.  Redemonstrated hemorrhagic metastatic lesion in the left parietal lobe, relatively unchanged when compared to prior imaging.  2.  Subtle ill-defined focus of enhancement within the left frontal lobe as discussed above. An underlying metastatic lesion could be present, however this is limited. Consider reimaging as clinically indicated for   further assessment.  3.  Chronic infarcts throughout the brain parenchyma as discussed above.  4.  Confluent increased T2/FLAIR signal throughout the subcortical and deep white matter. Findings are likely combination of chronic small vessel disease and post treatment changes.  5.  Poor flow signal void within the left M1 segment. This could be artifactual in nature.

## 2023-09-18 NOTE — PROGRESS NOTE ADULT - ASSESSMENT
63y Male with lung cancer and brain metastases.     Brain metastases.  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 ( empiricallY)  Continue Keppra for seziure prevention.  Overall plan per oncology to address metastatic cancer    Lung cancer   With brain mets as above  Plan per oncology.  Last oncology note from 9/11 mentioned Tagrisso, but comes from specialty pharmacy.    Altered mental status.  MRI brain shows diffuse demyelination of unclear etiology.    Paraneoplastic or PML are in the differential. (The confluence increase in T2/FLAIR signal throughout the subcortical and deep white matter has progressed fairly significantly from imaging on August 4, 2023. While postradiation changes could present similarly, the abrupt change in territory affected makes this differential less likely. If this patient is immunocompromised, given cancer diagnosis, a progressive multifocal encephalopathy (PML) should be considered).  Now less lethargic than previously noted.   LP done, CSF with no WBC, slightly elevated protein and glucose neg PCR meningitis/encephalitis panel  cvEEG did not show seziures.    He was followed by palliative care and hospice care was discussed with the family.  The Palliative care team has signed off for now.     Case discussed with Dr Manrique.

## 2023-09-18 NOTE — PROGRESS NOTE ADULT - SUBJECTIVE AND OBJECTIVE BOX
[facility] Division of Hospital Medicine    Chief Complaint:  AMS    INTERVAL HISTORY:  Pt remains clinically unchanged  d/w multiple services (Neuro, palliative, SW)  At trhis time, pts mental status is showing no signs of improvement and unfortunately there are no further avenues of treatment that I can see being of clinically relevance. \  Attempting to d/w pts sisters regarding severity and liekly terminality of pts medical condition   ROS not obtained 2/2 neuro status    MEDICATIONS  (STANDING):  dextrose 5% + sodium chloride 0.9%. 1000 milliLiter(s) (75 mL/Hr) IV Continuous <Continuous>  levETIRAcetam  IVPB 1000 milliGRAM(s) IV Intermittent every 12 hours  methylPREDNISolone sodium succinate IVPB 1000 milliGRAM(s) IV Intermittent daily  pantoprazole  Injectable 40 milliGRAM(s) IV Push daily    MEDICATIONS  (PRN):  haloperidol    Injectable 2.5 milliGRAM(s) IV Push every 6 hours PRN agitaion 2nd line  hydrALAZINE Injectable 10 milliGRAM(s) IV Push every 4 hours PRN for sbp above 160  QUEtiapine 25 milliGRAM(s) Oral three times a day PRN agitation        PHYSICAL EXAM:  Vital Signs Last 24 Hrs  T(C): 36.7 (18 Sep 2023 16:54), Max: 37.1 (17 Sep 2023 20:54)  T(F): 98.1 (18 Sep 2023 16:54), Max: 98.8 (17 Sep 2023 20:54)  HR: 73 (18 Sep 2023 16:54) (60 - 76)  BP: 166/87 (18 Sep 2023 16:54) (135/72 - 166/87)  BP(mean): --  RR: 18 (18 Sep 2023 16:54) (17 - 18)  SpO2: 93% (18 Sep 2023 16:54) (93% - 98%)    Parameters below as of 18 Sep 2023 16:54  Patient On (Oxygen Delivery Method): room air        GENERAL: not in acute distress, stable RUE tremors  HEENT:  Clear conjunctiva, Rt eye catartact, Left eye with min tracking, , moist oral mucosa  RESP:  Non-labored breathing pattern, lungs clear to ausculation, no wheezes or crackles appreciated  CV: Regular rate and rhythm, no murmurs appreciated, no lower extremity edema  GI: Soft, non-tender, non-distended  NEURO: unable to assess, does not follow commands  PSYCH: Awake and alert, non ot min verbal, not able to follow commands   SKIN: No rash or lesions, warm and dry    LABS:                        13.2   17.51 )-----------( 213      ( 15 Sep 2023 06:31 )             37.5   09-15    138  |  101  |  15.2  ----------------------------<  116<H>  3.3<L>   |  25.0  |  0.42<L>    Ca    8.7      15 Sep 2023 06:31                Urinalysis Basic - ( 15 Sep 2023 06:31 )    Color: x / Appearance: x / SG: x / pH: x  Gluc: 116 mg/dL / Ketone: x  / Bili: x / Urobili: x   Blood: x / Protein: x / Nitrite: x   Leuk Esterase: x / RBC: x / WBC x   Sq Epi: x / Non Sq Epi: x / Bacteria: x        CAPILLARY BLOOD GLUCOSE            RADIOLOGY & ADDITIONAL TESTS:  Results Reviewed:   Imaging Personally Reviewed:  Electrocardiogram Personally Reviewed:

## 2023-09-18 NOTE — PROGRESS NOTE ADULT - ASSESSMENT
63 male with recent diagnosis of lung adenocarcinoma with brain mets presented with report of acute metabolic encephalopathy.     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   Oncological interventions are not a realistic goal as his mental status to qualify for any such interventions has not occurred and is not anticipated to happen (this is irrespective of whether compassionate use of any radiation services or anti neoplastics could be considered)  C/w seroquel 25 mg TID PRN and IV haldol PRN if 2nd agent needed   Tolerating Pureed diet with assistance   Unfortunately at this time, there does not appear to be a treatable etiology of pts advanced illness     Leucocytosis   Likely 2/2 steroid use     Diet  SLP evaluated pt 9/11, recommending puree diet     DVT ppx: scds  gi ppx: IV ppi qd    code: dnr/dni; palliative involved but family not ready for hospice, continue discussions   Overall abysmal prognosis

## 2023-09-19 LAB
AMPHIPHYSIN AB TITR CSF: NEGATIVE — SIGNIFICANT CHANGE UP
ANION GAP SERPL CALC-SCNC: 11 MMOL/L — SIGNIFICANT CHANGE UP (ref 5–17)
BUN SERPL-MCNC: 16.8 MG/DL — SIGNIFICANT CHANGE UP (ref 8–20)
CALCIUM SERPL-MCNC: 8.4 MG/DL — SIGNIFICANT CHANGE UP (ref 8.4–10.5)
CHLORIDE SERPL-SCNC: 96 MMOL/L — SIGNIFICANT CHANGE UP (ref 96–108)
CO2 SERPL-SCNC: 32 MMOL/L — HIGH (ref 22–29)
CREAT SERPL-MCNC: 0.64 MG/DL — SIGNIFICANT CHANGE UP (ref 0.5–1.3)
CV2 IGG TITR CSF: NEGATIVE — SIGNIFICANT CHANGE UP
EGFR: 107 ML/MIN/1.73M2 — SIGNIFICANT CHANGE UP
GLIAL NUC TYPE 1 AB TITR CSF: NEGATIVE — SIGNIFICANT CHANGE UP
GLUCOSE SERPL-MCNC: 101 MG/DL — HIGH (ref 70–99)
HCT VFR BLD CALC: 40.3 % — SIGNIFICANT CHANGE UP (ref 39–50)
HGB BLD-MCNC: 13.9 G/DL — SIGNIFICANT CHANGE UP (ref 13–17)
HU1 AB TITR CSF IF: NEGATIVE — SIGNIFICANT CHANGE UP
HU2 AB TITR CSF IF: NEGATIVE — SIGNIFICANT CHANGE UP
HU3 AB TITR CSF: NEGATIVE — SIGNIFICANT CHANGE UP
MAGNESIUM SERPL-MCNC: 1.8 MG/DL — SIGNIFICANT CHANGE UP (ref 1.6–2.6)
MCHC RBC-ENTMCNC: 28.8 PG — SIGNIFICANT CHANGE UP (ref 27–34)
MCHC RBC-ENTMCNC: 34.5 GM/DL — SIGNIFICANT CHANGE UP (ref 32–36)
MCV RBC AUTO: 83.6 FL — SIGNIFICANT CHANGE UP (ref 80–100)
PARANEOPLASTIC INTERPRETATION, CSF: SIGNIFICANT CHANGE UP
PCA-TR AB TITR CSF: NEGATIVE — SIGNIFICANT CHANGE UP
PLATELET # BLD AUTO: 168 K/UL — SIGNIFICANT CHANGE UP (ref 150–400)
POTASSIUM SERPL-MCNC: 2.9 MMOL/L — CRITICAL LOW (ref 3.5–5.3)
POTASSIUM SERPL-SCNC: 2.9 MMOL/L — CRITICAL LOW (ref 3.5–5.3)
RBC # BLD: 4.82 M/UL — SIGNIFICANT CHANGE UP (ref 4.2–5.8)
RBC # FLD: 12.5 % — SIGNIFICANT CHANGE UP (ref 10.3–14.5)
SODIUM SERPL-SCNC: 139 MMOL/L — SIGNIFICANT CHANGE UP (ref 135–145)
WBC # BLD: 11.65 K/UL — HIGH (ref 3.8–10.5)
WBC # FLD AUTO: 11.65 K/UL — HIGH (ref 3.8–10.5)

## 2023-09-19 PROCEDURE — 99232 SBSQ HOSP IP/OBS MODERATE 35: CPT

## 2023-09-19 RX ORDER — POTASSIUM CHLORIDE 20 MEQ
10 PACKET (EA) ORAL
Refills: 0 | Status: COMPLETED | OUTPATIENT
Start: 2023-09-19 | End: 2023-09-19

## 2023-09-19 RX ORDER — POTASSIUM CHLORIDE 20 MEQ
20 PACKET (EA) ORAL
Refills: 0 | Status: COMPLETED | OUTPATIENT
Start: 2023-09-19 | End: 2023-09-19

## 2023-09-19 RX ORDER — OSIMERTINIB 80 1/1
80 TABLET, FILM COATED ORAL EVERY 24 HOURS
Refills: 0 | Status: DISCONTINUED | OUTPATIENT
Start: 2023-09-19 | End: 2023-09-19

## 2023-09-19 RX ADMIN — Medication 100 MILLIEQUIVALENT(S): at 11:34

## 2023-09-19 RX ADMIN — Medication 20 MILLIEQUIVALENT(S): at 11:34

## 2023-09-19 RX ADMIN — Medication 100 MILLIEQUIVALENT(S): at 23:39

## 2023-09-19 RX ADMIN — Medication 100 MILLIEQUIVALENT(S): at 20:41

## 2023-09-19 RX ADMIN — SODIUM CHLORIDE 75 MILLILITER(S): 9 INJECTION, SOLUTION INTRAVENOUS at 07:43

## 2023-09-19 RX ADMIN — LEVETIRACETAM 400 MILLIGRAM(S): 250 TABLET, FILM COATED ORAL at 17:15

## 2023-09-19 RX ADMIN — Medication 100 MILLIEQUIVALENT(S): at 21:52

## 2023-09-19 RX ADMIN — Medication 100 MILLIEQUIVALENT(S): at 08:51

## 2023-09-19 RX ADMIN — PANTOPRAZOLE SODIUM 40 MILLIGRAM(S): 20 TABLET, DELAYED RELEASE ORAL at 13:44

## 2023-09-19 RX ADMIN — Medication 100 MILLIEQUIVALENT(S): at 09:55

## 2023-09-19 RX ADMIN — LEVETIRACETAM 400 MILLIGRAM(S): 250 TABLET, FILM COATED ORAL at 05:06

## 2023-09-19 RX ADMIN — Medication 20 MILLIEQUIVALENT(S): at 08:51

## 2023-09-19 NOTE — PROGRESS NOTE ADULT - ASSESSMENT
63 male with a recent diagnosis of lung adenocarcinoma with brain mets presented with report of acute metabolic encephalopathy.     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   Appreciate Oncology efforts at obtaining compassionate use medication (Tegrisso 3rd Gen EGFR TKinh), however in the pts setting of pts inability to safely take regular or vene thickened liquids safely, the use of such medication which would need to be administered in an extremel complex manner and beyond the expected ability of the family (also at the lowest doses at which therapeutic levels are unknown), medically the pt will be transitioned to treatment where the focus is on the quality of his remaining life  (above is independent of pts social barriers to pursuing further treatments at this time)  Can C/w seroquel 25 mg TID PRN and IV haldol PRN if 2nd agent needed   Tolerating Pureed diet with assistance   Unfortunately at this time, there does not appear to be a treatable etiology of pts advanced illness       Leucocytosis   Likely 2/2 steroid use     Hypokalemia   Replet with IV     Diet  SLP evaluated pt 9/11, recommending puree diet     DVT ppx: scds  gi ppx: IV ppi qd    code: dnr/dni; palliative involved but family not ready for hospice, continue discussions   Overall abysmal prognosis

## 2023-09-19 NOTE — CHART NOTE - NSCHARTNOTEFT_GEN_A_CORE
Source: Patient [ ]  Family [ ]   other [ EMR]  63 male with recent diagnosis of lung adenocarcinoma with brain mets presented with report of acute metabolic encephalopathy. Tolerating Pureed diet with assistance     Current Diet: Diet, Pureed:   No Liquids (NOLIQUIDS) (09-11-23 @ 13:58)    Patient reports [ ] nausea  [ ] vomiting [ ] diarrhea [ ] constipation  [X ]chewing problems [X ] swallowing issues  [ ] other:     PO intake:  < 50% [ ]   50-75%  [ ]   %  [X ]  other :    Source for PO intake [ ] Patient [ ] family [X ] chart [ ] staff [ ] other    Current Weight:   (9/16) 132.2lbs   (9/14) 134.4  (9/10) 133.1  (9/7) 135.5    Pertinent Medications: MEDICATIONS  (STANDING):  dextrose 5% + sodium chloride 0.9%. 1000 milliLiter(s) (75 mL/Hr) IV Continuous <Continuous>  levETIRAcetam  IVPB 1000 milliGRAM(s) IV Intermittent every 12 hours  pantoprazole  Injectable 40 milliGRAM(s) IV Push daily    MEDICATIONS  (PRN):  haloperidol    Injectable 2.5 milliGRAM(s) IV Push every 6 hours PRN agitaion 2nd line  hydrALAZINE Injectable 10 milliGRAM(s) IV Push every 4 hours PRN for sbp above 160  QUEtiapine 25 milliGRAM(s) Oral three times a day PRN agitation    Pertinent Labs: CBC Full  -  ( 19 Sep 2023 06:41 )  WBC Count : 11.65 K/uL  RBC Count : 4.82 M/uL  Hemoglobin : 13.9 g/dL  Hematocrit : 40.3 %  Platelet Count - Automated : 168 K/uL  Mean Cell Volume : 83.6 fl  Mean Cell Hemoglobin : 28.8 pg  Mean Cell Hemoglobin Concentrtion : 34.5 gm/dL  09-19 Na139 mmol/L Glu 101 mg/dL<H> K+ 2.9 mmol/L<LL> Cr  0.64 mg/dL BUN 16.8 mg/dL Phos n/a   Alb n/a   PAB n/a       Nutrition focused physical exam conducted - found signs of malnutrition [ ]absent [X ]present    Subcutaneous fat loss: [ ] Orbital fat pads region, [ ]Buccal fat region, [ ]Triceps region,  [ ]Ribs region    Muscle wasting: [X ]Temples region, [X ]Clavicle region, [ X]Shoulder region, [ ]Scapula region, [ ]Interosseous region,  [ ]thigh region, [ ]Calf region    Estimated Needs:   [ X] no change since previous assessment  [ ] recalculated:     Current Nutrition Diagnosis: Pt remains at nutrition risk secondary to increased nutrition needs related to increased physiological demand for nutrient as evidenced by intracerebral hemorrhage. Pt upgraded to puree diet with no liquids, tolerating well eating >75%, visual signs of wasting present, will recommend for pt to receive HBV protein snack/ food options (magic cup, ensure pudding, greek yogurt etc). Pt remains appropriate for assistance with meals. RD to remain available     Recommendations:   Rx: MVI, Thiamine, Folic acid daily, ensure pudding, magic cup TID   Encourage po intake, monitor diet tolerance, and provide assistance at meals as needed  Obtain daily weights to monitor trends  Monitor nutrition related labs; glucose, lytes     Monitoring and Evaluation:   [ ] PO intake [ ] Tolerance to diet prescription [X] Weights  [X] Follow up per protocol [X] Labs:

## 2023-09-19 NOTE — PHARMACOTHERAPY INTERVENTION NOTE - COMMENTS
s/w heme onc. patient's own medication obtained from [facility] pharmacy thru [facility]. patient to take balance home upon discharge

## 2023-09-19 NOTE — PROGRESS NOTE ADULT - ASSESSMENT
63y Male with lung cancer and brain metastases.     Brain metastases.  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  Continue Keppra for seziure prevention. On 1000 mg q 12 h.    Lung cancer   With brain mets as above  Overall plan per oncology to address metastatic cancer.  Last oncology note from 9/11 mentioned Tagrisso, but comes from specialty pharmacy.    Altered mental status.  MRI brain shows diffuse demyelination of unclear etiology.    Paraneoplastic or PML are in the differential. (The confluence increase in T2/FLAIR signal throughout the subcortical and deep white matter has progressed fairly significantly from imaging on August 4, 2023. While postradiation changes could present similarly, the abrupt change in territory affected makes this differential less likely. If this patient is immunocompromised, given cancer diagnosis, a progressive multifocal encephalopathy (PML) should be considered).  Now less lethargic than previously noted.   LP done, CSF with no WBC, slightly elevated protein and glucose neg PCR meningitis/encephalitis panel  cvEEG did not show seziures.    He was followed by palliative care and hospice care was discussed with the family.  The Palliative care team has signed off for now.     Unfortunately, nothing else (other than antiseizure drug management) to offer from neurologic standpoint.

## 2023-09-19 NOTE — PROGRESS NOTE ADULT - SUBJECTIVE AND OBJECTIVE BOX
REASON FOR CONSULTATION: NSCLC, stage IV    INTERVAL HISTORY: Still with signficant cognitive     REVIEW OF SYSTEMS:  >>> <<<    Allergies    No Known Allergies    Intolerances        MEDICATIONS  (STANDING):  dextrose 5% + sodium chloride 0.9%. 1000 milliLiter(s) (75 mL/Hr) IV Continuous <Continuous>  levETIRAcetam  IVPB 1000 milliGRAM(s) IV Intermittent every 12 hours  pantoprazole  Injectable 40 milliGRAM(s) IV Push daily  potassium chloride    Tablet ER 20 milliEquivalent(s) Oral every 2 hours  potassium chloride  10 mEq/100 mL IVPB 10 milliEquivalent(s) IV Intermittent every 1 hour    MEDICATIONS  (PRN):  haloperidol    Injectable 2.5 milliGRAM(s) IV Push every 6 hours PRN agitaion 2nd line  hydrALAZINE Injectable 10 milliGRAM(s) IV Push every 4 hours PRN for sbp above 160  QUEtiapine 25 milliGRAM(s) Oral three times a day PRN agitation      Vital Signs Last 24 Hrs  T(C): 36.9 (19 Sep 2023 07:52), Max: 36.9 (19 Sep 2023 07:52)  T(F): 98.5 (19 Sep 2023 07:52), Max: 98.5 (19 Sep 2023 07:52)  HR: 76 (19 Sep 2023 05:00) (65 - 76)  BP: 171/106 (19 Sep 2023 07:52) (135/72 - 171/106)  BP(mean): --  RR: 20 (19 Sep 2023 07:52) (17 - 20)  SpO2: 96% (19 Sep 2023 07:52) (93% - 98%)    Parameters below as of 19 Sep 2023 05:00  Patient On (Oxygen Delivery Method): room air        PHYSICAL EXAM:    GENERAL: NAD, well-groomed, well-developed  HEAD:  Atraumatic, Normocephalic  EYES: EOMI, PERRLA, conjunctiva and sclera clear  ENMT: No tonsillar erythema, exudates, or enlargement; Moist mucous membranes, Good dentition, No lesions  NECK: Supple, No JVD, Normal thyroid  NERVOUS SYSTEM:  Alert & Oriented X3, Good concentration; Motor Strength 5/5 B/L upper and lower extremities; DTRs 2+ intact and symmetric  CHEST/LUNG: Clear to percussion bilaterally; No rales, rhonchi, wheezing, or rubs  HEART: Regular rate and rhythm; No murmurs, rubs, or gallops  ABDOMEN: Soft, Nontender, Nondistended; Bowel sounds present  EXTREMITIES:  2+ Peripheral Pulses, No clubbing, cyanosis, or edema  LYMPH: No lymphadenopathy noted  SKIN: No rashes or lesions      LABS:                        13.9   11.65 )-----------( 168      ( 19 Sep 2023 06:41 )             40.3     09-19    139  |  96  |  16.8  ----------------------------<  101<H>  2.9<LL>   |  32.0<H>  |  0.64    Ca    8.4      19 Sep 2023 06:41  Mg     1.8     09-19        Urinalysis Basic - ( 19 Sep 2023 06:41 )    Color: x / Appearance: x / SG: x / pH: x  Gluc: 101 mg/dL / Ketone: x  / Bili: x / Urobili: x   Blood: x / Protein: x / Nitrite: x   Leuk Esterase: x / RBC: x / WBC x   Sq Epi: x / Non Sq Epi: x / Bacteria: x          RADIOLOGY & ADDITIONAL STUDIES:    PATHOLOGY:     REASON FOR CONSULTATION: NSCLC, stage IV    INTERVAL HISTORY: Still with signficant cognitive deficits. Eyes open, but does not follow up command     REVIEW OF SYSTEMS:  >>> <<<    Allergies    No Known Allergies    Intolerances        MEDICATIONS  (STANDING):  dextrose 5% + sodium chloride 0.9%. 1000 milliLiter(s) (75 mL/Hr) IV Continuous <Continuous>  levETIRAcetam  IVPB 1000 milliGRAM(s) IV Intermittent every 12 hours  pantoprazole  Injectable 40 milliGRAM(s) IV Push daily  potassium chloride    Tablet ER 20 milliEquivalent(s) Oral every 2 hours  potassium chloride  10 mEq/100 mL IVPB 10 milliEquivalent(s) IV Intermittent every 1 hour    MEDICATIONS  (PRN):  haloperidol    Injectable 2.5 milliGRAM(s) IV Push every 6 hours PRN agitaion 2nd line  hydrALAZINE Injectable 10 milliGRAM(s) IV Push every 4 hours PRN for sbp above 160  QUEtiapine 25 milliGRAM(s) Oral three times a day PRN agitation      Vital Signs Last 24 Hrs  T(C): 36.9 (19 Sep 2023 07:52), Max: 36.9 (19 Sep 2023 07:52)  T(F): 98.5 (19 Sep 2023 07:52), Max: 98.5 (19 Sep 2023 07:52)  HR: 76 (19 Sep 2023 05:00) (65 - 76)  BP: 171/106 (19 Sep 2023 07:52) (135/72 - 171/106)  BP(mean): --  RR: 20 (19 Sep 2023 07:52) (17 - 20)  SpO2: 96% (19 Sep 2023 07:52) (93% - 98%)    Parameters below as of 19 Sep 2023 05:00  Patient On (Oxygen Delivery Method): room air        PHYSICAL EXAM:    GENERAL: NAD, staring blankly  HEAD:  Atraumatic, Normocephalic  EYES: EOMI, PERRLA, conjunctiva and sclera clear  ENMT:  Moist mucous membranes  NECK: Supple, No JVD, Normal thyroid  NERVOUS SYSTEM:  Alert & Oriented X0  CHEST/LUNG: Clear to percussion bilaterally  HEART: Regular rate and rhythm; No murmurs, rubs, or gallops  ABDOMEN: Soft, Nontender, Nondistended; Bowel sounds present  EXTREMITIES:  2+ Peripheral Pulses, No clubbing, cyanosis, or edema  LYMPH: No lymphadenopathy noted  SKIN: No rashes or lesions      LABS:                        13.9   11.65 )-----------( 168      ( 19 Sep 2023 06:41 )             40.3     09-19    139  |  96  |  16.8  ----------------------------<  101<H>  2.9<LL>   |  32.0<H>  |  0.64    Ca    8.4      19 Sep 2023 06:41  Mg     1.8     09-19        Urinalysis Basic - ( 19 Sep 2023 06:41 )    Color: x / Appearance: x / SG: x / pH: x  Gluc: 101 mg/dL / Ketone: x  / Bili: x / Urobili: x   Blood: x / Protein: x / Nitrite: x   Leuk Esterase: x / RBC: x / WBC x   Sq Epi: x / Non Sq Epi: x / Bacteria: x          RADIOLOGY & ADDITIONAL STUDIES:    PATHOLOGY:

## 2023-09-19 NOTE — PHARMACOTHERAPY INTERVENTION NOTE - INTERVENTION TYPE CHEMO
Verbal shift change report given to PATRICIA Caputo (oncoming nurse) by MAXIME Cardenas RN (offgoing nurse). Report included the following information SBAR, MAR and Recent Results. Chemo - Date adjustment: Date start/stop changed

## 2023-09-19 NOTE — PROGRESS NOTE ADULT - SUBJECTIVE AND OBJECTIVE BOX
[facility] Division of Hospital Medicine    Chief Complaint:  AMS    INTERVAL HISTORY:  Pt remains clinically unchanged  d/w pts brother at length.   D/w Oncology. Although Tegrisso (3rd Gen EGFR TKI) may have been of benefit in past, given both physiological (unable to swallow medication as it must be administered) and pts brother understanding that at this time, returning home pt would need to be transitioned to pleasure feeds as he is unable to even tolerate regular enteral fluids w/o high aspiration risk, pursuit of treatment unfortunately is not a realistic goal.   ROS unable to be reached obtained 2/2 neuro status        MEDICATIONS  (STANDING):  dextrose 5% + sodium chloride 0.9%. 1000 milliLiter(s) (75 mL/Hr) IV Continuous <Continuous>  levETIRAcetam  IVPB 1000 milliGRAM(s) IV Intermittent every 12 hours  osimertinib 80 milliGRAM(s) Oral every 24 hours  pantoprazole  Injectable 40 milliGRAM(s) IV Push daily    MEDICATIONS  (PRN):  haloperidol    Injectable 2.5 milliGRAM(s) IV Push every 6 hours PRN agitaion 2nd line  hydrALAZINE Injectable 10 milliGRAM(s) IV Push every 4 hours PRN for sbp above 160  QUEtiapine 25 milliGRAM(s) Oral three times a day PRN agitation          PHYSICAL EXAM:  Vital Signs Last 24 Hrs  T(C): 36.5 (09-19-23 @ 16:44), Max: 36.9 (09-19-23 @ 07:52)  T(F): 97.7 (09-19-23 @ 16:44), Max: 98.5 (09-19-23 @ 07:52)  HR: 83 (09-19-23 @ 16:44) (70 - 83)  BP: 145/87 (09-19-23 @ 16:44) (143/92 - 171/106)  BP(mean): --  RR: 18 (09-19-23 @ 16:44) (17 - 20)  SpO2: 93% (09-19-23 @ 16:44) (93% - 96%)        GENERAL: not in acute distress, stable RUE tremors  HEENT:  Clear conjunctiva, Rt eye catartact, Left eye with min tracking, , moist oral mucosa  RESP:  Non-labored breathing pattern, lungs clear to ausculation, no wheezes or crackles appreciated  CV: Regular rate and rhythm, no murmurs appreciated, no lower extremity edema  GI: Soft, non-tender, non-distended  NEURO: unable to assess, does not follow commands  PSYCH: Awake and alert, non ot min verbal, not able to follow commands   SKIN: No rash or lesions, warm and dry      LABS:                                   13.9   11.65 )-----------( 168      ( 19 Sep 2023 06:41 )             40.3   09-19    139  |  96  |  16.8  ----------------------------<  101<H>  2.9<LL>   |  32.0<H>  |  0.64    Ca    8.4      19 Sep 2023 06:41  Mg     1.8     09-19            Urinalysis Basic - ( 15 Sep 2023 06:31 )    Color: x / Appearance: x / SG: x / pH: x  Gluc: 116 mg/dL / Ketone: x  / Bili: x / Urobili: x   Blood: x / Protein: x / Nitrite: x   Leuk Esterase: x / RBC: x / WBC x   Sq Epi: x / Non Sq Epi: x / Bacteria: x        CAPILLARY BLOOD GLUCOSE            RADIOLOGY & ADDITIONAL TESTS:  Results Reviewed:   Imaging Personally Reviewed:  Electrocardiogram Personally Reviewed:

## 2023-09-19 NOTE — PROGRESS NOTE ADULT - ASSESSMENT
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  -  MRI brain, no evidence of leptomeningeal disease   - s/p LP, does not appear infectious CSF with no WBC, slightly elevated protein and glucose neg PCR meningitis/encephalitis panel  -  EEG did not show seziures,  triphasic waves may indicate metabolic encephalopathy  -Concerning that no significant improvement has been made in his neuro-cognitive function despite all evaluations/interventions to date.  Not a candidate in his current state for chemotherapy, but can follow up as outpatient and can potentially be treated with EGFR agent tagrisso.  Poor prognosis overall given lack of improvement though disease would likely be responsive to treatment. Will ascertain whether medication can be obtained for inpatient use(unlikely)      -Stage IV lung adenocarcinoma: EGFR exon 21 mutation  - AMS is not likely due to metastatic Lung cancer,   - Prescription sent for Tagrisso  (Targetted therapy for LUng cancer) to specialty Pharmacy Poor social situation. Will determine if this is an option given his status as an inpatient.

## 2023-09-19 NOTE — PROGRESS NOTE ADULT - SUBJECTIVE AND OBJECTIVE BOX
[facility]                                        Neurology at [place]                                 [facility]                                  [address]                                        [place], [place], [address]                                             [phone]        CC: Brain metastases and seizure     HPI:   The patient is a 63y Male who initially presented 7/31/23 with right sided weakness.   He was seen by the stroke team.   Chest CT showed lesion suspicious for lung cancer.  Head CT showed left posterior parietal intraparenchymal hemorrhage measuring approximately 2.1 cm in greatest diameter, with surrounding edema. Smaller eft inferior frontal hemorrhagic lesion with surrounding edema, small left posterior temporal hemorrhagic lesion with surrounding edema and small right temporal hemorrhagic lesion with surrounding edema.  He had lung biopsy by IR and was seen by the oncology and neurosurgery services.   He was discharged on 8/16/23.  He was brought back on 8/30/23 with what was described as altered mental status.   He was again seen by oncology and neurosurgery.   MRI again shows hemorrhagic metastases with surrounding edema.   Neurology evaluation requested 9/3/23.    Interim history:  Remains on 4 [place].     ROS:   Unobtainable due to patient's condition. (Patient aphasic).    MEDICATIONS  (STANDING):  dextrose 5% + sodium chloride 0.9%. 1000 milliLiter(s) (75 mL/Hr) IV Continuous <Continuous>  levETIRAcetam  IVPB 1000 milliGRAM(s) IV Intermittent every 12 hours  pantoprazole  Injectable 40 milliGRAM(s) IV Push daily  potassium chloride    Tablet ER 20 milliEquivalent(s) Oral every 2 hours  potassium chloride  10 mEq/100 mL IVPB 10 milliEquivalent(s) IV Intermittent every 1 hour    Vital Signs Last 24 Hrs  T(C): 36.9 (19 Sep 2023 07:52), Max: 36.9 (19 Sep 2023 07:52)  T(F): 98.5 (19 Sep 2023 07:52), Max: 98.5 (19 Sep 2023 07:52)  HR: 76 (19 Sep 2023 05:00) (65 - 76)  BP: 171/106 (19 Sep 2023 07:52) (135/72 - 171/106)  RR: 20 (19 Sep 2023 07:52) (17 - 20)  SpO2: 96% (19 Sep 2023 07:52) (93% - 98%)    Parameters below as of 19 Sep 2023 05:00  Patient On (Oxygen Delivery Method): room air    Detailed Neurologic Exam:    Mental status: The patient is awake and alert. He is nonverbal today and Not following instructions.     Cranial nerves: Pupils equal and react symmetrically to light. There is no visual field deficit to threat. Extraocular motion is full with no nystagmus. Facial sensation is intact. Facial musculature is symmetric. Palate elevates symmetrically. Tongue is midline.    Motor: There is increased tone bilaterally. There is some tremor of right hand today.  Moving left UE more than right UE to stimuli. At times moves LUE spontaneously. Withdraws BLE briskly    Sensation: Grimaces to stimuli on left more than right.    Reflexes: 1+ throughout and plantar responses are extensor bilaterally.    Cerebellar: Cannot be tested.     Labs:     09-19    139  |  96  |  16.8  ----------------------------<  101<H>  2.9<LL>   |  32.0<H>  |  0.64    Ca    8.4      19 Sep 2023 06:41  Mg     1.8     09-19                              13.9   11.65 )-----------( 168      ( 19 Sep 2023 06:41 )             40.3       EEG diffusely slow but no epileptic activity.    Rad:   Brain MRI from 9/7/23  1.  Redemonstrated hemorrhagic metastatic lesion in the left parietal lobe, relatively unchanged when compared to prior imaging.  2.  Subtle ill-defined focus of enhancement within the left frontal lobe as discussed above. An underlying metastatic lesion could be present, however this is limited. Consider reimaging as clinically indicated for   further assessment.  3.  Chronic infarcts throughout the brain parenchyma as discussed above.  4.  Confluent increased T2/FLAIR signal throughout the subcortical and deep white matter. Findings are likely combination of chronic small vessel disease and post treatment changes.  5.  Poor flow signal void within the left M1 segment. This could be artifactual in nature.

## 2023-09-20 LAB
ANION GAP SERPL CALC-SCNC: 12 MMOL/L — SIGNIFICANT CHANGE UP (ref 5–17)
BUN SERPL-MCNC: 23 MG/DL — HIGH (ref 8–20)
CALCIUM SERPL-MCNC: 8.8 MG/DL — SIGNIFICANT CHANGE UP (ref 8.4–10.5)
CHLORIDE SERPL-SCNC: 95 MMOL/L — LOW (ref 96–108)
CO2 SERPL-SCNC: 26 MMOL/L — SIGNIFICANT CHANGE UP (ref 22–29)
CREAT SERPL-MCNC: 0.67 MG/DL — SIGNIFICANT CHANGE UP (ref 0.5–1.3)
EGFR: 106 ML/MIN/1.73M2 — SIGNIFICANT CHANGE UP
GLUCOSE SERPL-MCNC: 140 MG/DL — HIGH (ref 70–99)
HCT VFR BLD CALC: 40.3 % — SIGNIFICANT CHANGE UP (ref 39–50)
HGB BLD-MCNC: 13.9 G/DL — SIGNIFICANT CHANGE UP (ref 13–17)
MAGNESIUM SERPL-MCNC: 2 MG/DL — SIGNIFICANT CHANGE UP (ref 1.6–2.6)
MCHC RBC-ENTMCNC: 28.8 PG — SIGNIFICANT CHANGE UP (ref 27–34)
MCHC RBC-ENTMCNC: 34.5 GM/DL — SIGNIFICANT CHANGE UP (ref 32–36)
MCV RBC AUTO: 83.4 FL — SIGNIFICANT CHANGE UP (ref 80–100)
PLATELET # BLD AUTO: 139 K/UL — LOW (ref 150–400)
POTASSIUM SERPL-MCNC: 4.1 MMOL/L — SIGNIFICANT CHANGE UP (ref 3.5–5.3)
POTASSIUM SERPL-SCNC: 4.1 MMOL/L — SIGNIFICANT CHANGE UP (ref 3.5–5.3)
RBC # BLD: 4.83 M/UL — SIGNIFICANT CHANGE UP (ref 4.2–5.8)
RBC # FLD: 12.7 % — SIGNIFICANT CHANGE UP (ref 10.3–14.5)
SODIUM SERPL-SCNC: 133 MMOL/L — LOW (ref 135–145)
WBC # BLD: 19.7 K/UL — HIGH (ref 3.8–10.5)
WBC # FLD AUTO: 19.7 K/UL — HIGH (ref 3.8–10.5)

## 2023-09-20 PROCEDURE — 99232 SBSQ HOSP IP/OBS MODERATE 35: CPT

## 2023-09-20 PROCEDURE — 99233 SBSQ HOSP IP/OBS HIGH 50: CPT

## 2023-09-20 RX ADMIN — LEVETIRACETAM 400 MILLIGRAM(S): 250 TABLET, FILM COATED ORAL at 06:01

## 2023-09-20 RX ADMIN — PANTOPRAZOLE SODIUM 40 MILLIGRAM(S): 20 TABLET, DELAYED RELEASE ORAL at 10:06

## 2023-09-20 RX ADMIN — LEVETIRACETAM 400 MILLIGRAM(S): 250 TABLET, FILM COATED ORAL at 17:00

## 2023-09-20 NOTE — PROGRESS NOTE ADULT - ASSESSMENT
63y Male with lung cancer and brain metastases.     Brain metastases.  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  Continue Keppra for seziure prevention. On 1000 mg q 12 h.    Lung cancer   With brain mets as above  Overall plan per oncology to address metastatic cancer.  No plan for further treatment at this time.     Altered mental status.  MRI brain shows diffuse demyelination of unclear etiology.    Paraneoplastic or PML are in the differential. (The confluence increase in T2/FLAIR signal throughout the subcortical and deep white matter has progressed fairly significantly from imaging on August 4, 2023. While postradiation changes could present similarly, the abrupt change in territory affected makes this differential less likely. If this patient is immunocompromised, given cancer diagnosis, a progressive multifocal encephalopathy (PML) should be considered).  Now less lethargic than previously noted.   LP done, CSF with no WBC, slightly elevated protein and glucose neg PCR meningitis/encephalitis panel  cvEEG did not show seziures.    He was followed by palliative care and hospice care was discussed with the family.  The Palliative care team has signed off for now.     Unfortunately, nothing else (other than antiseizure drug management) to offer from neurologic standpoint.    No further specific neurologic recommendations. Will be available as needed.     Case discussed with Dr Manrique.

## 2023-09-20 NOTE — PROGRESS NOTE ADULT - TIME BILLING
D/W RN, hospitalist Dr Candelario    Total time also includes discussion during interdisciplinary team rounds, chart review including but limited to prior admissions/ GOC discussions, review of established ACP documentations ( ex. living will, HCP, MOLST form),  review of medications/ labs/ imaging, examination, care coordination with other health care professionals, documentation EXCLUDING current goals of care or advance care planning discussions.
D/W jasper, SLP, THEODORE Salguero    Total time also includes discussion during interdisciplinary team rounds, chart review including but limited to prior admissions/ GOC discussions, review of established ACP documentations ( ex. living will, HCP, MOLST form),  review of medications/ labs/ imaging, examination, care coordination with other health care professionals, documentation EXCLUDING current goals of care or advance care planning discussions.
D/W NP YEIMI Hood, hospitalist Dr Stallworth, RN    Total time also includes discussion during interdisciplinary team rounds, chart review including but limited to prior admissions/ GOC discussions, review of established ACP documentations ( ex. living will, HCP, MOLST form),  review of medications/ labs/ imaging, examination, care coordination with other health care professionals, documentation EXCLUDING current goals of care or advance care planning discussions.
MDM
D/W sister Lea, hospitalist Dr Candelario, NP YEIMI Hood    Total time also includes discussion during interdisciplinary team rounds, chart review including but limited to prior admissions/ GOC discussions, review of established ACP documentations ( ex. living will, HCP, MOLST form),  review of medications/ labs/ imaging, examination, care coordination with other health care professionals, documentation EXCLUDING current goals of care or advance care planning discussions.

## 2023-09-20 NOTE — PROGRESS NOTE ADULT - SUBJECTIVE AND OBJECTIVE BOX
REASON FOR CONSULTATION: stage IV lung cancer    INTERVAL HISTORY: non-communicative    REVIEW OF SYSTEMS:  >>> <<<    Allergies    No Known Allergies    Intolerances        MEDICATIONS  (STANDING):  levETIRAcetam  IVPB 1000 milliGRAM(s) IV Intermittent every 12 hours  pantoprazole  Injectable 40 milliGRAM(s) IV Push daily    MEDICATIONS  (PRN):  haloperidol    Injectable 2.5 milliGRAM(s) IV Push every 6 hours PRN agitaion 2nd line  hydrALAZINE Injectable 10 milliGRAM(s) IV Push every 4 hours PRN for sbp above 160  QUEtiapine 25 milliGRAM(s) Oral three times a day PRN agitation      Vital Signs Last 24 Hrs  T(C): 36.9 (20 Sep 2023 08:43), Max: 37.4 (20 Sep 2023 04:55)  T(F): 98.5 (20 Sep 2023 08:43), Max: 99.3 (20 Sep 2023 04:55)  HR: 74 (20 Sep 2023 08:43) (72 - 83)  BP: 120/74 (20 Sep 2023 08:43) (109/73 - 145/87)  BP(mean): --  RR: 18 (20 Sep 2023 08:43) (18 - 18)  SpO2: 98% (20 Sep 2023 08:43) (93% - 98%)    Parameters below as of 20 Sep 2023 08:43  Patient On (Oxygen Delivery Method): room air        PHYSICAL EXAM:    GENERAL: NAD, doesnt respond to commands  HEAD:  Atraumatic, Normocephalic  EYES: EOMI, PERRLA, conjunctiva and sclera clear  ENMT:  Moist mucous membranes  NECK: Supple, No JVD, Normal thyroid  NERVOUS SYSTEM:  Alert & Oriented X0,   CHEST/LUNG: Clear to percussion bilaterally  HEART: Regular rate and rhythm  ABDOMEN: Soft, Nontender, Nondistended  EXTREMITIES:  2+ Peripheral Pulses  LYMPH: No lymphadenopathy noted  SKIN: No rashes or lesions      LABS:                        13.9   11.65 )-----------( 168      ( 19 Sep 2023 06:41 )             40.3     09-19    139  |  96  |  16.8  ----------------------------<  101<H>  2.9<LL>   |  32.0<H>  |  0.64    Ca    8.4      19 Sep 2023 06:41  Mg     1.8     09-19        Urinalysis Basic - ( 19 Sep 2023 06:41 )    Color: x / Appearance: x / SG: x / pH: x  Gluc: 101 mg/dL / Ketone: x  / Bili: x / Urobili: x   Blood: x / Protein: x / Nitrite: x   Leuk Esterase: x / RBC: x / WBC x   Sq Epi: x / Non Sq Epi: x / Bacteria: x          RADIOLOGY & ADDITIONAL STUDIES:    PATHOLOGY:

## 2023-09-20 NOTE — PROGRESS NOTE ADULT - ASSESSMENT
63 male with a recent diagnosis of lung adenocarcinoma with brain mets presented with report of acute metabolic encephalopathy.     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   Appreciate Oncology efforts at obtaining compassionate use medication (Tegrisso 3rd Gen EGFR TKinh), however in the pts setting of pts inability to safely take regular or vene thickened liquids safely, the use of such medication which would need to be administered in an extremel complex manner and beyond the expected ability of the family (also at the lowest doses at which therapeutic levels are unknown), medically the pt will be transitioned to treatment where the focus is on the quality of his remaining life  (above is independent of pts social barriers to pursuing further treatments at this time)  Can C/w seroquel 25 mg TID PRN and IV haldol PRN if 2nd agent needed   Tolerating Pureed diet with assistance   Unfortunately at this time, there does not appear to be a treatable etiology of pts advanced illness       Leucocytosis   Likely 2/2 steroid use     Hypokalemia   Repleted with IV / PO  Will repeat in am     Diet  SLP evaluated pt 9/11, recommending puree diet     DVT ppx: scds  gi ppx: IV ppi qd    code: dnr/dni; palliative involved but family not ready for hospice, continue discussions   Overall abysmal prognosis

## 2023-09-20 NOTE — PROGRESS NOTE ADULT - ASSESSMENT
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  -  MRI brain, no evidence of leptomeningeal disease   - s/p LP, does not appear infectious CSF with no WBC, slightly elevated protein and glucose neg PCR meningitis/encephalitis panel  -  EEG did not show seziures,  triphasic waves may indicate metabolic encephalopathy  -Concerning that no significant improvement has been made in his neuro-cognitive function despite all evaluations/interventions to date.  Not a candidate in his current state for chemotherapy. It was determined that he is at significant aspiration risk and therefor given the very low probability that tagrisso would improve his mental status, would not recommend administering this medication.  His malignancy/condition is no longer amenable to any systemic treatment options. Would reccommend palliative care and hospice evaluation.

## 2023-09-20 NOTE — PROGRESS NOTE ADULT - SUBJECTIVE AND OBJECTIVE BOX
[facility]                                        Neurology at [facility]                                 [facility]                                  [address]                                        [place], [place], [address]                                             [phone]        CC: Brain metastases and seizure     HPI:   The patient is a 63y Male who initially presented 7/31/23 with right sided weakness.   He was seen by the stroke team.   Chest CT showed lesion suspicious for lung cancer.  Head CT showed left posterior parietal intraparenchymal hemorrhage measuring approximately 2.1 cm in greatest diameter, with surrounding edema. Smaller eft inferior frontal hemorrhagic lesion with surrounding edema, small left posterior temporal hemorrhagic lesion with surrounding edema and small right temporal hemorrhagic lesion with surrounding edema.  He had lung biopsy by IR and was seen by the oncology and neurosurgery services.   He was discharged on 8/16/23.  He was brought back on 8/30/23 with what was described as altered mental status.   He was again seen by oncology and neurosurgery.   MRI again shows hemorrhagic metastases with surrounding edema.   Neurology evaluation requested 9/3/23.    Interim history:  Remains on 4 [place].     ROS:   Unobtainable due to patient's condition. (Patient aphasic).    MEDICATIONS  (STANDING):  levETIRAcetam  IVPB 1000 milliGRAM(s) IV Intermittent every 12 hours  pantoprazole  Injectable 40 milliGRAM(s) IV Push daily    Vital Signs Last 24 Hrs  T(C): 36.9 (20 Sep 2023 08:43), Max: 37.4 (20 Sep 2023 04:55)  T(F): 98.5 (20 Sep 2023 08:43), Max: 99.3 (20 Sep 2023 04:55)  HR: 74 (20 Sep 2023 08:43) (72 - 83)  BP: 120/74 (20 Sep 2023 08:43) (109/73 - 145/87)  RR: 18 (20 Sep 2023 08:43) (18 - 18)  SpO2: 98% (20 Sep 2023 08:43) (93% - 98%)    Parameters below as of 20 Sep 2023 08:43  Patient On (Oxygen Delivery Method): room air    Detailed Neurologic Exam:    Mental status: The patient is awake and alert. He is nonverbal today and Not following instructions.     Cranial nerves: Pupils equal and react symmetrically to light. There is no visual field deficit to threat. Extraocular motion is full with no nystagmus. Facial sensation is intact. Facial musculature is symmetric. Palate elevates symmetrically. Tongue is midline.    Motor: There is increased tone bilaterally. There is some tremor of right hand today.  Moving left UE more than right UE to stimuli. At times moves LUE spontaneously. Withdraws BLE briskly    Sensation: Grimaces to stimuli on left more than right.    Reflexes: 1+ throughout and plantar responses are extensor bilaterally.    Cerebellar: Cannot be tested.     Labs:     09-19    139  |  96  |  16.8  ----------------------------<  101<H>  2.9<LL>   |  32.0<H>  |  0.64    Ca    8.4      19 Sep 2023 06:41  Mg     1.8     09-19                              13.9   11.65 )-----------( 168      ( 19 Sep 2023 06:41 )             40.3       Rad:   EEG diffusely slow but no epileptic activity.    Rad:   Brain MRI from 9/7/23  1.  Redemonstrated hemorrhagic metastatic lesion in the left parietal lobe, relatively unchanged when compared to prior imaging.  2.  Subtle ill-defined focus of enhancement within the left frontal lobe as discussed above. An underlying metastatic lesion could be present, however this is limited. Consider reimaging as clinically indicated for   further assessment.  3.  Chronic infarcts throughout the brain parenchyma as discussed above.  4.  Confluent increased T2/FLAIR signal throughout the subcortical and deep white matter. Findings are likely combination of chronic small vessel disease and post treatment changes.  5.  Poor flow signal void within the left M1 segment. This could be artifactual in nature.

## 2023-09-20 NOTE — PROGRESS NOTE ADULT - SUBJECTIVE AND OBJECTIVE BOX
[facility] Division of Hospital Medicine    Chief Complaint:  AMS    INTERVAL HISTORY:  Pt remains clinically unchanged  No significant change in mental status  ROS unable to be reached obtained 2/2 neuro status  Awaiting family decision regarding dispo plan       MEDICATIONS  (STANDING):  levETIRAcetam  IVPB 1000 milliGRAM(s) IV Intermittent every 12 hours  pantoprazole  Injectable 40 milliGRAM(s) IV Push daily    MEDICATIONS  (PRN):  haloperidol    Injectable 2.5 milliGRAM(s) IV Push every 6 hours PRN agitaion 2nd line  hydrALAZINE Injectable 10 milliGRAM(s) IV Push every 4 hours PRN for sbp above 160  QUEtiapine 25 milliGRAM(s) Oral three times a day PRN agitation            PHYSICAL EXAM:  Vital Signs Last 24 Hrs  T(C): 36.9 (20 Sep 2023 08:43), Max: 37.4 (20 Sep 2023 04:55)  T(F): 98.5 (20 Sep 2023 08:43), Max: 99.3 (20 Sep 2023 04:55)  HR: 74 (20 Sep 2023 08:43) (72 - 83)  BP: 120/74 (20 Sep 2023 08:43) (109/73 - 145/87)  BP(mean): --  RR: 18 (20 Sep 2023 08:43) (18 - 18)  SpO2: 98% (20 Sep 2023 08:43) (93% - 98%)    Parameters below as of 20 Sep 2023 08:43  Patient On (Oxygen Delivery Method): room air        GENERAL: not in acute distress, stable RUE tremors  HEENT:  Clear conjunctiva, Rt eye catartact, Left eye with min tracking, , moist oral mucosa  RESP:  Non-labored breathing pattern, lungs clear to ausculation, no wheezes or crackles appreciated  CV: Regular rate and rhythm, no murmurs appreciated, no lower extremity edema  GI: Soft, non-tender, non-distended  NEURO: unable to assess, does not follow commands  PSYCH: Awake and alert, non ot min verbal, not able to follow commands   SKIN: No rash or lesions, warm and dry      LABS:                                   13.9   11.65 )-----------( 168      ( 19 Sep 2023 06:41 )             40.3   09-19    139  |  96  |  16.8  ----------------------------<  101<H>  2.9<LL>   |  32.0<H>  |  0.64    Ca    8.4      19 Sep 2023 06:41  Mg     1.8     09-19            Urinalysis Basic - ( 15 Sep 2023 06:31 )    Color: x / Appearance: x / SG: x / pH: x  Gluc: 116 mg/dL / Ketone: x  / Bili: x / Urobili: x   Blood: x / Protein: x / Nitrite: x   Leuk Esterase: x / RBC: x / WBC x   Sq Epi: x / Non Sq Epi: x / Bacteria: x        CAPILLARY BLOOD GLUCOSE            RADIOLOGY & ADDITIONAL TESTS:  Results Reviewed:   Imaging Personally Reviewed:  Electrocardiogram Personally Reviewed:

## 2023-09-21 LAB
ANION GAP SERPL CALC-SCNC: 12 MMOL/L — SIGNIFICANT CHANGE UP (ref 5–17)
BUN SERPL-MCNC: 21.2 MG/DL — HIGH (ref 8–20)
CALCIUM SERPL-MCNC: 8.6 MG/DL — SIGNIFICANT CHANGE UP (ref 8.4–10.5)
CHLORIDE SERPL-SCNC: 99 MMOL/L — SIGNIFICANT CHANGE UP (ref 96–108)
CO2 SERPL-SCNC: 27 MMOL/L — SIGNIFICANT CHANGE UP (ref 22–29)
CREAT SERPL-MCNC: 0.49 MG/DL — LOW (ref 0.5–1.3)
EGFR: 116 ML/MIN/1.73M2 — SIGNIFICANT CHANGE UP
GLUCOSE SERPL-MCNC: 113 MG/DL — HIGH (ref 70–99)
POTASSIUM SERPL-MCNC: 4 MMOL/L — SIGNIFICANT CHANGE UP (ref 3.5–5.3)
POTASSIUM SERPL-SCNC: 4 MMOL/L — SIGNIFICANT CHANGE UP (ref 3.5–5.3)
SODIUM SERPL-SCNC: 137 MMOL/L — SIGNIFICANT CHANGE UP (ref 135–145)

## 2023-09-21 PROCEDURE — 99232 SBSQ HOSP IP/OBS MODERATE 35: CPT

## 2023-09-21 RX ADMIN — PANTOPRAZOLE SODIUM 40 MILLIGRAM(S): 20 TABLET, DELAYED RELEASE ORAL at 09:21

## 2023-09-21 RX ADMIN — LEVETIRACETAM 400 MILLIGRAM(S): 250 TABLET, FILM COATED ORAL at 05:06

## 2023-09-21 RX ADMIN — LEVETIRACETAM 400 MILLIGRAM(S): 250 TABLET, FILM COATED ORAL at 17:17

## 2023-09-21 NOTE — PROGRESS NOTE ADULT - SUBJECTIVE AND OBJECTIVE BOX
[facility] Division of Hospital Medicine    Chief Complaint:  AMS    INTERVAL HISTORY:  Pt remains clinically unchanged  No significant change in mental status  ROS unable to be reached obtained 2/2 neuro status  Still pending family final decision for dispo      MEDICATIONS  (STANDING):  levETIRAcetam  IVPB 1000 milliGRAM(s) IV Intermittent every 12 hours  pantoprazole  Injectable 40 milliGRAM(s) IV Push daily    MEDICATIONS  (PRN):  haloperidol    Injectable 2.5 milliGRAM(s) IV Push every 6 hours PRN agitaion 2nd line  hydrALAZINE Injectable 10 milliGRAM(s) IV Push every 4 hours PRN for sbp above 160  QUEtiapine 25 milliGRAM(s) Oral three times a day PRN agitation        PHYSICAL EXAM:  Vital Signs Last 24 Hrs  T(C): 36.7 (21 Sep 2023 09:14), Max: 37.4 (20 Sep 2023 16:50)  T(F): 98.1 (21 Sep 2023 09:14), Max: 99.3 (20 Sep 2023 16:50)  HR: 100 (21 Sep 2023 09:14) (87 - 100)  BP: 132/90 (21 Sep 2023 09:14) (115/82 - 136/95)  BP(mean): --  RR: 18 (21 Sep 2023 09:14) (17 - 19)  SpO2: 98% (21 Sep 2023 09:14) (94% - 98%)    Parameters below as of 21 Sep 2023 09:14  Patient On (Oxygen Delivery Method): room air        GENERAL: not in acute distress, stable RUE tremors  HEENT:  Clear conjunctiva, Rt eye catartact, Left eye with min tracking, , moist oral mucosa  RESP:  Non-labored breathing pattern, lungs clear to ausculation, no wheezes or crackles appreciated  CV: Regular rate and rhythm, no murmurs appreciated, no lower extremity edema  GI: Soft, non-tender, non-distended  NEURO: unable to assess, does not follow commands  PSYCH: Awake and alert, non ot min verbal, not able to follow commands   SKIN: No rash or lesions, warm and dry      LABS:                                   13.9   19.70 )-----------( 139      ( 20 Sep 2023 22:49 )             40.3   09-21    137  |  99  |  21.2<H>  ----------------------------<  113<H>  4.0   |  27.0  |  0.49<L>    Ca    8.6      21 Sep 2023 06:23  Mg     2.0     09-20                Urinalysis Basic - ( 15 Sep 2023 06:31 )    Color: x / Appearance: x / SG: x / pH: x  Gluc: 116 mg/dL / Ketone: x  / Bili: x / Urobili: x   Blood: x / Protein: x / Nitrite: x   Leuk Esterase: x / RBC: x / WBC x   Sq Epi: x / Non Sq Epi: x / Bacteria: x        CAPILLARY BLOOD GLUCOSE            RADIOLOGY & ADDITIONAL TESTS:  Results Reviewed:   Imaging Personally Reviewed:  Electrocardiogram Personally Reviewed:

## 2023-09-21 NOTE — CHART NOTE - NSCHARTNOTEFT_GEN_A_CORE
Medicine PA-  Pt's K repleted, repeat 4.1.  WBC-19.7, pt afebrile and was on steroids, repeat CBC pending AM.

## 2023-09-21 NOTE — PROGRESS NOTE ADULT - ASSESSMENT
63 male with a recent diagnosis of lung adenocarcinoma with brain mets presented with report of acute metabolic encephalopathy.     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   Can C/w seroquel 25 mg TID PRN and IV haldol PRN if 2nd agent needed   Tolerating adjusted diet. Remains high aspiration risk due to underlying mental status   Unfortunately at this time, there does not appear to be a treatable etiology of pts advanced illness (multiple barriers to even palliative neoplastic treatment)      Leucocytosis   Likely 2/2 steroid use     Hypokalemia   Repleted, stable     Diet  SLP evaluated pt 9/11, recommending puree diet     DVT ppx: scds  gi ppx: IV ppi qd    code: dnr/dni; palliative involved but family not ready for hospice, continue discussions   Overall abysmal prognosis

## 2023-09-22 LAB
BASOPHILS # BLD AUTO: 0.02 K/UL — SIGNIFICANT CHANGE UP (ref 0–0.2)
BASOPHILS NFR BLD AUTO: 0.1 % — SIGNIFICANT CHANGE UP (ref 0–2)
EOSINOPHIL # BLD AUTO: 0.01 K/UL — SIGNIFICANT CHANGE UP (ref 0–0.5)
EOSINOPHIL NFR BLD AUTO: 0.1 % — SIGNIFICANT CHANGE UP (ref 0–6)
HCT VFR BLD CALC: 38.2 % — LOW (ref 39–50)
HGB BLD-MCNC: 13.5 G/DL — SIGNIFICANT CHANGE UP (ref 13–17)
IMM GRANULOCYTES NFR BLD AUTO: 0.5 % — SIGNIFICANT CHANGE UP (ref 0–0.9)
LYMPHOCYTES # BLD AUTO: 1.1 K/UL — SIGNIFICANT CHANGE UP (ref 1–3.3)
LYMPHOCYTES # BLD AUTO: 7.5 % — LOW (ref 13–44)
MCHC RBC-ENTMCNC: 29.3 PG — SIGNIFICANT CHANGE UP (ref 27–34)
MCHC RBC-ENTMCNC: 35.3 GM/DL — SIGNIFICANT CHANGE UP (ref 32–36)
MCV RBC AUTO: 82.9 FL — SIGNIFICANT CHANGE UP (ref 80–100)
MONOCYTES # BLD AUTO: 0.76 K/UL — SIGNIFICANT CHANGE UP (ref 0–0.9)
MONOCYTES NFR BLD AUTO: 5.2 % — SIGNIFICANT CHANGE UP (ref 2–14)
NEUTROPHILS # BLD AUTO: 12.69 K/UL — HIGH (ref 1.8–7.4)
NEUTROPHILS NFR BLD AUTO: 86.6 % — HIGH (ref 43–77)
PLATELET # BLD AUTO: 173 K/UL — SIGNIFICANT CHANGE UP (ref 150–400)
RBC # BLD: 4.61 M/UL — SIGNIFICANT CHANGE UP (ref 4.2–5.8)
RBC # FLD: 13.1 % — SIGNIFICANT CHANGE UP (ref 10.3–14.5)
WBC # BLD: 14.66 K/UL — HIGH (ref 3.8–10.5)
WBC # FLD AUTO: 14.66 K/UL — HIGH (ref 3.8–10.5)

## 2023-09-22 PROCEDURE — 99232 SBSQ HOSP IP/OBS MODERATE 35: CPT

## 2023-09-22 RX ADMIN — LEVETIRACETAM 400 MILLIGRAM(S): 250 TABLET, FILM COATED ORAL at 17:14

## 2023-09-22 RX ADMIN — LEVETIRACETAM 400 MILLIGRAM(S): 250 TABLET, FILM COATED ORAL at 05:05

## 2023-09-22 RX ADMIN — PANTOPRAZOLE SODIUM 40 MILLIGRAM(S): 20 TABLET, DELAYED RELEASE ORAL at 12:29

## 2023-09-22 NOTE — ADVANCED PRACTICE NURSE CONSULT - ASSESSMENT
Patient laying supine in bed, turned to right (pillow under side), HOB elevated 30 degrees. Pt awake, A&Ox0, max assist with care.  Patient with limited mobility in bed. Documented history of fecal and urinary incontinence, Incontinence Associated Dermatitis (IAD).  Ordered for minced and moist diet, current Herber score of 12. With assistance, patient turned patient to side for skin assessment. Patient with small liquid incontinence episode at time of assessment.  Patient cleaned with primary RN.  Skin assessment as below:    Patient found to have Moisture Associated Skin Damage (MASD) to the intergluteal fold and gluteal cleft with partial -thickness injury due to IAD and Intertriginous Dermatitis (ITD).      ·	Gluteal Cleft - Linear Fissure MASD Partial Thickness Erosion with  (1.5x1cm) - patchy pink moist wound beds to proximal/distal portion of fissure with irregular peeling edges,  no drainage. Periwound skin moist with blanching redness, intact warm.

## 2023-09-22 NOTE — PROGRESS NOTE ADULT - SUBJECTIVE AND OBJECTIVE BOX
[facility] Division of Hospital Medicine    Chief Complaint:  AMS    INTERVAL HISTORY:  Pt remains clinically unchanged  No significant change in mental status  ROS unable to be reached obtained 2/2 neuro status  D/w SW, awaiting arrangements of home hospice       MEDICATIONS  (STANDING):  levETIRAcetam  IVPB 1000 milliGRAM(s) IV Intermittent every 12 hours  pantoprazole  Injectable 40 milliGRAM(s) IV Push daily    MEDICATIONS  (PRN):  haloperidol    Injectable 2.5 milliGRAM(s) IV Push every 6 hours PRN agitaion 2nd line  hydrALAZINE Injectable 10 milliGRAM(s) IV Push every 4 hours PRN for sbp above 160  QUEtiapine 25 milliGRAM(s) Oral three times a day PRN agitation        PHYSICAL EXAM:  Vital Signs Last 24 Hrs  T(C): 37.5 (22 Sep 2023 16:58), Max: 37.5 (22 Sep 2023 16:58)  T(F): 99.5 (22 Sep 2023 16:58), Max: 99.5 (22 Sep 2023 16:58)  HR: 100 (22 Sep 2023 16:58) (79 - 102)  BP: 111/71 (22 Sep 2023 16:58) (111/71 - 148/82)  BP(mean): --  RR: 18 (22 Sep 2023 16:58) (18 - 19)  SpO2: 98% (22 Sep 2023 16:58) (94% - 98%)    Parameters below as of 22 Sep 2023 16:58  Patient On (Oxygen Delivery Method): room air            GENERAL: not in acute distress, stable RUE tremors  HEENT:  Clear conjunctiva, Rt eye catartact, Left eye with min tracking, , moist oral mucosa  RESP:  Non-labored breathing pattern, lungs clear to ausculation, no wheezes or crackles appreciated  CV: Regular rate and rhythm, no murmurs appreciated, no lower extremity edema  GI: Soft, non-tender, non-distended  NEURO: unable to assess, does not follow commands  PSYCH: Awake and alert, non ot min verbal, not able to follow commands   SKIN: No rash or lesions, warm and dry      LABS:                                   13.5   14.66 )-----------( 173      ( 22 Sep 2023 06:28 )             38.2   09-21    137  |  99  |  21.2<H>  ----------------------------<  113<H>  4.0   |  27.0  |  0.49<L>    Ca    8.6      21 Sep 2023 06:23  Mg     2.0     09-20                  Urinalysis Basic - ( 15 Sep 2023 06:31 )    Color: x / Appearance: x / SG: x / pH: x  Gluc: 116 mg/dL / Ketone: x  / Bili: x / Urobili: x   Blood: x / Protein: x / Nitrite: x   Leuk Esterase: x / RBC: x / WBC x   Sq Epi: x / Non Sq Epi: x / Bacteria: x        CAPILLARY BLOOD GLUCOSE            RADIOLOGY & ADDITIONAL TESTS:  Results Reviewed:   Imaging Personally Reviewed:  Electrocardiogram Personally Reviewed:

## 2023-09-22 NOTE — ADVANCED PRACTICE NURSE CONSULT - RECOMMEDATIONS
Cleanse with normal saline, Pat dry, paint periwound with Cavilon liquid skin barrier, apply XEROFORM to wound bed, cover with Allevyn - date and initial dressing changes - (DO NOT PUT ALLEVYN DIRECTLY ONTO OPEN SKIN). - change daily    - Turn and Reposition Q2-4H  - Offload sacral-coccygeal area with positioner pillow, alternate sides Q2-4H  - Incontinence care with repositioning Q2-4H  - Green Waffle Seat cushion at all times when patient is out of bed in chair  - Apply Heel-Offloading cAIRboots or vertical pillows under each leg at all times while in bed. - provide skin checks and foot placement Q8H    -Continue turning/positioning patient from side-to-side q2h while in bed, q1h when/if OOB chair, or in accordance w/ pt's plan of care. Utilize pillows and/or Spry positioner pillow to assist w/ turning/positioning. When/if OOB chair, utilize pillows or chair cushion to offload pressure.   -Continue to offload heels from bed surface with soft pillow under calfs or by applying offloading boots to BLEs.   -Continue applying Coloplast Doc Protect moisture barrier cream to buttock and perineal area daily and prn after each incontinent episode.    -Continue utilizing one underpad underneath patient to contain incontinence episodes; change pad when saturated/soiled.   -Consider utilizing condom catheter (if patient candidate) to contain any urinary incontinence.   -Consider utilizing external fecal  (if patient candidate) or fecal management system/rectal tube/DigniShield (if patient candidate; MD order required) to contain loose fecal incontinence.   -Continue nutrition consult for optimal wound healing & nutritional status.   -Assess skin/wound qshift, report changes to primary provider.     Plan of Care: Primary RN made aware of above recs. Spoke w/ provider, Dr. Manrique in regards to above. No further needs/recs from [facility] service at this time. Staff RN to perform routine skin/wound assessment and manage wound care. Questions or concerns or if wound worsens and reconsult needed, please contact CN.

## 2023-09-22 NOTE — PROGRESS NOTE ADULT - ASSESSMENT
63 male with a recent diagnosis of lung adenocarcinoma with brain mets presented with report of acute metabolic encephalopathy.     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   Can C/w seroquel 25 mg TID PRN and IV haldol PRN if 2nd agent needed   Tolerating adjusted diet. Remains high aspiration risk due to underlying mental status   Unfortunately at this time, there does not appear to be a treatable etiology of pts advanced illness (multiple barriers to even palliative neoplastic treatment)      Leucocytosis   Likely 2/2 steroid use     Hypokalemia   Repleted, stable     Diet  SLP evaluated pt 9/11, recommending puree diet     DVT ppx: scds  gi ppx: IV ppi qd    code: dnr/dni; palliative involved, currently awaiting hospice (home vs [facility] in[facility]) arrangements   Overall abysmal prognosis

## 2023-09-23 PROCEDURE — 99232 SBSQ HOSP IP/OBS MODERATE 35: CPT

## 2023-09-23 RX ADMIN — PANTOPRAZOLE SODIUM 40 MILLIGRAM(S): 20 TABLET, DELAYED RELEASE ORAL at 08:45

## 2023-09-23 RX ADMIN — LEVETIRACETAM 400 MILLIGRAM(S): 250 TABLET, FILM COATED ORAL at 05:39

## 2023-09-23 RX ADMIN — LEVETIRACETAM 400 MILLIGRAM(S): 250 TABLET, FILM COATED ORAL at 16:39

## 2023-09-23 NOTE — PROGRESS NOTE ADULT - SUBJECTIVE AND OBJECTIVE BOX
[facility] Division of Hospital Medicine    Chief Complaint:      SUBJECTIVE / OVERNIGHT EVENTS:    Patient seen and examined at bedside, no acute events overnight, patient remains nonverbal, with limited interaction in bed on exam. Plan for Home with hospice if accepted for [facility] placement will follow with hospice / Palliative care.     MEDICATIONS  (STANDING):  levETIRAcetam  IVPB 1000 milliGRAM(s) IV Intermittent every 12 hours  pantoprazole  Injectable 40 milliGRAM(s) IV Push daily    MEDICATIONS  (PRN):  haloperidol    Injectable 2.5 milliGRAM(s) IV Push every 6 hours PRN agitaion 2nd line  hydrALAZINE Injectable 10 milliGRAM(s) IV Push every 4 hours PRN for sbp above 160  QUEtiapine 25 milliGRAM(s) Oral three times a day PRN agitation        I&O's Summary    22 Sep 2023 07:01  -  23 Sep 2023 07:00  --------------------------------------------------------  IN: 0 mL / OUT: 500 mL / NET: -500 mL        PHYSICAL EXAM:  Vital Signs Last 24 Hrs  T(C): 36.8 (23 Sep 2023 12:54), Max: 37.5 (22 Sep 2023 16:58)  T(F): 98.2 (23 Sep 2023 12:54), Max: 99.5 (22 Sep 2023 16:58)  HR: 88 (23 Sep 2023 12:54) (81 - 100)  BP: 138/82 (23 Sep 2023 12:54) (111/71 - 150/86)  BP(mean): --  RR: 18 (23 Sep 2023 12:54) (17 - 18)  SpO2: 94% (23 Sep 2023 12:54) (93% - 98%)    Parameters below as of 23 Sep 2023 12:54  Patient On (Oxygen Delivery Method): room air        GENERAL: not in acute distress  HEENT:  Clear conjunctiva, Rt eye catartact, Left eye with min tracking, , moist oral mucosa  RESP:  Non-labored breathing pattern, lungs clear to ausculation, no wheezes or crackles appreciated  CV: Regular rate and rhythm, no murmurs appreciated, no lower extremity edema  GI: Soft, non-tender, non-distended  NEURO: unable to assess, does not follow commands  PSYCH: Awake and alert, min verbal, not able to follow commands   SKIN: No rash or lesions, warm and dry    LABS:                        13.5   14.66 )-----------( 173      ( 22 Sep 2023 06:28 )             38.2                     CAPILLARY BLOOD GLUCOSE            RADIOLOGY & ADDITIONAL TESTS:  Results Reviewed:   Imaging Personally Reviewed:  Electrocardiogram Personally Reviewed:

## 2023-09-23 NOTE — PROGRESS NOTE ADULT - ASSESSMENT
63 male with a recent diagnosis of lung adenocarcinoma with brain mets presented with report of acute metabolic encephalopathy, Prolonged hospital course with workup negative for infection, not suggestive of leptomeningeal carcinomatosis, however AMS not better explained than by current CNS malignancy. Plan for Home with hospice care when able, given patient is undocumented/uninsured will need to be [facility].     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   Can C/w seroquel 25 mg TID PRN and IV haldol PRN if 2nd agent needed   Tolerating adjusted diet. Remains high aspiration risk due to underlying mental status   Unfortunately at this time, there does not appear to be a treatable etiology of pts advanced illness (multiple barriers to even palliative neoplastic treatment)    #Leucocytosis   improving   Likely 2/2 steroid use     #Hypokalemia   Repleted, stable     Diet  SLP evaluated pt 9/11, recommending puree diet     DVT ppx: scds  gi ppx: IV ppi qd    code: dnr/dni; palliative involved, currently awaiting hospice (home vs [facility] in[facility]) arrangements   Overall abysmal prognosis

## 2023-09-24 PROCEDURE — 99232 SBSQ HOSP IP/OBS MODERATE 35: CPT

## 2023-09-24 RX ORDER — LEVETIRACETAM 250 MG/1
1000 TABLET, FILM COATED ORAL
Refills: 0 | Status: DISCONTINUED | OUTPATIENT
Start: 2023-09-24 | End: 2023-10-05

## 2023-09-24 RX ORDER — PANTOPRAZOLE SODIUM 20 MG/1
40 TABLET, DELAYED RELEASE ORAL
Refills: 0 | Status: DISCONTINUED | OUTPATIENT
Start: 2023-09-24 | End: 2023-09-25

## 2023-09-24 RX ADMIN — PANTOPRAZOLE SODIUM 40 MILLIGRAM(S): 20 TABLET, DELAYED RELEASE ORAL at 07:57

## 2023-09-24 RX ADMIN — LEVETIRACETAM 1000 MILLIGRAM(S): 250 TABLET, FILM COATED ORAL at 16:49

## 2023-09-24 RX ADMIN — LEVETIRACETAM 400 MILLIGRAM(S): 250 TABLET, FILM COATED ORAL at 05:32

## 2023-09-24 NOTE — PROGRESS NOTE ADULT - ASSESSMENT
63 male with a recent diagnosis of lung adenocarcinoma with brain mets presented with report of acute metabolic encephalopathy, Prolonged hospital course with workup negative for infection, not suggestive of leptomeningeal carcinomatosis, however AMS not better explained than by current CNS malignancy. Plan for Home with hospice care when able, given patient is undocumented/uninsured will need to be [facility].     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   Can C/w seroquel 25 mg TID PRN and IV haldol PRN if 2nd agent needed   Tolerating adjusted diet. Remains high aspiration risk due to underlying mental status   Unfortunately at this time, there does not appear to be a treatable etiology of pts advanced illness (multiple barriers to even palliative neoplastic treatment)    #Leucocytosis   improving   Likely 2/2 steroid use     #Hypokalemia   Repleted, stable     Diet  SLP evaluated pt 9/11, recommending puree diet     DVT ppx: scds  gi ppx: IV ppi qd changed to PO    code: dnr/dni; palliative involved, currently awaiting hospice (home vs [facility] in[facility]) arrangements

## 2023-09-24 NOTE — PROGRESS NOTE ADULT - SUBJECTIVE AND OBJECTIVE BOX
[facility] Division of Hospital Medicine    Chief Complaint:      SUBJECTIVE / OVERNIGHT EVENTS:    Patient seen and examined at bedside, no acute events overnight, patient remains nonverbal, not responding to commands / stimulation. Awaiting home w/ hospice.     MEDICATIONS  (STANDING):  levETIRAcetam 1000 milliGRAM(s) Oral two times a day  pantoprazole    Tablet 40 milliGRAM(s) Oral before breakfast    MEDICATIONS  (PRN):  haloperidol    Injectable 2.5 milliGRAM(s) IV Push every 6 hours PRN agitaion 2nd line  hydrALAZINE Injectable 10 milliGRAM(s) IV Push every 4 hours PRN for sbp above 160  QUEtiapine 25 milliGRAM(s) Oral three times a day PRN agitation        I&O's Summary    23 Sep 2023 07:01  -  24 Sep 2023 07:00  --------------------------------------------------------  IN: 340 mL / OUT: 900 mL / NET: -560 mL        PHYSICAL EXAM:  Vital Signs Last 24 Hrs  T(C): 36.8 (24 Sep 2023 08:52), Max: 37.6 (23 Sep 2023 20:55)  T(F): 98.3 (24 Sep 2023 08:52), Max: 99.6 (23 Sep 2023 20:55)  HR: 92 (24 Sep 2023 08:52) (88 - 100)  BP: 157/86 (24 Sep 2023 08:52) (138/82 - 157/86)  BP(mean): --  RR: 20 (24 Sep 2023 08:52) (17 - 20)  SpO2: 96% (24 Sep 2023 08:52) (94% - 96%)    Parameters below as of 24 Sep 2023 05:28  Patient On (Oxygen Delivery Method): room air      GENERAL: not in acute distress  HEENT:  Clear conjunctiva, Rt eye catartact, Left eye with min tracking, , moist oral mucosa  RESP:  Non-labored breathing pattern, lungs clear to ausculation, no wheezes or crackles appreciated  CV: Regular rate and rhythm, no murmurs appreciated, no lower extremity edema  GI: Soft, non-tender, non-distended  NEURO: unable to assess, does not follow commands  PSYCH: Awake and alert, min verbal, not able to follow commands   SKIN: No rash or lesions, warm and dry    LABS:                    CAPILLARY BLOOD GLUCOSE            RADIOLOGY & ADDITIONAL TESTS:  Results Reviewed:   Imaging Personally Reviewed:  Electrocardiogram Personally Reviewed:

## 2023-09-25 PROCEDURE — 99232 SBSQ HOSP IP/OBS MODERATE 35: CPT

## 2023-09-25 RX ORDER — PANTOPRAZOLE SODIUM 20 MG/1
40 TABLET, DELAYED RELEASE ORAL EVERY 24 HOURS
Refills: 0 | Status: DISCONTINUED | OUTPATIENT
Start: 2023-09-25 | End: 2023-09-25

## 2023-09-25 RX ORDER — PANTOPRAZOLE SODIUM 20 MG/1
40 TABLET, DELAYED RELEASE ORAL
Refills: 0 | Status: DISCONTINUED | OUTPATIENT
Start: 2023-09-25 | End: 2023-09-26

## 2023-09-25 RX ADMIN — LEVETIRACETAM 1000 MILLIGRAM(S): 250 TABLET, FILM COATED ORAL at 16:02

## 2023-09-25 RX ADMIN — PANTOPRAZOLE SODIUM 40 MILLIGRAM(S): 20 TABLET, DELAYED RELEASE ORAL at 05:42

## 2023-09-25 RX ADMIN — LEVETIRACETAM 1000 MILLIGRAM(S): 250 TABLET, FILM COATED ORAL at 05:41

## 2023-09-25 NOTE — PROGRESS NOTE ADULT - SUBJECTIVE AND OBJECTIVE BOX
[facility] Division of Hospital Medicine    Chief Complaint:      SUBJECTIVE / OVERNIGHT EVENTS:    Patient seen and examined at bedside, no acute events overnight, patient status remains unchanged, SW / hospice liason discussion with family who now state they are unable to care for him at home even with home hospice care. [facility] / [facility] services.     MEDICATIONS  (STANDING):  levETIRAcetam 1000 milliGRAM(s) Oral two times a day  pantoprazole    Tablet 40 milliGRAM(s) Oral before breakfast    MEDICATIONS  (PRN):  haloperidol    Injectable 2.5 milliGRAM(s) IV Push every 6 hours PRN agitaion 2nd line  hydrALAZINE Injectable 10 milliGRAM(s) IV Push every 4 hours PRN for sbp above 160  QUEtiapine 25 milliGRAM(s) Oral three times a day PRN agitation        I&O's Summary    24 Sep 2023 07:01  -  25 Sep 2023 07:00  --------------------------------------------------------  IN: 0 mL / OUT: 325 mL / NET: -325 mL        PHYSICAL EXAM:  Vital Signs Last 24 Hrs  T(C): 37 (25 Sep 2023 11:00), Max: 37.6 (24 Sep 2023 15:59)  T(F): 98.6 (25 Sep 2023 11:00), Max: 99.7 (24 Sep 2023 15:59)  HR: 77 (25 Sep 2023 11:00) (77 - 80)  BP: 118/73 (25 Sep 2023 11:00) (118/73 - 133/72)  BP(mean): --  RR: 18 (25 Sep 2023 11:00) (17 - 18)  SpO2: 97% (25 Sep 2023 11:00) (94% - 97%)    Parameters below as of 25 Sep 2023 11:00  Patient On (Oxygen Delivery Method): room air      GENERAL: not in acute distress  HEENT:  Clear conjunctiva, Rt eye catartact, Left eye with min tracking, , moist oral mucosa  RESP:  Non-labored breathing pattern, lungs clear to ausculation, no wheezes or crackles appreciated  CV: Regular rate and rhythm, no murmurs appreciated, no lower extremity edema  GI: Soft, non-tender, non-distended  NEURO: unable to assess, does not follow commands  PSYCH: Awake and alert, min verbal, not able to follow commands   SKIN: No rash or lesions, warm and dry    LABS:                    CAPILLARY BLOOD GLUCOSE            RADIOLOGY & ADDITIONAL TESTS:  Results Reviewed:   Imaging Personally Reviewed:  Electrocardiogram Personally Reviewed:

## 2023-09-25 NOTE — PROGRESS NOTE ADULT - ASSESSMENT
63 male with a recent diagnosis of lung adenocarcinoma with brain mets presented with report of acute metabolic encephalopathy, Prolonged hospital course with workup negative for infection, not suggestive of leptomeningeal carcinomatosis, however AMS not better explained than by current CNS malignancy. Plan initially for home hospice care when able, however now family may not be able to care for him at home with hospice services. [facility] facility [facility], given patient is undocumented/uninsured will need to be [facility].     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   Can C/w seroquel 25 mg TID PRN and IV haldol PRN if 2nd agent needed   Tolerating adjusted diet. Remains high aspiration risk due to underlying mental status   Unfortunately at this time, there does not appear to be a treatable etiology of pts advanced illness (multiple barriers to even palliative neoplastic treatment)    #Leucocytosis   improving   Likely 2/2 steroid use     #Hypokalemia   Repleted, stable     Diet  SLP evaluated pt 9/11, recommending puree diet     DVT ppx: scds  gi ppx: IV ppi qd    code: dnr/dni; palliative involved, currently awaiting hospice [facility] arrangements vs. Home hospice    Overall abysmal prognosis

## 2023-09-26 PROCEDURE — 99232 SBSQ HOSP IP/OBS MODERATE 35: CPT

## 2023-09-26 RX ORDER — PANTOPRAZOLE SODIUM 20 MG/1
40 TABLET, DELAYED RELEASE ORAL DAILY
Refills: 0 | Status: DISCONTINUED | OUTPATIENT
Start: 2023-09-26 | End: 2023-10-05

## 2023-09-26 RX ADMIN — LEVETIRACETAM 1000 MILLIGRAM(S): 250 TABLET, FILM COATED ORAL at 05:04

## 2023-09-26 RX ADMIN — LEVETIRACETAM 1000 MILLIGRAM(S): 250 TABLET, FILM COATED ORAL at 17:31

## 2023-09-26 RX ADMIN — PANTOPRAZOLE SODIUM 40 MILLIGRAM(S): 20 TABLET, DELAYED RELEASE ORAL at 12:51

## 2023-09-26 NOTE — PROGRESS NOTE ADULT - SUBJECTIVE AND OBJECTIVE BOX
[facility] Division of Hospital Medicine    Chief Complaint:      SUBJECTIVE / OVERNIGHT EVENTS:    Patient seen and examined at bedside, no acute events overnight, patient remains nonverbal awaiting hospice placement    MEDICATIONS  (STANDING):  levETIRAcetam 1000 milliGRAM(s) Oral two times a day  pantoprazole   Suspension 40 milliGRAM(s) Oral daily    MEDICATIONS  (PRN):  haloperidol    Injectable 2.5 milliGRAM(s) IV Push every 6 hours PRN agitaion 2nd line  hydrALAZINE Injectable 10 milliGRAM(s) IV Push every 4 hours PRN for sbp above 160  QUEtiapine 25 milliGRAM(s) Oral three times a day PRN agitation        I&O's Summary    25 Sep 2023 07:01  -  26 Sep 2023 07:00  --------------------------------------------------------  IN: 100 mL / OUT: 750 mL / NET: -650 mL        PHYSICAL EXAM:  Vital Signs Last 24 Hrs  T(C): 37.4 (26 Sep 2023 16:11), Max: 37.4 (26 Sep 2023 16:11)  T(F): 99.3 (26 Sep 2023 16:11), Max: 99.3 (26 Sep 2023 16:11)  HR: 100 (26 Sep 2023 16:11) (81 - 100)  BP: 147/86 (26 Sep 2023 16:11) (114/72 - 147/86)  BP(mean): 106 (26 Sep 2023 16:11) (106 - 106)  RR: 20 (26 Sep 2023 16:11) (17 - 20)  SpO2: 93% (26 Sep 2023 16:11) (93% - 96%)    Parameters below as of 26 Sep 2023 16:11  Patient On (Oxygen Delivery Method): room air      GENERAL: not in acute distress  HEENT:  Clear conjunctiva, Rt eye catartact, Left eye with min tracking, , moist oral mucosa  RESP:  Non-labored breathing pattern, lungs clear to ausculation, no wheezes or crackles appreciated  CV: Regular rate and rhythm, no murmurs appreciated, no lower extremity edema  GI: Soft, non-tender, non-distended  NEURO: unable to assess, does not follow commands  PSYCH: Awake and alert, min verbal, not able to follow commands   SKIN: No rash or lesions, warm and dry    LABS:                    CAPILLARY BLOOD GLUCOSE            RADIOLOGY & ADDITIONAL TESTS:  Results Reviewed:   Imaging Personally Reviewed:  Electrocardiogram Personally Reviewed:

## 2023-09-27 PROCEDURE — 99232 SBSQ HOSP IP/OBS MODERATE 35: CPT

## 2023-09-27 RX ORDER — TUBERCULIN PURIFIED PROTEIN DERIVATIVE 5 [IU]/.1ML
5 INJECTION, SOLUTION INTRADERMAL ONCE
Refills: 0 | Status: COMPLETED | OUTPATIENT
Start: 2023-09-27 | End: 2023-09-27

## 2023-09-27 RX ADMIN — LEVETIRACETAM 1000 MILLIGRAM(S): 250 TABLET, FILM COATED ORAL at 05:50

## 2023-09-27 RX ADMIN — LEVETIRACETAM 1000 MILLIGRAM(S): 250 TABLET, FILM COATED ORAL at 18:14

## 2023-09-27 RX ADMIN — PANTOPRAZOLE SODIUM 40 MILLIGRAM(S): 20 TABLET, DELAYED RELEASE ORAL at 08:35

## 2023-09-27 RX ADMIN — TUBERCULIN PURIFIED PROTEIN DERIVATIVE 5 UNIT(S): 5 INJECTION, SOLUTION INTRADERMAL at 12:42

## 2023-09-27 NOTE — PROGRESS NOTE ADULT - ASSESSMENT
63 male with a recent diagnosis of lung adenocarcinoma with brain mets presented with report of acute metabolic encephalopathy, Prolonged hospital course with workup negative for infection, not suggestive of leptomeningeal carcinomatosis, however AMS not better explained than by current CNS malignancy. Plan initially for home hospice care when able, however now family may not be able to care for him at home with hospice services. [facility] for facility placement, given patient is undocumented/uninsured will need to be [facility] Denied from [facility], awaiting response from [facility], placed PPD per their instructions.     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   Can C/w seroquel 25 mg TID PRN and IV haldol PRN if 2nd agent needed   Tolerating adjusted diet. Remains high aspiration risk due to underlying mental status   Unfortunately at this time, there does not appear to be a treatable etiology of pts advanced illness (multiple barriers to even palliative neoplastic treatment)    #Leucocytosis   improving   Likely 2/2 steroid use     #Hypokalemia   Repleted, stable     Diet  SLP evaluated pt 9/11, recommending puree diet     DVT ppx: scds  gi ppx: IV ppi qd    code: dnr/dni; palliative involved, currently awaiting hospice [facility] inpt arrangements vs. Home hospice    Overall abysmal prognosis

## 2023-09-27 NOTE — CHART NOTE - NSCHARTNOTEFT_GEN_A_CORE
Source: Patient [X ]  Family [ ]   other [ EMR]  63 male with a recent diagnosis of lung adenocarcinoma with brain mets presented with report of acute metabolic encephalopathy, Prolonged hospital course with workup negative for infection, not suggestive of leptomeningeal carcinomatosis, however AMS not better explained than by current CNS malignancy. Plan initially for home hospice care when able, however now family may not be able to care for him at home with hospice services. Referral sent to [facility] for facility placement, given patient is undocumented/uninsured will need to be [facility]ora.     Current Diet: minced and moist, mod thick liquids     Patient reports [ ] nausea  [ ] vomiting [ ] diarrhea [ ] constipation  [ X]chewing problems [ X] swallowing issues  [ ] other:     PO intake:  < 50% [ ]   50-75%  [ ]   %  [X ]  other :    Source for PO intake [X ] Patient [ ] family [X ] chart [ ] staff [ ] other    Current Weight:   (9/24) 123lbs   Rd bed scale weight obtained 121lbs   (9/16) 132.2lbs   (9/14) 134.4  (9/10) 133.1  (9/7) 135.5    % Weight Change 9.22% weight loss x1 month     Pertinent Medications: MEDICATIONS  (STANDING):  levETIRAcetam 1000 milliGRAM(s) Oral two times a day  pantoprazole   Suspension 40 milliGRAM(s) Oral daily  PPD  5 Tuberculin Unit(s) Injectable 5 Unit(s) IntraDermal once    MEDICATIONS  (PRN):  haloperidol    Injectable 2.5 milliGRAM(s) IV Push every 6 hours PRN agitaion 2nd line  hydrALAZINE Injectable 10 milliGRAM(s) IV Push every 4 hours PRN for sbp above 160  QUEtiapine 25 milliGRAM(s) Oral three times a day PRN agitation    Nutrition focused physical exam conducted - found signs of malnutrition [ ]absent [X ]present    Subcutaneous fat loss: [ ] Orbital fat pads region, [ ]Buccal fat region, [ ]Triceps region,  [ ]Ribs region    Muscle wasting: [X ]Temples region, [X ]Clavicle region, [ X]Shoulder region, [ ]Scapula region, [ ]Interosseous region,  [ ]thigh region, [ ]Calf region    Estimated Needs:   [X ] no change since previous assessment  [ ] recalculated:     Current Nutrition Diagnosis: Pt was at nutrition risk for increased needs but due to 9.22% weight loss with NFPE findings pt now with severe chronic malnutrition related to inability to meet sufficient protein-energy in setting of multiple comorbidities as evidenced by meeting mod muscle loss, >5% weight loss x1 month.    Pts diet advanced to mod thick liquids and can provide ensure enlive TID to provide an additional 1050cal, 60g pro/day.  Pt awaiting transfer to hospice either at home or at facility. Pt remains appropriate for total feed as A&Ox0 nonverbal. RD to remain available     Recommendations:   MVI, Thiamine, Folic acid daily, ensure enlive TID   Encourage po intake, monitor diet tolerance, and provide assistance at meals as needed  Obtain daily weights to monitor trends  Monitor nutrition related labs; glucose, renal     Monitoring and Evaluation:   [ ] PO intake [ ] Tolerance to diet prescription [X] Weights  [X] Follow up per protocol [X] Labs:

## 2023-09-27 NOTE — DIETITIAN NUTRITION RISK NOTIFICATION - ADDITIONAL COMMENTS/DIETITIAN RECOMMENDATIONS
MVI, Thiamine, Folic acid daily, ensure enlive TID   Encourage po intake, monitor diet tolerance, and provide assistance at meals as needed  Obtain daily weights to monitor trends  Monitor nutrition related labs; glucose, renal

## 2023-09-27 NOTE — PROGRESS NOTE ADULT - SUBJECTIVE AND OBJECTIVE BOX
[facility] Division of Hospital Medicine    Chief Complaint:      SUBJECTIVE / OVERNIGHT EVENTS:    Patient seen and examined at bedside, no acute events overnight, patient clinically remains unchanged. Awaiting hospice placement, application still pending for [facility], PPD placed per instructions of [facility]      MEDICATIONS  (STANDING):  levETIRAcetam 1000 milliGRAM(s) Oral two times a day  pantoprazole   Suspension 40 milliGRAM(s) Oral daily    MEDICATIONS  (PRN):  haloperidol    Injectable 2.5 milliGRAM(s) IV Push every 6 hours PRN agitaion 2nd line  hydrALAZINE Injectable 10 milliGRAM(s) IV Push every 4 hours PRN for sbp above 160  QUEtiapine 25 milliGRAM(s) Oral three times a day PRN agitation        I&O's Summary    27 Sep 2023 07:01  -  27 Sep 2023 18:53  --------------------------------------------------------  IN: 320 mL / OUT: 600 mL / NET: -280 mL        PHYSICAL EXAM:  Vital Signs Last 24 Hrs  T(C): 36.7 (27 Sep 2023 16:55), Max: 37 (27 Sep 2023 05:46)  T(F): 98.1 (27 Sep 2023 16:55), Max: 98.6 (27 Sep 2023 05:46)  HR: 80 (27 Sep 2023 16:55) (74 - 84)  BP: 121/77 (27 Sep 2023 16:55) (121/77 - 151/81)  BP(mean): 91 (27 Sep 2023 16:55) (91 - 91)  RR: 18 (27 Sep 2023 16:55) (16 - 18)  SpO2: 96% (27 Sep 2023 16:55) (94% - 96%)    Parameters below as of 27 Sep 2023 16:55  Patient On (Oxygen Delivery Method): room air        GENERAL: not in acute distress  HEENT:  Clear conjunctiva, Rt eye catartact, Left eye with min tracking, , moist oral mucosa  RESP:  Non-labored breathing pattern, lungs clear to ausculation, no wheezes or crackles appreciated  CV: Regular rate and rhythm, no murmurs appreciated, no lower extremity edema  GI: Soft, non-tender, non-distended  NEURO: unable to assess, does not follow commands  PSYCH: Awake and alert, min verbal, not able to follow commands   SKIN: No rash or lesions, warm and dry    LABS:                    CAPILLARY BLOOD GLUCOSE            RADIOLOGY & ADDITIONAL TESTS:  Results Reviewed:   Imaging Personally Reviewed:  Electrocardiogram Personally Reviewed:

## 2023-09-28 PROCEDURE — 99232 SBSQ HOSP IP/OBS MODERATE 35: CPT

## 2023-09-28 RX ADMIN — LEVETIRACETAM 1000 MILLIGRAM(S): 250 TABLET, FILM COATED ORAL at 05:04

## 2023-09-28 RX ADMIN — LEVETIRACETAM 1000 MILLIGRAM(S): 250 TABLET, FILM COATED ORAL at 17:00

## 2023-09-28 RX ADMIN — PANTOPRAZOLE SODIUM 40 MILLIGRAM(S): 20 TABLET, DELAYED RELEASE ORAL at 08:27

## 2023-09-28 NOTE — PROGRESS NOTE ADULT - SUBJECTIVE AND OBJECTIVE BOX
[facility] Division of Hospital Medicine    Chief Complaint:      SUBJECTIVE / OVERNIGHT EVENTS:    Patient seen and examined at bedside, no acute events overnight, patient status remains unchanged.     MEDICATIONS  (STANDING):  levETIRAcetam 1000 milliGRAM(s) Oral two times a day  pantoprazole   Suspension 40 milliGRAM(s) Oral daily    MEDICATIONS  (PRN):  haloperidol    Injectable 2.5 milliGRAM(s) IV Push every 6 hours PRN agitaion 2nd line  hydrALAZINE Injectable 10 milliGRAM(s) IV Push every 4 hours PRN for sbp above 160  QUEtiapine 25 milliGRAM(s) Oral three times a day PRN agitation        I&O's Summary    27 Sep 2023 07:01  -  28 Sep 2023 07:00  --------------------------------------------------------  IN: 320 mL / OUT: 600 mL / NET: -280 mL        PHYSICAL EXAM:  Vital Signs Last 24 Hrs  T(C): 37.1 (28 Sep 2023 16:10), Max: 37.1 (28 Sep 2023 16:10)  T(F): 98.8 (28 Sep 2023 16:10), Max: 98.8 (28 Sep 2023 16:10)  HR: 63 (28 Sep 2023 16:10) (63 - 81)  BP: 136/78 (28 Sep 2023 16:10) (111/72 - 136/78)  BP(mean): 97 (28 Sep 2023 16:10) (91 - 97)  RR: 18 (28 Sep 2023 16:10) (16 - 18)  SpO2: 96% (28 Sep 2023 16:10) (96% - 97%)    Parameters below as of 28 Sep 2023 16:10  Patient On (Oxygen Delivery Method): room air      GENERAL: not in acute distress  HEENT:  Clear conjunctiva, Rt eye catartact, Left eye with min tracking, , moist oral mucosa  RESP:  Non-labored breathing pattern, lungs clear to ausculation, no wheezes or crackles appreciated  CV: Regular rate and rhythm, no murmurs appreciated, no lower extremity edema  GI: Soft, non-tender, non-distended  NEURO: unable to assess, does not follow commands  PSYCH: Awake and alert, min verbal, not able to follow commands   SKIN: No rash or lesions, warm and dry    LABS:                    CAPILLARY BLOOD GLUCOSE            RADIOLOGY & ADDITIONAL TESTS:  Results Reviewed:   Imaging Personally Reviewed:  Electrocardiogram Personally Reviewed:

## 2023-09-29 PROCEDURE — 99232 SBSQ HOSP IP/OBS MODERATE 35: CPT

## 2023-09-29 RX ADMIN — TUBERCULIN PURIFIED PROTEIN DERIVATIVE 5 UNIT(S): 5 INJECTION, SOLUTION INTRADERMAL at 14:16

## 2023-09-29 RX ADMIN — LEVETIRACETAM 1000 MILLIGRAM(S): 250 TABLET, FILM COATED ORAL at 17:54

## 2023-09-29 RX ADMIN — PANTOPRAZOLE SODIUM 40 MILLIGRAM(S): 20 TABLET, DELAYED RELEASE ORAL at 11:22

## 2023-09-29 RX ADMIN — LEVETIRACETAM 1000 MILLIGRAM(S): 250 TABLET, FILM COATED ORAL at 06:06

## 2023-09-29 NOTE — PROGRESS NOTE ADULT - ASSESSMENT
63 male with a recent diagnosis of lung adenocarcinoma with brain mets presented with report of acute metabolic encephalopathy, Prolonged hospital course with workup negative for infection, not suggestive of leptomeningeal carcinomatosis, however AMS not better explained than by current CNS malignancy. Plan initially for home hospice care when able, however now family may not be able to care for him at home with hospice services. [facility] sent [facility] for facility placement, given patient is undocumented/uninsured will need to be [facility] Denied from [facility], awaiting response from [facility], placed PPD per their instructions.     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   Can C/w seroquel 25 mg TID PRN and IV haldol PRN if 2nd agent needed   Tolerating adjusted diet. Remains high aspiration risk due to underlying mental status   Unfortunately at this time, there does not appear to be a treatable etiology of pts advanced illness (multiple barriers to even palliative neoplastic treatment)    #Leucocytosis   improving   Likely 2/2 steroid use     #Hypokalemia   Repleted, stable     Diet  SLP evaluated pt 9/11, recommending puree diet     DVT ppx: scds  gi ppx: IV ppi qd    code: dnr/dni; palliative involved, currently awaiting hospice [facility] inpt arrangements vs. Home hospice

## 2023-09-29 NOTE — PROGRESS NOTE ADULT - NS ATTEST RISK PROBLEM GEN_ALL_CORE FT
Elderly male with AMS of unclear etiology thought to be mixed metastatic involvement / Alcoholism /chronic infarcts as well. Awaiting comfort measures, multiple social factors limiting DC capability (undocumented / Uninsured).
Elderly male with AMS of unclear etiology thought to be mixed metastatic involvement / Alcoholism /chronic infarcts as well. Awaiting comfort measures, multiple social factors limiting DC capability (undocumented / Uninsured).
Elderly male with AMS of unclear etiology thought to be mixed metastatic involvement / Alcoholism /chronic infarcts as well. Awaiting comfort measures, multiple social factors limiting DC capability (undocumented / Uninsured)
Elderly male with AMS of unclear etiology thought to be mixed metastatic involvement / Alcoholism /chronic infarcts as well. Awaiting comfort measures, multiple social factors limiting DC capability (undocumented / Uninsured).

## 2023-09-29 NOTE — PROGRESS NOTE ADULT - SUBJECTIVE AND OBJECTIVE BOX
[facility] Division of Hospital Medicine    Chief Complaint:      SUBJECTIVE / OVERNIGHT EVENTS:    Patient seen and examined at bedside, no acute events overnight, patient remains the same. PPD negative, [facility]iferon requested per [facility], ordered today.     MEDICATIONS  (STANDING):  levETIRAcetam 1000 milliGRAM(s) Oral two times a day  pantoprazole   Suspension 40 milliGRAM(s) Oral daily    MEDICATIONS  (PRN):  haloperidol    Injectable 2.5 milliGRAM(s) IV Push every 6 hours PRN agitaion 2nd line  hydrALAZINE Injectable 10 milliGRAM(s) IV Push every 4 hours PRN for sbp above 160  QUEtiapine 25 milliGRAM(s) Oral three times a day PRN agitation        I&O's Summary    28 Sep 2023 07:01  -  29 Sep 2023 07:00  --------------------------------------------------------  IN: 796 mL / OUT: 1000 mL / NET: -204 mL    29 Sep 2023 07:01  -  29 Sep 2023 15:50  --------------------------------------------------------  IN: 0 mL / OUT: 450 mL / NET: -450 mL        PHYSICAL EXAM:  Vital Signs Last 24 Hrs  T(C): 36.7 (29 Sep 2023 09:07), Max: 37.2 (29 Sep 2023 04:42)  T(F): 98.1 (29 Sep 2023 09:07), Max: 99 (29 Sep 2023 04:42)  HR: 81 (29 Sep 2023 09:07) (63 - 86)  BP: 145/78 (29 Sep 2023 09:07) (126/73 - 145/78)  BP(mean): 97 (28 Sep 2023 16:10) (97 - 97)  RR: 18 (29 Sep 2023 09:07) (18 - 19)  SpO2: 94% (29 Sep 2023 09:07) (94% - 96%)    Parameters below as of 29 Sep 2023 09:07  Patient On (Oxygen Delivery Method): room air      GENERAL: not in acute distress  HEENT:  Clear conjunctiva, Rt eye catartact, Left eye with min tracking, , moist oral mucosa  RESP:  Non-labored breathing pattern, lungs clear to ausculation, no wheezes or crackles appreciated  CV: Regular rate and rhythm, no murmurs appreciated, no lower extremity edema  GI: Soft, non-tender, non-distended  NEURO: unable to assess, does not follow commands  PSYCH: Awake and alert, min verbal, not able to follow commands   SKIN: No rash or lesions, warm and dry    LABS:                    CAPILLARY BLOOD GLUCOSE            RADIOLOGY & ADDITIONAL TESTS:  Results Reviewed:   Imaging Personally Reviewed:  Electrocardiogram Personally Reviewed:

## 2023-09-30 LAB
HCT VFR BLD CALC: 35.9 % — LOW (ref 39–50)
HGB BLD-MCNC: 12 G/DL — LOW (ref 13–17)
MCHC RBC-ENTMCNC: 28.9 PG — SIGNIFICANT CHANGE UP (ref 27–34)
MCHC RBC-ENTMCNC: 33.4 GM/DL — SIGNIFICANT CHANGE UP (ref 32–36)
MCV RBC AUTO: 86.5 FL — SIGNIFICANT CHANGE UP (ref 80–100)
PLATELET # BLD AUTO: 263 K/UL — SIGNIFICANT CHANGE UP (ref 150–400)
RBC # BLD: 4.15 M/UL — LOW (ref 4.2–5.8)
RBC # FLD: 13.3 % — SIGNIFICANT CHANGE UP (ref 10.3–14.5)
WBC # BLD: 6.88 K/UL — SIGNIFICANT CHANGE UP (ref 3.8–10.5)
WBC # FLD AUTO: 6.88 K/UL — SIGNIFICANT CHANGE UP (ref 3.8–10.5)

## 2023-09-30 PROCEDURE — 99232 SBSQ HOSP IP/OBS MODERATE 35: CPT

## 2023-09-30 RX ADMIN — PANTOPRAZOLE SODIUM 40 MILLIGRAM(S): 20 TABLET, DELAYED RELEASE ORAL at 11:51

## 2023-09-30 RX ADMIN — LEVETIRACETAM 1000 MILLIGRAM(S): 250 TABLET, FILM COATED ORAL at 17:56

## 2023-09-30 RX ADMIN — LEVETIRACETAM 1000 MILLIGRAM(S): 250 TABLET, FILM COATED ORAL at 05:31

## 2023-09-30 NOTE — PROGRESS NOTE ADULT - ASSESSMENT
63 male with a recent diagnosis of lung adenocarcinoma with brain mets presented with report of acute metabolic encephalopathy, Prolonged hospital course with workup negative for infection, not suggestive of leptomeningeal carcinomatosis, however AMS not better explained than by current CNS malignancy. Plan initially for home hospice care when able, however now family may not be able to care for him at home with hospice services. Referral sent [facility] for facility placement, given patient is undocumented/uninsured will need to be [facility] Denied from [facility], awaiting response from [facility], placed PPD per their instructions.     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   Can C/w seroquel 25 mg TID PRN and IV haldol PRN if 2nd agent needed   Tolerating adjusted diet. Remains high aspiration risk due to underlying mental status   Unfortunately at this time, there does not appear to be a treatable etiology of pts advanced illness (multiple barriers to even palliative neoplastic treatment)    #Leucocytosis   - was improving  - was determined to be secondary to steroids  - no longer being followed as pt afebrile and no concern for sepsis     Diet  SLP evaluated pt 9/11, recommending puree diet     DVT ppx: scds  gi ppx: IV ppi qd    code: dnr/dni; palliative involved, currently awaiting hospice [facility] inpt arrangements vs. Home hospice      63 male with a recent diagnosis of lung adenocarcinoma with brain mets presented with report of acute metabolic encephalopathy, Prolonged hospital course with workup negative for infection, not suggestive of leptomeningeal carcinomatosis, however AMS not better explained than by current CNS malignancy. Plan initially for home hospice care when able, however now family may not be able to care for him at home with hospice services. Referral sent [facility] for facility placement, given patient is undocumented/uninsured will need to be [facility] Denied from [facility], awaiting response from [facility], placed PPD per their instructions.     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   Can C/w seroquel 25 mg TID PRN and IV haldol PRN if 2nd agent needed   Tolerating adjusted diet. Remains high aspiration risk due to underlying mental status   Unfortunately at this time, there does not appear to be a treatable etiology of pts advanced illness (multiple barriers to even palliative neoplastic treatment)    #Leucocytosis   - was improving  - was determined to be secondary to steroids  - no longer being followed as pt afebrile and no concern for sepsis     Diet  SLP evaluated pt 9/11, recommending puree diet     DVT ppx: scds    code: dnr/dni; palliative involved, currently awaiting hospice [facility] inpt arrangements vs. Home hospice

## 2023-09-30 NOTE — PROGRESS NOTE ADULT - SUBJECTIVE AND OBJECTIVE BOX
[facility] Division of Medicine    Chief Complaint:      SUBJECTIVE / OVERNIGHT EVENTS: Pt seen at the bedside. States they are feeling improved from day prior.  Patient denies chest pain, SOB, abd pain, N/V, fever, chills, dysuria or any other complaints. All remainder ROS negative.     MEDICATIONS  (STANDING):  levETIRAcetam 1000 milliGRAM(s) Oral two times a day  pantoprazole   Suspension 40 milliGRAM(s) Oral daily    MEDICATIONS  (PRN):  haloperidol    Injectable 2.5 milliGRAM(s) IV Push every 6 hours PRN agitaion 2nd line  hydrALAZINE Injectable 10 milliGRAM(s) IV Push every 4 hours PRN for sbp above 160  QUEtiapine 25 milliGRAM(s) Oral three times a day PRN agitation      I&O's Summary    29 Sep 2023 07:01  -  30 Sep 2023 07:00  --------------------------------------------------------  IN: 1200 mL / OUT: 850 mL / NET: 350 mL        PHYSICAL EXAM:  Vital Signs Last 24 Hrs  T(C): 36.9 (30 Sep 2023 08:26), Max: 37.1 (29 Sep 2023 20:40)  T(F): 98.5 (30 Sep 2023 08:26), Max: 98.8 (29 Sep 2023 20:40)  HR: 69 (30 Sep 2023 08:26) (66 - 82)  BP: 147/87 (30 Sep 2023 08:26) (129/82 - 147/89)  BP(mean): --  RR: 18 (30 Sep 2023 08:26) (18 - 18)  SpO2: 96% (30 Sep 2023 08:26) (95% - 98%)    Parameters below as of 30 Sep 2023 04:44  Patient On (Oxygen Delivery Method): room air          GENERAL: not in acute distress  HEENT:  Clear conjunctiva, PERRL,  EOMI, moist oral mucosa no pharyngeal injection or exudates, no cervical LAD  RESP:  Non-labored breathing pattern, lungs clear to ausculation, no wheezes or crackles appreciated  CV: Regular rate and rhythm, no murmurs appreciated, no lower extremity edema, peripheral pulses are 2+ bilaterally  GI: Soft, non-tender, non-distended  NEURO: Awake, alert, conversant, upper and lower extremity strength 5/5, light touch sensation grossly intact  PSYCH: Calm, cooperative, A&Ox3  SKIN: No rash or lesions, warm and dry      LABS:                    CAPILLARY BLOOD GLUCOSE          IMAGING:                                   [facility] Division of Medicine    Chief Complaint:  AMS    SUBJECTIVE / OVERNIGHT EVENTS: Pt seen at the bedside. Follows no commands, awake but not alert. ROS unable to be obtained    MEDICATIONS  (STANDING):  levETIRAcetam 1000 milliGRAM(s) Oral two times a day  pantoprazole   Suspension 40 milliGRAM(s) Oral daily    MEDICATIONS  (PRN):  haloperidol    Injectable 2.5 milliGRAM(s) IV Push every 6 hours PRN agitaion 2nd line  hydrALAZINE Injectable 10 milliGRAM(s) IV Push every 4 hours PRN for sbp above 160  QUEtiapine 25 milliGRAM(s) Oral three times a day PRN agitation      I&O's Summary    29 Sep 2023 07:01  -  30 Sep 2023 07:00  --------------------------------------------------------  IN: 1200 mL / OUT: 850 mL / NET: 350 mL        PHYSICAL EXAM:  Vital Signs Last 24 Hrs  T(C): 36.9 (30 Sep 2023 08:26), Max: 37.1 (29 Sep 2023 20:40)  T(F): 98.5 (30 Sep 2023 08:26), Max: 98.8 (29 Sep 2023 20:40)  HR: 69 (30 Sep 2023 08:26) (66 - 82)  BP: 147/87 (30 Sep 2023 08:26) (129/82 - 147/89)  BP(mean): --  RR: 18 (30 Sep 2023 08:26) (18 - 18)  SpO2: 96% (30 Sep 2023 08:26) (95% - 98%)    Parameters below as of 30 Sep 2023 04:44  Patient On (Oxygen Delivery Method): room air          GENERAL: not in acute distress  HEENT:  Clear conjunctiva, PERRL, moist oral mucosa  RESP:  Non-labored breathing pattern, lungs clear to ausculation, no wheezes or crackles appreciated  CV: Regular rate and rhythm, no murmurs appreciated, no lower extremity edema, peripheral pulses are 2+ bilaterally  GI: Soft, non-tender, non-distended  NEURO: Awake, alert, conversant, unable to fully assess as pt not following commands  PSYCH: A&Ox0  SKIN: No rash or lesions, warm and dry      LABS:                    CAPILLARY BLOOD GLUCOSE          IMAGING:

## 2023-10-01 LAB
GAMMA INTERFERON BACKGROUND BLD IA-ACNC: 0.04 IU/ML — SIGNIFICANT CHANGE UP
M TB IFN-G BLD-IMP: NEGATIVE — SIGNIFICANT CHANGE UP
M TB IFN-G CD4+ BCKGRND COR BLD-ACNC: -0.01 IU/ML — SIGNIFICANT CHANGE UP
M TB IFN-G CD4+CD8+ BCKGRND COR BLD-ACNC: -0.01 IU/ML — SIGNIFICANT CHANGE UP
QUANT TB PLUS MITOGEN MINUS NIL: 8.66 IU/ML — SIGNIFICANT CHANGE UP

## 2023-10-01 PROCEDURE — 99232 SBSQ HOSP IP/OBS MODERATE 35: CPT

## 2023-10-01 RX ADMIN — LEVETIRACETAM 1000 MILLIGRAM(S): 250 TABLET, FILM COATED ORAL at 05:08

## 2023-10-01 RX ADMIN — LEVETIRACETAM 1000 MILLIGRAM(S): 250 TABLET, FILM COATED ORAL at 18:37

## 2023-10-01 RX ADMIN — PANTOPRAZOLE SODIUM 40 MILLIGRAM(S): 20 TABLET, DELAYED RELEASE ORAL at 13:28

## 2023-10-01 NOTE — PROGRESS NOTE ADULT - SUBJECTIVE AND OBJECTIVE BOX
[facility] Division of Medicine    Chief Complaint:  AMS    SUBJECTIVE / OVERNIGHT EVENTS: Pt seen at the bedside. Follows no commands, awake but not alert. ROS unable to be obtained    MEDICATIONS  (STANDING):  levETIRAcetam 1000 milliGRAM(s) Oral two times a day  pantoprazole   Suspension 40 milliGRAM(s) Oral daily    MEDICATIONS  (PRN):  haloperidol    Injectable 2.5 milliGRAM(s) IV Push every 6 hours PRN agitaion 2nd line  hydrALAZINE Injectable 10 milliGRAM(s) IV Push every 4 hours PRN for sbp above 160  QUEtiapine 25 milliGRAM(s) Oral three times a day PRN agitation      I&O's Summary    30 Sep 2023 07:01  -  01 Oct 2023 07:00  --------------------------------------------------------  IN: 640 mL / OUT: 200 mL / NET: 440 mL    01 Oct 2023 07:01  -  01 Oct 2023 14:08  --------------------------------------------------------  IN: 0 mL / OUT: 500 mL / NET: -500 mL        PHYSICAL EXAM:  Vital Signs Last 24 Hrs  T(C): 36.6 (01 Oct 2023 07:55), Max: 36.9 (30 Sep 2023 16:22)  T(F): 97.8 (01 Oct 2023 07:55), Max: 98.4 (30 Sep 2023 16:22)  HR: 72 (01 Oct 2023 07:55) (72 - 80)  BP: 105/69 (01 Oct 2023 07:55) (105/69 - 150/84)  BP(mean): 106 (30 Sep 2023 16:22) (106 - 106)  RR: 16 (01 Oct 2023 07:55) (15 - 16)  SpO2: 95% (01 Oct 2023 07:55) (95% - 96%)    Parameters below as of 01 Oct 2023 05:07  Patient On (Oxygen Delivery Method): room air        GENERAL: not in acute distress  HEENT:  Clear conjunctiva, PERRL, moist oral mucosa  RESP:  Non-labored breathing pattern, lungs clear to ausculation, no wheezes or crackles appreciated  CV: Regular rate and rhythm, no murmurs appreciated, no lower extremity edema, peripheral pulses are 2+ bilaterally  GI: Soft, non-tender, non-distended  NEURO: Awake, alert, conversant, unable to fully assess as pt not following commands  PSYCH: A&Ox0  SKIN: No rash or lesions, warm and dry      LABS:                        12.0   6.88  )-----------( 263      ( 30 Sep 2023 19:33 )             35.9                     CAPILLARY BLOOD GLUCOSE          IMAGING:

## 2023-10-01 NOTE — PROGRESS NOTE ADULT - ASSESSMENT
63 male with a recent diagnosis of lung adenocarcinoma with brain mets presented with report of acute metabolic encephalopathy, Prolonged hospital course with workup negative for infection, not suggestive of leptomeningeal carcinomatosis, however AMS not better explained than by current CNS malignancy. Plan initially for home hospice care when able, however now family may not be able to care for him at home with hospice services. [facility] for [facility] placement, given patient is undocumented/uninsured will need to be [facility] Denied from [facility], awaiting response from [facility], placed PPD per their instructions.     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   - Can C/w seroquel 25 mg TID PRN and IV haldol PRN if 2nd agent needed   - Tolerating adjusted diet. Remains high aspiration risk due to underlying mental status   - Unfortunately at this time, there does not appear to be a treatable etiology of pts advanced illness (multiple barriers to even palliative neoplastic treatment)    #Leucocytosis   - was improving  - was determined to be secondary to steroids  - no longer being followed as pt afebrile and no concern for sepsis     Diet  SLP evaluated pt 9/11, recommending puree diet     DVT ppx: scds    code: dnr/dni; palliative involved, currently awaiting hospice theod[facility] inpt arrangements vs. Home hospice

## 2023-10-02 PROCEDURE — 99232 SBSQ HOSP IP/OBS MODERATE 35: CPT

## 2023-10-02 RX ADMIN — LEVETIRACETAM 1000 MILLIGRAM(S): 250 TABLET, FILM COATED ORAL at 17:25

## 2023-10-02 RX ADMIN — PANTOPRAZOLE SODIUM 40 MILLIGRAM(S): 20 TABLET, DELAYED RELEASE ORAL at 13:18

## 2023-10-02 RX ADMIN — LEVETIRACETAM 1000 MILLIGRAM(S): 250 TABLET, FILM COATED ORAL at 05:36

## 2023-10-02 NOTE — PROGRESS NOTE ADULT - SUBJECTIVE AND OBJECTIVE BOX
[facility] Division of Medicine    Chief Complaint:      SUBJECTIVE / OVERNIGHT EVENTS: Pt seen at the bedside. Follows no commands, does not participate in any meaningful conversation, awake but not alert. ROS unable to be obtained    MEDICATIONS  (STANDING):  levETIRAcetam 1000 milliGRAM(s) Oral two times a day  pantoprazole   Suspension 40 milliGRAM(s) Oral daily    MEDICATIONS  (PRN):  haloperidol    Injectable 2.5 milliGRAM(s) IV Push every 6 hours PRN agitaion 2nd line  hydrALAZINE Injectable 10 milliGRAM(s) IV Push every 4 hours PRN for sbp above 160  QUEtiapine 25 milliGRAM(s) Oral three times a day PRN agitation      I&O's Summary    01 Oct 2023 07:01  -  02 Oct 2023 07:00  --------------------------------------------------------  IN: 353 mL / OUT: 850 mL / NET: -497 mL        PHYSICAL EXAM:  Vital Signs Last 24 Hrs  T(C): 36.7 (02 Oct 2023 12:00), Max: 36.8 (02 Oct 2023 07:44)  T(F): 98.1 (02 Oct 2023 12:00), Max: 98.3 (02 Oct 2023 07:44)  HR: 84 (02 Oct 2023 12:00) (74 - 92)  BP: 103/64 (02 Oct 2023 12:00) (103/64 - 132/82)  BP(mean): 92 (02 Oct 2023 04:48) (84 - 99)  RR: 16 (02 Oct 2023 12:00) (16 - 17)  SpO2: 95% (02 Oct 2023 12:00) (92% - 98%)    Parameters below as of 02 Oct 2023 12:00  Patient On (Oxygen Delivery Method): room air      GENERAL: not in acute distress  HEENT:  Clear conjunctiva, PERRL, moist oral mucosa  RESP:  Non-labored breathing pattern, lungs clear to ausculation, no wheezes or crackles appreciated  CV: Regular rate and rhythm, no murmurs appreciated, no lower extremity edema, peripheral pulses are 2+ bilaterally  GI: Soft, non-tender, non-distended  NEURO: Awake, alert, conversant, unable to fully assess as pt not following commands  PSYCH: A&Ox0  SKIN: No rash or lesions, warm and dry        LABS:                        12.0   6.88  )-----------( 263      ( 30 Sep 2023 19:33 )             35.9                     CAPILLARY BLOOD GLUCOSE          IMAGING:

## 2023-10-02 NOTE — CHART NOTE - NSCHARTNOTESELECT_GEN_ALL_CORE
EEG prelim
Event Note
EEG prelim
Nutrition Services

## 2023-10-02 NOTE — CHART NOTE - NSCHARTNOTEFT_GEN_A_CORE
Source: Patient [ ]  Family [ ]   other [EMR ]  63 male with a recent diagnosis of lung adenocarcinoma with brain mets presented with report of acute metabolic encephalopathy, Prolonged hospital course with workup negative for infection, not suggestive of leptomeningeal carcinomatosis, however AMS not better explained than by current CNS malignancy. Plan initially for home hospice care when able, however now family may not be able to care for him at home with hospice services. [facility] sent [facility] for facility placement, given patient is undocumented/uninsured will need to be [facility] Denied from [facility], awaiting response from [facility], placed PPD per their instructions.     Current Diet: Minced and moist, moderately thick  ensure enlive TID     PO intake:  < 50% [ ]   50-75%  [X ]   %  [ ]  other :    Source for PO intake [ ] Patient [ ] family [X ] chart [ ] staff [ ] other    Current Weight:   (10/2) 124.9 RD bed scale weight obtained   (9/24) 123lbs   Rd bed scale weight obtained 121lbs   (9/16) 132.2lbs   (9/14) 134.4  (9/10) 133.1  (9/7) 135.5    % Weight Change     Pertinent Medications: MEDICATIONS  (STANDING):  levETIRAcetam 1000 milliGRAM(s) Oral two times a day  pantoprazole   Suspension 40 milliGRAM(s) Oral daily    MEDICATIONS  (PRN):  haloperidol    Injectable 2.5 milliGRAM(s) IV Push every 6 hours PRN agitaion 2nd line  hydrALAZINE Injectable 10 milliGRAM(s) IV Push every 4 hours PRN for sbp above 160  QUEtiapine 25 milliGRAM(s) Oral three times a day PRN agitation    Pertinent Labs: CBC Full  -  ( 30 Sep 2023 19:33 )  WBC Count : 6.88 K/uL  RBC Count : 4.15 M/uL  Hemoglobin : 12.0 g/dL  Hematocrit : 35.9 %  Platelet Count - Automated : 263 K/uL  Mean Cell Volume : 86.5 fl  Mean Cell Hemoglobin : 28.9 pg  Mean Cell Hemoglobin Concentration : 33.4 gm/dL    Skin: impaired     Nutrition focused physical exam conducted - found signs of malnutrition [ ]absent [X ]present    Subcutaneous fat loss: [ ] Orbital fat pads region, [ ]Buccal fat region, [ ]Triceps region,  [ ]Ribs region    Muscle wasting: [X ]Temples region, [X ]Clavicle region, [ X]Shoulder region, [ ]Scapula region, [ ]Interosseous region,  [ ]thigh region, [ ]Calf region    Estimated Needs:   [X ] no change since previous assessment  [ ] recalculated:     Current Nutrition Diagnosis: Pt was at nutrition risk for increased needs but due to 9.22% weight loss with NFPE findings pt now with severe chronic malnutrition related to inability to meet sufficient protein-energy in setting of multiple comorbidities as evidenced by meeting mod muscle loss, >5% weight loss x1 month.   Pts diet advanced to mod thick liquids and can provide ensure enlive TID to provide an additional 1050cal, 60g pro/day.  Pt awaiting transfer to hospice either at home or at facility possibly today/ tomorrow. Pt remains appropriate for total feed as A&Ox0 nonverbal. Tolerating diet consistency well per documentation. RD to remain available     Recommendations:   Rx: MVI, Thiamine, Folic acid daily  continue ensure enlive TID   Encourage po intake, monitor diet tolerance, and provide assistance at meals as needed  Obtain daily weights to monitor trends  Monitor nutrition related labs; glucose, renal     Monitoring and Evaluation:   [ ] PO intake [ ] Tolerance to diet prescription [X] Weights  [X] Follow up per protocol [X] Labs:

## 2023-10-03 LAB — SARS-COV-2 RNA SPEC QL NAA+PROBE: SIGNIFICANT CHANGE UP

## 2023-10-03 PROCEDURE — 99232 SBSQ HOSP IP/OBS MODERATE 35: CPT

## 2023-10-03 RX ADMIN — LEVETIRACETAM 1000 MILLIGRAM(S): 250 TABLET, FILM COATED ORAL at 17:55

## 2023-10-03 RX ADMIN — PANTOPRAZOLE SODIUM 40 MILLIGRAM(S): 20 TABLET, DELAYED RELEASE ORAL at 17:55

## 2023-10-03 RX ADMIN — LEVETIRACETAM 1000 MILLIGRAM(S): 250 TABLET, FILM COATED ORAL at 06:03

## 2023-10-03 NOTE — PROGRESS NOTE ADULT - SUBJECTIVE AND OBJECTIVE BOX
[facility] Division of Medicine    Chief Complaint:      SUBJECTIVE / OVERNIGHT EVENTS: Pt seen at the bedside. Follows no commands, does not participate in any meaningful conversation, awake but not alert. ROS unable to be obtained    MEDICATIONS  (STANDING):  levETIRAcetam 1000 milliGRAM(s) Oral two times a day  pantoprazole   Suspension 40 milliGRAM(s) Oral daily    MEDICATIONS  (PRN):  haloperidol    Injectable 2.5 milliGRAM(s) IV Push every 6 hours PRN agitaion 2nd line  hydrALAZINE Injectable 10 milliGRAM(s) IV Push every 4 hours PRN for sbp above 160  QUEtiapine 25 milliGRAM(s) Oral three times a day PRN agitation      I&O's Summary    02 Oct 2023 07:01  -  03 Oct 2023 07:00  --------------------------------------------------------  IN: 0 mL / OUT: 750 mL / NET: -750 mL        PHYSICAL EXAM:  Vital Signs Last 24 Hrs  T(C): 36.7 (03 Oct 2023 08:15), Max: 36.8 (02 Oct 2023 16:15)  T(F): 98.1 (03 Oct 2023 08:15), Max: 98.3 (02 Oct 2023 16:15)  HR: 83 (03 Oct 2023 08:15) (83 - 92)  BP: 126/80 (03 Oct 2023 08:15) (122/74 - 144/84)  BP(mean): --  RR: 18 (03 Oct 2023 08:15) (15 - 18)  SpO2: 95% (03 Oct 2023 08:15) (94% - 97%)    Parameters below as of 03 Oct 2023 07:26  Patient On (Oxygen Delivery Method): room air      GENERAL: not in acute distress  HEENT:  Clear conjunctiva, PERRL, moist oral mucosa  RESP:  Non-labored breathing pattern, lungs clear to ausculation, no wheezes or crackles appreciated  CV: Regular rate and rhythm, no murmurs appreciated, no lower extremity edema, peripheral pulses are 2+ bilaterally  GI: Soft, non-tender, non-distended  NEURO: Awake, alert, conversant, unable to fully assess as pt not following commands  PSYCH: A&Ox0  SKIN: No rash or lesions, warm and dry      LABS:                    CAPILLARY BLOOD GLUCOSE          IMAGING:

## 2023-10-04 ENCOUNTER — TRANSCRIPTION ENCOUNTER (OUTPATIENT)
Age: 63
End: 2023-10-04

## 2023-10-04 PROCEDURE — 99232 SBSQ HOSP IP/OBS MODERATE 35: CPT

## 2023-10-04 RX ORDER — PANTOPRAZOLE SODIUM 20 MG/1
1 TABLET, DELAYED RELEASE ORAL
Qty: 0 | Refills: 0 | DISCHARGE
Start: 2023-10-04

## 2023-10-04 RX ORDER — QUETIAPINE FUMARATE 200 MG/1
1 TABLET, FILM COATED ORAL
Qty: 0 | Refills: 0 | DISCHARGE
Start: 2023-10-04

## 2023-10-04 RX ORDER — LEVETIRACETAM 250 MG/1
1 TABLET, FILM COATED ORAL
Qty: 0 | Refills: 0 | DISCHARGE
Start: 2023-10-04

## 2023-10-04 RX ADMIN — LEVETIRACETAM 1000 MILLIGRAM(S): 250 TABLET, FILM COATED ORAL at 16:52

## 2023-10-04 RX ADMIN — LEVETIRACETAM 1000 MILLIGRAM(S): 250 TABLET, FILM COATED ORAL at 05:01

## 2023-10-04 RX ADMIN — PANTOPRAZOLE SODIUM 40 MILLIGRAM(S): 20 TABLET, DELAYED RELEASE ORAL at 10:38

## 2023-10-04 NOTE — PROGRESS NOTE ADULT - SUBJECTIVE AND OBJECTIVE BOX
[facility] Division of Medicine    Chief Complaint:      SUBJECTIVE / OVERNIGHT EVENTS: Pt seen at the bedside. Follows no commands, does not participate in any meaningful conversation, awake but not alert. ROS unable to be obtained    MEDICATIONS  (STANDING):  levETIRAcetam 1000 milliGRAM(s) Oral two times a day  pantoprazole   Suspension 40 milliGRAM(s) Oral daily    MEDICATIONS  (PRN):  haloperidol    Injectable 2.5 milliGRAM(s) IV Push every 6 hours PRN agitaion 2nd line  hydrALAZINE Injectable 10 milliGRAM(s) IV Push every 4 hours PRN for sbp above 160  QUEtiapine 25 milliGRAM(s) Oral three times a day PRN agitation      I&O's Summary    03 Oct 2023 07:01  -  04 Oct 2023 07:00  --------------------------------------------------------  IN: 0 mL / OUT: 400 mL / NET: -400 mL        PHYSICAL EXAM:  Vital Signs Last 24 Hrs  T(C): 37.1 (04 Oct 2023 07:49), Max: 37.1 (03 Oct 2023 16:40)  T(F): 98.7 (04 Oct 2023 07:49), Max: 98.7 (03 Oct 2023 16:40)  HR: 85 (04 Oct 2023 07:49) (85 - 105)  BP: 117/71 (04 Oct 2023 07:49) (101/64 - 139/73)  BP(mean): 76 (03 Oct 2023 16:40) (76 - 76)  RR: 16 (04 Oct 2023 07:49) (16 - 18)  SpO2: 92% (04 Oct 2023 07:49) (91% - 96%)    Parameters below as of 04 Oct 2023 07:49  Patient On (Oxygen Delivery Method): room air        GENERAL: not in acute distress  HEENT:  Clear conjunctiva, PERRL, moist oral mucosa  RESP:  Non-labored breathing pattern, lungs clear to ausculation, no wheezes or crackles appreciated  CV: Regular rate and rhythm, no murmurs appreciated, no lower extremity edema, peripheral pulses are 2+ bilaterally  GI: Soft, non-tender, non-distended  NEURO: Awake, alert, conversant, unable to fully assess as pt not following commands  PSYCH: A&Ox0  SKIN: No rash or lesions, warm and dry      LABS:                    CAPILLARY BLOOD GLUCOSE          IMAGING:

## 2023-10-04 NOTE — DISCHARGE NOTE PROVIDER - CARE PROVIDER_API CALL
Michael Hammonds  Hematology  [address] [place] [address]  Phone: [phone]  Fax: [phone]  Follow Up Time:     Emiliano Hanson  Neurology  [address] [facility] [address]  [place] [address]  Phone: [phone]  Fax: [phone]  Follow Up Time:     Yony Rossi  Radiation Oncology  [address] [place] [address]  Phone: [phone]  Fax: [phone]  Follow Up Time:

## 2023-10-04 NOTE — DISCHARGE NOTE PROVIDER - PROVIDER TOKENS
PROVIDER:[TOKEN:[[id number]]],PROVIDER:[TOKEN:[[id number]]],PROVIDER:[TOKEN:[[id number]]]

## 2023-10-04 NOTE — DISCHARGE NOTE PROVIDER - HOSPITAL COURSE
The patient is a 63y Male who initially presented 7/31/23 with right sided weakness. He was seen by the stroke team. Chest CT showed lesion suspicious for lung cancer.  Head CT showed left posterior parietal intraparenchymal hemorrhage measuring approximately 2.1 cm in greatest diameter, with surrounding edema. Smaller eft inferior frontal hemorrhagic lesion with surrounding edema, small left posterior temporal hemorrhagic lesion with surrounding edema and small right temporal hemorrhagic lesion with surrounding edema. He had lung biopsy by IR and was seen by the oncology and neurosurgery services. He was discharged on 8/16/23.He was brought back on 8/30/23 with what was described as altered mental status. He was again seen by oncology and neurosurgery. MRI again shows hemorrhagic metastases with surrounding edema. Prolonged hospital course with workup negative for infection, not suggestive of leptomeningeal carcinomatosis, however AMS not better explained than by current CNS malignancy. No neuro or neurosurgical path to improvement identified. AEDs reduced and pulse steroids used w/o any benefit. Tolerating dysphagia diet. Remains high aspiration risk due to underlying mental status. Unfortunately at this time, there does not appear to be a treatable etiology of pts advanced illness (multiple barriers to even palliative neoplastic treatment). Palliative Care/Ethics consulted, DNR/DNI,  currently awaiting hospice [facility] inpt arrangements vs. Home hospice.    The patient is a 63y Male who initially presented 7/31/23 with right sided weakness. He was seen by the stroke team. Chest CT showed lesion suspicious for lung cancer.  Head CT showed left posterior parietal intraparenchymal hemorrhage measuring approximately 2.1 cm in greatest diameter, with surrounding edema. Smaller eft inferior frontal hemorrhagic lesion with surrounding edema, small left posterior temporal hemorrhagic lesion with surrounding edema and small right temporal hemorrhagic lesion with surrounding edema. He had lung biopsy by IR and was seen by the oncology and neurosurgery services. He was discharged on 8/16/23.He was brought back on 8/30/23 with what was described as altered mental status. He was again seen by oncology and neurosurgery. MRI again shows hemorrhagic metastases with surrounding edema. Prolonged hospital course with workup negative for infection, not suggestive of leptomeningeal carcinomatosis, however AMS not better explained than by current CNS malignancy. No neuro or neurosurgical path to improvement identified. AEDs reduced and pulse steroids used w/o any benefit. Tolerating dysphagia diet. Remains high aspiration risk due to underlying mental status. Unfortunately at this time, there does not appear to be a treatable etiology of pts advanced illness (multiple barriers to even palliative neoplastic treatment). Palliative Care/Ethics consulted, DNR/DNI. D/C to hospice theodora in at [facility].

## 2023-10-04 NOTE — DISCHARGE NOTE PROVIDER - CARE PROVIDERS DIRECT ADDRESSES
[email or web address]

## 2023-10-04 NOTE — DISCHARGE NOTE PROVIDER - NSDCMRMEDTOKEN_GEN_ALL_CORE_FT
acetaminophen 325 mg oral tablet: 2 tab(s) orally every 6 hours As needed Temp greater or equal to 38C (100.4F), Mild Pain (1 - 3)  atorvastatin 80 mg oral tablet: 1 tab(s) orally once a day (at bedtime)  folic acid 1 mg oral tablet: 1 tab(s) orally once a day  gabapentin 300 mg oral capsule: 1 cap(s) orally 2 times a day  methocarbamol 750 mg oral tablet: 1 tab(s) orally 2 times a day as needed for Muscle Spasm  Multiple Vitamins oral tablet: 1 tab(s) orally once a day  senna leaf extract oral tablet: 2 tab(s) orally once a day (at bedtime)   levETIRAcetam 1000 mg oral tablet: 1 tab(s) orally 2 times a day  pantoprazole 40 mg oral granule, delayed release: 1 tab(s) orally once a day  QUEtiapine 25 mg oral tablet: 1 tab(s) orally 3 times a day As needed agitation   acetaminophen 325 mg oral tablet: 2 tab(s) orally every 6 hours As needed Mild Pain (1 - 3)  levETIRAcetam 1000 mg oral tablet: 1 tab(s) orally 2 times a day  pantoprazole 40 mg oral granule, delayed release: 1 tab(s) orally once a day  QUEtiapine 25 mg oral tablet: 1 tab(s) orally 3 times a day As needed agitation

## 2023-10-04 NOTE — DISCHARGE NOTE PROVIDER - DETAILS OF MALNUTRITION DIAGNOSIS/DIAGNOSES
This patient has been assessed with a concern for Malnutrition and was treated during this hospitalization for the following Nutrition diagnosis/diagnoses:     -  09/27/2023: Severe protein-calorie malnutrition

## 2023-10-04 NOTE — DISCHARGE NOTE PROVIDER - ATTENDING DISCHARGE PHYSICAL EXAMINATION:
T(C): 37.1 (10-06-23 @ 08:28), Max: 37.7 (10-05-23 @ 12:10)  HR: 86 (10-06-23 @ 08:28) (86 - 100)  BP: 122/74 (10-06-23 @ 08:28) (122/74 - 145/85)  RR: 18 (10-06-23 @ 08:28) (16 - 18)  SpO2: 94% (10-06-23 @ 08:28) (93% - 97%)      GENERAL: not in acute distress  HEENT:  Clear conjunctiva, PERRL, moist oral mucosa  RESP:  Non-labored breathing pattern, lungs clear to ausculation, no wheezes or crackles appreciated  CV: Regular rate and rhythm, no murmurs appreciated, no lower extremity edema, peripheral pulses are 2+ bilaterally  GI: Soft, non-tender, non-distended  NEURO: Awake, alert, conversant, unable to fully assess as pt not following commands  PSYCH: A&Ox0  SKIN: No rash or lesions, warm and dry

## 2023-10-04 NOTE — DISCHARGE NOTE PROVIDER - NSDCCPCAREPLAN_GEN_ALL_CORE_FT
PRINCIPAL DISCHARGE DIAGNOSIS  Diagnosis: Hemorrhagic stroke  Assessment and Plan of Treatment:       SECONDARY DISCHARGE DIAGNOSES  Diagnosis: Lung cancer  Assessment and Plan of Treatment:     Diagnosis: Brain mass  Assessment and Plan of Treatment:     Diagnosis: Dysphagia  Assessment and Plan of Treatment:      PRINCIPAL DISCHARGE DIAGNOSIS  Diagnosis: Hemorrhagic stroke  Assessment and Plan of Treatment: No surgical intervention indicated  Hospice Care      SECONDARY DISCHARGE DIAGNOSES  Diagnosis: Lung cancer  Assessment and Plan of Treatment: No treatments indicated    Diagnosis: Brain mass  Assessment and Plan of Treatment: No treatments indicated    Diagnosis: AMS (altered mental status)  Assessment and Plan of Treatment: Probably due to above  Supportive care    Diagnosis: Dysphagia  Assessment and Plan of Treatment: Aspiration precautions

## 2023-10-05 PROCEDURE — 99232 SBSQ HOSP IP/OBS MODERATE 35: CPT

## 2023-10-05 RX ORDER — ACETAMINOPHEN 500 MG
650 TABLET ORAL EVERY 6 HOURS
Refills: 0 | Status: DISCONTINUED | OUTPATIENT
Start: 2023-10-05 | End: 2023-10-05

## 2023-10-05 RX ADMIN — Medication 650 MILLIGRAM(S): at 12:51

## 2023-10-05 RX ADMIN — LEVETIRACETAM 1000 MILLIGRAM(S): 250 TABLET, FILM COATED ORAL at 17:56

## 2023-10-05 RX ADMIN — Medication 650 MILLIGRAM(S): at 13:51

## 2023-10-05 RX ADMIN — LEVETIRACETAM 1000 MILLIGRAM(S): 250 TABLET, FILM COATED ORAL at 06:07

## 2023-10-05 RX ADMIN — PANTOPRAZOLE SODIUM 40 MILLIGRAM(S): 20 TABLET, DELAYED RELEASE ORAL at 12:51

## 2023-10-05 NOTE — PROGRESS NOTE ADULT - PROVIDER SPECIALTY LIST ADULT
Heme/Onc
Hospitalist
Internal Medicine
Internal Medicine
Neurology
Neurology
Neurosurgery
Palliative Care
Heme/Onc
Hospitalist
Internal Medicine
Internal Medicine
Neurology
Neurology
Heme/Onc
Hospitalist
Internal Medicine
Internal Medicine
Neurology
Palliative Care
Hospitalist
Internal Medicine
Neurology
Neurology
Palliative Care
Palliative Care
Hospitalist
Neurosurgery
Hospitalist
Rad Onc

## 2023-10-05 NOTE — PROGRESS NOTE ADULT - SUBJECTIVE AND OBJECTIVE BOX
[facility] Division of Medicine    Chief Complaint:      SUBJECTIVE / OVERNIGHT EVENTS: Pt seen at the bedside. Follows no commands, does not participate in any meaningful conversation, awake but not alert. ROS unable to be obtained    MEDICATIONS  (STANDING):  levETIRAcetam 1000 milliGRAM(s) Oral two times a day  pantoprazole   Suspension 40 milliGRAM(s) Oral daily    MEDICATIONS  (PRN):  acetaminophen     Tablet .. 650 milliGRAM(s) Oral every 6 hours PRN Mild Pain (1 - 3)  haloperidol    Injectable 2.5 milliGRAM(s) IV Push every 6 hours PRN agitaion 2nd line  hydrALAZINE Injectable 10 milliGRAM(s) IV Push every 4 hours PRN for sbp above 160  QUEtiapine 25 milliGRAM(s) Oral three times a day PRN agitation      I&O's Summary    04 Oct 2023 07:01  -  05 Oct 2023 07:00  --------------------------------------------------------  IN: 0 mL / OUT: 400 mL / NET: -400 mL    05 Oct 2023 07:01  -  05 Oct 2023 15:10  --------------------------------------------------------  IN: 0 mL / OUT: 225 mL / NET: -225 mL        PHYSICAL EXAM:  Vital Signs Last 24 Hrs  T(C): 37.7 (05 Oct 2023 12:10), Max: 37.7 (05 Oct 2023 12:10)  T(F): 99.8 (05 Oct 2023 12:10), Max: 99.8 (05 Oct 2023 12:10)  HR: 96 (05 Oct 2023 12:10) (76 - 96)  BP: 145/85 (05 Oct 2023 12:10) (108/69 - 145/85)  BP(mean): 83 (04 Oct 2023 20:52) (83 - 89)  RR: 16 (05 Oct 2023 12:10) (16 - 17)  SpO2: 96% (05 Oct 2023 12:10) (94% - 96%)    Parameters below as of 05 Oct 2023 12:10  Patient On (Oxygen Delivery Method): room air        GENERAL: not in acute distress  HEENT:  Clear conjunctiva, PERRL, moist oral mucosa  RESP:  Non-labored breathing pattern, lungs clear to ausculation, no wheezes or crackles appreciated  CV: Regular rate and rhythm, no murmurs appreciated, no lower extremity edema, peripheral pulses are 2+ bilaterally  GI: Soft, non-tender, non-distended  NEURO: Awake, alert, conversant, unable to fully assess as pt not following commands  PSYCH: A&Ox0  SKIN: No rash or lesions, warm and dry      LABS:                    CAPILLARY BLOOD GLUCOSE          IMAGING:

## 2023-10-05 NOTE — PROGRESS NOTE ADULT - REASON FOR ADMISSION
AMS

## 2023-10-05 NOTE — PROGRESS NOTE ADULT - NUTRITIONAL ASSESSMENT
This patient has been assessed with a concern for Malnutrition and has been determined to have a diagnosis/diagnoses of Severe protein-calorie malnutrition.    This patient is being managed with:   Diet Minced and Moist-  Moderately Thick Liquids (MODTHICKLIQS)  Supplement Feeding Modality:  Oral  Ensure Enlive Cans or Servings Per Day:  3       Frequency:  Three Times a day  Entered: Sep 28 2023  4:15PM  
This patient has been assessed with a concern for Malnutrition and has been determined to have a diagnosis/diagnoses of Severe protein-calorie malnutrition.    This patient is being managed with:   Diet Minced and Moist-  Moderately Thick Liquids (MODTHICKLIQS)  Entered: Sep 20 2023 11:40AM

## 2023-10-06 ENCOUNTER — TRANSCRIPTION ENCOUNTER (OUTPATIENT)
Age: 63
End: 2023-10-06

## 2023-10-06 VITALS
TEMPERATURE: 99 F | DIASTOLIC BLOOD PRESSURE: 74 MMHG | RESPIRATION RATE: 18 BRPM | SYSTOLIC BLOOD PRESSURE: 122 MMHG | HEART RATE: 86 BPM | OXYGEN SATURATION: 94 %

## 2023-10-06 PROCEDURE — 84145 PROCALCITONIN (PCT): CPT

## 2023-10-06 PROCEDURE — 92610 EVALUATE SWALLOWING FUNCTION: CPT

## 2023-10-06 PROCEDURE — 87449 NOS EACH ORGANISM AG IA: CPT

## 2023-10-06 PROCEDURE — 84157 ASSAY OF PROTEIN OTHER: CPT

## 2023-10-06 PROCEDURE — 95708 EEG WO VID EA 12-26HR UNMNTR: CPT

## 2023-10-06 PROCEDURE — 87070 CULTURE OTHR SPECIMN AEROBIC: CPT

## 2023-10-06 PROCEDURE — 80307 DRUG TEST PRSMV CHEM ANLYZR: CPT

## 2023-10-06 PROCEDURE — 83615 LACTATE (LD) (LDH) ENZYME: CPT

## 2023-10-06 PROCEDURE — 95819 EEG AWAKE AND ASLEEP: CPT

## 2023-10-06 PROCEDURE — 87483 CNS DNA AMP PROBE TYPE 12-25: CPT

## 2023-10-06 PROCEDURE — 87077 CULTURE AEROBIC IDENTIFY: CPT

## 2023-10-06 PROCEDURE — 87040 BLOOD CULTURE FOR BACTERIA: CPT

## 2023-10-06 PROCEDURE — T1013: CPT

## 2023-10-06 PROCEDURE — 99239 HOSP IP/OBS DSCHRG MGMT >30: CPT

## 2023-10-06 PROCEDURE — 84146 ASSAY OF PROLACTIN: CPT

## 2023-10-06 PROCEDURE — 95711 VEEG 2-12 HR UNMONITORED: CPT

## 2023-10-06 PROCEDURE — 36415 COLL VENOUS BLD VENIPUNCTURE: CPT

## 2023-10-06 PROCEDURE — 95714 VEEG EA 12-26 HR UNMNTR: CPT

## 2023-10-06 PROCEDURE — 92526 ORAL FUNCTION THERAPY: CPT

## 2023-10-06 PROCEDURE — 82945 GLUCOSE OTHER FLUID: CPT

## 2023-10-06 PROCEDURE — 80051 ELECTROLYTE PANEL: CPT

## 2023-10-06 PROCEDURE — 77003 FLUOROGUIDE FOR SPINE INJECT: CPT

## 2023-10-06 PROCEDURE — 89051 BODY FLUID CELL COUNT: CPT

## 2023-10-06 PROCEDURE — 70553 MRI BRAIN STEM W/O & W/DYE: CPT

## 2023-10-06 PROCEDURE — 87205 SMEAR GRAM STAIN: CPT

## 2023-10-06 PROCEDURE — 85730 THROMBOPLASTIN TIME PARTIAL: CPT

## 2023-10-06 PROCEDURE — 87529 HSV DNA AMP PROBE: CPT

## 2023-10-06 PROCEDURE — 86480 TB TEST CELL IMMUN MEASURE: CPT

## 2023-10-06 PROCEDURE — 84443 ASSAY THYROID STIM HORMONE: CPT

## 2023-10-06 PROCEDURE — 86255 FLUORESCENT ANTIBODY SCREEN: CPT

## 2023-10-06 PROCEDURE — 86592 SYPHILIS TEST NON-TREP QUAL: CPT

## 2023-10-06 PROCEDURE — 96374 THER/PROPH/DIAG INJ IV PUSH: CPT

## 2023-10-06 PROCEDURE — 84484 ASSAY OF TROPONIN QUANT: CPT

## 2023-10-06 PROCEDURE — 81003 URINALYSIS AUTO W/O SCOPE: CPT

## 2023-10-06 PROCEDURE — 95700 EEG CONT REC W/VID EEG TECH: CPT

## 2023-10-06 PROCEDURE — 99285 EMERGENCY DEPT VISIT HI MDM: CPT | Mod: 25

## 2023-10-06 PROCEDURE — 81001 URINALYSIS AUTO W/SCOPE: CPT

## 2023-10-06 PROCEDURE — 80053 COMPREHEN METABOLIC PANEL: CPT

## 2023-10-06 PROCEDURE — 71045 X-RAY EXAM CHEST 1 VIEW: CPT

## 2023-10-06 PROCEDURE — 85610 PROTHROMBIN TIME: CPT

## 2023-10-06 PROCEDURE — 86703 HIV-1/HIV-2 1 RESULT ANTBDY: CPT

## 2023-10-06 PROCEDURE — 70450 CT HEAD/BRAIN W/O DYE: CPT | Mod: MA

## 2023-10-06 PROCEDURE — 87186 SC STD MICRODIL/AGAR DIL: CPT

## 2023-10-06 PROCEDURE — 80076 HEPATIC FUNCTION PANEL: CPT

## 2023-10-06 PROCEDURE — 83605 ASSAY OF LACTIC ACID: CPT

## 2023-10-06 PROCEDURE — 83735 ASSAY OF MAGNESIUM: CPT

## 2023-10-06 PROCEDURE — 82607 VITAMIN B-12: CPT

## 2023-10-06 PROCEDURE — 87799 DETECT AGENT NOS DNA QUANT: CPT

## 2023-10-06 PROCEDURE — 80048 BASIC METABOLIC PNL TOTAL CA: CPT

## 2023-10-06 PROCEDURE — 85025 COMPLETE CBC W/AUTO DIFF WBC: CPT

## 2023-10-06 PROCEDURE — 82962 GLUCOSE BLOOD TEST: CPT

## 2023-10-06 PROCEDURE — 87635 SARS-COV-2 COVID-19 AMP PRB: CPT

## 2023-10-06 PROCEDURE — 85027 COMPLETE CBC AUTOMATED: CPT

## 2023-10-06 PROCEDURE — 87899 AGENT NOS ASSAY W/OPTIC: CPT

## 2023-10-06 PROCEDURE — 93005 ELECTROCARDIOGRAM TRACING: CPT

## 2023-10-06 PROCEDURE — 88108 CYTOPATH CONCENTRATE TECH: CPT

## 2023-10-06 PROCEDURE — 87086 URINE CULTURE/COLONY COUNT: CPT

## 2023-10-06 RX ORDER — ACETAMINOPHEN 500 MG
2 TABLET ORAL
Qty: 0 | Refills: 0 | DISCHARGE
Start: 2023-10-06

## 2023-10-06 RX ADMIN — LEVETIRACETAM 1000 MILLIGRAM(S): 250 TABLET, FILM COATED ORAL at 05:35

## 2023-10-06 NOTE — DISCHARGE NOTE NURSING/CASE MANAGEMENT/SOCIAL WORK - PATIENT PORTAL LINK FT
You can access the FollowMyHealth Patient Portal offered by [facility] by registering at the following website: [email or web address]. By joining [facility]’s FollowMyHealth portal, you will also be able to view your health information using other applications (apps) compatible with our system.

## 2023-10-06 NOTE — DISCHARGE NOTE NURSING/CASE MANAGEMENT/SOCIAL WORK - NSDCPEFALRISK_GEN_ALL_CORE
For information on Fall & Injury Prevention, visit: [email or web address] OR  [email or web address] OR  https://www.cdc.gov/steadi/patient.html

## 2023-10-08 PROCEDURE — 70498 CT ANGIOGRAPHY NECK: CPT

## 2023-10-08 PROCEDURE — 86803 HEPATITIS C AB TEST: CPT

## 2023-10-08 PROCEDURE — 97530 THERAPEUTIC ACTIVITIES: CPT

## 2023-10-08 PROCEDURE — 97167 OT EVAL HIGH COMPLEX 60 MIN: CPT

## 2023-10-08 PROCEDURE — 97760 ORTHOTIC MGMT&TRAING 1ST ENC: CPT

## 2023-10-08 PROCEDURE — 93312 ECHO TRANSESOPHAGEAL: CPT

## 2023-10-08 PROCEDURE — 80061 LIPID PANEL: CPT

## 2023-10-08 PROCEDURE — 70450 CT HEAD/BRAIN W/O DYE: CPT | Mod: MA

## 2023-10-08 PROCEDURE — 71045 X-RAY EXAM CHEST 1 VIEW: CPT

## 2023-10-08 PROCEDURE — 80307 DRUG TEST PRSMV CHEM ANLYZR: CPT

## 2023-10-08 PROCEDURE — 85730 THROMBOPLASTIN TIME PARTIAL: CPT

## 2023-10-08 PROCEDURE — 88305 TISSUE EXAM BY PATHOLOGIST: CPT

## 2023-10-08 PROCEDURE — 80074 ACUTE HEPATITIS PANEL: CPT

## 2023-10-08 PROCEDURE — T1013: CPT

## 2023-10-08 PROCEDURE — 97163 PT EVAL HIGH COMPLEX 45 MIN: CPT

## 2023-10-08 PROCEDURE — 74177 CT ABD & PELVIS W/CONTRAST: CPT

## 2023-10-08 PROCEDURE — 71260 CT THORAX DX C+: CPT

## 2023-10-08 PROCEDURE — 85025 COMPLETE CBC W/AUTO DIFF WBC: CPT

## 2023-10-08 PROCEDURE — 80076 HEPATIC FUNCTION PANEL: CPT

## 2023-10-08 PROCEDURE — 85610 PROTHROMBIN TIME: CPT

## 2023-10-08 PROCEDURE — 83036 HEMOGLOBIN GLYCOSYLATED A1C: CPT

## 2023-10-08 PROCEDURE — 80048 BASIC METABOLIC PNL TOTAL CA: CPT

## 2023-10-08 PROCEDURE — 84484 ASSAY OF TROPONIN QUANT: CPT

## 2023-10-08 PROCEDURE — 76705 ECHO EXAM OF ABDOMEN: CPT

## 2023-10-08 PROCEDURE — 70553 MRI BRAIN STEM W/O & W/DYE: CPT | Mod: MA

## 2023-10-08 PROCEDURE — 99285 EMERGENCY DEPT VISIT HI MDM: CPT | Mod: 25

## 2023-10-08 PROCEDURE — 84100 ASSAY OF PHOSPHORUS: CPT

## 2023-10-08 PROCEDURE — 93005 ELECTROCARDIOGRAM TRACING: CPT

## 2023-10-08 PROCEDURE — 97535 SELF CARE MNGMENT TRAINING: CPT

## 2023-10-08 PROCEDURE — C8929: CPT

## 2023-10-08 PROCEDURE — 93320 DOPPLER ECHO COMPLETE: CPT

## 2023-10-08 PROCEDURE — 82962 GLUCOSE BLOOD TEST: CPT

## 2023-10-08 PROCEDURE — 70496 CT ANGIOGRAPHY HEAD: CPT

## 2023-10-08 PROCEDURE — 73600 X-RAY EXAM OF ANKLE: CPT

## 2023-10-08 PROCEDURE — 36415 COLL VENOUS BLD VENIPUNCTURE: CPT

## 2023-10-08 PROCEDURE — 80053 COMPREHEN METABOLIC PANEL: CPT

## 2023-10-08 PROCEDURE — 84145 PROCALCITONIN (PCT): CPT

## 2023-10-08 PROCEDURE — 85027 COMPLETE CBC AUTOMATED: CPT

## 2023-10-08 PROCEDURE — 77012 CT SCAN FOR NEEDLE BIOPSY: CPT

## 2023-10-08 PROCEDURE — 93325 DOPPLER ECHO COLOR FLOW MAPG: CPT

## 2023-10-08 PROCEDURE — 93970 EXTREMITY STUDY: CPT

## 2023-10-08 PROCEDURE — 71250 CT THORAX DX C-: CPT

## 2023-10-08 PROCEDURE — 83735 ASSAY OF MAGNESIUM: CPT

## 2024-02-12 NOTE — PROGRESS NOTE ADULT - ASSESSMENT
63 male with recent diagnosis of lung adenocarcinoma with brain mets presented with acute metabolic encephalopathy    Acute metabolic encephalopathy  - likely secondary to brain mets with vasogenic edema however imaging findings are unchanged from prior  - does not appear to be an infectious etiology as UA and CXR negative, WBC wnl and afebrile  - neuro sx state no intervention at this time, signed off  - spoke with oncology today, given minimal response to steroids pt's prognosis is poor, recommend neurology consult  - c/w decadron 6mg IV q8hr for now  - neuro consult recs appreciated  - radiation oncology will be available tomorrow  - c/w seroquel 25 mg TID PRN and IV haldol PRN as 2nd line  - keppra for seizure prophylaxis    lung adenocarcinoma with brain mets  - c/w decadron 6mg q8hr as per oncology   - previous provider informed radiation oncology about the consult, note pending   - keppra for seizure prophylaxis    DVT prophylaxis: SCD   - - -

## 2024-03-12 NOTE — H&P ADULT - PATIENT'S GENDER IDENTITY
Will send prednisone per action plan for patient to have on hand. Spoke with Jasmyn. She was in the hospital and was started on some different pulmonary medication. Set up follow up appt with Tata Lainez CNP for 3//22/24 to review new meds and follow up.   
Male

## 2024-05-03 NOTE — ED ADULT NURSE NOTE - SUICIDE SCREENING QUESTION 3
neuro cons dict  seen for dizziness  likely vertigo  sp fall-mechanical   back pain   old thoraco-lumbar fracture  antivert  pain control Patient unable to complete

## 2025-03-24 NOTE — PROVIDER CONTACT NOTE (FALL NOTIFICATION) - SITUATION
[facility] EMERGENCY DEPARTMENT  [address] [place] [address] AVE  [facility] [address]  Phone: [phone]    Joao Taylor was seen and treated in our emergency department on 3/24/2025.  He may return to work on 03/26/2025.         Thank you for choosing [facility].    Carlos Peña Jr., MD      
Pt found on ground other pt states he witnessed pt crawl out of the bed and land on his left shoulder